# Patient Record
Sex: FEMALE | Race: BLACK OR AFRICAN AMERICAN | Employment: UNEMPLOYED | URBAN - METROPOLITAN AREA
[De-identification: names, ages, dates, MRNs, and addresses within clinical notes are randomized per-mention and may not be internally consistent; named-entity substitution may affect disease eponyms.]

---

## 2022-07-20 NOTE — LETTER
OUTSIDE TESTING RESULT REQUEST     IMPORTANT: FOR YOUR IMMEDIATE ATTENTION  Please FAX all test results listed below to: [phone]     Testing already done on or about: 24 @ 1300     * * * * If testing is NOT complete, arrange with patient A.S.A.P. * * * *      Patient Name: Ogunyemi,Oluwaseun Oluwadamilola  Surgery Date: 12/3/2024  Medical Record: [id number]  CSN: [id number]  : 8/3/1985 - A: 39 y     Sex: female  Surgeon(s):  Mahin Denis DO  Procedure: LAPAROSCOPIC CHOLECYSTECTOMY POSSIBLE OPEN WITH CHOLANGIOGRAM  Anesthesia Type: General     Surgeon: Mahin Denis DO     The following Testing and Time Line are REQUIRED PER ANESTHESIA     EKG READ AND SIGNED WITHIN   90 days      Thank You,   Sent by:July Bernard RN     Repair Type: Flap and Graft

## 2022-09-05 ENCOUNTER — HOSPITAL ENCOUNTER (INPATIENT)
Facility: HOSPITAL | Age: 37
LOS: 2 days | Discharge: HOME | DRG: 812 | End: 2022-09-07
Attending: EMERGENCY MEDICINE | Admitting: STUDENT IN AN ORGANIZED HEALTH CARE EDUCATION/TRAINING PROGRAM

## 2022-09-05 ENCOUNTER — APPOINTMENT (OUTPATIENT)
Dept: GENERAL RADIOLOGY | Facility: HOSPITAL | Age: 37
DRG: 812 | End: 2022-09-05
Attending: EMERGENCY MEDICINE

## 2022-09-05 DIAGNOSIS — J18.9 MULTIFOCAL PNEUMONIA: ICD-10-CM

## 2022-09-05 DIAGNOSIS — D57.00 SICKLE CELL CRISIS (HCC): Primary | ICD-10-CM

## 2022-09-05 DIAGNOSIS — R77.8 ELEVATED TROPONIN: ICD-10-CM

## 2022-09-05 DIAGNOSIS — D57.00 SICKLE CELL DISEASE WITH CRISIS (HCC): ICD-10-CM

## 2022-09-05 PROBLEM — R79.89 ELEVATED TROPONIN: Status: ACTIVE | Noted: 2022-09-05

## 2022-09-05 LAB
ALBUMIN SERPL-MCNC: 4 G/DL (ref 3.4–5)
ALBUMIN/GLOB SERPL: 0.7 {RATIO} (ref 1–2)
ALP LIVER SERPL-CCNC: 51 U/L
ALT SERPL-CCNC: 20 U/L
ANION GAP SERPL CALC-SCNC: 5 MMOL/L (ref 0–18)
AST SERPL-CCNC: 23 U/L (ref 15–37)
B-HCG UR QL: NEGATIVE
BASOPHILS # BLD AUTO: 0 X10(3) UL (ref 0–0.2)
BASOPHILS NFR BLD AUTO: 0 %
BILIRUB SERPL-MCNC: 0.6 MG/DL (ref 0.1–2)
BILIRUB UR QL STRIP.AUTO: NEGATIVE
BUN BLD-MCNC: 11 MG/DL (ref 7–18)
CALCIUM BLD-MCNC: 8.8 MG/DL (ref 8.5–10.1)
CHLORIDE SERPL-SCNC: 106 MMOL/L (ref 98–112)
CHOLEST SERPL-MCNC: 141 MG/DL (ref ?–200)
CLARITY UR REFRACT.AUTO: CLEAR
CO2 SERPL-SCNC: 26 MMOL/L (ref 21–32)
COLOR UR AUTO: YELLOW
CREAT BLD-MCNC: 0.6 MG/DL
DEPRECATED HBV CORE AB SER IA-ACNC: 395.5 NG/ML
EOSINOPHIL # BLD AUTO: 0.01 X10(3) UL (ref 0–0.7)
EOSINOPHIL NFR BLD AUTO: 0.2 %
ERYTHROCYTE [DISTWIDTH] IN BLOOD BY AUTOMATED COUNT: 14.6 %
GFR SERPLBLD BASED ON 1.73 SQ M-ARVRAT: 118 ML/MIN/1.73M2 (ref 60–?)
GLOBULIN PLAS-MCNC: 5.6 G/DL (ref 2.8–4.4)
GLUCOSE BLD-MCNC: 90 MG/DL (ref 70–99)
GLUCOSE UR STRIP.AUTO-MCNC: NEGATIVE MG/DL
HAPTOGLOB SERPL-MCNC: 60.5 MG/DL (ref 30–200)
HCT VFR BLD AUTO: 24.2 %
HDLC SERPL-MCNC: 34 MG/DL (ref 40–59)
HEMOGLOBIN S SICKLE CRISIS: 48.2 %
HGB BLD-MCNC: 8.7 G/DL
HGB RETIC QN AUTO: 42.3 PG (ref 28.2–36.6)
IMM GRANULOCYTES # BLD AUTO: 0.01 X10(3) UL (ref 0–1)
IMM GRANULOCYTES NFR BLD: 0.2 %
IMM RETICS NFR: 0.26 RATIO (ref 0.1–0.3)
IRON SATN MFR SERPL: 36 %
IRON SERPL-MCNC: 112 UG/DL
KETONES UR STRIP.AUTO-MCNC: NEGATIVE MG/DL
LDH SERPL L TO P-CCNC: 158 U/L
LDLC SERPL CALC-MCNC: 88 MG/DL (ref ?–100)
LEUKOCYTE ESTERASE UR QL STRIP.AUTO: NEGATIVE
LYMPHOCYTES # BLD AUTO: 2.04 X10(3) UL (ref 1–4)
LYMPHOCYTES NFR BLD AUTO: 39.3 %
MCH RBC QN AUTO: 42.2 PG (ref 26–34)
MCHC RBC AUTO-ENTMCNC: 36 G/DL (ref 31–37)
MCV RBC AUTO: 117.5 FL
MONOCYTES # BLD AUTO: 0.71 X10(3) UL (ref 0.1–1)
MONOCYTES NFR BLD AUTO: 13.7 %
NEUTROPHILS # BLD AUTO: 2.42 X10 (3) UL (ref 1.5–7.7)
NEUTROPHILS # BLD AUTO: 2.42 X10(3) UL (ref 1.5–7.7)
NEUTROPHILS NFR BLD AUTO: 46.6 %
NITRITE UR QL STRIP.AUTO: NEGATIVE
NONHDLC SERPL-MCNC: 107 MG/DL (ref ?–130)
OSMOLALITY SERPL CALC.SUM OF ELEC: 283 MOSM/KG (ref 275–295)
PH UR STRIP.AUTO: 7.5 [PH] (ref 5–8)
PLATELET # BLD AUTO: 350 10(3)UL (ref 150–450)
POTASSIUM SERPL-SCNC: 3.6 MMOL/L (ref 3.5–5.1)
PROT SERPL-MCNC: 9.6 G/DL (ref 6.4–8.2)
PROT UR STRIP.AUTO-MCNC: NEGATIVE MG/DL
RBC # BLD AUTO: 2.06 X10(6)UL
RBC UR QL AUTO: NEGATIVE
RETICS # AUTO: 56.9 X10(3) UL (ref 22.5–147.5)
RETICS/RBC NFR AUTO: 2.8 %
SARS-COV-2 RNA RESP QL NAA+PROBE: NOT DETECTED
SODIUM SERPL-SCNC: 137 MMOL/L (ref 136–145)
SP GR UR STRIP.AUTO: 1.01 (ref 1–1.03)
TIBC SERPL-MCNC: 310 UG/DL (ref 240–450)
TRANSFERRIN SERPL-MCNC: 208 MG/DL (ref 200–360)
TRIGL SERPL-MCNC: 99 MG/DL (ref 30–149)
TROPONIN I HIGH SENSITIVITY: 80 NG/L
TROPONIN I HIGH SENSITIVITY: 83 NG/L
UROBILINOGEN UR STRIP.AUTO-MCNC: 1 MG/DL
VLDLC SERPL CALC-MCNC: 16 MG/DL (ref 0–30)
WBC # BLD AUTO: 5.2 X10(3) UL (ref 4–11)

## 2022-09-05 PROCEDURE — 99222 1ST HOSP IP/OBS MODERATE 55: CPT | Performed by: STUDENT IN AN ORGANIZED HEALTH CARE EDUCATION/TRAINING PROGRAM

## 2022-09-05 PROCEDURE — 99223 1ST HOSP IP/OBS HIGH 75: CPT | Performed by: INTERNAL MEDICINE

## 2022-09-05 PROCEDURE — 71045 X-RAY EXAM CHEST 1 VIEW: CPT | Performed by: EMERGENCY MEDICINE

## 2022-09-05 RX ORDER — HYDROXYUREA 500 MG/1
2000 CAPSULE ORAL DAILY
Status: ON HOLD | COMMUNITY
End: 2022-09-06

## 2022-09-05 RX ORDER — HYDROMORPHONE HYDROCHLORIDE 1 MG/ML
1 INJECTION, SOLUTION INTRAMUSCULAR; INTRAVENOUS; SUBCUTANEOUS EVERY 30 MIN PRN
Status: DISCONTINUED | OUTPATIENT
Start: 2022-09-05 | End: 2022-09-05

## 2022-09-05 RX ORDER — DIPHENHYDRAMINE HYDROCHLORIDE 50 MG/ML
25 INJECTION INTRAMUSCULAR; INTRAVENOUS ONCE
Status: COMPLETED | OUTPATIENT
Start: 2022-09-05 | End: 2022-09-05

## 2022-09-05 RX ORDER — SODIUM PHOSPHATE, DIBASIC AND SODIUM PHOSPHATE, MONOBASIC 7; 19 G/133ML; G/133ML
1 ENEMA RECTAL ONCE AS NEEDED
Status: DISCONTINUED | OUTPATIENT
Start: 2022-09-05 | End: 2022-09-07

## 2022-09-05 RX ORDER — MORPHINE SULFATE 2 MG/ML
2 INJECTION, SOLUTION INTRAMUSCULAR; INTRAVENOUS EVERY 2 HOUR PRN
Status: DISCONTINUED | OUTPATIENT
Start: 2022-09-05 | End: 2022-09-06

## 2022-09-05 RX ORDER — MORPHINE SULFATE 2 MG/ML
1 INJECTION, SOLUTION INTRAMUSCULAR; INTRAVENOUS EVERY 2 HOUR PRN
Status: DISCONTINUED | OUTPATIENT
Start: 2022-09-05 | End: 2022-09-06

## 2022-09-05 RX ORDER — MORPHINE SULFATE 2 MG/ML
1 INJECTION, SOLUTION INTRAMUSCULAR; INTRAVENOUS EVERY 2 HOUR PRN
Status: DISCONTINUED | OUTPATIENT
Start: 2022-09-05 | End: 2022-09-05

## 2022-09-05 RX ORDER — FOLIC ACID 1 MG/1
150 TABLET ORAL DAILY
COMMUNITY
End: 2022-09-07

## 2022-09-05 RX ORDER — HYDROMORPHONE HYDROCHLORIDE 1 MG/ML
0.8 INJECTION, SOLUTION INTRAMUSCULAR; INTRAVENOUS; SUBCUTANEOUS EVERY 2 HOUR PRN
Status: DISCONTINUED | OUTPATIENT
Start: 2022-09-05 | End: 2022-09-05

## 2022-09-05 RX ORDER — OXYCODONE HYDROCHLORIDE 30 MG/1
10 TABLET ORAL EVERY 4 HOURS PRN
COMMUNITY
End: 2022-09-07

## 2022-09-05 RX ORDER — FOLIC ACID 1 MG/1
1 TABLET ORAL DAILY
Status: DISCONTINUED | OUTPATIENT
Start: 2022-09-05 | End: 2022-09-07

## 2022-09-05 RX ORDER — MORPHINE SULFATE 30 MG/1
30 TABLET ORAL EVERY 4 HOURS PRN
Status: ON HOLD | COMMUNITY
End: 2022-09-05 | Stop reason: CLARIF

## 2022-09-05 RX ORDER — SENNOSIDES 8.6 MG
17.2 TABLET ORAL NIGHTLY PRN
Status: DISCONTINUED | OUTPATIENT
Start: 2022-09-05 | End: 2022-09-07

## 2022-09-05 RX ORDER — MORPHINE SULFATE 2 MG/ML
0.5 INJECTION, SOLUTION INTRAMUSCULAR; INTRAVENOUS EVERY 2 HOUR PRN
Status: DISCONTINUED | OUTPATIENT
Start: 2022-09-05 | End: 2022-09-05

## 2022-09-05 RX ORDER — ECHINACEA PURPUREA EXTRACT 125 MG
1 TABLET ORAL
Status: DISCONTINUED | OUTPATIENT
Start: 2022-09-05 | End: 2022-09-07

## 2022-09-05 RX ORDER — MORPHINE SULFATE 2 MG/ML
2 INJECTION, SOLUTION INTRAMUSCULAR; INTRAVENOUS EVERY 2 HOUR PRN
Status: DISCONTINUED | OUTPATIENT
Start: 2022-09-05 | End: 2022-09-05

## 2022-09-05 RX ORDER — ACETAMINOPHEN 500 MG
500 TABLET ORAL EVERY 4 HOURS PRN
Status: DISCONTINUED | OUTPATIENT
Start: 2022-09-05 | End: 2022-09-07

## 2022-09-05 RX ORDER — DULOXETIN HYDROCHLORIDE 20 MG/1
40 CAPSULE, DELAYED RELEASE ORAL DAILY
Status: DISCONTINUED | OUTPATIENT
Start: 2022-09-05 | End: 2022-09-07

## 2022-09-05 RX ORDER — OXYCODONE HYDROCHLORIDE 5 MG/1
10 TABLET ORAL EVERY 4 HOURS PRN
Status: DISCONTINUED | OUTPATIENT
Start: 2022-09-05 | End: 2022-09-06

## 2022-09-05 RX ORDER — POLYETHYLENE GLYCOL 3350 17 G/17G
17 POWDER, FOR SOLUTION ORAL DAILY PRN
Status: DISCONTINUED | OUTPATIENT
Start: 2022-09-05 | End: 2022-09-07

## 2022-09-05 RX ORDER — DULOXETIN HYDROCHLORIDE 20 MG/1
20 CAPSULE, DELAYED RELEASE ORAL DAILY
COMMUNITY

## 2022-09-05 RX ORDER — MORPHINE SULFATE 4 MG/ML
4 INJECTION, SOLUTION INTRAMUSCULAR; INTRAVENOUS EVERY 2 HOUR PRN
Status: DISCONTINUED | OUTPATIENT
Start: 2022-09-05 | End: 2022-09-06

## 2022-09-05 RX ORDER — METOPROLOL SUCCINATE 25 MG/1
25 TABLET, EXTENDED RELEASE ORAL
Status: DISCONTINUED | OUTPATIENT
Start: 2022-09-05 | End: 2022-09-07

## 2022-09-05 RX ORDER — PROCHLORPERAZINE EDISYLATE 5 MG/ML
5 INJECTION INTRAMUSCULAR; INTRAVENOUS EVERY 8 HOURS PRN
Status: DISCONTINUED | OUTPATIENT
Start: 2022-09-05 | End: 2022-09-07

## 2022-09-05 RX ORDER — BISACODYL 10 MG
10 SUPPOSITORY, RECTAL RECTAL
Status: DISCONTINUED | OUTPATIENT
Start: 2022-09-05 | End: 2022-09-07

## 2022-09-05 RX ORDER — HYDROMORPHONE HYDROCHLORIDE 1 MG/ML
0.4 INJECTION, SOLUTION INTRAMUSCULAR; INTRAVENOUS; SUBCUTANEOUS EVERY 2 HOUR PRN
Status: DISCONTINUED | OUTPATIENT
Start: 2022-09-05 | End: 2022-09-05

## 2022-09-05 RX ORDER — METOPROLOL SUCCINATE 25 MG/1
25 TABLET, EXTENDED RELEASE ORAL DAILY
COMMUNITY

## 2022-09-05 RX ORDER — ENOXAPARIN SODIUM 100 MG/ML
40 INJECTION SUBCUTANEOUS DAILY
Status: DISCONTINUED | OUTPATIENT
Start: 2022-09-05 | End: 2022-09-07

## 2022-09-05 RX ORDER — LISINOPRIL 2.5 MG/1
2.5 TABLET ORAL DAILY
Status: DISCONTINUED | OUTPATIENT
Start: 2022-09-05 | End: 2022-09-07

## 2022-09-05 RX ORDER — ONDANSETRON 2 MG/ML
4 INJECTION INTRAMUSCULAR; INTRAVENOUS EVERY 6 HOURS PRN
Status: DISCONTINUED | OUTPATIENT
Start: 2022-09-05 | End: 2022-09-07

## 2022-09-05 RX ORDER — MORPHINE SULFATE 30 MG/1
30 TABLET, FILM COATED, EXTENDED RELEASE ORAL EVERY 12 HOURS SCHEDULED
Status: ON HOLD | COMMUNITY
End: 2022-09-06

## 2022-09-05 RX ORDER — GLUTAMINE 5 G/1
15 POWDER, FOR SOLUTION ORAL 2 TIMES DAILY
Status: ON HOLD | COMMUNITY
End: 2022-09-06

## 2022-09-05 RX ORDER — LISINOPRIL 2.5 MG/1
2.5 TABLET ORAL DAILY
COMMUNITY

## 2022-09-05 RX ORDER — MORPHINE SULFATE 15 MG/1
30 TABLET, FILM COATED, EXTENDED RELEASE ORAL EVERY 12 HOURS SCHEDULED
Status: DISCONTINUED | OUTPATIENT
Start: 2022-09-05 | End: 2022-09-07

## 2022-09-05 RX ORDER — UBIDECARENONE 75 MG
200 CAPSULE ORAL DAILY
Status: DISCONTINUED | OUTPATIENT
Start: 2022-09-05 | End: 2022-09-07

## 2022-09-05 RX ORDER — HYDROXYUREA 500 MG/1
2000 CAPSULE ORAL DAILY
Status: DISCONTINUED | OUTPATIENT
Start: 2022-09-05 | End: 2022-09-07

## 2022-09-05 RX ORDER — HYDROMORPHONE HYDROCHLORIDE 1 MG/ML
0.2 INJECTION, SOLUTION INTRAMUSCULAR; INTRAVENOUS; SUBCUTANEOUS EVERY 2 HOUR PRN
Status: DISCONTINUED | OUTPATIENT
Start: 2022-09-05 | End: 2022-09-05

## 2022-09-05 RX ORDER — SODIUM CHLORIDE 9 MG/ML
INJECTION, SOLUTION INTRAVENOUS CONTINUOUS
Status: DISCONTINUED | OUTPATIENT
Start: 2022-09-05 | End: 2022-09-07

## 2022-09-05 NOTE — PROGRESS NOTES
09/05/22 0340 09/05/22 0342 09/05/22 0344   Vital Signs   Pulse 91 88 103   Heart Rate Source Monitor  --   --    Resp 18  --   --    /64 113/75 105/71   MAP (mmHg) 74 82 78   BP Location Right arm Right arm Right arm   BP Method Automatic Automatic Automatic   Patient Position Lying Sitting Standing

## 2022-09-05 NOTE — ED QUICK NOTES
Orders for admission, patient is aware of plan and ready to go upstairs. Any questions, please call ED RN Jason  at extension [phone]. Vaccinated?   Type of COVID test sent:rapid  COVID Suspicion level: Low      Titratable drug(s) infusing:none  Rate:    LOC at time of transport:a&ox4    Other pertinent information:    CIWA score=  NIH score=

## 2022-09-05 NOTE — PLAN OF CARE
A&Ox4  O2 sat WNL on RA. . Pt states she has shortness of breath with ambulation   SR/ST on tele. Denies chest pain at this time  Continent of bladder and bowel   IVF infusing per order  Up with SBA. Fall precautions explained to pt.  Bed alarm on and call light in reach   Pt updated on POC     Problem: Patient/Family Goals  Goal: Patient/Family Long Term Goal  Description: Patient's Long Term Goal: to stay out of the hospital     Interventions:  - Follow up appointments  -Take medications as prescribed by MD     - See additional Care Plan goals for specific interventions  Outcome: Progressing  Goal: Patient/Family Short Term Goal  Description: Patient's Short Term Goal: To be pain free     Interventions:   - Pain control   - IV fluids   - Mds to see     - See additional Care Plan goals for specific interventions  Outcome: Progressing

## 2022-09-05 NOTE — ED INITIAL ASSESSMENT (HPI)
Sickle cell pain. Pt has has increased stress due to recent move. Pain in back, waist, bilateral legs and chest and left chest wall.

## 2022-09-06 ENCOUNTER — APPOINTMENT (OUTPATIENT)
Dept: CV DIAGNOSTICS | Facility: HOSPITAL | Age: 37
DRG: 812 | End: 2022-09-06
Attending: INTERNAL MEDICINE

## 2022-09-06 LAB
ALBUMIN SERPL-MCNC: 3.1 G/DL (ref 3.4–5)
ALBUMIN/GLOB SERPL: 0.7 {RATIO} (ref 1–2)
ALP LIVER SERPL-CCNC: 47 U/L
ALT SERPL-CCNC: 28 U/L
ANION GAP SERPL CALC-SCNC: 4 MMOL/L (ref 0–18)
AST SERPL-CCNC: 27 U/L (ref 15–37)
ATRIAL RATE: 118 BPM
ATRIAL RATE: 72 BPM
BASOPHILS # BLD AUTO: 0.01 X10(3) UL (ref 0–0.2)
BASOPHILS NFR BLD AUTO: 0.2 %
BILIRUB SERPL-MCNC: 0.4 MG/DL (ref 0.1–2)
BUN BLD-MCNC: 9 MG/DL (ref 7–18)
CALCIUM BLD-MCNC: 8.2 MG/DL (ref 8.5–10.1)
CHLORIDE SERPL-SCNC: 113 MMOL/L (ref 98–112)
CO2 SERPL-SCNC: 25 MMOL/L (ref 21–32)
CREAT BLD-MCNC: 0.61 MG/DL
EOSINOPHIL # BLD AUTO: 0.03 X10(3) UL (ref 0–0.7)
EOSINOPHIL NFR BLD AUTO: 0.7 %
ERYTHROCYTE [DISTWIDTH] IN BLOOD BY AUTOMATED COUNT: 14.7 %
GFR SERPLBLD BASED ON 1.73 SQ M-ARVRAT: 117 ML/MIN/1.73M2 (ref 60–?)
GLOBULIN PLAS-MCNC: 4.3 G/DL (ref 2.8–4.4)
GLUCOSE BLD-MCNC: 100 MG/DL (ref 70–99)
HCT VFR BLD AUTO: 21 %
HGB BLD-MCNC: 7.2 G/DL
HGB RETIC QN AUTO: 36.1 PG (ref 28.2–36.6)
IMM GRANULOCYTES # BLD AUTO: 0.02 X10(3) UL (ref 0–1)
IMM GRANULOCYTES NFR BLD: 0.5 %
IMM RETICS NFR: 0.32 RATIO (ref 0.1–0.3)
LYMPHOCYTES # BLD AUTO: 1.81 X10(3) UL (ref 1–4)
LYMPHOCYTES NFR BLD AUTO: 41.7 %
MCH RBC QN AUTO: 42.1 PG (ref 26–34)
MCHC RBC AUTO-ENTMCNC: 34.3 G/DL (ref 31–37)
MCV RBC AUTO: 122.8 FL
MONOCYTES # BLD AUTO: 0.35 X10(3) UL (ref 0.1–1)
MONOCYTES NFR BLD AUTO: 8.1 %
NEUTROPHILS # BLD AUTO: 2.12 X10 (3) UL (ref 1.5–7.7)
NEUTROPHILS # BLD AUTO: 2.12 X10(3) UL (ref 1.5–7.7)
NEUTROPHILS NFR BLD AUTO: 48.8 %
OSMOLALITY SERPL CALC.SUM OF ELEC: 293 MOSM/KG (ref 275–295)
P AXIS: 26 DEGREES
P AXIS: 56 DEGREES
P-R INTERVAL: 164 MS
P-R INTERVAL: 190 MS
PLATELET # BLD AUTO: 306 10(3)UL (ref 150–450)
POTASSIUM SERPL-SCNC: 3.7 MMOL/L (ref 3.5–5.1)
PROCALCITONIN SERPL-MCNC: <0.05 NG/ML (ref ?–0.16)
PROT SERPL-MCNC: 7.4 G/DL (ref 6.4–8.2)
Q-T INTERVAL: 306 MS
Q-T INTERVAL: 424 MS
QRS DURATION: 74 MS
QRS DURATION: 98 MS
QTC CALCULATION (BEZET): 428 MS
QTC CALCULATION (BEZET): 464 MS
R AXIS: 34 DEGREES
R AXIS: 36 DEGREES
RBC # BLD AUTO: 1.71 X10(6)UL
RETICS # AUTO: 40.6 X10(3) UL (ref 22.5–147.5)
RETICS/RBC NFR AUTO: 2.4 %
SODIUM SERPL-SCNC: 142 MMOL/L (ref 136–145)
T AXIS: -36 DEGREES
T AXIS: 10 DEGREES
TROPONIN I HIGH SENSITIVITY: 94 NG/L
VENTRICULAR RATE: 118 BPM
VENTRICULAR RATE: 72 BPM
WBC # BLD AUTO: 4.3 X10(3) UL (ref 4–11)

## 2022-09-06 PROCEDURE — 99232 SBSQ HOSP IP/OBS MODERATE 35: CPT | Performed by: INTERNAL MEDICINE

## 2022-09-06 PROCEDURE — 93306 TTE W/DOPPLER COMPLETE: CPT | Performed by: INTERNAL MEDICINE

## 2022-09-06 RX ORDER — MORPHINE SULFATE 4 MG/ML
6 INJECTION, SOLUTION INTRAMUSCULAR; INTRAVENOUS EVERY 2 HOUR PRN
Status: DISCONTINUED | OUTPATIENT
Start: 2022-09-06 | End: 2022-09-06

## 2022-09-06 RX ORDER — GLUTAMINE 5 G/1
15 POWDER, FOR SOLUTION ORAL 2 TIMES DAILY
Qty: 180 EACH | Refills: 0 | Status: SHIPPED | OUTPATIENT
Start: 2022-09-06 | End: 2022-09-26

## 2022-09-06 RX ORDER — OXYCODONE HYDROCHLORIDE 5 MG/1
10 TABLET ORAL
Status: DISCONTINUED | OUTPATIENT
Start: 2022-09-06 | End: 2022-09-07

## 2022-09-06 RX ORDER — OXYCODONE HYDROCHLORIDE 10 MG/1
10 TABLET ORAL
Qty: 90 TABLET | Refills: 0 | Status: SHIPPED | OUTPATIENT
Start: 2022-09-06 | End: 2022-09-07

## 2022-09-06 RX ORDER — MORPHINE SULFATE 30 MG/1
30 TABLET, FILM COATED, EXTENDED RELEASE ORAL EVERY 12 HOURS SCHEDULED
Qty: 60 TABLET | Refills: 0 | Status: SHIPPED | OUTPATIENT
Start: 2022-09-06 | End: 2022-09-21

## 2022-09-06 RX ORDER — MORPHINE SULFATE 4 MG/ML
4 INJECTION, SOLUTION INTRAMUSCULAR; INTRAVENOUS EVERY 2 HOUR PRN
Status: DISCONTINUED | OUTPATIENT
Start: 2022-09-06 | End: 2022-09-06

## 2022-09-06 RX ORDER — MORPHINE SULFATE 2 MG/ML
2 INJECTION, SOLUTION INTRAMUSCULAR; INTRAVENOUS EVERY 2 HOUR PRN
Status: DISCONTINUED | OUTPATIENT
Start: 2022-09-06 | End: 2022-09-06

## 2022-09-06 RX ORDER — HYDROXYUREA 500 MG/1
2000 CAPSULE ORAL DAILY
Qty: 120 CAPSULE | Refills: 2 | Status: SHIPPED | OUTPATIENT
Start: 2022-09-06

## 2022-09-06 RX ORDER — DIPHENHYDRAMINE HYDROCHLORIDE 50 MG/ML
12.5 INJECTION INTRAMUSCULAR; INTRAVENOUS EVERY 4 HOURS PRN
Status: DISCONTINUED | OUTPATIENT
Start: 2022-09-06 | End: 2022-09-07

## 2022-09-06 RX ORDER — ONDANSETRON 2 MG/ML
4 INJECTION INTRAMUSCULAR; INTRAVENOUS EVERY 6 HOURS PRN
Status: DISCONTINUED | OUTPATIENT
Start: 2022-09-06 | End: 2022-09-07

## 2022-09-06 RX ORDER — SODIUM CHLORIDE 9 MG/ML
INJECTION, SOLUTION INTRAVENOUS CONTINUOUS
Status: DISCONTINUED | OUTPATIENT
Start: 2022-09-06 | End: 2022-09-07

## 2022-09-06 RX ORDER — NALOXONE HYDROCHLORIDE 0.4 MG/ML
0.08 INJECTION, SOLUTION INTRAMUSCULAR; INTRAVENOUS; SUBCUTANEOUS
Status: DISCONTINUED | OUTPATIENT
Start: 2022-09-06 | End: 2022-09-07

## 2022-09-06 RX ORDER — FOLIC ACID 1 MG/1
1 TABLET ORAL DAILY
Qty: 90 TABLET | Refills: 3 | Status: SHIPPED | OUTPATIENT
Start: 2022-09-06

## 2022-09-06 RX ORDER — NALOXONE HYDROCHLORIDE 4 MG/.1ML
4 SPRAY NASAL AS NEEDED
Qty: 1 KIT | Refills: 0 | Status: SHIPPED | OUTPATIENT
Start: 2022-09-06

## 2022-09-06 NOTE — PLAN OF CARE
Pt alert x 4- tearful d/t severe pain 9/10  pt treated for sickle cell- vital signs stable- tele NSR-  Pt on oxycodone at home- med not ordered at this time- MD notified per pt requesting  Plan of care explained to pt  Pain control- morphine/ MS contin PRN  IVF infusing  Will con't to monitor  Problem: CARDIOVASCULAR - ADULT  Goal: Absence of cardiac arrhythmias or at baseline  Description: INTERVENTIONS:  - Continuous cardiac monitoring, monitor vital signs, obtain 12 lead EKG if indicated  - Evaluate effectiveness of antiarrhythmic and heart rate control medications as ordered  - Initiate emergency measures for life threatening arrhythmias  - Monitor electrolytes and administer replacement therapy as ordered  Outcome: Progressing     Problem: PAIN - ADULT  Goal: Verbalizes/displays adequate comfort level or patient's stated pain goal  Description: INTERVENTIONS:  - Encourage pt to monitor pain and request assistance  - Assess pain using appropriate pain scale  - Administer analgesics based on type and severity of pain and evaluate response  - Implement non-pharmacological measures as appropriate and evaluate response  - Consider cultural and social influences on pain and pain management  - Manage/alleviate anxiety  - Utilize distraction and/or relaxation techniques  - Monitor for opioid side effects  - Notify MD/LIP if interventions unsuccessful or patient reports new pain  - Anticipate increased pain with activity and pre-medicate as appropriate  Outcome: Progressing

## 2022-09-06 NOTE — PLAN OF CARE
Assumed pt care at 0730. A&Ox4. 2L for comfort/ sickle cell crisis, denies pain/SOB, lung sounds clear, VSS, NSR/ST on tele. Voids. Up with SBA. Pain mgmt (pt to be initiated on PCA pump, IV fluids, POC updated with pt, questions answered, verbalized understanding. Will continue to monitor. Problem: Patient/Family Goals  Goal: Patient/Family Long Term Goal  Description: Patient's Long Term Goal: to stay out of the hospital     Interventions:  - Follow up appointments  -Take medications as prescribed by MD     - See additional Care Plan goals for specific interventions  Outcome: Progressing  Goal: Patient/Family Short Term Goal  Description: Patient's Short Term Goal: To be pain free     Interventions:   - Pain control   - IV fluids   - Mds to see     - See additional Care Plan goals for specific interventions  Outcome: Progressing     Problem: CARDIOVASCULAR - ADULT  Goal: Maintains optimal cardiac output and hemodynamic stability  Description: INTERVENTIONS:  - Monitor vital signs, rhythm, and trends  - Monitor for bleeding, hypotension and signs of decreased cardiac output  - Evaluate effectiveness of vasoactive medications to optimize hemodynamic stability  - Monitor arterial and/or venous puncture sites for bleeding and/or hematoma  - Assess quality of pulses, skin color and temperature  - Assess for signs of decreased coronary artery perfusion - ex.  Angina  - Evaluate fluid balance, assess for edema, trend weights  Outcome: Progressing  Goal: Absence of cardiac arrhythmias or at baseline  Description: INTERVENTIONS:  - Continuous cardiac monitoring, monitor vital signs, obtain 12 lead EKG if indicated  - Evaluate effectiveness of antiarrhythmic and heart rate control medications as ordered  - Initiate emergency measures for life threatening arrhythmias  - Monitor electrolytes and administer replacement therapy as ordered  Outcome: Progressing     Problem: PAIN - ADULT  Goal: Verbalizes/displays adequate comfort level or patient's stated pain goal  Description: INTERVENTIONS:  - Encourage pt to monitor pain and request assistance  - Assess pain using appropriate pain scale  - Administer analgesics based on type and severity of pain and evaluate response  - Implement non-pharmacological measures as appropriate and evaluate response  - Consider cultural and social influences on pain and pain management  - Manage/alleviate anxiety  - Utilize distraction and/or relaxation techniques  - Monitor for opioid side effects  - Notify MD/LIP if interventions unsuccessful or patient reports new pain  - Anticipate increased pain with activity and pre-medicate as appropriate  Outcome: Progressing     Problem: RISK FOR INFECTION - ADULT  Goal: Absence of fever/infection during anticipated neutropenic period  Description: INTERVENTIONS  - Monitor WBC  - Administer growth factors as ordered  - Implement neutropenic guidelines  Outcome: Progressing     Problem: SAFETY ADULT - FALL  Goal: Free from fall injury  Description: INTERVENTIONS:  - Assess pt frequently for physical needs  - Identify cognitive and physical deficits and behaviors that affect risk of falls.   - [place] fall precautions as indicated by assessment.  - Educate pt/family on patient safety including physical limitations  - Instruct pt to call for assistance with activity based on assessment  - Modify environment to reduce risk of injury  - Provide assistive devices as appropriate  - Consider OT/PT consult to assist with strengthening/mobility  - Encourage toileting schedule  Outcome: Progressing     Problem: DISCHARGE PLANNING  Goal: Discharge to home or other facility with appropriate resources  Description: INTERVENTIONS:  - Identify barriers to discharge w/pt and caregiver  - Include patient/family/discharge partner in discharge planning  - Arrange for needed discharge resources and transportation as appropriate  - Identify discharge learning needs (meds, wound care, etc)  - Arrange for interpreters to assist at discharge as needed  - Consider post-discharge preferences of patient/family/discharge partner  - Complete POLST form as appropriate  - Assess patient's ability to be responsible for managing their own health  - Refer to Case Management Department for coordinating discharge planning if the patient needs post-hospital services based on physician/LIP order or complex needs related to functional status, cognitive ability or social support system  Outcome: Progressing

## 2022-09-06 NOTE — PROGRESS NOTES
[facility] Pharmacy Note:  Pain Consult    Eva Gonzalez is a 44year old patient started on Morphine PCA by Dr. Cole Landers. Pharmacy was consulted to review medication profile and to discontinue previously ordered narcotics and sedatives. Medication profile was reviewed and scheduled MScontin was held per Dr Cole Landers  and oxycodone 10 mg every 3 hours PRN was continued per Dr Cole Landers    Thank you for the consult.     Bayron Mcclain PharmD  9/6/20222:12 PM

## 2022-09-07 VITALS
WEIGHT: 162.94 LBS | DIASTOLIC BLOOD PRESSURE: 73 MMHG | SYSTOLIC BLOOD PRESSURE: 99 MMHG | TEMPERATURE: 97 F | OXYGEN SATURATION: 100 % | RESPIRATION RATE: 18 BRPM | HEART RATE: 82 BPM

## 2022-09-07 LAB
BASOPHILS # BLD AUTO: 0.01 X10(3) UL (ref 0–0.2)
BASOPHILS NFR BLD AUTO: 0.3 %
EOSINOPHIL # BLD AUTO: 0.03 X10(3) UL (ref 0–0.7)
EOSINOPHIL NFR BLD AUTO: 0.8 %
ERYTHROCYTE [DISTWIDTH] IN BLOOD BY AUTOMATED COUNT: 14.8 %
HCT VFR BLD AUTO: 21.2 %
HGB BLD-MCNC: 7.2 G/DL
IMM GRANULOCYTES # BLD AUTO: 0.01 X10(3) UL (ref 0–1)
IMM GRANULOCYTES NFR BLD: 0.3 %
LYMPHOCYTES # BLD AUTO: 1.48 X10(3) UL (ref 1–4)
LYMPHOCYTES NFR BLD AUTO: 41.6 %
MCH RBC QN AUTO: 41.6 PG (ref 26–34)
MCHC RBC AUTO-ENTMCNC: 34 G/DL (ref 31–37)
MCV RBC AUTO: 122.5 FL
MONOCYTES # BLD AUTO: 0.36 X10(3) UL (ref 0.1–1)
MONOCYTES NFR BLD AUTO: 10.1 %
NEUTROPHILS # BLD AUTO: 1.67 X10 (3) UL (ref 1.5–7.7)
NEUTROPHILS # BLD AUTO: 1.67 X10(3) UL (ref 1.5–7.7)
NEUTROPHILS NFR BLD AUTO: 46.9 %
PLATELET # BLD AUTO: 316 10(3)UL (ref 150–450)
RBC # BLD AUTO: 1.73 X10(6)UL
WBC # BLD AUTO: 3.6 X10(3) UL (ref 4–11)

## 2022-09-07 PROCEDURE — 99239 HOSP IP/OBS DSCHRG MGMT >30: CPT | Performed by: INTERNAL MEDICINE

## 2022-09-07 PROCEDURE — 99232 SBSQ HOSP IP/OBS MODERATE 35: CPT | Performed by: INTERNAL MEDICINE

## 2022-09-07 RX ORDER — MORPHINE SULFATE 15 MG/1
30 TABLET, FILM COATED, EXTENDED RELEASE ORAL EVERY 12 HOURS SCHEDULED
Status: DISCONTINUED | OUTPATIENT
Start: 2022-09-07 | End: 2022-09-07

## 2022-09-07 RX ORDER — OXYCODONE HYDROCHLORIDE 10 MG/1
10 TABLET ORAL
Qty: 90 TABLET | Refills: 0 | Status: SHIPPED | OUTPATIENT
Start: 2022-09-07

## 2022-09-07 NOTE — PLAN OF CARE
Explained discharge instructions including medications and follow-ups to the patient, verbalized understanding, IV removed, tele monitor discontinued, will be transported via wheelchair. Problem: Patient/Family Goals  Goal: Patient/Family Long Term Goal  Description: Patient's Long Term Goal: to stay out of the hospital     Interventions:  - Follow up appointments  -Take medications as prescribed by MD     - See additional Care Plan goals for specific interventions  9/7/2022 1449 by Valente Bean RN  Outcome: Completed  9/7/2022 1448 by Valente Bean RN  Outcome: Progressing  9/7/2022 1028 by Valente Bean RN  Outcome: Progressing  Goal: Patient/Family Short Term Goal  Description: Patient's Short Term Goal: To be pain free     Interventions:   - Pain control   - IV fluids   - Mds to see     - See additional Care Plan goals for specific interventions  9/7/2022 1449 by Valente Bean RN  Outcome: Completed  9/7/2022 1448 by Valente Bean RN  Outcome: Progressing  9/7/2022 1028 by Valente Bean RN  Outcome: Progressing     Problem: CARDIOVASCULAR - ADULT  Goal: Maintains optimal cardiac output and hemodynamic stability  Description: INTERVENTIONS:  - Monitor vital signs, rhythm, and trends  - Monitor for bleeding, hypotension and signs of decreased cardiac output  - Evaluate effectiveness of vasoactive medications to optimize hemodynamic stability  - Monitor arterial and/or venous puncture sites for bleeding and/or hematoma  - Assess quality of pulses, skin color and temperature  - Assess for signs of decreased coronary artery perfusion - ex.  Angina  - Evaluate fluid balance, assess for edema, trend weights  9/7/2022 1449 by Valente Bean RN  Outcome: Completed  9/7/2022 1448 by Valente Bean RN  Outcome: Progressing  9/7/2022 1028 by Valente Bean RN  Outcome: Progressing  Goal: Absence of cardiac arrhythmias or at baseline  Description: INTERVENTIONS:  - Continuous cardiac monitoring, monitor vital signs, obtain 12 lead EKG if indicated  - Evaluate effectiveness of antiarrhythmic and heart rate control medications as ordered  - Initiate emergency measures for life threatening arrhythmias  - Monitor electrolytes and administer replacement therapy as ordered  9/7/2022 1449 by Enrike Muller RN  Outcome: Completed  9/7/2022 1448 by Enrike Muller RN  Outcome: Progressing  9/7/2022 1028 by Enrike Muller RN  Outcome: Progressing     Problem: PAIN - ADULT  Goal: Verbalizes/displays adequate comfort level or patient's stated pain goal  Description: INTERVENTIONS:  - Encourage pt to monitor pain and request assistance  - Assess pain using appropriate pain scale  - Administer analgesics based on type and severity of pain and evaluate response  - Implement non-pharmacological measures as appropriate and evaluate response  - Consider cultural and social influences on pain and pain management  - Manage/alleviate anxiety  - Utilize distraction and/or relaxation techniques  - Monitor for opioid side effects  - Notify MD/LIP if interventions unsuccessful or patient reports new pain  - Anticipate increased pain with activity and pre-medicate as appropriate  9/7/2022 1449 by Enrike Muller RN  Outcome: Completed  9/7/2022 1448 by Enrike Muller RN  Outcome: Progressing  9/7/2022 1028 by Enrike Muller RN  Outcome: Progressing     Problem: RISK FOR INFECTION - ADULT  Goal: Absence of fever/infection during anticipated neutropenic period  Description: INTERVENTIONS  - Monitor WBC  - Administer growth factors as ordered  - Implement neutropenic guidelines  9/7/2022 1449 by Enrike Muller RN  Outcome: Completed  9/7/2022 1448 by Enrike Muller RN  Outcome: Progressing  9/7/2022 1028 by Enrike Muller RN  Outcome: Progressing     Problem: SAFETY ADULT - FALL  Goal: Free from fall injury  Description: INTERVENTIONS:  - Assess pt frequently for physical needs  - Identify cognitive and physical deficits and behaviors that affect risk of falls.   - [place] fall precautions as indicated by assessment.  - Educate pt/family on patient safety including physical limitations  - Instruct pt to call for assistance with activity based on assessment  - Modify environment to reduce risk of injury  - Provide assistive devices as appropriate  - Consider OT/PT consult to assist with strengthening/mobility  - Encourage toileting schedule  9/7/2022 1449 by Evelia Hayes RN  Outcome: Completed  9/7/2022 1448 by Evelia Hayes RN  Outcome: Progressing  9/7/2022 1028 by Evelia Hayes RN  Outcome: Progressing     Problem: DISCHARGE PLANNING  Goal: Discharge to home or other facility with appropriate resources  Description: INTERVENTIONS:  - Identify barriers to discharge w/pt and caregiver  - Include patient/family/discharge partner in discharge planning  - Arrange for needed discharge resources and transportation as appropriate  - Identify discharge learning needs (meds, wound care, etc)  - Arrange for interpreters to assist at discharge as needed  - Consider post-discharge preferences of patient/family/discharge partner  - Complete POLST form as appropriate  - Assess patient's ability to be responsible for managing their own health  - Refer to Case Management Department for coordinating discharge planning if the patient needs post-hospital services based on physician/LIP order or complex needs related to functional status, cognitive ability or social support system  9/7/2022 1449 by Evelia Hayes RN  Outcome: Completed  9/7/2022 1448 by Evelia Hayes RN  Outcome: Progressing  9/7/2022 1028 by Evelia Hayes RN  Outcome: Progressing

## 2022-09-07 NOTE — CM/SW NOTE
Received call from Ayesha Coughlin ([phone]) Laquita LIRA who is able to offer discharge planning assistance. SW will remain available.      JAMEEL Childress  Discharge Planner  [phone]

## 2022-09-07 NOTE — PLAN OF CARE
Assumed pt care at 0730. A&Ox4. 2L for comfort/ sickle cell crisis, denies pain/SOB, lung sounds clear, VSS, NSR/ST on tele. Voids. Up with SBA. Pain mgmt (PCA pump PO morphine, PRN oxy), IV fluids, potential dc. POC updated with pt, questions answered, verbalized understanding. Will continue to monitor. Problem: Patient/Family Goals  Goal: Patient/Family Long Term Goal  Description: Patient's Long Term Goal: to stay out of the hospital     Interventions:  - Follow up appointments  -Take medications as prescribed by MD     - See additional Care Plan goals for specific interventions  Outcome: Progressing  Goal: Patient/Family Short Term Goal  Description: Patient's Short Term Goal: To be pain free     Interventions:   - Pain control   - IV fluids   - Mds to see     - See additional Care Plan goals for specific interventions  Outcome: Progressing     Problem: CARDIOVASCULAR - ADULT  Goal: Maintains optimal cardiac output and hemodynamic stability  Description: INTERVENTIONS:  - Monitor vital signs, rhythm, and trends  - Monitor for bleeding, hypotension and signs of decreased cardiac output  - Evaluate effectiveness of vasoactive medications to optimize hemodynamic stability  - Monitor arterial and/or venous puncture sites for bleeding and/or hematoma  - Assess quality of pulses, skin color and temperature  - Assess for signs of decreased coronary artery perfusion - ex.  Angina  - Evaluate fluid balance, assess for edema, trend weights  Outcome: Progressing  Goal: Absence of cardiac arrhythmias or at baseline  Description: INTERVENTIONS:  - Continuous cardiac monitoring, monitor vital signs, obtain 12 lead EKG if indicated  - Evaluate effectiveness of antiarrhythmic and heart rate control medications as ordered  - Initiate emergency measures for life threatening arrhythmias  - Monitor electrolytes and administer replacement therapy as ordered  Outcome: Progressing     Problem: PAIN - ADULT  Goal: Verbalizes/displays adequate comfort level or patient's stated pain goal  Description: INTERVENTIONS:  - Encourage pt to monitor pain and request assistance  - Assess pain using appropriate pain scale  - Administer analgesics based on type and severity of pain and evaluate response  - Implement non-pharmacological measures as appropriate and evaluate response  - Consider cultural and social influences on pain and pain management  - Manage/alleviate anxiety  - Utilize distraction and/or relaxation techniques  - Monitor for opioid side effects  - Notify MD/LIP if interventions unsuccessful or patient reports new pain  - Anticipate increased pain with activity and pre-medicate as appropriate  Outcome: Progressing     Problem: RISK FOR INFECTION - ADULT  Goal: Absence of fever/infection during anticipated neutropenic period  Description: INTERVENTIONS  - Monitor WBC  - Administer growth factors as ordered  - Implement neutropenic guidelines  Outcome: Progressing     Problem: SAFETY ADULT - FALL  Goal: Free from fall injury  Description: INTERVENTIONS:  - Assess pt frequently for physical needs  - Identify cognitive and physical deficits and behaviors that affect risk of falls.   - [place] fall precautions as indicated by assessment.  - Educate pt/family on patient safety including physical limitations  - Instruct pt to call for assistance with activity based on assessment  - Modify environment to reduce risk of injury  - Provide assistive devices as appropriate  - Consider OT/PT consult to assist with strengthening/mobility  - Encourage toileting schedule  Outcome: Progressing     Problem: DISCHARGE PLANNING  Goal: Discharge to home or other facility with appropriate resources  Description: INTERVENTIONS:  - Identify barriers to discharge w/pt and caregiver  - Include patient/family/discharge partner in discharge planning  - Arrange for needed discharge resources and transportation as appropriate  - Identify discharge learning needs (meds, wound care, etc)  - Arrange for interpreters to assist at discharge as needed  - Consider post-discharge preferences of patient/family/discharge partner  - Complete POLST form as appropriate  - Assess patient's ability to be responsible for managing their own health  - Refer to Case Management Department for coordinating discharge planning if the patient needs post-hospital services based on physician/LIP order or complex needs related to functional status, cognitive ability or social support system  Outcome: Progressing

## 2022-09-08 LAB
HGB A2 MFR BLD: 1.3 % (ref 1.5–3.5)
HGB F MFR BLD: 45 % (ref 0–2)
HGB PNL BLD ELPH: <1 % (ref 95.5–100)
HGB S MFR BLD: 53.7 %

## 2022-09-19 ENCOUNTER — APPOINTMENT (OUTPATIENT)
Dept: CT IMAGING | Facility: HOSPITAL | Age: 37
End: 2022-09-19
Attending: EMERGENCY MEDICINE

## 2022-09-19 ENCOUNTER — APPOINTMENT (OUTPATIENT)
Dept: CV DIAGNOSTICS | Facility: HOSPITAL | Age: 37
End: 2022-09-19
Attending: EMERGENCY MEDICINE

## 2022-09-19 ENCOUNTER — APPOINTMENT (OUTPATIENT)
Dept: GENERAL RADIOLOGY | Facility: HOSPITAL | Age: 37
End: 2022-09-19
Attending: EMERGENCY MEDICINE

## 2022-09-19 ENCOUNTER — HOSPITAL ENCOUNTER (EMERGENCY)
Facility: HOSPITAL | Age: 37
Discharge: HOME | End: 2022-09-19
Attending: EMERGENCY MEDICINE

## 2022-09-19 VITALS
SYSTOLIC BLOOD PRESSURE: 93 MMHG | HEART RATE: 76 BPM | HEIGHT: 66 IN | BODY MASS INDEX: 25.55 KG/M2 | DIASTOLIC BLOOD PRESSURE: 67 MMHG | WEIGHT: 159 LBS | RESPIRATION RATE: 16 BRPM | TEMPERATURE: 98 F | OXYGEN SATURATION: 100 %

## 2022-09-19 DIAGNOSIS — D57.00 SICKLE CELL CRISIS (HCC): ICD-10-CM

## 2022-09-19 DIAGNOSIS — R07.9 CHEST PAIN OF UNCERTAIN ETIOLOGY: Primary | ICD-10-CM

## 2022-09-19 PROBLEM — R73.9 HYPERGLYCEMIA: Status: ACTIVE | Noted: 2022-09-19

## 2022-09-19 PROBLEM — R79.89 AZOTEMIA: Status: ACTIVE | Noted: 2022-09-19

## 2022-09-19 PROBLEM — E87.6 HYPOKALEMIA: Status: ACTIVE | Noted: 2022-09-19

## 2022-09-19 LAB
ALBUMIN SERPL-MCNC: 3.9 G/DL (ref 3.4–5)
ALBUMIN/GLOB SERPL: 0.8 {RATIO} (ref 1–2)
ALP LIVER SERPL-CCNC: 47 U/L
ALT SERPL-CCNC: 19 U/L
ANION GAP SERPL CALC-SCNC: 7 MMOL/L (ref 0–18)
AST SERPL-CCNC: 24 U/L (ref 15–37)
ATRIAL RATE: 85 BPM
B-HCG UR QL: NEGATIVE
BASOPHILS # BLD AUTO: 0.01 X10(3) UL (ref 0–0.2)
BASOPHILS NFR BLD AUTO: 0.2 %
BILIRUB SERPL-MCNC: 0.5 MG/DL (ref 0.1–2)
BILIRUB UR QL STRIP.AUTO: NEGATIVE
BUN BLD-MCNC: 13 MG/DL (ref 7–18)
CALCIUM BLD-MCNC: 9.2 MG/DL (ref 8.5–10.1)
CHLORIDE SERPL-SCNC: 106 MMOL/L (ref 98–112)
CHOLEST SERPL-MCNC: 146 MG/DL (ref ?–200)
CLARITY UR REFRACT.AUTO: CLEAR
CO2 SERPL-SCNC: 26 MMOL/L (ref 21–32)
COLOR UR AUTO: YELLOW
CREAT BLD-MCNC: 0.62 MG/DL
D DIMER PPP FEU-MCNC: 1.04 UG/ML FEU (ref ?–0.5)
EOSINOPHIL # BLD AUTO: 0.01 X10(3) UL (ref 0–0.7)
EOSINOPHIL NFR BLD AUTO: 0.2 %
ERYTHROCYTE [DISTWIDTH] IN BLOOD BY AUTOMATED COUNT: 15.1 %
GFR SERPLBLD BASED ON 1.73 SQ M-ARVRAT: 117 ML/MIN/1.73M2 (ref 60–?)
GLOBULIN PLAS-MCNC: 5.2 G/DL (ref 2.8–4.4)
GLUCOSE BLD-MCNC: 103 MG/DL (ref 70–99)
GLUCOSE UR STRIP.AUTO-MCNC: NEGATIVE MG/DL
HCT VFR BLD AUTO: 24.3 %
HDLC SERPL-MCNC: 34 MG/DL (ref 40–59)
HGB BLD-MCNC: 8.5 G/DL
HGB RETIC QN AUTO: 39.2 PG (ref 28.2–36.6)
IMM GRANULOCYTES # BLD AUTO: 0.01 X10(3) UL (ref 0–1)
IMM GRANULOCYTES NFR BLD: 0.2 %
IMM RETICS NFR: 0.4 RATIO (ref 0.1–0.3)
KETONES UR STRIP.AUTO-MCNC: NEGATIVE MG/DL
LDLC SERPL CALC-MCNC: 91 MG/DL (ref ?–100)
LEUKOCYTE ESTERASE UR QL STRIP.AUTO: NEGATIVE
LYMPHOCYTES # BLD AUTO: 1.69 X10(3) UL (ref 1–4)
LYMPHOCYTES NFR BLD AUTO: 37.4 %
MCH RBC QN AUTO: 42.1 PG (ref 26–34)
MCHC RBC AUTO-ENTMCNC: 35 G/DL (ref 31–37)
MCV RBC AUTO: 120.3 FL
MONOCYTES # BLD AUTO: 0.5 X10(3) UL (ref 0.1–1)
MONOCYTES NFR BLD AUTO: 11.1 %
NEUTROPHILS # BLD AUTO: 2.3 X10 (3) UL (ref 1.5–7.7)
NEUTROPHILS # BLD AUTO: 2.3 X10(3) UL (ref 1.5–7.7)
NEUTROPHILS NFR BLD AUTO: 50.9 %
NITRITE UR QL STRIP.AUTO: NEGATIVE
NONHDLC SERPL-MCNC: 112 MG/DL (ref ?–130)
OSMOLALITY SERPL CALC.SUM OF ELEC: 288 MOSM/KG (ref 275–295)
P AXIS: 41 DEGREES
P-R INTERVAL: 174 MS
PH UR STRIP.AUTO: 7 [PH] (ref 5–8)
PLATELET # BLD AUTO: 405 10(3)UL (ref 150–450)
POTASSIUM SERPL-SCNC: 3.4 MMOL/L (ref 3.5–5.1)
PROT SERPL-MCNC: 9.1 G/DL (ref 6.4–8.2)
PROT UR STRIP.AUTO-MCNC: NEGATIVE MG/DL
Q-T INTERVAL: 364 MS
QRS DURATION: 90 MS
QTC CALCULATION (BEZET): 433 MS
R AXIS: 18 DEGREES
RBC # BLD AUTO: 2.02 X10(6)UL
RBC UR QL AUTO: NEGATIVE
RETICS # AUTO: 81.6 X10(3) UL (ref 22.5–147.5)
RETICS/RBC NFR AUTO: 4 %
SARS-COV-2 RNA RESP QL NAA+PROBE: NOT DETECTED
SODIUM SERPL-SCNC: 139 MMOL/L (ref 136–145)
SP GR UR STRIP.AUTO: 1.01 (ref 1–1.03)
T AXIS: 10 DEGREES
TRIGL SERPL-MCNC: 114 MG/DL (ref 30–149)
TROPONIN I HIGH SENSITIVITY: 101 NG/L
TROPONIN I HIGH SENSITIVITY: 99 NG/L
UROBILINOGEN UR STRIP.AUTO-MCNC: 1 MG/DL
VENTRICULAR RATE: 85 BPM
VLDLC SERPL CALC-MCNC: 18 MG/DL (ref 0–30)
WBC # BLD AUTO: 4.5 X10(3) UL (ref 4–11)

## 2022-09-19 PROCEDURE — 96361 HYDRATE IV INFUSION ADD-ON: CPT

## 2022-09-19 PROCEDURE — 80061 LIPID PANEL: CPT | Performed by: EMERGENCY MEDICINE

## 2022-09-19 PROCEDURE — 80053 COMPREHEN METABOLIC PANEL: CPT | Performed by: EMERGENCY MEDICINE

## 2022-09-19 PROCEDURE — 93350 STRESS TTE ONLY: CPT | Performed by: EMERGENCY MEDICINE

## 2022-09-19 PROCEDURE — 71275 CT ANGIOGRAPHY CHEST: CPT | Performed by: EMERGENCY MEDICINE

## 2022-09-19 PROCEDURE — 99285 EMERGENCY DEPT VISIT HI MDM: CPT

## 2022-09-19 PROCEDURE — 85025 COMPLETE CBC W/AUTO DIFF WBC: CPT | Performed by: EMERGENCY MEDICINE

## 2022-09-19 PROCEDURE — 85379 FIBRIN DEGRADATION QUANT: CPT | Performed by: EMERGENCY MEDICINE

## 2022-09-19 PROCEDURE — 93018 CV STRESS TEST I&R ONLY: CPT | Performed by: EMERGENCY MEDICINE

## 2022-09-19 PROCEDURE — 85045 AUTOMATED RETICULOCYTE COUNT: CPT | Performed by: EMERGENCY MEDICINE

## 2022-09-19 PROCEDURE — 96375 TX/PRO/DX INJ NEW DRUG ADDON: CPT

## 2022-09-19 PROCEDURE — 81003 URINALYSIS AUTO W/O SCOPE: CPT | Performed by: EMERGENCY MEDICINE

## 2022-09-19 PROCEDURE — 71045 X-RAY EXAM CHEST 1 VIEW: CPT | Performed by: EMERGENCY MEDICINE

## 2022-09-19 PROCEDURE — 96376 TX/PRO/DX INJ SAME DRUG ADON: CPT

## 2022-09-19 PROCEDURE — 93017 CV STRESS TEST TRACING ONLY: CPT | Performed by: EMERGENCY MEDICINE

## 2022-09-19 PROCEDURE — 84484 ASSAY OF TROPONIN QUANT: CPT | Performed by: EMERGENCY MEDICINE

## 2022-09-19 PROCEDURE — 93005 ELECTROCARDIOGRAM TRACING: CPT

## 2022-09-19 PROCEDURE — 81025 URINE PREGNANCY TEST: CPT

## 2022-09-19 PROCEDURE — 93010 ELECTROCARDIOGRAM REPORT: CPT

## 2022-09-19 PROCEDURE — 96374 THER/PROPH/DIAG INJ IV PUSH: CPT

## 2022-09-19 RX ORDER — SODIUM CHLORIDE 9 MG/ML
125 INJECTION, SOLUTION INTRAVENOUS CONTINUOUS
Status: DISCONTINUED | OUTPATIENT
Start: 2022-09-19 | End: 2022-09-19

## 2022-09-19 RX ORDER — ASPIRIN 81 MG/1
324 TABLET, CHEWABLE ORAL ONCE
Status: COMPLETED | OUTPATIENT
Start: 2022-09-19 | End: 2022-09-19

## 2022-09-19 RX ORDER — MORPHINE SULFATE 4 MG/ML
4 INJECTION, SOLUTION INTRAMUSCULAR; INTRAVENOUS EVERY 30 MIN PRN
Status: COMPLETED | OUTPATIENT
Start: 2022-09-19 | End: 2022-09-19

## 2022-09-19 RX ORDER — SODIUM CHLORIDE 9 MG/ML
INJECTION, SOLUTION INTRAVENOUS CONTINUOUS
Status: CANCELLED | OUTPATIENT
Start: 2022-09-19 | End: 2022-09-19

## 2022-09-19 RX ORDER — OXYCODONE AND ACETAMINOPHEN 10; 325 MG/1; MG/1
1-2 TABLET ORAL EVERY 6 HOURS PRN
Qty: 10 TABLET | Refills: 0 | Status: SHIPPED | OUTPATIENT
Start: 2022-09-19 | End: 2022-09-21

## 2022-09-19 RX ORDER — POTASSIUM CHLORIDE 20 MEQ/1
40 TABLET, EXTENDED RELEASE ORAL ONCE
Status: COMPLETED | OUTPATIENT
Start: 2022-09-19 | End: 2022-09-19

## 2022-09-19 RX ORDER — ONDANSETRON 2 MG/ML
4 INJECTION INTRAMUSCULAR; INTRAVENOUS ONCE
Status: COMPLETED | OUTPATIENT
Start: 2022-09-19 | End: 2022-09-19

## 2022-09-19 RX ORDER — IOHEXOL 350 MG/ML
100 INJECTION, SOLUTION INTRAVENOUS
Status: COMPLETED | OUTPATIENT
Start: 2022-09-19 | End: 2022-09-19

## 2022-09-19 RX ORDER — DIPHENHYDRAMINE HYDROCHLORIDE 50 MG/ML
INJECTION INTRAMUSCULAR; INTRAVENOUS
Status: COMPLETED
Start: 2022-09-19 | End: 2022-09-19

## 2022-09-19 RX ORDER — ONDANSETRON 2 MG/ML
INJECTION INTRAMUSCULAR; INTRAVENOUS
Status: COMPLETED
Start: 2022-09-19 | End: 2022-09-19

## 2022-09-19 RX ORDER — MORPHINE SULFATE 4 MG/ML
4 INJECTION, SOLUTION INTRAMUSCULAR; INTRAVENOUS EVERY 30 MIN PRN
Status: CANCELLED | OUTPATIENT
Start: 2022-09-19 | End: 2022-09-19

## 2022-09-19 RX ORDER — DIPHENHYDRAMINE HYDROCHLORIDE 50 MG/ML
25 INJECTION INTRAMUSCULAR; INTRAVENOUS ONCE
Status: COMPLETED | OUTPATIENT
Start: 2022-09-19 | End: 2022-09-19

## 2022-09-19 RX ORDER — ONDANSETRON 2 MG/ML
4 INJECTION INTRAMUSCULAR; INTRAVENOUS EVERY 4 HOURS PRN
Status: CANCELLED | OUTPATIENT
Start: 2022-09-19 | End: 2022-09-19

## 2022-09-19 NOTE — ED INITIAL ASSESSMENT (HPI)
Patient here with c/o chest pain and sickle cell pain. Patient reports that she has had the sickle cell pain x 1 week. Chest pain started a couple hours ago, midsternal and radiating down right arm. Patient also nauseated.

## 2022-09-19 NOTE — PLAN OF CARE
Stress echo reviewed by Dr Elba Barrientos. Normal study. No further cardiac work up required at this time. Cardiology will sign off. Please call if further assistance needed.      RAQUEL Carrington  9/19/2022  1:31 PM

## 2022-09-20 ENCOUNTER — HOSPITAL ENCOUNTER (INPATIENT)
Facility: HOSPITAL | Age: 37
LOS: 1 days | Discharge: HOME | End: 2022-09-21
Attending: EMERGENCY MEDICINE | Admitting: HOSPITALIST
Payer: COMMERCIAL

## 2022-09-20 DIAGNOSIS — R11.2 NAUSEA VOMITING AND DIARRHEA: ICD-10-CM

## 2022-09-20 DIAGNOSIS — D57.00 SICKLE CELL CRISIS (HCC): ICD-10-CM

## 2022-09-20 DIAGNOSIS — R19.7 NAUSEA VOMITING AND DIARRHEA: ICD-10-CM

## 2022-09-20 DIAGNOSIS — D57.00 SICKLE CELL PAIN CRISIS (HCC): Primary | ICD-10-CM

## 2022-09-20 LAB
ALBUMIN SERPL-MCNC: 4.2 G/DL (ref 3.4–5)
ALBUMIN/GLOB SERPL: 0.8 {RATIO} (ref 1–2)
ALP LIVER SERPL-CCNC: 50 U/L
ALT SERPL-CCNC: 23 U/L
ANION GAP SERPL CALC-SCNC: 6 MMOL/L (ref 0–18)
AST SERPL-CCNC: 24 U/L (ref 15–37)
ATRIAL RATE: 82 BPM
B-HCG UR QL: NEGATIVE
BASOPHILS # BLD AUTO: 0 X10(3) UL (ref 0–0.2)
BASOPHILS NFR BLD AUTO: 0 %
BILIRUB SERPL-MCNC: 0.7 MG/DL (ref 0.1–2)
BILIRUB UR QL STRIP.AUTO: NEGATIVE
BUN BLD-MCNC: 9 MG/DL (ref 7–18)
CALCIUM BLD-MCNC: 9.9 MG/DL (ref 8.5–10.1)
CHLORIDE SERPL-SCNC: 108 MMOL/L (ref 98–112)
CLARITY UR REFRACT.AUTO: CLEAR
CO2 SERPL-SCNC: 25 MMOL/L (ref 21–32)
COLOR UR AUTO: YELLOW
CREAT BLD-MCNC: 0.56 MG/DL
EOSINOPHIL # BLD AUTO: 0 X10(3) UL (ref 0–0.7)
EOSINOPHIL NFR BLD AUTO: 0 %
ERYTHROCYTE [DISTWIDTH] IN BLOOD BY AUTOMATED COUNT: 14.8 %
GFR SERPLBLD BASED ON 1.73 SQ M-ARVRAT: 120 ML/MIN/1.73M2 (ref 60–?)
GLOBULIN PLAS-MCNC: 5.2 G/DL (ref 2.8–4.4)
GLUCOSE BLD-MCNC: 111 MG/DL (ref 70–99)
GLUCOSE UR STRIP.AUTO-MCNC: NEGATIVE MG/DL
HCT VFR BLD AUTO: 24.3 %
HEMOGLOBIN S SICKLE CRISIS: 49.2 %
HGB BLD-MCNC: 8.6 G/DL
HGB RETIC QN AUTO: 47.1 PG (ref 28.2–36.6)
IMM GRANULOCYTES # BLD AUTO: 0.05 X10(3) UL (ref 0–1)
IMM GRANULOCYTES NFR BLD: 0.7 %
IMM RETICS NFR: 0.38 RATIO (ref 0.1–0.3)
KETONES UR STRIP.AUTO-MCNC: NEGATIVE MG/DL
LEUKOCYTE ESTERASE UR QL STRIP.AUTO: NEGATIVE
LIPASE SERPL-CCNC: 135 U/L (ref 73–393)
LYMPHOCYTES # BLD AUTO: 0.58 X10(3) UL (ref 1–4)
LYMPHOCYTES NFR BLD AUTO: 8.1 %
MCH RBC QN AUTO: 42.4 PG (ref 26–34)
MCHC RBC AUTO-ENTMCNC: 35.4 G/DL (ref 31–37)
MCV RBC AUTO: 119.7 FL
MONOCYTES # BLD AUTO: 0.45 X10(3) UL (ref 0.1–1)
MONOCYTES NFR BLD AUTO: 6.3 %
NEUTROPHILS # BLD AUTO: 6.07 X10 (3) UL (ref 1.5–7.7)
NEUTROPHILS # BLD AUTO: 6.07 X10(3) UL (ref 1.5–7.7)
NEUTROPHILS NFR BLD AUTO: 84.9 %
NITRITE UR QL STRIP.AUTO: NEGATIVE
OSMOLALITY SERPL CALC.SUM OF ELEC: 287 MOSM/KG (ref 275–295)
P AXIS: 20 DEGREES
P-R INTERVAL: 180 MS
PH UR STRIP.AUTO: 6.5 [PH] (ref 5–8)
PLATELET # BLD AUTO: 424 10(3)UL (ref 150–450)
POTASSIUM SERPL-SCNC: 3.9 MMOL/L (ref 3.5–5.1)
PROT SERPL-MCNC: 9.4 G/DL (ref 6.4–8.2)
Q-T INTERVAL: 404 MS
QRS DURATION: 74 MS
QTC CALCULATION (BEZET): 472 MS
R AXIS: 26 DEGREES
RBC # BLD AUTO: 2.03 X10(6)UL
RBC UR QL AUTO: NEGATIVE
RETICS # AUTO: 78.4 X10(3) UL (ref 22.5–147.5)
RETICS/RBC NFR AUTO: 3.9 %
SODIUM SERPL-SCNC: 139 MMOL/L (ref 136–145)
SP GR UR STRIP.AUTO: 1.01 (ref 1–1.03)
T AXIS: -32 DEGREES
UROBILINOGEN UR STRIP.AUTO-MCNC: 1 MG/DL
VENTRICULAR RATE: 82 BPM
WBC # BLD AUTO: 7.2 X10(3) UL (ref 4–11)

## 2022-09-20 PROCEDURE — 99254 IP/OBS CNSLTJ NEW/EST MOD 60: CPT | Performed by: NURSE PRACTITIONER

## 2022-09-20 PROCEDURE — 99223 1ST HOSP IP/OBS HIGH 75: CPT | Performed by: INTERNAL MEDICINE

## 2022-09-20 RX ORDER — METOPROLOL SUCCINATE 25 MG/1
25 TABLET, EXTENDED RELEASE ORAL
Status: DISCONTINUED | OUTPATIENT
Start: 2022-09-20 | End: 2022-09-21

## 2022-09-20 RX ORDER — MORPHINE SULFATE 30 MG/1
30 TABLET, FILM COATED, EXTENDED RELEASE ORAL EVERY 12 HOURS SCHEDULED
Status: DISCONTINUED | OUTPATIENT
Start: 2022-09-20 | End: 2022-09-21

## 2022-09-20 RX ORDER — ONDANSETRON 2 MG/ML
4 INJECTION INTRAMUSCULAR; INTRAVENOUS EVERY 6 HOURS PRN
Status: DISCONTINUED | OUTPATIENT
Start: 2022-09-20 | End: 2022-09-20

## 2022-09-20 RX ORDER — PROCHLORPERAZINE EDISYLATE 5 MG/ML
5 INJECTION INTRAMUSCULAR; INTRAVENOUS EVERY 8 HOURS PRN
Status: DISCONTINUED | OUTPATIENT
Start: 2022-09-20 | End: 2022-09-21

## 2022-09-20 RX ORDER — HYDROXYUREA 500 MG/1
2000 CAPSULE ORAL DAILY
Status: DISCONTINUED | OUTPATIENT
Start: 2022-09-20 | End: 2022-09-21

## 2022-09-20 RX ORDER — NALOXONE HYDROCHLORIDE 0.4 MG/ML
0.08 INJECTION, SOLUTION INTRAMUSCULAR; INTRAVENOUS; SUBCUTANEOUS
Status: DISCONTINUED | OUTPATIENT
Start: 2022-09-20 | End: 2022-09-21

## 2022-09-20 RX ORDER — ONDANSETRON 2 MG/ML
4 INJECTION INTRAMUSCULAR; INTRAVENOUS ONCE
Status: COMPLETED | OUTPATIENT
Start: 2022-09-20 | End: 2022-09-20

## 2022-09-20 RX ORDER — DULOXETIN HYDROCHLORIDE 20 MG/1
20 CAPSULE, DELAYED RELEASE ORAL DAILY
Status: DISCONTINUED | OUTPATIENT
Start: 2022-09-20 | End: 2022-09-21

## 2022-09-20 RX ORDER — ACETAMINOPHEN 500 MG
500 TABLET ORAL EVERY 4 HOURS PRN
Status: DISCONTINUED | OUTPATIENT
Start: 2022-09-20 | End: 2022-09-21

## 2022-09-20 RX ORDER — SODIUM CHLORIDE 9 MG/ML
INJECTION, SOLUTION INTRAVENOUS CONTINUOUS
Status: DISCONTINUED | OUTPATIENT
Start: 2022-09-20 | End: 2022-09-21

## 2022-09-20 RX ORDER — FOLIC ACID 1 MG/1
1 TABLET ORAL DAILY
Status: DISCONTINUED | OUTPATIENT
Start: 2022-09-20 | End: 2022-09-21

## 2022-09-20 RX ORDER — ASPIRIN 81 MG/1
81 TABLET ORAL DAILY
Status: DISCONTINUED | OUTPATIENT
Start: 2022-09-20 | End: 2022-09-21

## 2022-09-20 RX ORDER — MORPHINE SULFATE 4 MG/ML
4 INJECTION, SOLUTION INTRAMUSCULAR; INTRAVENOUS ONCE
Status: COMPLETED | OUTPATIENT
Start: 2022-09-20 | End: 2022-09-20

## 2022-09-20 RX ORDER — ONDANSETRON 2 MG/ML
4 INJECTION INTRAMUSCULAR; INTRAVENOUS EVERY 6 HOURS PRN
Status: DISCONTINUED | OUTPATIENT
Start: 2022-09-20 | End: 2022-09-21

## 2022-09-20 RX ORDER — MORPHINE SULFATE 4 MG/ML
4 INJECTION, SOLUTION INTRAMUSCULAR; INTRAVENOUS EVERY 30 MIN PRN
Status: ACTIVE | OUTPATIENT
Start: 2022-09-20 | End: 2022-09-20

## 2022-09-20 RX ORDER — SODIUM CHLORIDE 9 MG/ML
INJECTION, SOLUTION INTRAVENOUS CONTINUOUS
Status: ACTIVE | OUTPATIENT
Start: 2022-09-20 | End: 2022-09-20

## 2022-09-20 RX ORDER — ONDANSETRON 2 MG/ML
4 INJECTION INTRAMUSCULAR; INTRAVENOUS EVERY 4 HOURS PRN
Status: ACTIVE | OUTPATIENT
Start: 2022-09-20 | End: 2022-09-20

## 2022-09-20 RX ORDER — LISINOPRIL 2.5 MG/1
2.5 TABLET ORAL DAILY
Status: DISCONTINUED | OUTPATIENT
Start: 2022-09-20 | End: 2022-09-21

## 2022-09-20 RX ORDER — ASPIRIN 81 MG/1
81 TABLET ORAL DAILY
Status: ON HOLD | COMMUNITY

## 2022-09-20 RX ORDER — MORPHINE SULFATE 4 MG/ML
4 INJECTION, SOLUTION INTRAMUSCULAR; INTRAVENOUS EVERY 30 MIN PRN
Status: COMPLETED | OUTPATIENT
Start: 2022-09-20 | End: 2022-09-20

## 2022-09-20 RX ORDER — ENOXAPARIN SODIUM 100 MG/ML
40 INJECTION SUBCUTANEOUS DAILY
Status: DISCONTINUED | OUTPATIENT
Start: 2022-09-20 | End: 2022-09-21

## 2022-09-20 RX ORDER — DIPHENHYDRAMINE HYDROCHLORIDE 50 MG/ML
12.5 INJECTION INTRAMUSCULAR; INTRAVENOUS EVERY 4 HOURS PRN
Status: DISCONTINUED | OUTPATIENT
Start: 2022-09-20 | End: 2022-09-21

## 2022-09-20 NOTE — ED QUICK NOTES
Orders for admission, patient is aware of plan and ready to go upstairs. Any questions, please call ED RN Eusebio Duncan  at extension [phone]. Vaccinated? yes  Type of COVID test sent:rapid  COVID Suspicion level: Low      Titratable drug(s) infusing:none  Rate:    LOC at time of transport:alert    Other pertinent information:    CIWA score=  NIH score=

## 2022-09-20 NOTE — ED INITIAL ASSESSMENT (HPI)
Pt presents to ED with c/o nausea/vomiting/diarrhea since 0300. Pt also reports having sickle cell crisis.

## 2022-09-21 VITALS
SYSTOLIC BLOOD PRESSURE: 104 MMHG | TEMPERATURE: 98 F | OXYGEN SATURATION: 99 % | BODY MASS INDEX: 25.54 KG/M2 | WEIGHT: 158.94 LBS | RESPIRATION RATE: 20 BRPM | HEART RATE: 82 BPM | DIASTOLIC BLOOD PRESSURE: 62 MMHG | HEIGHT: 66 IN

## 2022-09-21 LAB
ALBUMIN SERPL-MCNC: 3.1 G/DL (ref 3.4–5)
ALBUMIN/GLOB SERPL: 0.7 {RATIO} (ref 1–2)
ALP LIVER SERPL-CCNC: 40 U/L
ALT SERPL-CCNC: 16 U/L
ANION GAP SERPL CALC-SCNC: 4 MMOL/L (ref 0–18)
AST SERPL-CCNC: 11 U/L (ref 15–37)
BASOPHILS # BLD AUTO: 0 X10(3) UL (ref 0–0.2)
BASOPHILS NFR BLD AUTO: 0 %
BILIRUB SERPL-MCNC: 0.4 MG/DL (ref 0.1–2)
BUN BLD-MCNC: 4 MG/DL (ref 7–18)
CALCIUM BLD-MCNC: 8.2 MG/DL (ref 8.5–10.1)
CHLORIDE SERPL-SCNC: 112 MMOL/L (ref 98–112)
CO2 SERPL-SCNC: 27 MMOL/L (ref 21–32)
CREAT BLD-MCNC: 0.54 MG/DL
EOSINOPHIL # BLD AUTO: 0.01 X10(3) UL (ref 0–0.7)
EOSINOPHIL NFR BLD AUTO: 0.2 %
ERYTHROCYTE [DISTWIDTH] IN BLOOD BY AUTOMATED COUNT: 14.7 %
GFR SERPLBLD BASED ON 1.73 SQ M-ARVRAT: 121 ML/MIN/1.73M2 (ref 60–?)
GLOBULIN PLAS-MCNC: 4.3 G/DL (ref 2.8–4.4)
GLUCOSE BLD-MCNC: 90 MG/DL (ref 70–99)
HCT VFR BLD AUTO: 21.8 %
HGB BLD-MCNC: 7.6 G/DL
IMM GRANULOCYTES # BLD AUTO: 0.01 X10(3) UL (ref 0–1)
IMM GRANULOCYTES NFR BLD: 0.2 %
LYMPHOCYTES # BLD AUTO: 2.25 X10(3) UL (ref 1–4)
LYMPHOCYTES NFR BLD AUTO: 43.2 %
MCH RBC QN AUTO: 42 PG (ref 26–34)
MCHC RBC AUTO-ENTMCNC: 34.9 G/DL (ref 31–37)
MCV RBC AUTO: 120.4 FL
MONOCYTES # BLD AUTO: 0.25 X10(3) UL (ref 0.1–1)
MONOCYTES NFR BLD AUTO: 4.8 %
NEUTROPHILS # BLD AUTO: 2.69 X10 (3) UL (ref 1.5–7.7)
NEUTROPHILS # BLD AUTO: 2.69 X10(3) UL (ref 1.5–7.7)
NEUTROPHILS NFR BLD AUTO: 51.6 %
OSMOLALITY SERPL CALC.SUM OF ELEC: 292 MOSM/KG (ref 275–295)
PLATELET # BLD AUTO: 351 10(3)UL (ref 150–450)
POTASSIUM SERPL-SCNC: 3.5 MMOL/L (ref 3.5–5.1)
PROT SERPL-MCNC: 7.4 G/DL (ref 6.4–8.2)
RBC # BLD AUTO: 1.81 X10(6)UL
SODIUM SERPL-SCNC: 143 MMOL/L (ref 136–145)
WBC # BLD AUTO: 5.2 X10(3) UL (ref 4–11)

## 2022-09-21 PROCEDURE — 99232 SBSQ HOSP IP/OBS MODERATE 35: CPT | Performed by: INTERNAL MEDICINE

## 2022-09-21 RX ORDER — OXYCODONE HYDROCHLORIDE 5 MG/1
20 TABLET ORAL
Status: DISCONTINUED | OUTPATIENT
Start: 2022-09-21 | End: 2022-09-21

## 2022-09-21 RX ORDER — OXYCODONE HYDROCHLORIDE 5 MG/1
10 TABLET ORAL
Status: DISCONTINUED | OUTPATIENT
Start: 2022-09-21 | End: 2022-09-21

## 2022-09-21 RX ORDER — BUTALBITAL, ACETAMINOPHEN AND CAFFEINE 50; 325; 40 MG/1; MG/1; MG/1
1 TABLET ORAL EVERY 4 HOURS PRN
Status: DISCONTINUED | OUTPATIENT
Start: 2022-09-21 | End: 2022-09-21

## 2022-09-21 NOTE — PLAN OF CARE
A/o x4. Room air. Still with nausea, no emesis. Prn zofran/ compazine given with relief. Trying to eat and drink. No stool overnight. Pain control with PCA. Ivf continues. Fall precaution maintained.   Problem: PAIN - ADULT  Goal: Verbalizes/displays adequate comfort level or patient's stated pain goal  Description: INTERVENTIONS:  - Encourage pt to monitor pain and request assistance  - Assess pain using appropriate pain scale  - Administer analgesics based on type and severity of pain and evaluate response  - Implement non-pharmacological measures as appropriate and evaluate response  - Consider cultural and social influences on pain and pain management  - Manage/alleviate anxiety  - Utilize distraction and/or relaxation techniques  - Monitor for opioid side effects  - Notify MD/LIP if interventions unsuccessful or patient reports new pain  - Anticipate increased pain with activity and pre-medicate as appropriate  Outcome: Progressing

## 2022-09-21 NOTE — PLAN OF CARE
Pt reports relief w use of PCA and bolus. Fal and safety precautions in place. Patient tolerating some PO intake. Records release request form filled out, signed by patient and sent to [facility] at approx 1400. Patient declines lovenox, educated on DVT risks and complications, patient performs ankle exercises and ambulates in room. PCA safety information reviewed w patient, patient verbalizes and demonstrates understanding. Patient updated progressing and in agreement with POC.

## 2022-09-21 NOTE — PROGRESS NOTES
Patient is alert and oriented x4. Pain rated 6-7 throughout the shift on a numerical pain scale of 1-10. Pt given oxycodone 10-20 mg q3 and encourage to use PO vs pushing PCA. Pt nauseous in beginning of day, given zofran and compazine PRN. Pt ate breakfast and lunch and tolerated well. Discharge order in place, patient stated pain was zero and was ready for discharge. All medications given per [facility]. Discharge instructions printed and explained. Pt denied questions. IV removed without complications. Patient will use call light when private vehicle arrives for ride home.

## 2022-09-21 NOTE — PROGRESS NOTES
09/21/22 1701   Clinical Encounter Type   Visited With Patient   Continue Visiting No   Patient Spiritual Encounters   Spiritual Assessment Completed Yes   Taxonomy   Intended Effects Demonstrate caring and concern   Methods Encourage sharing of feelings;Encourage self reflection;Encourage self care; Offer spiritual/Orthodoxy support   Interventions Active listening; Ask guided questions;[place]; Share words of hope and inspiration     Initial Visit:     visited with patient in room. Patient alert, oriented, and aware.  helped patient explore feelings concerning grief, spirituality, and family matters.  educated patient on grief group at [facility].  provided spiritual/emotional support through prayer. Spiritual Care support can be requested via an Epic consult or, for immediate needs, at pager [phone]. Maxine Villalobos Balloon

## 2022-09-22 NOTE — PROGRESS NOTES
NURSING DISCHARGE NOTE    Discharged Home via Wheelchair. Accompanied by Support staff  Belongings Taken by patient/family. Remains a/o x4. Pain control with oral pain meds. Nausea better. No more diarrhea. Discharge instruction given. Iv removed per a day nurse. Home stable.

## 2022-09-26 ENCOUNTER — TELEPHONE (OUTPATIENT)
Dept: HEMATOLOGY/ONCOLOGY | Facility: HOSPITAL | Age: 37
End: 2022-09-26

## 2022-09-26 ENCOUNTER — OFFICE VISIT (OUTPATIENT)
Dept: HEMATOLOGY/ONCOLOGY | Facility: HOSPITAL | Age: 37
End: 2022-09-26
Attending: INTERNAL MEDICINE

## 2022-09-26 VITALS
DIASTOLIC BLOOD PRESSURE: 71 MMHG | OXYGEN SATURATION: 98 % | HEART RATE: 93 BPM | SYSTOLIC BLOOD PRESSURE: 107 MMHG | BODY MASS INDEX: 26 KG/M2 | RESPIRATION RATE: 18 BRPM | WEIGHT: 159 LBS | TEMPERATURE: 97 F

## 2022-09-26 DIAGNOSIS — D57.1 SICKLE CELL DISEASE WITHOUT CRISIS (HCC): Primary | ICD-10-CM

## 2022-09-26 LAB
ALBUMIN SERPL-MCNC: 4.1 G/DL (ref 3.4–5)
ALBUMIN/GLOB SERPL: 0.7 {RATIO} (ref 1–2)
ALP LIVER SERPL-CCNC: 48 U/L
ALT SERPL-CCNC: 19 U/L
ANION GAP SERPL CALC-SCNC: 7 MMOL/L (ref 0–18)
AST SERPL-CCNC: 13 U/L (ref 15–37)
BASOPHILS # BLD AUTO: 0.01 X10(3) UL (ref 0–0.2)
BASOPHILS NFR BLD AUTO: 0.2 %
BILIRUB SERPL-MCNC: 0.8 MG/DL (ref 0.1–2)
BUN BLD-MCNC: 8 MG/DL (ref 7–18)
CALCIUM BLD-MCNC: 9.4 MG/DL (ref 8.5–10.1)
CHLORIDE SERPL-SCNC: 108 MMOL/L (ref 98–112)
CO2 SERPL-SCNC: 23 MMOL/L (ref 21–32)
CREAT BLD-MCNC: 0.61 MG/DL
EOSINOPHIL # BLD AUTO: 0.01 X10(3) UL (ref 0–0.7)
EOSINOPHIL NFR BLD AUTO: 0.2 %
ERYTHROCYTE [DISTWIDTH] IN BLOOD BY AUTOMATED COUNT: 14.7 %
GFR SERPLBLD BASED ON 1.73 SQ M-ARVRAT: 117 ML/MIN/1.73M2 (ref 60–?)
GLOBULIN PLAS-MCNC: 5.5 G/DL (ref 2.8–4.4)
GLUCOSE BLD-MCNC: 97 MG/DL (ref 70–99)
HCT VFR BLD AUTO: 24.2 %
HGB BLD-MCNC: 8.6 G/DL
HGB RETIC QN AUTO: 41.3 PG (ref 28.2–36.6)
IMM GRANULOCYTES # BLD AUTO: 0.02 X10(3) UL (ref 0–1)
IMM GRANULOCYTES NFR BLD: 0.5 %
IMM RETICS NFR: 0.33 RATIO (ref 0.1–0.3)
LYMPHOCYTES # BLD AUTO: 1.68 X10(3) UL (ref 1–4)
LYMPHOCYTES NFR BLD AUTO: 41.2 %
MCH RBC QN AUTO: 41.5 PG (ref 26–34)
MCHC RBC AUTO-ENTMCNC: 35.5 G/DL (ref 31–37)
MCV RBC AUTO: 116.9 FL
MONOCYTES # BLD AUTO: 0.44 X10(3) UL (ref 0.1–1)
MONOCYTES NFR BLD AUTO: 10.8 %
NEUTROPHILS # BLD AUTO: 1.92 X10 (3) UL (ref 1.5–7.7)
NEUTROPHILS # BLD AUTO: 1.92 X10(3) UL (ref 1.5–7.7)
NEUTROPHILS NFR BLD AUTO: 47.1 %
OSMOLALITY SERPL CALC.SUM OF ELEC: 284 MOSM/KG (ref 275–295)
PLATELET # BLD AUTO: 341 10(3)UL (ref 150–450)
POTASSIUM SERPL-SCNC: 3.6 MMOL/L (ref 3.5–5.1)
PROT SERPL-MCNC: 9.6 G/DL (ref 6.4–8.2)
RBC # BLD AUTO: 2.07 X10(6)UL
RETICS # AUTO: 54.4 X10(3) UL (ref 22.5–147.5)
RETICS/RBC NFR AUTO: 2.6 %
SODIUM SERPL-SCNC: 138 MMOL/L (ref 136–145)
WBC # BLD AUTO: 4.1 X10(3) UL (ref 4–11)

## 2022-09-26 PROCEDURE — 99214 OFFICE O/P EST MOD 30 MIN: CPT | Performed by: INTERNAL MEDICINE

## 2022-09-26 RX ORDER — GLUTAMINE 5 G/1
15 POWDER, FOR SOLUTION ORAL 2 TIMES DAILY
Qty: 180 EACH | Refills: 11 | Status: SHIPPED | OUTPATIENT
Start: 2022-09-26

## 2022-09-26 NOTE — PROGRESS NOTES
Education Record    Learner:  Patient    Disease / Diagnosis: sickle cell anemia    Barriers / Limitations:  None   Comments:    Method:  Discussion   Comments:    General Topics:  Plan of care reviewed   Comments:    Outcome:  Shows understanding   Comments: PHFU pt states she is feeling better since hospitalization but still having pain in her thighs, legs and waist. States pain medications control her pain. Taking endari, folic acid and hydrea as directed.

## 2022-09-26 NOTE — TELEPHONE ENCOUNTER
Tim Silva calling with [facility].  [employer]     Asking and confirming if Medication ENDARI  Has been sent to [address]  call [phone]

## 2022-09-28 ENCOUNTER — TELEPHONE (OUTPATIENT)
Dept: HEMATOLOGY/ONCOLOGY | Facility: HOSPITAL | Age: 37
End: 2022-09-28

## 2022-09-28 NOTE — TELEPHONE ENCOUNTER
Dinesh Stinson from [place] called to make sure the patient has her contact because she cannot get ahold of her.

## 2022-09-29 DIAGNOSIS — D57.00 SICKLE CELL DISEASE WITH CRISIS (HCC): ICD-10-CM

## 2022-09-29 RX ORDER — OXYCODONE HYDROCHLORIDE 10 MG/1
10 TABLET ORAL
Qty: 90 TABLET | Refills: 0 | Status: CANCELLED | OUTPATIENT
Start: 2022-09-29

## 2022-09-29 RX ORDER — OXYCODONE HYDROCHLORIDE 10 MG/1
10 TABLET ORAL
Qty: 180 TABLET | Refills: 0 | Status: SHIPPED | OUTPATIENT
Start: 2022-09-29

## 2022-09-30 DIAGNOSIS — D57.00 SICKLE CELL DISEASE WITH CRISIS (HCC): ICD-10-CM

## 2022-09-30 RX ORDER — OXYCODONE HYDROCHLORIDE 10 MG/1
10 TABLET ORAL
Qty: 180 TABLET | Refills: 0 | Status: CANCELLED | OUTPATIENT
Start: 2022-09-30

## 2022-10-06 ENCOUNTER — TELEPHONE (OUTPATIENT)
Dept: HEMATOLOGY/ONCOLOGY | Facility: HOSPITAL | Age: 37
End: 2022-10-06

## 2022-10-06 NOTE — TELEPHONE ENCOUNTER
Manjit Acron calling with [employer]   Regarding medication     Glutamine, Sickle Cell, (ENDARI) 5 g Oral Powd Pack.   E-script was sent 9/24/2022    Patient is refusing to have counseling on this medication-  Because she has taken it before    [facility] requesting the patients medication list.    Please call [phone]

## 2022-10-08 ENCOUNTER — HOSPITAL ENCOUNTER (INPATIENT)
Facility: HOSPITAL | Age: 37
LOS: 4 days | Discharge: HOME | End: 2022-10-13
Attending: EMERGENCY MEDICINE | Admitting: INTERNAL MEDICINE
Payer: COMMERCIAL

## 2022-10-08 ENCOUNTER — APPOINTMENT (OUTPATIENT)
Dept: GENERAL RADIOLOGY | Facility: HOSPITAL | Age: 37
End: 2022-10-08
Attending: EMERGENCY MEDICINE
Payer: COMMERCIAL

## 2022-10-08 DIAGNOSIS — R06.02 SOB (SHORTNESS OF BREATH): ICD-10-CM

## 2022-10-08 DIAGNOSIS — D57.00 SICKLE CELL PAIN CRISIS (HCC): ICD-10-CM

## 2022-10-08 DIAGNOSIS — D64.9 ANEMIA, UNSPECIFIED TYPE: ICD-10-CM

## 2022-10-08 DIAGNOSIS — R07.9 ACUTE CHEST PAIN: ICD-10-CM

## 2022-10-08 DIAGNOSIS — D57.00 SICKLE CELL CRISIS (HCC): Primary | ICD-10-CM

## 2022-10-08 LAB
BASOPHILS # BLD AUTO: 0.01 X10(3) UL (ref 0–0.2)
BASOPHILS NFR BLD AUTO: 0.2 %
EOSINOPHIL # BLD AUTO: 0.04 X10(3) UL (ref 0–0.7)
EOSINOPHIL NFR BLD AUTO: 0.9 %
ERYTHROCYTE [DISTWIDTH] IN BLOOD BY AUTOMATED COUNT: 14.9 %
HCT VFR BLD AUTO: 20.6 %
HGB BLD-MCNC: 7.1 G/DL
HGB RETIC QN AUTO: 32.2 PG (ref 28.2–36.6)
IMM GRANULOCYTES # BLD AUTO: 0.02 X10(3) UL (ref 0–1)
IMM GRANULOCYTES NFR BLD: 0.4 %
IMM RETICS NFR: 0.34 RATIO (ref 0.1–0.3)
LYMPHOCYTES # BLD AUTO: 1.89 X10(3) UL (ref 1–4)
LYMPHOCYTES NFR BLD AUTO: 41.1 %
MCH RBC QN AUTO: 41.5 PG (ref 26–34)
MCHC RBC AUTO-ENTMCNC: 34.5 G/DL (ref 31–37)
MCV RBC AUTO: 120.5 FL
MONOCYTES # BLD AUTO: 0.49 X10(3) UL (ref 0.1–1)
MONOCYTES NFR BLD AUTO: 10.7 %
NEUTROPHILS # BLD AUTO: 2.15 X10 (3) UL (ref 1.5–7.7)
NEUTROPHILS # BLD AUTO: 2.15 X10(3) UL (ref 1.5–7.7)
NEUTROPHILS NFR BLD AUTO: 46.7 %
PLATELET # BLD AUTO: 270 10(3)UL (ref 150–450)
RBC # BLD AUTO: 1.71 X10(6)UL
RETICS # AUTO: 60 X10(3) UL (ref 22.5–147.5)
RETICS/RBC NFR AUTO: 3.5 %
WBC # BLD AUTO: 4.6 X10(3) UL (ref 4–11)

## 2022-10-08 PROCEDURE — 71045 X-RAY EXAM CHEST 1 VIEW: CPT | Performed by: EMERGENCY MEDICINE

## 2022-10-08 RX ORDER — MORPHINE SULFATE 4 MG/ML
4 INJECTION, SOLUTION INTRAMUSCULAR; INTRAVENOUS EVERY 30 MIN PRN
Status: DISCONTINUED | OUTPATIENT
Start: 2022-10-08 | End: 2022-10-13

## 2022-10-08 RX ORDER — DIPHENHYDRAMINE HYDROCHLORIDE 50 MG/ML
25 INJECTION INTRAMUSCULAR; INTRAVENOUS ONCE
Status: COMPLETED | OUTPATIENT
Start: 2022-10-08 | End: 2022-10-08

## 2022-10-08 RX ORDER — ONDANSETRON 2 MG/ML
4 INJECTION INTRAMUSCULAR; INTRAVENOUS ONCE
Status: COMPLETED | OUTPATIENT
Start: 2022-10-08 | End: 2022-10-08

## 2022-10-08 RX ORDER — KETOROLAC TROMETHAMINE 15 MG/ML
15 INJECTION, SOLUTION INTRAMUSCULAR; INTRAVENOUS ONCE
Status: COMPLETED | OUTPATIENT
Start: 2022-10-08 | End: 2022-10-08

## 2022-10-09 ENCOUNTER — APPOINTMENT (OUTPATIENT)
Dept: CT IMAGING | Facility: HOSPITAL | Age: 37
End: 2022-10-09
Attending: EMERGENCY MEDICINE
Payer: COMMERCIAL

## 2022-10-09 PROBLEM — D64.9 ANEMIA, UNSPECIFIED TYPE: Status: ACTIVE | Noted: 2022-10-09

## 2022-10-09 PROBLEM — R06.02 SOB (SHORTNESS OF BREATH): Status: ACTIVE | Noted: 2022-10-09

## 2022-10-09 PROBLEM — R07.9 ACUTE CHEST PAIN: Status: ACTIVE | Noted: 2022-10-09

## 2022-10-09 LAB
ADENOVIRUS PCR:: NOT DETECTED
ALBUMIN SERPL-MCNC: 3.3 G/DL (ref 3.4–5)
ALBUMIN/GLOB SERPL: 0.7 {RATIO} (ref 1–2)
ALP LIVER SERPL-CCNC: 58 U/L
ALT SERPL-CCNC: 35 U/L
ANION GAP SERPL CALC-SCNC: 2 MMOL/L (ref 0–18)
ANTIBODY SCREEN: POSITIVE
AST SERPL-CCNC: 29 U/L (ref 15–37)
B PARAPERT DNA SPEC QL NAA+PROBE: NOT DETECTED
B PERT DNA SPEC QL NAA+PROBE: NOT DETECTED
BASOPHILS # BLD AUTO: 0.01 X10(3) UL (ref 0–0.2)
BASOPHILS NFR BLD AUTO: 0.2 %
BILIRUB SERPL-MCNC: 0.4 MG/DL (ref 0.1–2)
BUN BLD-MCNC: 8 MG/DL (ref 7–18)
C ANTIGEN: NEGATIVE
C PNEUM DNA SPEC QL NAA+PROBE: NOT DETECTED
CALCIUM BLD-MCNC: 8.7 MG/DL (ref 8.5–10.1)
CHLORIDE SERPL-SCNC: 108 MMOL/L (ref 98–112)
CHOLEST SERPL-MCNC: 139 MG/DL (ref ?–200)
CO2 SERPL-SCNC: 31 MMOL/L (ref 21–32)
CORONAVIRUS 229E PCR:: NOT DETECTED
CORONAVIRUS HKU1 PCR:: NOT DETECTED
CORONAVIRUS NL63 PCR:: NOT DETECTED
CORONAVIRUS OC43 PCR:: NOT DETECTED
CREAT BLD-MCNC: 0.5 MG/DL
D DIMER PPP FEU-MCNC: 1.08 UG/ML FEU (ref ?–0.5)
DIRECT COOMBS POLY: NEGATIVE
E ANTIGEN: NEGATIVE
ELUATE RESULT: NEGATIVE
EOSINOPHIL # BLD AUTO: 0.04 X10(3) UL (ref 0–0.7)
EOSINOPHIL NFR BLD AUTO: 1 %
ERYTHROCYTE [DISTWIDTH] IN BLOOD BY AUTOMATED COUNT: 14.9 %
FLUAV RNA SPEC QL NAA+PROBE: NOT DETECTED
FLUBV RNA SPEC QL NAA+PROBE: NOT DETECTED
FYA ANTIGEN: NEGATIVE
GFR SERPLBLD BASED ON 1.73 SQ M-ARVRAT: 123 ML/MIN/1.73M2 (ref 60–?)
GLOBULIN PLAS-MCNC: 5 G/DL (ref 2.8–4.4)
GLUCOSE BLD-MCNC: 112 MG/DL (ref 70–99)
HCT VFR BLD AUTO: 18.8 %
HDLC SERPL-MCNC: 27 MG/DL (ref 40–59)
HGB BLD-MCNC: 6.4 G/DL
IMM GRANULOCYTES # BLD AUTO: 0.03 X10(3) UL (ref 0–1)
IMM GRANULOCYTES NFR BLD: 0.7 %
JKB ANTIGEN: POSITIVE
K ANTIGEN: NEGATIVE
LDLC SERPL CALC-MCNC: 87 MG/DL (ref ?–100)
LYMPHOCYTES # BLD AUTO: 1.76 X10(3) UL (ref 1–4)
LYMPHOCYTES NFR BLD AUTO: 43.3 %
MCH RBC QN AUTO: 41.3 PG (ref 26–34)
MCHC RBC AUTO-ENTMCNC: 34 G/DL (ref 31–37)
MCV RBC AUTO: 121.3 FL
METAPNEUMOVIRUS PCR:: NOT DETECTED
MONOCYTES # BLD AUTO: 0.56 X10(3) UL (ref 0.1–1)
MONOCYTES NFR BLD AUTO: 13.8 %
MYCOPLASMA PNEUMONIA PCR:: NOT DETECTED
NEUTROPHILS # BLD AUTO: 1.66 X10 (3) UL (ref 1.5–7.7)
NEUTROPHILS # BLD AUTO: 1.66 X10(3) UL (ref 1.5–7.7)
NEUTROPHILS NFR BLD AUTO: 41 %
NONHDLC SERPL-MCNC: 112 MG/DL (ref ?–130)
NT-PROBNP SERPL-MCNC: 297 PG/ML (ref ?–125)
OSMOLALITY SERPL CALC.SUM OF ELEC: 291 MOSM/KG (ref 275–295)
PARAINFLUENZA 1 PCR:: NOT DETECTED
PARAINFLUENZA 2 PCR:: NOT DETECTED
PARAINFLUENZA 3 PCR:: NOT DETECTED
PARAINFLUENZA 4 PCR:: NOT DETECTED
PLATELET # BLD AUTO: 260 10(3)UL (ref 150–450)
POTASSIUM SERPL-SCNC: 3.2 MMOL/L (ref 3.5–5.1)
PROT SERPL-MCNC: 8.3 G/DL (ref 6.4–8.2)
RBC # BLD AUTO: 1.55 X10(6)UL
RGTSCRN: 5
RH BLOOD TYPE: POSITIVE
RHINOVIRUS/ENTERO PCR:: DETECTED
RSV RNA SPEC QL NAA+PROBE: NOT DETECTED
SARS-COV-2 RNA NPH QL NAA+NON-PROBE: NOT DETECTED
SARS-COV-2 RNA RESP QL NAA+PROBE: NOT DETECTED
SODIUM SERPL-SCNC: 141 MMOL/L (ref 136–145)
TRIGL SERPL-MCNC: 138 MG/DL (ref 30–149)
TROPONIN I HIGH SENSITIVITY: 101 NG/L
TROPONIN I HIGH SENSITIVITY: 112 NG/L
VLDLC SERPL CALC-MCNC: 22 MG/DL (ref 0–30)
WBC # BLD AUTO: 4.1 X10(3) UL (ref 4–11)

## 2022-10-09 PROCEDURE — 99223 1ST HOSP IP/OBS HIGH 75: CPT | Performed by: INTERNAL MEDICINE

## 2022-10-09 PROCEDURE — 71275 CT ANGIOGRAPHY CHEST: CPT | Performed by: EMERGENCY MEDICINE

## 2022-10-09 RX ORDER — DIPHENHYDRAMINE HCL 25 MG
25 CAPSULE ORAL EVERY 4 HOURS PRN
Status: DISCONTINUED | OUTPATIENT
Start: 2022-10-09 | End: 2022-10-13

## 2022-10-09 RX ORDER — OXYCODONE HYDROCHLORIDE 5 MG/1
10 TABLET ORAL EVERY 4 HOURS PRN
Status: DISCONTINUED | OUTPATIENT
Start: 2022-10-09 | End: 2022-10-10

## 2022-10-09 RX ORDER — HYDROXYUREA 500 MG/1
2000 CAPSULE ORAL DAILY
Status: DISCONTINUED | OUTPATIENT
Start: 2022-10-09 | End: 2022-10-13

## 2022-10-09 RX ORDER — MORPHINE SULFATE 4 MG/ML
4 INJECTION, SOLUTION INTRAMUSCULAR; INTRAVENOUS EVERY 2 HOUR PRN
Status: DISCONTINUED | OUTPATIENT
Start: 2022-10-09 | End: 2022-10-10

## 2022-10-09 RX ORDER — SODIUM PHOSPHATE, DIBASIC AND SODIUM PHOSPHATE, MONOBASIC 7; 19 G/133ML; G/133ML
1 ENEMA RECTAL ONCE AS NEEDED
Status: DISCONTINUED | OUTPATIENT
Start: 2022-10-09 | End: 2022-10-13

## 2022-10-09 RX ORDER — MELATONIN
3 NIGHTLY PRN
Status: DISCONTINUED | OUTPATIENT
Start: 2022-10-09 | End: 2022-10-13

## 2022-10-09 RX ORDER — ENOXAPARIN SODIUM 100 MG/ML
40 INJECTION SUBCUTANEOUS DAILY
Status: DISCONTINUED | OUTPATIENT
Start: 2022-10-09 | End: 2022-10-13

## 2022-10-09 RX ORDER — ACETAMINOPHEN 500 MG
1000 TABLET ORAL EVERY 8 HOURS PRN
Status: DISCONTINUED | OUTPATIENT
Start: 2022-10-09 | End: 2022-10-13

## 2022-10-09 RX ORDER — ONDANSETRON 2 MG/ML
4 INJECTION INTRAMUSCULAR; INTRAVENOUS EVERY 6 HOURS PRN
Status: DISCONTINUED | OUTPATIENT
Start: 2022-10-09 | End: 2022-10-13

## 2022-10-09 RX ORDER — METOPROLOL SUCCINATE 25 MG/1
25 TABLET, EXTENDED RELEASE ORAL
Status: DISCONTINUED | OUTPATIENT
Start: 2022-10-09 | End: 2022-10-13

## 2022-10-09 RX ORDER — SODIUM CHLORIDE 9 MG/ML
INJECTION, SOLUTION INTRAVENOUS CONTINUOUS
Status: DISCONTINUED | OUTPATIENT
Start: 2022-10-09 | End: 2022-10-09

## 2022-10-09 RX ORDER — SENNOSIDES 8.6 MG
17.2 TABLET ORAL NIGHTLY PRN
Status: DISCONTINUED | OUTPATIENT
Start: 2022-10-09 | End: 2022-10-13

## 2022-10-09 RX ORDER — POLYETHYLENE GLYCOL 3350 17 G/17G
17 POWDER, FOR SOLUTION ORAL DAILY PRN
Status: DISCONTINUED | OUTPATIENT
Start: 2022-10-09 | End: 2022-10-13

## 2022-10-09 RX ORDER — MORPHINE SULFATE 2 MG/ML
2 INJECTION, SOLUTION INTRAMUSCULAR; INTRAVENOUS EVERY 2 HOUR PRN
Status: DISCONTINUED | OUTPATIENT
Start: 2022-10-09 | End: 2022-10-10

## 2022-10-09 RX ORDER — IOHEXOL 350 MG/ML
100 INJECTION, SOLUTION INTRAVENOUS
Status: COMPLETED | OUTPATIENT
Start: 2022-10-09 | End: 2022-10-09

## 2022-10-09 RX ORDER — PROCHLORPERAZINE EDISYLATE 5 MG/ML
5 INJECTION INTRAMUSCULAR; INTRAVENOUS EVERY 8 HOURS PRN
Status: DISCONTINUED | OUTPATIENT
Start: 2022-10-09 | End: 2022-10-13

## 2022-10-09 RX ORDER — DIPHENHYDRAMINE HYDROCHLORIDE 50 MG/ML
12.5 INJECTION INTRAMUSCULAR; INTRAVENOUS EVERY 4 HOURS PRN
Status: DISCONTINUED | OUTPATIENT
Start: 2022-10-09 | End: 2022-10-11

## 2022-10-09 RX ORDER — BISACODYL 10 MG
10 SUPPOSITORY, RECTAL RECTAL
Status: DISCONTINUED | OUTPATIENT
Start: 2022-10-09 | End: 2022-10-13

## 2022-10-09 RX ORDER — FOLIC ACID 1 MG/1
1 TABLET ORAL DAILY
Status: DISCONTINUED | OUTPATIENT
Start: 2022-10-09 | End: 2022-10-13

## 2022-10-09 RX ORDER — MORPHINE SULFATE 30 MG/1
30 TABLET, FILM COATED, EXTENDED RELEASE ORAL EVERY 12 HOURS SCHEDULED
Status: DISCONTINUED | OUTPATIENT
Start: 2022-10-09 | End: 2022-10-13

## 2022-10-09 RX ORDER — DEXTROSE AND SODIUM CHLORIDE 5; .45 G/100ML; G/100ML
INJECTION, SOLUTION INTRAVENOUS CONTINUOUS
Status: DISCONTINUED | OUTPATIENT
Start: 2022-10-09 | End: 2022-10-13

## 2022-10-09 RX ORDER — MORPHINE SULFATE 2 MG/ML
1 INJECTION, SOLUTION INTRAMUSCULAR; INTRAVENOUS EVERY 2 HOUR PRN
Status: DISCONTINUED | OUTPATIENT
Start: 2022-10-09 | End: 2022-10-10

## 2022-10-09 RX ORDER — MORPHINE SULFATE 30 MG/1
30 TABLET, FILM COATED, EXTENDED RELEASE ORAL 2 TIMES DAILY PRN
Status: DISCONTINUED | OUTPATIENT
Start: 2022-10-09 | End: 2022-10-10

## 2022-10-09 NOTE — ED INITIAL ASSESSMENT (HPI)
Pt presents to ED c/o sickle cell crisis. Pt reports nausea, no vomiting. SOB with exertion, shoulder pain, rib pain, and knee pain. Pt reports low grade fever 99.8 but relieved with tylenol.

## 2022-10-09 NOTE — ED QUICK NOTES
Orders for admission, patient is aware of plan and ready to go upstairs. Any questions, please call ED RN Bonnie Cushing  at extension [phone]. Vaccinated?  yes  Type of COVID test sent:rapid  COVID Suspicion level: Low/High  low    Titratable drug(s) infusing:  Rate:    LOC at time of transport:  aox4  Other pertinent information:    CIWA score=  NIH score=

## 2022-10-09 NOTE — PLAN OF CARE
Pt received A&Ox4. VSS. Afebrile. Pt c/o generalized pain and headache, morphine 4 mg given Q2H and Acetaminophen. Medications per MAR. Hgb 6.4 - MD aware, blood transfusion held. IVF @ 125 ml/hr. Call light within reach.

## 2022-10-09 NOTE — PLAN OF CARE
NURSING ADMISSION NOTE      Patient admitted via cart. Oriented to room. Safety precautions initiated. Bed in low position. Call light in reach. Admitted patient from ED due to Sickle Cell crisis with Acute Chest Syndrome. CTA Chest, Troponin, BNP  and D-Dimer tests done prior transfer to floor due to complaints of Chest pain and SOB. Rapid Covid test came back negative. Patient remains on Droplet Isolation pending results of Respiratory Flu Panel +Covid test. Patient alert and oriented x4. Still in pain due to Sickle cell, managed by PRN IV pain meds with some relief. Afebrile. Room air, sats 93-95%. IVF continuous as ordered. Ambulatory. Fall precautions in place. Needs attended. Received admitting orders from Dr. Randell Harris. Helme/Onco Dr. Karey Amado on consult.

## 2022-10-10 LAB
ALBUMIN SERPL-MCNC: 3.2 G/DL (ref 3.4–5)
ALBUMIN/GLOB SERPL: 0.7 {RATIO} (ref 1–2)
ALP LIVER SERPL-CCNC: 55 U/L
ALT SERPL-CCNC: 24 U/L
ANION GAP SERPL CALC-SCNC: 6 MMOL/L (ref 0–18)
AST SERPL-CCNC: 13 U/L (ref 15–37)
BASOPHILS # BLD AUTO: 0.01 X10(3) UL (ref 0–0.2)
BASOPHILS NFR BLD AUTO: 0.2 %
BILIRUB SERPL-MCNC: 0.3 MG/DL (ref 0.1–2)
BUN BLD-MCNC: 5 MG/DL (ref 7–18)
CALCIUM BLD-MCNC: 8.3 MG/DL (ref 8.5–10.1)
CHLORIDE SERPL-SCNC: 111 MMOL/L (ref 98–112)
CO2 SERPL-SCNC: 26 MMOL/L (ref 21–32)
CREAT BLD-MCNC: 0.44 MG/DL
EOSINOPHIL # BLD AUTO: 0.04 X10(3) UL (ref 0–0.7)
EOSINOPHIL NFR BLD AUTO: 1 %
ERYTHROCYTE [DISTWIDTH] IN BLOOD BY AUTOMATED COUNT: 14.8 %
GFR SERPLBLD BASED ON 1.73 SQ M-ARVRAT: 127 ML/MIN/1.73M2 (ref 60–?)
GLOBULIN PLAS-MCNC: 4.3 G/DL (ref 2.8–4.4)
GLUCOSE BLD-MCNC: 111 MG/DL (ref 70–99)
HCT VFR BLD AUTO: 19.3 %
HGB BLD-MCNC: 6.7 G/DL
IMM GRANULOCYTES # BLD AUTO: 0.01 X10(3) UL (ref 0–1)
IMM GRANULOCYTES NFR BLD: 0.2 %
LYMPHOCYTES # BLD AUTO: 2.1 X10(3) UL (ref 1–4)
LYMPHOCYTES NFR BLD AUTO: 50.2 %
MCH RBC QN AUTO: 41.9 PG (ref 26–34)
MCHC RBC AUTO-ENTMCNC: 34.7 G/DL (ref 31–37)
MCV RBC AUTO: 120.6 FL
MONOCYTES # BLD AUTO: 0.54 X10(3) UL (ref 0.1–1)
MONOCYTES NFR BLD AUTO: 12.9 %
NEUTROPHILS # BLD AUTO: 1.48 X10 (3) UL (ref 1.5–7.7)
NEUTROPHILS # BLD AUTO: 1.48 X10(3) UL (ref 1.5–7.7)
NEUTROPHILS NFR BLD AUTO: 35.5 %
OSMOLALITY SERPL CALC.SUM OF ELEC: 294 MOSM/KG (ref 275–295)
PLATELET # BLD AUTO: 261 10(3)UL (ref 150–450)
PLATELET MORPHOLOGY: NORMAL
POTASSIUM SERPL-SCNC: 3.5 MMOL/L (ref 3.5–5.1)
POTASSIUM SERPL-SCNC: 4 MMOL/L (ref 3.5–5.1)
PROT SERPL-MCNC: 7.5 G/DL (ref 6.4–8.2)
RBC # BLD AUTO: 1.6 X10(6)UL
SODIUM SERPL-SCNC: 143 MMOL/L (ref 136–145)
WBC # BLD AUTO: 4.2 X10(3) UL (ref 4–11)

## 2022-10-10 PROCEDURE — 99232 SBSQ HOSP IP/OBS MODERATE 35: CPT | Performed by: HOSPITALIST

## 2022-10-10 PROCEDURE — 99232 SBSQ HOSP IP/OBS MODERATE 35: CPT | Performed by: INTERNAL MEDICINE

## 2022-10-10 PROCEDURE — 30233N1 TRANSFUSION OF NONAUTOLOGOUS RED BLOOD CELLS INTO PERIPHERAL VEIN, PERCUTANEOUS APPROACH: ICD-10-PCS | Performed by: HOSPITALIST

## 2022-10-10 RX ORDER — SODIUM CHLORIDE 9 MG/ML
INJECTION, SOLUTION INTRAVENOUS ONCE
Status: COMPLETED | OUTPATIENT
Start: 2022-10-10 | End: 2022-10-10

## 2022-10-10 RX ORDER — MORPHINE SULFATE 30 MG/1
30 TABLET, FILM COATED, EXTENDED RELEASE ORAL EVERY 12 HOURS SCHEDULED
Status: DISCONTINUED | OUTPATIENT
Start: 2022-10-10 | End: 2022-10-10

## 2022-10-10 RX ORDER — POTASSIUM CHLORIDE 20 MEQ/1
40 TABLET, EXTENDED RELEASE ORAL EVERY 4 HOURS
Status: COMPLETED | OUTPATIENT
Start: 2022-10-10 | End: 2022-10-10

## 2022-10-10 RX ORDER — MORPHINE SULFATE 4 MG/ML
4 INJECTION, SOLUTION INTRAMUSCULAR; INTRAVENOUS EVERY 2 HOUR PRN
Status: DISCONTINUED | OUTPATIENT
Start: 2022-10-10 | End: 2022-10-13

## 2022-10-10 RX ORDER — MORPHINE SULFATE 2 MG/ML
2 INJECTION, SOLUTION INTRAMUSCULAR; INTRAVENOUS EVERY 2 HOUR PRN
Status: DISCONTINUED | OUTPATIENT
Start: 2022-10-10 | End: 2022-10-13

## 2022-10-10 RX ORDER — OXYCODONE HYDROCHLORIDE 5 MG/1
10 TABLET ORAL
Status: DISCONTINUED | OUTPATIENT
Start: 2022-10-10 | End: 2022-10-13

## 2022-10-10 RX ORDER — BUTALBITAL, ACETAMINOPHEN AND CAFFEINE 50; 325; 40 MG/1; MG/1; MG/1
1 TABLET ORAL EVERY 4 HOURS PRN
Status: DISCONTINUED | OUTPATIENT
Start: 2022-10-10 | End: 2022-10-13

## 2022-10-10 RX ORDER — MORPHINE SULFATE 4 MG/ML
6 INJECTION, SOLUTION INTRAMUSCULAR; INTRAVENOUS EVERY 2 HOUR PRN
Status: DISCONTINUED | OUTPATIENT
Start: 2022-10-10 | End: 2022-10-13

## 2022-10-10 NOTE — PLAN OF CARE
Patient is alert and oriented, vitals stable, no fever. Pain controlled with IV Morphine as needed. IVF infusing at 125 ml/hr. No other complaints overnight, will continue to monitor.      Problem: PAIN - ADULT  Goal: Verbalizes/displays adequate comfort level or patient's stated pain goal  Description: INTERVENTIONS:  - Encourage pt to monitor pain and request assistance  - Assess pain using appropriate pain scale  - Administer analgesics based on type and severity of pain and evaluate response  - Implement non-pharmacological measures as appropriate and evaluate response  - Consider cultural and social influences on pain and pain management  - Manage/alleviate anxiety  - Utilize distraction and/or relaxation techniques  - Monitor for opioid side effects  - Notify MD/LIP if interventions unsuccessful or patient reports new pain  - Anticipate increased pain with activity and pre-medicate as appropriate  Outcome: Progressing

## 2022-10-11 LAB
BASOPHILS # BLD AUTO: 0.01 X10(3) UL (ref 0–0.2)
BASOPHILS NFR BLD AUTO: 0.2 %
BLOOD TYPE BARCODE: 5100
BLOOD TYPE BARCODE: 5100
EOSINOPHIL # BLD AUTO: 0.05 X10(3) UL (ref 0–0.7)
EOSINOPHIL NFR BLD AUTO: 1 %
ERYTHROCYTE [DISTWIDTH] IN BLOOD BY AUTOMATED COUNT: 18 %
HCT VFR BLD AUTO: 24.8 %
HGB BLD-MCNC: 8.6 G/DL
IMM GRANULOCYTES # BLD AUTO: 0.02 X10(3) UL (ref 0–1)
IMM GRANULOCYTES NFR BLD: 0.4 %
LYMPHOCYTES # BLD AUTO: 1.74 X10(3) UL (ref 1–4)
LYMPHOCYTES NFR BLD AUTO: 36.5 %
MCH RBC QN AUTO: 39.8 PG (ref 26–34)
MCHC RBC AUTO-ENTMCNC: 34.7 G/DL (ref 31–37)
MCV RBC AUTO: 114.8 FL
MONOCYTES # BLD AUTO: 0.53 X10(3) UL (ref 0.1–1)
MONOCYTES NFR BLD AUTO: 11.1 %
NEUTROPHILS # BLD AUTO: 2.42 X10 (3) UL (ref 1.5–7.7)
NEUTROPHILS # BLD AUTO: 2.42 X10(3) UL (ref 1.5–7.7)
NEUTROPHILS NFR BLD AUTO: 50.8 %
PLATELET # BLD AUTO: 279 10(3)UL (ref 150–450)
RBC # BLD AUTO: 2.16 X10(6)UL
WBC # BLD AUTO: 4.8 X10(3) UL (ref 4–11)

## 2022-10-11 PROCEDURE — 99232 SBSQ HOSP IP/OBS MODERATE 35: CPT | Performed by: HOSPITALIST

## 2022-10-11 PROCEDURE — 99232 SBSQ HOSP IP/OBS MODERATE 35: CPT | Performed by: INTERNAL MEDICINE

## 2022-10-11 RX ORDER — NAPROXEN 500 MG/1
500 TABLET ORAL 2 TIMES DAILY WITH MEALS
Status: DISCONTINUED | OUTPATIENT
Start: 2022-10-11 | End: 2022-10-13

## 2022-10-11 NOTE — PLAN OF CARE
Pt A/O x4. VSS. Pt c/o pain between 8-10 on 0-10 scale. PRN morphine and oxycodone given per MAR. Pt reports relief w/ 6/10 on 0-10 sclae. Pt also c/o of headache pain. PRN fioricet ordered per Dr. Fatmata Cisneros. Pt c/o of nausea, PRN zofran given. All other medications given per MAR. Hgb 6.7, pt received 1 unit of PRBCs per Dr. Zach Lord. Pt tolerated well. VSS. Pt reports sufficient pain control. Fall precautions in place. Call light within reach; will continue to monitor.     Problem: PAIN - ADULT  Goal: Verbalizes/displays adequate comfort level or patient's stated pain goal  Description: INTERVENTIONS:  - Encourage pt to monitor pain and request assistance  - Assess pain using appropriate pain scale  - Administer analgesics based on type and severity of pain and evaluate response  - Implement non-pharmacological measures as appropriate and evaluate response  - Consider cultural and social influences on pain and pain management  - Manage/alleviate anxiety  - Utilize distraction and/or relaxation techniques  - Monitor for opioid side effects  - Notify MD/LIP if interventions unsuccessful or patient reports new pain  - Anticipate increased pain with activity and pre-medicate as appropriate  Outcome: Progressing

## 2022-10-11 NOTE — CM/SW NOTE
CM received call from pt's  at [facility] regarding discharge planning. She is available to assist with discharge needs.      Ольга Scales at [facility]: [phone]    CHAD WyattN, RN-BC    [id number]

## 2022-10-12 LAB
BASOPHILS # BLD AUTO: 0.01 X10(3) UL (ref 0–0.2)
BASOPHILS NFR BLD AUTO: 0.2 %
EOSINOPHIL # BLD AUTO: 0.04 X10(3) UL (ref 0–0.7)
EOSINOPHIL NFR BLD AUTO: 0.9 %
ERYTHROCYTE [DISTWIDTH] IN BLOOD BY AUTOMATED COUNT: 16.9 %
HCT VFR BLD AUTO: 23.9 %
HGB BLD-MCNC: 8.3 G/DL
IMM GRANULOCYTES # BLD AUTO: 0.02 X10(3) UL (ref 0–1)
IMM GRANULOCYTES NFR BLD: 0.4 %
LYMPHOCYTES # BLD AUTO: 1.84 X10(3) UL (ref 1–4)
LYMPHOCYTES NFR BLD AUTO: 41 %
MCH RBC QN AUTO: 40.3 PG (ref 26–34)
MCHC RBC AUTO-ENTMCNC: 34.7 G/DL (ref 31–37)
MCV RBC AUTO: 116 FL
MONOCYTES # BLD AUTO: 0.44 X10(3) UL (ref 0.1–1)
MONOCYTES NFR BLD AUTO: 9.8 %
NEUTROPHILS # BLD AUTO: 2.14 X10 (3) UL (ref 1.5–7.7)
NEUTROPHILS # BLD AUTO: 2.14 X10(3) UL (ref 1.5–7.7)
NEUTROPHILS NFR BLD AUTO: 47.7 %
PLATELET # BLD AUTO: 288 10(3)UL (ref 150–450)
PLATELET MORPHOLOGY: NORMAL
RBC # BLD AUTO: 2.06 X10(6)UL
WBC # BLD AUTO: 4.5 X10(3) UL (ref 4–11)

## 2022-10-12 PROCEDURE — 99232 SBSQ HOSP IP/OBS MODERATE 35: CPT | Performed by: INTERNAL MEDICINE

## 2022-10-12 PROCEDURE — 99232 SBSQ HOSP IP/OBS MODERATE 35: CPT | Performed by: HOSPITALIST

## 2022-10-12 NOTE — PLAN OF CARE
Pt A/O x4. Pt c/o pain at 8 on 0-10 scale. PRN morphine and oxycodone given per [facility] with relief noted by patient. Pt reported sufficient pain control. All other medications given per MAR. Fall precautions in place. Call light within reach; will continue to monitor. Pt unable to bring glutamine in at this time d/t pending shipment to her house.     Problem: PAIN - ADULT  Goal: Verbalizes/displays adequate comfort level or patient's stated pain goal  Description: INTERVENTIONS:  - Encourage pt to monitor pain and request assistance  - Assess pain using appropriate pain scale  - Administer analgesics based on type and severity of pain and evaluate response  - Implement non-pharmacological measures as appropriate and evaluate response  - Consider cultural and social influences on pain and pain management  - Manage/alleviate anxiety  - Utilize distraction and/or relaxation techniques  - Monitor for opioid side effects  - Notify MD/LIP if interventions unsuccessful or patient reports new pain  - Anticipate increased pain with activity and pre-medicate as appropriate  Outcome: Progressing

## 2022-10-12 NOTE — PLAN OF CARE
Patient alert and oriented x4. Afebrile. On 2LO2 with sats 94-96%. Still complaining of pain due to Sickle Call crisis, managed by IV and PO Pain medication with good relief. Hgb stable. No nausea and vomiting. Maintained on Droplet Isolation due to positive Rhinovirus. Up to the bathroom. Fall precautions in place. Call light in reach. Needs attended.        Problem: PAIN - ADULT  Goal: Verbalizes/displays adequate comfort level or patient's stated pain goal  Description: INTERVENTIONS:  - Encourage pt to monitor pain and request assistance  - Assess pain using appropriate pain scale  - Administer analgesics based on type and severity of pain and evaluate response  - Implement non-pharmacological measures as appropriate and evaluate response  - Consider cultural and social influences on pain and pain management  - Manage/alleviate anxiety  - Utilize distraction and/or relaxation techniques  - Monitor for opioid side effects  - Notify MD/LIP if interventions unsuccessful or patient reports new pain  - Anticipate increased pain with activity and pre-medicate as appropriate  Outcome: Progressing

## 2022-10-12 NOTE — PROGRESS NOTES
Pt A&Ox4, VSS. Up independently. IVF maintained, all scheduled meds administered per [facility]. Reports 7/10 pain in R shoulder, PRN morphine and oxy administered. Reports 4mg ineffective and requiring 6mg to achieve relief. Utilizing 2 L NC for comfort. Isolation precautions maintained. Hourly rounded on.

## 2022-10-13 VITALS
OXYGEN SATURATION: 100 % | BODY MASS INDEX: 27.18 KG/M2 | DIASTOLIC BLOOD PRESSURE: 61 MMHG | SYSTOLIC BLOOD PRESSURE: 104 MMHG | HEIGHT: 66 IN | RESPIRATION RATE: 14 BRPM | WEIGHT: 169.13 LBS | HEART RATE: 64 BPM | TEMPERATURE: 98 F

## 2022-10-13 LAB
ALBUMIN SERPL-MCNC: 3.5 G/DL (ref 3.4–5)
ALBUMIN/GLOB SERPL: 0.8 {RATIO} (ref 1–2)
ALP LIVER SERPL-CCNC: 52 U/L
ALT SERPL-CCNC: 28 U/L
ANION GAP SERPL CALC-SCNC: 3 MMOL/L (ref 0–18)
AST SERPL-CCNC: 23 U/L (ref 15–37)
BASOPHILS # BLD AUTO: 0.01 X10(3) UL (ref 0–0.2)
BASOPHILS NFR BLD AUTO: 0.2 %
BILIRUB SERPL-MCNC: 0.5 MG/DL (ref 0.1–2)
BUN BLD-MCNC: 6 MG/DL (ref 7–18)
CALCIUM BLD-MCNC: 8.7 MG/DL (ref 8.5–10.1)
CHLORIDE SERPL-SCNC: 109 MMOL/L (ref 98–112)
CO2 SERPL-SCNC: 29 MMOL/L (ref 21–32)
CREAT BLD-MCNC: 0.62 MG/DL
EOSINOPHIL # BLD AUTO: 0.04 X10(3) UL (ref 0–0.7)
EOSINOPHIL NFR BLD AUTO: 1 %
ERYTHROCYTE [DISTWIDTH] IN BLOOD BY AUTOMATED COUNT: 16 %
GFR SERPLBLD BASED ON 1.73 SQ M-ARVRAT: 117 ML/MIN/1.73M2 (ref 60–?)
GLOBULIN PLAS-MCNC: 4.4 G/DL (ref 2.8–4.4)
GLUCOSE BLD-MCNC: 92 MG/DL (ref 70–99)
HCT VFR BLD AUTO: 25.4 %
HGB BLD-MCNC: 8.8 G/DL
IMM GRANULOCYTES # BLD AUTO: 0.01 X10(3) UL (ref 0–1)
IMM GRANULOCYTES NFR BLD: 0.2 %
LYMPHOCYTES # BLD AUTO: 1.69 X10(3) UL (ref 1–4)
LYMPHOCYTES NFR BLD AUTO: 40.7 %
MCH RBC QN AUTO: 40.6 PG (ref 26–34)
MCHC RBC AUTO-ENTMCNC: 34.6 G/DL (ref 31–37)
MCV RBC AUTO: 117.1 FL
MONOCYTES # BLD AUTO: 0.49 X10(3) UL (ref 0.1–1)
MONOCYTES NFR BLD AUTO: 11.8 %
NEUTROPHILS # BLD AUTO: 1.91 X10 (3) UL (ref 1.5–7.7)
NEUTROPHILS # BLD AUTO: 1.91 X10(3) UL (ref 1.5–7.7)
NEUTROPHILS NFR BLD AUTO: 46.1 %
OSMOLALITY SERPL CALC.SUM OF ELEC: 289 MOSM/KG (ref 275–295)
PLATELET # BLD AUTO: 342 10(3)UL (ref 150–450)
POTASSIUM SERPL-SCNC: 4 MMOL/L (ref 3.5–5.1)
PROT SERPL-MCNC: 7.9 G/DL (ref 6.4–8.2)
RBC # BLD AUTO: 2.17 X10(6)UL
SODIUM SERPL-SCNC: 141 MMOL/L (ref 136–145)
WBC # BLD AUTO: 4.2 X10(3) UL (ref 4–11)

## 2022-10-13 PROCEDURE — 99239 HOSP IP/OBS DSCHRG MGMT >30: CPT | Performed by: HOSPITALIST

## 2022-10-13 PROCEDURE — 99232 SBSQ HOSP IP/OBS MODERATE 35: CPT | Performed by: INTERNAL MEDICINE

## 2022-10-13 NOTE — PROGRESS NOTES
Pt A&Ox4, VSS. Reports 5/10 pain, states she feels comfortable discharging. Up independently, hourly rounded on, call light within reach, gait steady. Discharge paperwork given to pt and educated on POC. PIV removed with catheter intact and fluids stopped. Pt verbalized understanding. WCTM.

## 2022-10-13 NOTE — PLAN OF CARE
Received pt alert and oriented x4. VSS. Afebrile. Pt c/o ongoing pain and given PRN pain meds with moderate relief. IVF infusing. Meds given per MAR orders. No other complaints at this time. Safety and isolation precautions maintained. Call light within reach.

## 2022-10-24 ENCOUNTER — OFFICE VISIT (OUTPATIENT)
Dept: HEMATOLOGY/ONCOLOGY | Facility: HOSPITAL | Age: 37
End: 2022-10-24
Attending: INTERNAL MEDICINE
Payer: COMMERCIAL

## 2022-10-24 VITALS
DIASTOLIC BLOOD PRESSURE: 77 MMHG | WEIGHT: 164.19 LBS | BODY MASS INDEX: 27 KG/M2 | SYSTOLIC BLOOD PRESSURE: 115 MMHG | TEMPERATURE: 98 F | RESPIRATION RATE: 18 BRPM | OXYGEN SATURATION: 98 % | HEART RATE: 96 BPM

## 2022-10-24 DIAGNOSIS — D57.1 SICKLE CELL DISEASE WITHOUT CRISIS (HCC): Primary | ICD-10-CM

## 2022-10-24 LAB
ALBUMIN SERPL-MCNC: 3.9 G/DL (ref 3.4–5)
ALBUMIN/GLOB SERPL: 0.7 {RATIO} (ref 1–2)
ALP LIVER SERPL-CCNC: 53 U/L
ALT SERPL-CCNC: 20 U/L
ANION GAP SERPL CALC-SCNC: 5 MMOL/L (ref 0–18)
AST SERPL-CCNC: 12 U/L (ref 15–37)
BASOPHILS # BLD AUTO: 0.02 X10(3) UL (ref 0–0.2)
BASOPHILS NFR BLD AUTO: 0.5 %
BILIRUB SERPL-MCNC: 0.4 MG/DL (ref 0.1–2)
BUN BLD-MCNC: 12 MG/DL (ref 7–18)
CALCIUM BLD-MCNC: 9.1 MG/DL (ref 8.5–10.1)
CHLORIDE SERPL-SCNC: 107 MMOL/L (ref 98–112)
CO2 SERPL-SCNC: 27 MMOL/L (ref 21–32)
CREAT BLD-MCNC: 0.57 MG/DL
EOSINOPHIL # BLD AUTO: 0.01 X10(3) UL (ref 0–0.7)
EOSINOPHIL NFR BLD AUTO: 0.2 %
ERYTHROCYTE [DISTWIDTH] IN BLOOD BY AUTOMATED COUNT: 15.9 %
FASTING STATUS PATIENT QL REPORTED: NO
GFR SERPLBLD BASED ON 1.73 SQ M-ARVRAT: 119 ML/MIN/1.73M2 (ref 60–?)
GLOBULIN PLAS-MCNC: 5.3 G/DL (ref 2.8–4.4)
GLUCOSE BLD-MCNC: 103 MG/DL (ref 70–99)
HCT VFR BLD AUTO: 26.2 %
HGB BLD-MCNC: 9.4 G/DL
HGB RETIC QN AUTO: 40.8 PG (ref 28.2–36.6)
IMM GRANULOCYTES # BLD AUTO: 0.02 X10(3) UL (ref 0–1)
IMM GRANULOCYTES NFR BLD: 0.5 %
IMM RETICS NFR: 0.4 RATIO (ref 0.1–0.3)
LYMPHOCYTES # BLD AUTO: 2.1 X10(3) UL (ref 1–4)
LYMPHOCYTES NFR BLD AUTO: 51 %
MCH RBC QN AUTO: 41.6 PG (ref 26–34)
MCHC RBC AUTO-ENTMCNC: 35.9 G/DL (ref 31–37)
MCV RBC AUTO: 115.9 FL
MONOCYTES # BLD AUTO: 0.48 X10(3) UL (ref 0.1–1)
MONOCYTES NFR BLD AUTO: 11.7 %
NEUTROPHILS # BLD AUTO: 1.49 X10 (3) UL (ref 1.5–7.7)
NEUTROPHILS # BLD AUTO: 1.49 X10(3) UL (ref 1.5–7.7)
NEUTROPHILS NFR BLD AUTO: 36.1 %
OSMOLALITY SERPL CALC.SUM OF ELEC: 288 MOSM/KG (ref 275–295)
PLATELET # BLD AUTO: 508 10(3)UL (ref 150–450)
POTASSIUM SERPL-SCNC: 3.3 MMOL/L (ref 3.5–5.1)
PROT SERPL-MCNC: 9.2 G/DL (ref 6.4–8.2)
RBC # BLD AUTO: 2.26 X10(6)UL
RETICS # AUTO: 82.5 X10(3) UL (ref 22.5–147.5)
RETICS/RBC NFR AUTO: 3.7 %
SODIUM SERPL-SCNC: 139 MMOL/L (ref 136–145)
WBC # BLD AUTO: 4.1 X10(3) UL (ref 4–11)

## 2022-10-24 PROCEDURE — 99214 OFFICE O/P EST MOD 30 MIN: CPT | Performed by: INTERNAL MEDICINE

## 2022-10-26 ENCOUNTER — TELEPHONE (OUTPATIENT)
Dept: HEMATOLOGY/ONCOLOGY | Facility: HOSPITAL | Age: 37
End: 2022-10-26

## 2022-10-26 DIAGNOSIS — D57.00 SICKLE CELL DISEASE WITH CRISIS (HCC): ICD-10-CM

## 2022-10-26 RX ORDER — OXYCODONE HYDROCHLORIDE 10 MG/1
10 TABLET ORAL
Qty: 180 TABLET | Refills: 0 | Status: SHIPPED | OUTPATIENT
Start: 2022-10-26

## 2022-10-26 RX ORDER — MORPHINE SULFATE 30 MG/1
30 TABLET, FILM COATED, EXTENDED RELEASE ORAL EVERY 12 HOURS
Qty: 60 TABLET | Refills: 0 | Status: SHIPPED | OUTPATIENT
Start: 2022-10-26

## 2022-10-26 NOTE — TELEPHONE ENCOUNTER
Skye needs refill on Oxycodone 10 mg. She has question about Covid treatment that she read about. Could someone call her.  Thanks [id number]

## 2022-10-27 ENCOUNTER — TELEPHONE (OUTPATIENT)
Dept: HEMATOLOGY/ONCOLOGY | Facility: HOSPITAL | Age: 37
End: 2022-10-27

## 2022-10-27 NOTE — TELEPHONE ENCOUNTER
I called Skye and told her Dr Leopoldo Edward will order paxlovid if she tests positive. He suggested she wear a mask and try to isolate from her  and son. She said her son is only 11 yrs old so she can't self isolate. She will wear the mask.

## 2022-10-27 NOTE — TELEPHONE ENCOUNTER
Skye reports that her  tested positive for Covid 3 days ago. Her son tested positive this morning. Other than her sickle cell pain, she is not having symptoms. She is pushing water, eating and taking her pain medication. She denies pain in her chest or dyspnea. She said she did receive her Covid 19 booster and flu shot a month ago. She asked if there is anything she can do now to prevent getting Covid 19? I told her no. She should continue to monitor herself for symptoms. If she develops symptoms, to do a home Covid 19 test. If she is positive, she would be eligible for Paxlovid. Skye said she has not established care with a PCP yet. It she tests positive would Dr Nehemiah Velez order the Paxlovid? I told her I would ask.

## 2022-10-27 NOTE — TELEPHONE ENCOUNTER
Dr Herman Foreman patient - sickle cell disease    I attempted to return Skye's call. I left a voice mail message asking her to please return my call so I may do a telephone assessment.

## 2022-10-27 NOTE — TELEPHONE ENCOUNTER
Patient called. She has had Sickle cell pain all day. She is taking Oxycodone every 2-3 hours. Her son tested positive for Covid. She is negative for covid and she has no symptoms. Please call. Thank you.

## 2022-10-27 NOTE — TELEPHONE ENCOUNTER
[facility]: Tino Nesbitt ([phone]) would like to know if the  Refill: Endari 15 grams had been filled out and Faxed to [phone]

## 2022-10-29 ENCOUNTER — APPOINTMENT (OUTPATIENT)
Dept: GENERAL RADIOLOGY | Facility: HOSPITAL | Age: 37
End: 2022-10-29
Attending: EMERGENCY MEDICINE
Payer: COMMERCIAL

## 2022-10-29 ENCOUNTER — HOSPITAL ENCOUNTER (EMERGENCY)
Facility: HOSPITAL | Age: 37
Discharge: HOME | End: 2022-10-29
Attending: EMERGENCY MEDICINE
Payer: COMMERCIAL

## 2022-10-29 VITALS
TEMPERATURE: 98 F | HEART RATE: 82 BPM | SYSTOLIC BLOOD PRESSURE: 113 MMHG | BODY MASS INDEX: 26 KG/M2 | WEIGHT: 160 LBS | DIASTOLIC BLOOD PRESSURE: 79 MMHG | OXYGEN SATURATION: 98 % | RESPIRATION RATE: 16 BRPM

## 2022-10-29 DIAGNOSIS — D57.00 SICKLE CELL PAIN CRISIS (HCC): ICD-10-CM

## 2022-10-29 DIAGNOSIS — U07.1 COVID-19: Primary | ICD-10-CM

## 2022-10-29 DIAGNOSIS — D64.9 ANEMIA, UNSPECIFIED TYPE: ICD-10-CM

## 2022-10-29 LAB
ALBUMIN SERPL-MCNC: 3.9 G/DL (ref 3.4–5)
ALBUMIN/GLOB SERPL: 0.8 {RATIO} (ref 1–2)
ALP LIVER SERPL-CCNC: 44 U/L
ALT SERPL-CCNC: 19 U/L
ANION GAP SERPL CALC-SCNC: 4 MMOL/L (ref 0–18)
AST SERPL-CCNC: 12 U/L (ref 15–37)
BASOPHILS # BLD AUTO: 0.01 X10(3) UL (ref 0–0.2)
BASOPHILS NFR BLD AUTO: 0.2 %
BILIRUB SERPL-MCNC: 0.5 MG/DL (ref 0.1–2)
BUN BLD-MCNC: 9 MG/DL (ref 7–18)
CALCIUM BLD-MCNC: 8.6 MG/DL (ref 8.5–10.1)
CHLORIDE SERPL-SCNC: 107 MMOL/L (ref 98–112)
CO2 SERPL-SCNC: 27 MMOL/L (ref 21–32)
CREAT BLD-MCNC: 0.49 MG/DL
EOSINOPHIL # BLD AUTO: 0.01 X10(3) UL (ref 0–0.7)
EOSINOPHIL NFR BLD AUTO: 0.2 %
ERYTHROCYTE [DISTWIDTH] IN BLOOD BY AUTOMATED COUNT: 15.6 %
GFR SERPLBLD BASED ON 1.73 SQ M-ARVRAT: 124 ML/MIN/1.73M2 (ref 60–?)
GLOBULIN PLAS-MCNC: 4.9 G/DL (ref 2.8–4.4)
GLUCOSE BLD-MCNC: 99 MG/DL (ref 70–99)
HCT VFR BLD AUTO: 25.4 %
HGB BLD-MCNC: 8.9 G/DL
IMM GRANULOCYTES # BLD AUTO: 0.01 X10(3) UL (ref 0–1)
IMM GRANULOCYTES NFR BLD: 0.2 %
LYMPHOCYTES # BLD AUTO: 1.48 X10(3) UL (ref 1–4)
LYMPHOCYTES NFR BLD AUTO: 31.9 %
MCH RBC QN AUTO: 39.9 PG (ref 26–34)
MCHC RBC AUTO-ENTMCNC: 35 G/DL (ref 31–37)
MCV RBC AUTO: 113.9 FL
MONOCYTES # BLD AUTO: 0.53 X10(3) UL (ref 0.1–1)
MONOCYTES NFR BLD AUTO: 11.4 %
NEUTROPHILS # BLD AUTO: 2.6 X10 (3) UL (ref 1.5–7.7)
NEUTROPHILS # BLD AUTO: 2.6 X10(3) UL (ref 1.5–7.7)
NEUTROPHILS NFR BLD AUTO: 56.1 %
OSMOLALITY SERPL CALC.SUM OF ELEC: 285 MOSM/KG (ref 275–295)
PLATELET # BLD AUTO: 389 10(3)UL (ref 150–450)
POTASSIUM SERPL-SCNC: 3.6 MMOL/L (ref 3.5–5.1)
PROT SERPL-MCNC: 8.8 G/DL (ref 6.4–8.2)
RBC # BLD AUTO: 2.23 X10(6)UL
SARS-COV-2 RNA RESP QL NAA+PROBE: DETECTED
SODIUM SERPL-SCNC: 138 MMOL/L (ref 136–145)
WBC # BLD AUTO: 4.6 X10(3) UL (ref 4–11)

## 2022-10-29 PROCEDURE — 99284 EMERGENCY DEPT VISIT MOD MDM: CPT

## 2022-10-29 PROCEDURE — 96375 TX/PRO/DX INJ NEW DRUG ADDON: CPT

## 2022-10-29 PROCEDURE — 80053 COMPREHEN METABOLIC PANEL: CPT | Performed by: EMERGENCY MEDICINE

## 2022-10-29 PROCEDURE — 96376 TX/PRO/DX INJ SAME DRUG ADON: CPT

## 2022-10-29 PROCEDURE — 71045 X-RAY EXAM CHEST 1 VIEW: CPT | Performed by: EMERGENCY MEDICINE

## 2022-10-29 PROCEDURE — 96374 THER/PROPH/DIAG INJ IV PUSH: CPT

## 2022-10-29 PROCEDURE — 85025 COMPLETE CBC W/AUTO DIFF WBC: CPT | Performed by: EMERGENCY MEDICINE

## 2022-10-29 PROCEDURE — 96361 HYDRATE IV INFUSION ADD-ON: CPT

## 2022-10-29 RX ORDER — KETOROLAC TROMETHAMINE 15 MG/ML
15 INJECTION, SOLUTION INTRAMUSCULAR; INTRAVENOUS ONCE
Status: COMPLETED | OUTPATIENT
Start: 2022-10-29 | End: 2022-10-29

## 2022-10-29 RX ORDER — MORPHINE SULFATE 4 MG/ML
4 INJECTION, SOLUTION INTRAMUSCULAR; INTRAVENOUS ONCE
Status: COMPLETED | OUTPATIENT
Start: 2022-10-29 | End: 2022-10-29

## 2022-10-29 RX ORDER — DIPHENHYDRAMINE HYDROCHLORIDE 50 MG/ML
50 INJECTION INTRAMUSCULAR; INTRAVENOUS ONCE
Status: COMPLETED | OUTPATIENT
Start: 2022-10-29 | End: 2022-10-29

## 2022-10-31 ENCOUNTER — TELEPHONE (OUTPATIENT)
Dept: HEMATOLOGY/ONCOLOGY | Facility: HOSPITAL | Age: 37
End: 2022-10-31

## 2022-10-31 RX ORDER — GLUTAMINE 5 G/1
15 POWDER, FOR SOLUTION ORAL 2 TIMES DAILY
Qty: 180 EACH | Refills: 11 | Status: SHIPPED | OUTPATIENT
Start: 2022-10-31

## 2022-10-31 NOTE — TELEPHONE ENCOUNTER
[address] calling  [facility] [place] )  needs a refill for  [facility]  Fax to   [phone].  Thanks [employer]

## 2022-11-03 ENCOUNTER — TELEPHONE (OUTPATIENT)
Dept: HEMATOLOGY/ONCOLOGY | Facility: HOSPITAL | Age: 37
End: 2022-11-03

## 2022-11-03 NOTE — TELEPHONE ENCOUNTER
Fariba Roque calling with [facility] [phone]. regarding medication Glutamine, Sickle Cell, (ENDARI) 5 g Oral Powd Pack    Calling to advise patient is taking medication incorrectly  Stated she has taken all 6 packets at one  Without food. Stated they have tried to advise patient.    She does no seem to understand the directions provided    Please call patient

## 2022-11-06 ENCOUNTER — HOSPITAL ENCOUNTER (EMERGENCY)
Facility: HOSPITAL | Age: 37
Discharge: HOME | End: 2022-11-06
Attending: EMERGENCY MEDICINE
Payer: COMMERCIAL

## 2022-11-06 ENCOUNTER — APPOINTMENT (OUTPATIENT)
Dept: GENERAL RADIOLOGY | Facility: HOSPITAL | Age: 37
End: 2022-11-06
Attending: EMERGENCY MEDICINE
Payer: COMMERCIAL

## 2022-11-06 VITALS
HEART RATE: 75 BPM | HEIGHT: 66 IN | DIASTOLIC BLOOD PRESSURE: 72 MMHG | TEMPERATURE: 97 F | RESPIRATION RATE: 24 BRPM | SYSTOLIC BLOOD PRESSURE: 108 MMHG | WEIGHT: 165 LBS | BODY MASS INDEX: 26.52 KG/M2 | OXYGEN SATURATION: 100 %

## 2022-11-06 DIAGNOSIS — D57.00 SICKLE CELL PAIN CRISIS (HCC): Primary | ICD-10-CM

## 2022-11-06 DIAGNOSIS — U07.1 COVID-19 VIRUS INFECTION: ICD-10-CM

## 2022-11-06 LAB
ALBUMIN SERPL-MCNC: 3.6 G/DL (ref 3.4–5)
ALBUMIN/GLOB SERPL: 0.6 {RATIO} (ref 1–2)
ALP LIVER SERPL-CCNC: 53 U/L
ALT SERPL-CCNC: 21 U/L
ANION GAP SERPL CALC-SCNC: 4 MMOL/L (ref 0–18)
AST SERPL-CCNC: 46 U/L (ref 15–37)
BASOPHILS # BLD AUTO: 0.01 X10(3) UL (ref 0–0.2)
BASOPHILS NFR BLD AUTO: 0.1 %
BILIRUB SERPL-MCNC: 0.5 MG/DL (ref 0.1–2)
BUN BLD-MCNC: 9 MG/DL (ref 7–18)
CALCIUM BLD-MCNC: 8.9 MG/DL (ref 8.5–10.1)
CHLORIDE SERPL-SCNC: 106 MMOL/L (ref 98–112)
CO2 SERPL-SCNC: 27 MMOL/L (ref 21–32)
CREAT BLD-MCNC: 0.42 MG/DL
EOSINOPHIL # BLD AUTO: 0 X10(3) UL (ref 0–0.7)
EOSINOPHIL NFR BLD AUTO: 0 %
ERYTHROCYTE [DISTWIDTH] IN BLOOD BY AUTOMATED COUNT: 15.6 %
GFR SERPLBLD BASED ON 1.73 SQ M-ARVRAT: 128 ML/MIN/1.73M2 (ref 60–?)
GLOBULIN PLAS-MCNC: 5.8 G/DL (ref 2.8–4.4)
GLUCOSE BLD-MCNC: 108 MG/DL (ref 70–99)
HCT VFR BLD AUTO: 24.6 %
HGB BLD-MCNC: 8.7 G/DL
HGB RETIC QN AUTO: 36.7 PG (ref 28.2–36.6)
IMM GRANULOCYTES # BLD AUTO: 0.14 X10(3) UL (ref 0–1)
IMM GRANULOCYTES NFR BLD: 1.3 %
IMM RETICS NFR: 0.32 RATIO (ref 0.1–0.3)
LYMPHOCYTES # BLD AUTO: 0.72 X10(3) UL (ref 1–4)
LYMPHOCYTES NFR BLD AUTO: 6.5 %
MCH RBC QN AUTO: 40.7 PG (ref 26–34)
MCHC RBC AUTO-ENTMCNC: 35.4 G/DL (ref 31–37)
MCV RBC AUTO: 115 FL
MONOCYTES # BLD AUTO: 0.77 X10(3) UL (ref 0.1–1)
MONOCYTES NFR BLD AUTO: 6.9 %
NEUTROPHILS # BLD AUTO: 9.48 X10 (3) UL (ref 1.5–7.7)
NEUTROPHILS # BLD AUTO: 9.48 X10(3) UL (ref 1.5–7.7)
NEUTROPHILS NFR BLD AUTO: 85.2 %
OSMOLALITY SERPL CALC.SUM OF ELEC: 283 MOSM/KG (ref 275–295)
PLATELET # BLD AUTO: 222 10(3)UL (ref 150–450)
POTASSIUM SERPL-SCNC: 4.3 MMOL/L (ref 3.5–5.1)
PROT SERPL-MCNC: 9.4 G/DL (ref 6.4–8.2)
RBC # BLD AUTO: 2.14 X10(6)UL
RETICS # AUTO: 47.5 X10(3) UL (ref 22.5–147.5)
RETICS/RBC NFR AUTO: 2.2 %
SODIUM SERPL-SCNC: 137 MMOL/L (ref 136–145)
WBC # BLD AUTO: 11.1 X10(3) UL (ref 4–11)

## 2022-11-06 PROCEDURE — 85045 AUTOMATED RETICULOCYTE COUNT: CPT | Performed by: EMERGENCY MEDICINE

## 2022-11-06 PROCEDURE — 85025 COMPLETE CBC W/AUTO DIFF WBC: CPT | Performed by: EMERGENCY MEDICINE

## 2022-11-06 PROCEDURE — 71045 X-RAY EXAM CHEST 1 VIEW: CPT | Performed by: EMERGENCY MEDICINE

## 2022-11-06 PROCEDURE — 99284 EMERGENCY DEPT VISIT MOD MDM: CPT

## 2022-11-06 PROCEDURE — 96361 HYDRATE IV INFUSION ADD-ON: CPT

## 2022-11-06 PROCEDURE — 80053 COMPREHEN METABOLIC PANEL: CPT | Performed by: EMERGENCY MEDICINE

## 2022-11-06 PROCEDURE — 96374 THER/PROPH/DIAG INJ IV PUSH: CPT

## 2022-11-06 PROCEDURE — 96375 TX/PRO/DX INJ NEW DRUG ADDON: CPT

## 2022-11-06 RX ORDER — DIPHENHYDRAMINE HYDROCHLORIDE 50 MG/ML
25 INJECTION INTRAMUSCULAR; INTRAVENOUS ONCE
Status: COMPLETED | OUTPATIENT
Start: 2022-11-06 | End: 2022-11-06

## 2022-11-06 RX ORDER — HYDROMORPHONE HYDROCHLORIDE 1 MG/ML
2 INJECTION, SOLUTION INTRAMUSCULAR; INTRAVENOUS; SUBCUTANEOUS ONCE
Status: COMPLETED | OUTPATIENT
Start: 2022-11-06 | End: 2022-11-06

## 2022-11-06 NOTE — ED INITIAL ASSESSMENT (HPI)
Pt presents to ed c/o sickle cell crisis and fevers that started yesterday evening. Pt states she recently tested positive for covid. States last dose of tylenol was at 0600 this morning.

## 2022-11-21 ENCOUNTER — TELEPHONE (OUTPATIENT)
Dept: HEMATOLOGY/ONCOLOGY | Facility: HOSPITAL | Age: 37
End: 2022-11-21

## 2022-11-21 ENCOUNTER — APPOINTMENT (OUTPATIENT)
Dept: GENERAL RADIOLOGY | Facility: HOSPITAL | Age: 37
End: 2022-11-21
Attending: EMERGENCY MEDICINE
Payer: COMMERCIAL

## 2022-11-21 ENCOUNTER — HOSPITAL ENCOUNTER (EMERGENCY)
Facility: HOSPITAL | Age: 37
Discharge: HOME | End: 2022-11-21
Attending: EMERGENCY MEDICINE
Payer: COMMERCIAL

## 2022-11-21 ENCOUNTER — APPOINTMENT (OUTPATIENT)
Dept: CT IMAGING | Facility: HOSPITAL | Age: 37
End: 2022-11-21
Attending: EMERGENCY MEDICINE
Payer: COMMERCIAL

## 2022-11-21 VITALS
SYSTOLIC BLOOD PRESSURE: 112 MMHG | WEIGHT: 165 LBS | HEIGHT: 66 IN | HEART RATE: 86 BPM | RESPIRATION RATE: 17 BRPM | TEMPERATURE: 99 F | OXYGEN SATURATION: 98 % | DIASTOLIC BLOOD PRESSURE: 74 MMHG | BODY MASS INDEX: 26.52 KG/M2

## 2022-11-21 DIAGNOSIS — R07.89 CHEST WALL PAIN: Primary | ICD-10-CM

## 2022-11-21 DIAGNOSIS — D57.00 SICKLE CELL CRISIS (HCC): ICD-10-CM

## 2022-11-21 DIAGNOSIS — D57.00 SICKLE CELL DISEASE WITH CRISIS (HCC): ICD-10-CM

## 2022-11-21 LAB
ALBUMIN SERPL-MCNC: 4.2 G/DL (ref 3.4–5)
ALBUMIN/GLOB SERPL: 0.8 {RATIO} (ref 1–2)
ALP LIVER SERPL-CCNC: 50 U/L
ALT SERPL-CCNC: 22 U/L
ANION GAP SERPL CALC-SCNC: 3 MMOL/L (ref 0–18)
AST SERPL-CCNC: 52 U/L (ref 15–37)
ATRIAL RATE: 83 BPM
BASOPHILS # BLD AUTO: 0.01 X10(3) UL (ref 0–0.2)
BASOPHILS NFR BLD AUTO: 0.3 %
BILIRUB SERPL-MCNC: 0.6 MG/DL (ref 0.1–2)
BUN BLD-MCNC: 9 MG/DL (ref 7–18)
CALCIUM BLD-MCNC: 9.2 MG/DL (ref 8.5–10.1)
CHLORIDE SERPL-SCNC: 107 MMOL/L (ref 98–112)
CHOLEST SERPL-MCNC: 143 MG/DL (ref ?–200)
CO2 SERPL-SCNC: 27 MMOL/L (ref 21–32)
CREAT BLD-MCNC: 0.31 MG/DL
D DIMER PPP FEU-MCNC: 1.05 UG/ML FEU (ref ?–0.5)
EOSINOPHIL # BLD AUTO: 0.02 X10(3) UL (ref 0–0.7)
EOSINOPHIL NFR BLD AUTO: 0.6 %
ERYTHROCYTE [DISTWIDTH] IN BLOOD BY AUTOMATED COUNT: 16.9 %
GFR SERPLBLD BASED ON 1.73 SQ M-ARVRAT: 138 ML/MIN/1.73M2 (ref 60–?)
GLOBULIN PLAS-MCNC: 5.1 G/DL (ref 2.8–4.4)
GLUCOSE BLD-MCNC: 97 MG/DL (ref 70–99)
HCT VFR BLD AUTO: 24.7 %
HDLC SERPL-MCNC: 31 MG/DL (ref 40–59)
HGB BLD-MCNC: 8.7 G/DL
HGB RETIC QN AUTO: 37 PG (ref 28.2–36.6)
IMM GRANULOCYTES # BLD AUTO: 0.02 X10(3) UL (ref 0–1)
IMM GRANULOCYTES NFR BLD: 0.6 %
IMM RETICS NFR: 0.32 RATIO (ref 0.1–0.3)
LDLC SERPL CALC-MCNC: 91 MG/DL (ref ?–100)
LYMPHOCYTES # BLD AUTO: 1.25 X10(3) UL (ref 1–4)
LYMPHOCYTES NFR BLD AUTO: 35.6 %
MCH RBC QN AUTO: 39.9 PG (ref 26–34)
MCHC RBC AUTO-ENTMCNC: 35.2 G/DL (ref 31–37)
MCV RBC AUTO: 113.3 FL
MONOCYTES # BLD AUTO: 0.24 X10(3) UL (ref 0.1–1)
MONOCYTES NFR BLD AUTO: 6.8 %
NEUTROPHILS # BLD AUTO: 1.97 X10 (3) UL (ref 1.5–7.7)
NEUTROPHILS # BLD AUTO: 1.97 X10(3) UL (ref 1.5–7.7)
NEUTROPHILS NFR BLD AUTO: 56.1 %
NONHDLC SERPL-MCNC: 112 MG/DL (ref ?–130)
OSMOLALITY SERPL CALC.SUM OF ELEC: 283 MOSM/KG (ref 275–295)
P AXIS: 39 DEGREES
P-R INTERVAL: 186 MS
PLATELET # BLD AUTO: 468 10(3)UL (ref 150–450)
POTASSIUM SERPL-SCNC: 4.8 MMOL/L (ref 3.5–5.1)
PROT SERPL-MCNC: 9.3 G/DL (ref 6.4–8.2)
Q-T INTERVAL: 374 MS
QRS DURATION: 94 MS
QTC CALCULATION (BEZET): 439 MS
R AXIS: 22 DEGREES
RBC # BLD AUTO: 2.18 X10(6)UL
RETICS # AUTO: 59.1 X10(3) UL (ref 22.5–147.5)
RETICS/RBC NFR AUTO: 2.7 %
SODIUM SERPL-SCNC: 137 MMOL/L (ref 136–145)
T AXIS: 12 DEGREES
TRIGL SERPL-MCNC: 115 MG/DL (ref 30–149)
TROPONIN I HIGH SENSITIVITY: 92 NG/L
TROPONIN I HIGH SENSITIVITY: 97 NG/L
VENTRICULAR RATE: 83 BPM
VLDLC SERPL CALC-MCNC: 19 MG/DL (ref 0–30)
WBC # BLD AUTO: 3.5 X10(3) UL (ref 4–11)

## 2022-11-21 PROCEDURE — 71045 X-RAY EXAM CHEST 1 VIEW: CPT | Performed by: EMERGENCY MEDICINE

## 2022-11-21 PROCEDURE — 99285 EMERGENCY DEPT VISIT HI MDM: CPT

## 2022-11-21 PROCEDURE — 93005 ELECTROCARDIOGRAM TRACING: CPT

## 2022-11-21 PROCEDURE — 85379 FIBRIN DEGRADATION QUANT: CPT | Performed by: EMERGENCY MEDICINE

## 2022-11-21 PROCEDURE — 71260 CT THORAX DX C+: CPT | Performed by: EMERGENCY MEDICINE

## 2022-11-21 PROCEDURE — 85025 COMPLETE CBC W/AUTO DIFF WBC: CPT | Performed by: EMERGENCY MEDICINE

## 2022-11-21 PROCEDURE — 93010 ELECTROCARDIOGRAM REPORT: CPT

## 2022-11-21 PROCEDURE — 96375 TX/PRO/DX INJ NEW DRUG ADDON: CPT

## 2022-11-21 PROCEDURE — 96361 HYDRATE IV INFUSION ADD-ON: CPT

## 2022-11-21 PROCEDURE — 96374 THER/PROPH/DIAG INJ IV PUSH: CPT

## 2022-11-21 PROCEDURE — 96376 TX/PRO/DX INJ SAME DRUG ADON: CPT

## 2022-11-21 PROCEDURE — 80061 LIPID PANEL: CPT | Performed by: EMERGENCY MEDICINE

## 2022-11-21 PROCEDURE — 80053 COMPREHEN METABOLIC PANEL: CPT | Performed by: EMERGENCY MEDICINE

## 2022-11-21 PROCEDURE — 85045 AUTOMATED RETICULOCYTE COUNT: CPT | Performed by: EMERGENCY MEDICINE

## 2022-11-21 PROCEDURE — 84484 ASSAY OF TROPONIN QUANT: CPT | Performed by: EMERGENCY MEDICINE

## 2022-11-21 RX ORDER — OXYCODONE HYDROCHLORIDE 10 MG/1
10 TABLET ORAL
Qty: 180 TABLET | Refills: 0 | Status: SHIPPED | OUTPATIENT
Start: 2022-11-21 | End: 2022-12-16

## 2022-11-21 RX ORDER — KETOROLAC TROMETHAMINE 15 MG/ML
15 INJECTION, SOLUTION INTRAMUSCULAR; INTRAVENOUS ONCE
Status: COMPLETED | OUTPATIENT
Start: 2022-11-21 | End: 2022-11-21

## 2022-11-21 RX ORDER — IOHEXOL 350 MG/ML
100 INJECTION, SOLUTION INTRAVENOUS
Status: COMPLETED | OUTPATIENT
Start: 2022-11-21 | End: 2022-11-21

## 2022-11-21 RX ORDER — HYDROMORPHONE HYDROCHLORIDE 1 MG/ML
1 INJECTION, SOLUTION INTRAMUSCULAR; INTRAVENOUS; SUBCUTANEOUS EVERY 30 MIN PRN
Status: DISCONTINUED | OUTPATIENT
Start: 2022-11-21 | End: 2022-11-21

## 2022-11-21 RX ORDER — DIPHENHYDRAMINE HYDROCHLORIDE 50 MG/ML
25 INJECTION INTRAMUSCULAR; INTRAVENOUS ONCE
Status: COMPLETED | OUTPATIENT
Start: 2022-11-21 | End: 2022-11-21

## 2022-11-21 NOTE — TELEPHONE ENCOUNTER
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 Dyspnea/Chest Pain/Chest Pressure/Pain Between Shoulder Blades/Bilateral Thigh Pain & Lower Back Pain: (Patient reports that she tested positive for Covid 19 on 10/29/2022. On 11/6/2022 she presented to the [address] [place] [address] ER with fever, dyspnea and chest pain & sickle cell pain. She was evaluated and later discharged home. Since last Thursday she has had dyspnea when walking up and down stairs. She also has chest pain and chest pressure. She has pain between her shoulders blades, pain in both thighs and her lower back. The chest pain was worse this morning before she took her last oxycodone 10 mg tab. The chest pain is now more dull. She still has chest pressure and pain between her shoulder blades. She also reports feeling light headed after walking up and down stairs. She also has pain in both thighs and lower back.)    I updated Juan Pablo Hughes. She asked me to have the patient get evaluated in the ER ER for possible acute chest. I updated the patient. She agreed with the plan.  I called report to the [address] [place] [address] ER.

## 2022-11-21 NOTE — TELEPHONE ENCOUNTER
Patient called. She has aches in her legs, chest, back, rib cage and shoulder blades for the 3 days, She has been managing the pain with medication. Please call. Thank you.

## 2022-11-21 NOTE — ED INITIAL ASSESSMENT (HPI)
Pt c/o left sided chest pain x 4 days, states no relief with her pain medications, also c/o CARLIN with exertion.

## 2022-12-05 ENCOUNTER — OFFICE VISIT (OUTPATIENT)
Dept: HEMATOLOGY/ONCOLOGY | Facility: HOSPITAL | Age: 37
End: 2022-12-05
Attending: INTERNAL MEDICINE
Payer: COMMERCIAL

## 2022-12-05 ENCOUNTER — TELEPHONE (OUTPATIENT)
Dept: HEMATOLOGY/ONCOLOGY | Facility: HOSPITAL | Age: 37
End: 2022-12-05

## 2022-12-05 VITALS
OXYGEN SATURATION: 100 % | SYSTOLIC BLOOD PRESSURE: 132 MMHG | RESPIRATION RATE: 18 BRPM | HEART RATE: 95 BPM | BODY MASS INDEX: 25.71 KG/M2 | TEMPERATURE: 97 F | DIASTOLIC BLOOD PRESSURE: 87 MMHG | WEIGHT: 160 LBS | HEIGHT: 65.98 IN

## 2022-12-05 DIAGNOSIS — D57.00 SICKLE CELL PAIN CRISIS (HCC): Primary | ICD-10-CM

## 2022-12-05 DIAGNOSIS — D57.00 SICKLE CELL DISEASE WITH CRISIS (HCC): Primary | ICD-10-CM

## 2022-12-05 DIAGNOSIS — D57.00 SICKLE CELL DISEASE WITH CRISIS (HCC): ICD-10-CM

## 2022-12-05 LAB
ALBUMIN SERPL-MCNC: 4.1 G/DL (ref 3.4–5)
ALBUMIN/GLOB SERPL: 0.8 {RATIO} (ref 1–2)
ALP LIVER SERPL-CCNC: 51 U/L
ALT SERPL-CCNC: 26 U/L
ANION GAP SERPL CALC-SCNC: 4 MMOL/L (ref 0–18)
AST SERPL-CCNC: 15 U/L (ref 15–37)
BASOPHILS # BLD AUTO: 0 X10(3) UL (ref 0–0.2)
BASOPHILS NFR BLD AUTO: 0 %
BILIRUB SERPL-MCNC: 0.6 MG/DL (ref 0.1–2)
BUN BLD-MCNC: 10 MG/DL (ref 7–18)
CALCIUM BLD-MCNC: 9.5 MG/DL (ref 8.5–10.1)
CHLORIDE SERPL-SCNC: 109 MMOL/L (ref 98–112)
CO2 SERPL-SCNC: 27 MMOL/L (ref 21–32)
CREAT BLD-MCNC: 0.66 MG/DL
EOSINOPHIL # BLD AUTO: 0.02 X10(3) UL (ref 0–0.7)
EOSINOPHIL NFR BLD AUTO: 0.7 %
ERYTHROCYTE [DISTWIDTH] IN BLOOD BY AUTOMATED COUNT: 18.1 %
FASTING STATUS PATIENT QL REPORTED: NO
GFR SERPLBLD BASED ON 1.73 SQ M-ARVRAT: 115 ML/MIN/1.73M2 (ref 60–?)
GLOBULIN PLAS-MCNC: 4.9 G/DL (ref 2.8–4.4)
GLUCOSE BLD-MCNC: 94 MG/DL (ref 70–99)
HCT VFR BLD AUTO: 24.4 %
HELMET CELLS BLD QL SMEAR: PRESENT
HGB BLD-MCNC: 8.6 G/DL
HGB RETIC QN AUTO: 41.6 PG (ref 28.2–36.6)
IMM GRANULOCYTES # BLD AUTO: 0.01 X10(3) UL (ref 0–1)
IMM GRANULOCYTES NFR BLD: 0.3 %
IMM RETICS NFR: 0.4 RATIO (ref 0.1–0.3)
LYMPHOCYTES # BLD AUTO: 1.08 X10(3) UL (ref 1–4)
LYMPHOCYTES NFR BLD AUTO: 35.3 %
MCH RBC QN AUTO: 41.3 PG (ref 26–34)
MCHC RBC AUTO-ENTMCNC: 35.2 G/DL (ref 31–37)
MCV RBC AUTO: 117.3 FL
MONOCYTES # BLD AUTO: 0.29 X10(3) UL (ref 0.1–1)
MONOCYTES NFR BLD AUTO: 9.5 %
NEUTROPHILS # BLD AUTO: 1.66 X10 (3) UL (ref 1.5–7.7)
NEUTROPHILS # BLD AUTO: 1.66 X10(3) UL (ref 1.5–7.7)
NEUTROPHILS NFR BLD AUTO: 54.2 %
OSMOLALITY SERPL CALC.SUM OF ELEC: 289 MOSM/KG (ref 275–295)
PLATELET # BLD AUTO: 282 10(3)UL (ref 150–450)
PLATELET MORPHOLOGY: NORMAL
POTASSIUM SERPL-SCNC: 3.4 MMOL/L (ref 3.5–5.1)
PROT SERPL-MCNC: 9 G/DL (ref 6.4–8.2)
RBC # BLD AUTO: 2.08 X10(6)UL
RETICS # AUTO: 69.9 X10(3) UL (ref 22.5–147.5)
RETICS/RBC NFR AUTO: 3.4 %
SODIUM SERPL-SCNC: 140 MMOL/L (ref 136–145)
WBC # BLD AUTO: 3.1 X10(3) UL (ref 4–11)

## 2022-12-05 PROCEDURE — 96374 THER/PROPH/DIAG INJ IV PUSH: CPT

## 2022-12-05 PROCEDURE — 96376 TX/PRO/DX INJ SAME DRUG ADON: CPT

## 2022-12-05 PROCEDURE — 99214 OFFICE O/P EST MOD 30 MIN: CPT | Performed by: NURSE PRACTITIONER

## 2022-12-05 PROCEDURE — 96361 HYDRATE IV INFUSION ADD-ON: CPT

## 2022-12-05 RX ORDER — MORPHINE SULFATE 4 MG/ML
4 INJECTION, SOLUTION INTRAMUSCULAR; INTRAVENOUS ONCE
Status: CANCELLED
Start: 2022-12-05 | End: 2022-12-05

## 2022-12-05 RX ORDER — MORPHINE SULFATE 30 MG/1
30 TABLET, FILM COATED, EXTENDED RELEASE ORAL EVERY 12 HOURS SCHEDULED
Qty: 60 TABLET | Refills: 0 | Status: SHIPPED | OUTPATIENT
Start: 2022-12-05

## 2022-12-05 RX ORDER — MORPHINE SULFATE 4 MG/ML
4 INJECTION, SOLUTION INTRAMUSCULAR; INTRAVENOUS ONCE
Status: COMPLETED | OUTPATIENT
Start: 2022-12-05 | End: 2022-12-05

## 2022-12-05 RX ORDER — MORPHINE SULFATE 4 MG/ML
3 INJECTION, SOLUTION INTRAMUSCULAR; INTRAVENOUS ONCE
Status: CANCELLED
Start: 2022-12-05 | End: 2022-12-05

## 2022-12-05 RX ADMIN — MORPHINE SULFATE 4 MG: 4 INJECTION, SOLUTION INTRAMUSCULAR; INTRAVENOUS at 15:42:00

## 2022-12-05 RX ADMIN — MORPHINE SULFATE 4 MG: 4 INJECTION, SOLUTION INTRAMUSCULAR; INTRAVENOUS at 16:22:00

## 2022-12-05 RX ADMIN — MORPHINE SULFATE 4 MG: 4 INJECTION, SOLUTION INTRAMUSCULAR; INTRAVENOUS at 17:12:00

## 2022-12-05 NOTE — PROGRESS NOTES
Patient presents with:  Pain: Apn assessment      Patient here for assessment of acute sickle cell pain. Reports having pain 3-4 days in shoulders legs and waist- taking oral pain medication with water and alternating with no relief denies any chest pain- pain is 9/10 on pain scale. Patient denies any fever or chills- N/V or any signs of infection she reports being at the tail end of her menstrual cycle. Labs drawn peripherally and sent to lab for processing.       Learner:  Patient    Disease / Diagnosis: sickle cell     Barriers / Limitations:  None   Comments:    Method:  Brief focused   Comments:    General Topics:  Plan of care reviewed   Comments:    Outcome:     Comments:

## 2022-12-05 NOTE — TELEPHONE ENCOUNTER
Dr Freddy Bryan patient - Sickle Cell Disease    Acute Pain: (Patient reports she is having pain in both shoulders, legs and by her waist. She has taken her pain medication and has drank 64oz of water so far today. She is still having \"a lot of pain. \" She denies chest pain or dyspnea. She is requesting to come for an ACV, IV hydration and pain management.)    I updated JUAN Beard. She said if Infusion has room to book her for an ACV with JUAN Aleman. I spoke with Wilevr Greene RN [facility]. She said she could add her on. I updated Skye. She agreed to a 2:30 pm ACV.

## 2022-12-05 NOTE — PROGRESS NOTES
Education Record    Learner:  Patient    Disease / Diagnosis: Sickle Cell Crisis    Barriers / Limitations:  None   Comments:    Method:  Brief focused and Reinforcement   Comments:    General Topics:  Pain, Plan of care reviewed and Fall risk and prevention   Comments:    Outcome:  Shows understanding   Comments:

## 2022-12-06 RX ORDER — MORPHINE SULFATE 30 MG/1
30 TABLET, FILM COATED, EXTENDED RELEASE ORAL EVERY 12 HOURS SCHEDULED
Qty: 60 TABLET | Refills: 0 | OUTPATIENT
Start: 2022-12-06

## 2022-12-08 ENCOUNTER — TELEPHONE (OUTPATIENT)
Dept: HEMATOLOGY/ONCOLOGY | Facility: HOSPITAL | Age: 37
End: 2022-12-08

## 2022-12-08 RX ORDER — MORPHINE SULFATE 30 MG/1
30 TABLET, FILM COATED, EXTENDED RELEASE ORAL EVERY 12 HOURS SCHEDULED
Qty: 60 TABLET | Refills: 0 | Status: CANCELLED | OUTPATIENT
Start: 2022-12-08

## 2022-12-08 NOTE — TELEPHONE ENCOUNTER
Patient need refill on Morphine Er 30 mg po, patient is completely out of medication.  Please send to  [facility]/pharmacy #[id number] [place], [place]

## 2022-12-16 DIAGNOSIS — D57.00 SICKLE CELL DISEASE WITH CRISIS (HCC): ICD-10-CM

## 2022-12-16 RX ORDER — OXYCODONE HYDROCHLORIDE 10 MG/1
10 TABLET ORAL
Qty: 180 TABLET | Refills: 0 | Status: SHIPPED | OUTPATIENT
Start: 2022-12-16 | End: 2023-01-15

## 2022-12-22 ENCOUNTER — OFFICE VISIT (OUTPATIENT)
Dept: HEMATOLOGY/ONCOLOGY | Facility: HOSPITAL | Age: 37
End: 2022-12-22
Attending: INTERNAL MEDICINE
Payer: COMMERCIAL

## 2022-12-22 VITALS
HEIGHT: 65.98 IN | WEIGHT: 159.19 LBS | DIASTOLIC BLOOD PRESSURE: 58 MMHG | RESPIRATION RATE: 18 BRPM | SYSTOLIC BLOOD PRESSURE: 109 MMHG | TEMPERATURE: 97 F | HEART RATE: 78 BPM | BODY MASS INDEX: 25.58 KG/M2 | OXYGEN SATURATION: 98 %

## 2022-12-22 DIAGNOSIS — D57.00 SICKLE CELL PAIN CRISIS (HCC): Primary | ICD-10-CM

## 2022-12-22 DIAGNOSIS — D57.00 SICKLE CELL CRISIS (HCC): ICD-10-CM

## 2022-12-22 DIAGNOSIS — D57.00 SICKLE CELL DISEASE WITH CRISIS (HCC): ICD-10-CM

## 2022-12-22 DIAGNOSIS — D57.1 SICKLE CELL DISEASE WITHOUT CRISIS (HCC): ICD-10-CM

## 2022-12-22 LAB
ALBUMIN SERPL-MCNC: 4 G/DL (ref 3.4–5)
ALBUMIN/GLOB SERPL: 0.8 {RATIO} (ref 1–2)
ALP LIVER SERPL-CCNC: 49 U/L
ALT SERPL-CCNC: 16 U/L
ANION GAP SERPL CALC-SCNC: 2 MMOL/L (ref 0–18)
AST SERPL-CCNC: 14 U/L (ref 15–37)
BASOPHILS # BLD AUTO: 0.01 X10(3) UL (ref 0–0.2)
BASOPHILS NFR BLD AUTO: 0.2 %
BILIRUB SERPL-MCNC: 0.6 MG/DL (ref 0.1–2)
BUN BLD-MCNC: 14 MG/DL (ref 7–18)
CALCIUM BLD-MCNC: 8.9 MG/DL (ref 8.5–10.1)
CHLORIDE SERPL-SCNC: 108 MMOL/L (ref 98–112)
CO2 SERPL-SCNC: 25 MMOL/L (ref 21–32)
CREAT BLD-MCNC: 0.58 MG/DL
EOSINOPHIL # BLD AUTO: 0.02 X10(3) UL (ref 0–0.7)
EOSINOPHIL NFR BLD AUTO: 0.5 %
ERYTHROCYTE [DISTWIDTH] IN BLOOD BY AUTOMATED COUNT: 17 %
GFR SERPLBLD BASED ON 1.73 SQ M-ARVRAT: 119 ML/MIN/1.73M2 (ref 60–?)
GLOBULIN PLAS-MCNC: 4.9 G/DL (ref 2.8–4.4)
GLUCOSE BLD-MCNC: 98 MG/DL (ref 70–99)
HCT VFR BLD AUTO: 22.5 %
HGB BLD-MCNC: 7.9 G/DL
HGB RETIC QN AUTO: 35.8 PG (ref 28.2–36.6)
IMM GRANULOCYTES # BLD AUTO: 0.01 X10(3) UL (ref 0–1)
IMM GRANULOCYTES NFR BLD: 0.2 %
IMM RETICS NFR: 0.23 RATIO (ref 0.1–0.3)
LDH SERPL L TO P-CCNC: 186 U/L
LYMPHOCYTES # BLD AUTO: 2.1 X10(3) UL (ref 1–4)
LYMPHOCYTES NFR BLD AUTO: 48.7 %
MCH RBC QN AUTO: 41.1 PG (ref 26–34)
MCHC RBC AUTO-ENTMCNC: 35.1 G/DL (ref 31–37)
MCV RBC AUTO: 117.2 FL
MONOCYTES # BLD AUTO: 0.49 X10(3) UL (ref 0.1–1)
MONOCYTES NFR BLD AUTO: 11.4 %
NEUTROPHILS # BLD AUTO: 1.68 X10 (3) UL (ref 1.5–7.7)
NEUTROPHILS # BLD AUTO: 1.68 X10(3) UL (ref 1.5–7.7)
NEUTROPHILS NFR BLD AUTO: 39 %
OSMOLALITY SERPL CALC.SUM OF ELEC: 280 MOSM/KG (ref 275–295)
PLATELET # BLD AUTO: 373 10(3)UL (ref 150–450)
POTASSIUM SERPL-SCNC: 3.6 MMOL/L (ref 3.5–5.1)
PROT SERPL-MCNC: 8.9 G/DL (ref 6.4–8.2)
RBC # BLD AUTO: 1.92 X10(6)UL
RETICS # AUTO: 59.7 X10(3) UL (ref 22.5–147.5)
RETICS/RBC NFR AUTO: 3.1 %
SODIUM SERPL-SCNC: 135 MMOL/L (ref 136–145)
WBC # BLD AUTO: 4.3 X10(3) UL (ref 4–11)

## 2022-12-22 PROCEDURE — 96361 HYDRATE IV INFUSION ADD-ON: CPT

## 2022-12-22 PROCEDURE — 96374 THER/PROPH/DIAG INJ IV PUSH: CPT

## 2022-12-22 PROCEDURE — 96376 TX/PRO/DX INJ SAME DRUG ADON: CPT

## 2022-12-22 PROCEDURE — 99215 OFFICE O/P EST HI 40 MIN: CPT | Performed by: INTERNAL MEDICINE

## 2022-12-22 RX ORDER — DIPHENHYDRAMINE HCL 25 MG
CAPSULE ORAL ONCE
Status: CANCELLED
Start: 2022-12-22 | End: 2022-12-22

## 2022-12-22 RX ORDER — MORPHINE SULFATE 4 MG/ML
4 INJECTION, SOLUTION INTRAMUSCULAR; INTRAVENOUS ONCE
Status: COMPLETED | OUTPATIENT
Start: 2022-12-22 | End: 2022-12-22

## 2022-12-22 RX ORDER — DIPHENHYDRAMINE HCL 25 MG
25 CAPSULE ORAL ONCE
Status: COMPLETED | OUTPATIENT
Start: 2022-12-22 | End: 2022-12-22

## 2022-12-22 RX ORDER — DIPHENHYDRAMINE HCL 25 MG
CAPSULE ORAL ONCE
Start: 2022-12-22 | End: 2022-12-22

## 2022-12-22 RX ORDER — MORPHINE SULFATE 4 MG/ML
4 INJECTION, SOLUTION INTRAMUSCULAR; INTRAVENOUS ONCE
Start: 2022-12-22 | End: 2022-12-22

## 2022-12-22 RX ORDER — MORPHINE SULFATE 4 MG/ML
4 INJECTION, SOLUTION INTRAMUSCULAR; INTRAVENOUS ONCE
Status: CANCELLED
Start: 2022-12-22 | End: 2022-12-22

## 2022-12-22 RX ADMIN — MORPHINE SULFATE 4 MG: 4 INJECTION, SOLUTION INTRAMUSCULAR; INTRAVENOUS at 11:23:00

## 2022-12-22 RX ADMIN — DIPHENHYDRAMINE HCL 25 MG: 25 MG CAPSULE ORAL at 11:23:00

## 2022-12-22 RX ADMIN — MORPHINE SULFATE 4 MG: 4 INJECTION, SOLUTION INTRAMUSCULAR; INTRAVENOUS at 12:25:00

## 2022-12-22 NOTE — PROGRESS NOTES
Education Record    Learner:  Patient    Disease / Diagnosis: sickle cell    Barriers / Limitations:  None    Method:  Brief focused, printed material and  reinforcement    General Topics:  Plan of care reviewed    Outcome:  Shows understanding    Here for IVF, morphine and benadryl. Pain 7/10. Has f/u scheduled in two months.

## 2022-12-28 ENCOUNTER — APPOINTMENT (OUTPATIENT)
Dept: HEMATOLOGY/ONCOLOGY | Facility: HOSPITAL | Age: 37
End: 2022-12-28
Attending: INTERNAL MEDICINE
Payer: COMMERCIAL

## 2023-01-05 ENCOUNTER — OFFICE VISIT (OUTPATIENT)
Dept: HEMATOLOGY/ONCOLOGY | Facility: HOSPITAL | Age: 38
End: 2023-01-05
Attending: INTERNAL MEDICINE
Payer: COMMERCIAL

## 2023-01-05 ENCOUNTER — TELEPHONE (OUTPATIENT)
Dept: HEMATOLOGY/ONCOLOGY | Facility: HOSPITAL | Age: 38
End: 2023-01-05

## 2023-01-05 VITALS
HEART RATE: 97 BPM | TEMPERATURE: 98 F | SYSTOLIC BLOOD PRESSURE: 131 MMHG | RESPIRATION RATE: 18 BRPM | DIASTOLIC BLOOD PRESSURE: 85 MMHG | OXYGEN SATURATION: 98 %

## 2023-01-05 DIAGNOSIS — D57.00 SICKLE CELL DISEASE WITH CRISIS (HCC): Primary | ICD-10-CM

## 2023-01-05 DIAGNOSIS — D57.00 SICKLE CELL DISEASE WITH CRISIS (HCC): ICD-10-CM

## 2023-01-05 DIAGNOSIS — D57.00 SICKLE CELL PAIN CRISIS (HCC): Primary | ICD-10-CM

## 2023-01-05 PROCEDURE — 96361 HYDRATE IV INFUSION ADD-ON: CPT

## 2023-01-05 PROCEDURE — 96376 TX/PRO/DX INJ SAME DRUG ADON: CPT

## 2023-01-05 PROCEDURE — 99214 OFFICE O/P EST MOD 30 MIN: CPT | Performed by: NURSE PRACTITIONER

## 2023-01-05 PROCEDURE — 96374 THER/PROPH/DIAG INJ IV PUSH: CPT

## 2023-01-05 RX ORDER — DIPHENHYDRAMINE HCL 25 MG
CAPSULE ORAL ONCE
Start: 2023-01-05 | End: 2023-01-05

## 2023-01-05 RX ORDER — MORPHINE SULFATE 4 MG/ML
4 INJECTION, SOLUTION INTRAMUSCULAR; INTRAVENOUS ONCE
Status: COMPLETED | OUTPATIENT
Start: 2023-01-05 | End: 2023-01-05

## 2023-01-05 RX ORDER — MORPHINE SULFATE 4 MG/ML
4 INJECTION, SOLUTION INTRAMUSCULAR; INTRAVENOUS ONCE
Start: 2023-01-05 | End: 2023-01-05

## 2023-01-05 RX ORDER — DIPHENHYDRAMINE HCL 25 MG
25 CAPSULE ORAL ONCE
Status: COMPLETED | OUTPATIENT
Start: 2023-01-05 | End: 2023-01-05

## 2023-01-05 RX ADMIN — MORPHINE SULFATE 4 MG: 4 INJECTION, SOLUTION INTRAMUSCULAR; INTRAVENOUS at 16:43:00

## 2023-01-05 RX ADMIN — MORPHINE SULFATE 4 MG: 4 INJECTION, SOLUTION INTRAMUSCULAR; INTRAVENOUS at 14:27:00

## 2023-01-05 RX ADMIN — DIPHENHYDRAMINE HCL 25 MG: 25 MG CAPSULE ORAL at 14:27:00

## 2023-01-05 RX ADMIN — MORPHINE SULFATE 4 MG: 4 INJECTION, SOLUTION INTRAMUSCULAR; INTRAVENOUS at 15:40:00

## 2023-01-05 NOTE — TELEPHONE ENCOUNTER
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 I spoke with Michaela Sands. She reviewed Dr Bijan Allison most recent note. She asked me to have Skye come in for IV hydration and pain meds. Ester Wise can see her in Infusion. I spoke with María Motron RN [employer]. She said she could accommodate Skye at 2pm today. I told her Ester Wise will see her in infusion at 2 pm. attempted to reach [facility]. I left a voice mail message that I can get her an appointment for IV hydration, pain medication and a visit with Ester Wise at 2pm in Infusion today. I asked her to please call back ASAP so I can get her appointments booked.

## 2023-01-05 NOTE — PROGRESS NOTES
Education Record    Learner:  Patient    Disease / Diagnosis: Sickle Cell. Here for IV fluids and pain medications. Barriers / Limitations:  None   Comments:    Method:  Discussion   Comments:    General Topics:  Medication and Plan of care reviewed   Comments:    Outcome:  Shows understanding   Comments:    Vital signs stable. PO Benadryl given. IV morphine given for 8/10 pain in her legs, hips, back, abdomen, and chest. 1L 0.9NS infusing. Patient reported some minor relief in her pain after 1 dose of IV Morphine (8/10 to 7/10). Per order, patient can get IV morphine every 1 hour for a total of 3 doses. Another dose of IV Morphine given per order. Patient reports pain decreased from 7/10 to 6/10 30 minutes after 2nd dose of IV Morphine given. Patient given 3rd dose of IV Morphine 1 hour after 2nd dose for pain 6/10. Patient reports that pain is relieved in her chest, abdomen, and back, but is still in her legs and hips. 1L 0.9NS infusion completed. 15 minutes post 3rd dose of IV Morphine patient reports pain is decreased to 5/10 in her legs and hips. Patient verbalized understanding that if pain gets worse or does not improve by tomorrow to go to the ER. Patient discharged in stable condition.

## 2023-01-07 ENCOUNTER — HOSPITAL ENCOUNTER (INPATIENT)
Facility: HOSPITAL | Age: 38
LOS: 8 days | Discharge: HOME | End: 2023-01-15
Attending: EMERGENCY MEDICINE | Admitting: HOSPITALIST
Payer: COMMERCIAL

## 2023-01-07 ENCOUNTER — APPOINTMENT (OUTPATIENT)
Dept: CT IMAGING | Facility: HOSPITAL | Age: 38
End: 2023-01-07
Attending: EMERGENCY MEDICINE
Payer: COMMERCIAL

## 2023-01-07 ENCOUNTER — APPOINTMENT (OUTPATIENT)
Dept: GENERAL RADIOLOGY | Facility: HOSPITAL | Age: 38
End: 2023-01-07
Attending: EMERGENCY MEDICINE
Payer: COMMERCIAL

## 2023-01-07 DIAGNOSIS — R77.8 ELEVATED TROPONIN: ICD-10-CM

## 2023-01-07 DIAGNOSIS — D57.00 SICKLE CELL PAIN CRISIS (HCC): Primary | ICD-10-CM

## 2023-01-07 DIAGNOSIS — T40.2X5A CONSTIPATION DUE TO OPIOID THERAPY: ICD-10-CM

## 2023-01-07 DIAGNOSIS — Z72.820 POOR SLEEP: ICD-10-CM

## 2023-01-07 DIAGNOSIS — R07.9 ACUTE CHEST PAIN: ICD-10-CM

## 2023-01-07 DIAGNOSIS — D57.00 SICKLE CELL DISEASE WITH CRISIS (HCC): ICD-10-CM

## 2023-01-07 DIAGNOSIS — K59.03 CONSTIPATION DUE TO OPIOID THERAPY: ICD-10-CM

## 2023-01-07 PROBLEM — R11.2 NAUSEA AND VOMITING: Status: ACTIVE | Noted: 2022-09-20

## 2023-01-07 LAB
ALBUMIN SERPL-MCNC: 4 G/DL (ref 3.4–5)
ALBUMIN/GLOB SERPL: 0.7 {RATIO} (ref 1–2)
ALP LIVER SERPL-CCNC: 49 U/L
ALT SERPL-CCNC: 19 U/L
ANION GAP SERPL CALC-SCNC: 5 MMOL/L (ref 0–18)
AST SERPL-CCNC: 22 U/L (ref 15–37)
ATRIAL RATE: 70 BPM
B-HCG UR QL: NEGATIVE
BASOPHILS # BLD AUTO: 0.01 X10(3) UL (ref 0–0.2)
BASOPHILS NFR BLD AUTO: 0.2 %
BILIRUB SERPL-MCNC: 0.6 MG/DL (ref 0.1–2)
BILIRUB UR QL STRIP.AUTO: NEGATIVE
BUN BLD-MCNC: 6 MG/DL (ref 7–18)
CALCIUM BLD-MCNC: 9.3 MG/DL (ref 8.5–10.1)
CHLORIDE SERPL-SCNC: 109 MMOL/L (ref 98–112)
CHOLEST SERPL-MCNC: 144 MG/DL (ref ?–200)
CLARITY UR REFRACT.AUTO: CLEAR
CO2 SERPL-SCNC: 26 MMOL/L (ref 21–32)
COLOR UR AUTO: YELLOW
CREAT BLD-MCNC: 0.56 MG/DL
EOSINOPHIL # BLD AUTO: 0 X10(3) UL (ref 0–0.7)
EOSINOPHIL NFR BLD AUTO: 0 %
ERYTHROCYTE [DISTWIDTH] IN BLOOD BY AUTOMATED COUNT: 15.5 %
GFR SERPLBLD BASED ON 1.73 SQ M-ARVRAT: 120 ML/MIN/1.73M2 (ref 60–?)
GLOBULIN PLAS-MCNC: 5.5 G/DL (ref 2.8–4.4)
GLUCOSE BLD-MCNC: 114 MG/DL (ref 70–99)
GLUCOSE UR STRIP.AUTO-MCNC: NEGATIVE MG/DL
HCT VFR BLD AUTO: 24.3 %
HDLC SERPL-MCNC: 32 MG/DL (ref 40–59)
HGB BLD-MCNC: 8.7 G/DL
HGB RETIC QN AUTO: 40.5 PG (ref 28.2–36.6)
IMM GRANULOCYTES # BLD AUTO: 0.01 X10(3) UL (ref 0–1)
IMM GRANULOCYTES NFR BLD: 0.2 %
IMM RETICS NFR: 0.29 RATIO (ref 0.1–0.3)
KETONES UR STRIP.AUTO-MCNC: NEGATIVE MG/DL
LDLC SERPL CALC-MCNC: 96 MG/DL (ref ?–100)
LEUKOCYTE ESTERASE UR QL STRIP.AUTO: NEGATIVE
LYMPHOCYTES # BLD AUTO: 0.93 X10(3) UL (ref 1–4)
LYMPHOCYTES NFR BLD AUTO: 21.6 %
MCH RBC QN AUTO: 41.2 PG (ref 26–34)
MCHC RBC AUTO-ENTMCNC: 35.8 G/DL (ref 31–37)
MCV RBC AUTO: 115.2 FL
MONOCYTES # BLD AUTO: 0.27 X10(3) UL (ref 0.1–1)
MONOCYTES NFR BLD AUTO: 6.3 %
NEUTROPHILS # BLD AUTO: 3.08 X10 (3) UL (ref 1.5–7.7)
NEUTROPHILS # BLD AUTO: 3.08 X10(3) UL (ref 1.5–7.7)
NEUTROPHILS NFR BLD AUTO: 71.7 %
NITRITE UR QL STRIP.AUTO: NEGATIVE
NONHDLC SERPL-MCNC: 112 MG/DL (ref ?–130)
NT-PROBNP SERPL-MCNC: 158 PG/ML (ref ?–125)
OSMOLALITY SERPL CALC.SUM OF ELEC: 288 MOSM/KG (ref 275–295)
P AXIS: 50 DEGREES
P-R INTERVAL: 186 MS
PH UR STRIP.AUTO: 8 [PH] (ref 5–8)
PLATELET # BLD AUTO: 367 10(3)UL (ref 150–450)
POTASSIUM SERPL-SCNC: 3.4 MMOL/L (ref 3.5–5.1)
PROT SERPL-MCNC: 9.5 G/DL (ref 6.4–8.2)
PROT UR STRIP.AUTO-MCNC: NEGATIVE MG/DL
Q-T INTERVAL: 382 MS
QRS DURATION: 78 MS
QTC CALCULATION (BEZET): 412 MS
R AXIS: 21 DEGREES
RBC # BLD AUTO: 2.11 X10(6)UL
RBC UR QL AUTO: NEGATIVE
RETICS # AUTO: 58.8 X10(3) UL (ref 22.5–147.5)
RETICS/RBC NFR AUTO: 2.8 %
SARS-COV-2 RNA RESP QL NAA+PROBE: NOT DETECTED
SODIUM SERPL-SCNC: 140 MMOL/L (ref 136–145)
SP GR UR STRIP.AUTO: 1.01 (ref 1–1.03)
T AXIS: -6 DEGREES
TRIGL SERPL-MCNC: 82 MG/DL (ref 30–149)
TROPONIN I HIGH SENSITIVITY: 170 NG/L
TROPONIN I HIGH SENSITIVITY: 172 NG/L
UROBILINOGEN UR STRIP.AUTO-MCNC: 2 MG/DL
VENTRICULAR RATE: 70 BPM
VLDLC SERPL CALC-MCNC: 13 MG/DL (ref 0–30)
WBC # BLD AUTO: 4.3 X10(3) UL (ref 4–11)

## 2023-01-07 PROCEDURE — 99223 1ST HOSP IP/OBS HIGH 75: CPT | Performed by: HOSPITALIST

## 2023-01-07 PROCEDURE — 71275 CT ANGIOGRAPHY CHEST: CPT | Performed by: EMERGENCY MEDICINE

## 2023-01-07 PROCEDURE — 71045 X-RAY EXAM CHEST 1 VIEW: CPT | Performed by: EMERGENCY MEDICINE

## 2023-01-07 RX ORDER — DIPHENHYDRAMINE HYDROCHLORIDE 50 MG/ML
12.5 INJECTION INTRAMUSCULAR; INTRAVENOUS EVERY 4 HOURS PRN
Status: DISCONTINUED | OUTPATIENT
Start: 2023-01-07 | End: 2023-01-15

## 2023-01-07 RX ORDER — POLYETHYLENE GLYCOL 3350 17 G/17G
17 POWDER, FOR SOLUTION ORAL DAILY PRN
Status: DISCONTINUED | OUTPATIENT
Start: 2023-01-07 | End: 2023-01-15

## 2023-01-07 RX ORDER — HYDROXYUREA 500 MG/1
2000 CAPSULE ORAL DAILY
Status: DISCONTINUED | OUTPATIENT
Start: 2023-01-07 | End: 2023-01-15

## 2023-01-07 RX ORDER — MORPHINE SULFATE 15 MG/1
30 TABLET, FILM COATED, EXTENDED RELEASE ORAL EVERY 12 HOURS SCHEDULED
Status: DISCONTINUED | OUTPATIENT
Start: 2023-01-07 | End: 2023-01-13

## 2023-01-07 RX ORDER — KETOROLAC TROMETHAMINE 30 MG/ML
30 INJECTION, SOLUTION INTRAMUSCULAR; INTRAVENOUS ONCE
Status: COMPLETED | OUTPATIENT
Start: 2023-01-07 | End: 2023-01-07

## 2023-01-07 RX ORDER — FOLIC ACID 1 MG/1
1 TABLET ORAL DAILY
Status: DISCONTINUED | OUTPATIENT
Start: 2023-01-07 | End: 2023-01-15

## 2023-01-07 RX ORDER — ASPIRIN 81 MG/1
324 TABLET, CHEWABLE ORAL ONCE
Status: COMPLETED | OUTPATIENT
Start: 2023-01-07 | End: 2023-01-07

## 2023-01-07 RX ORDER — ONDANSETRON 2 MG/ML
4 INJECTION INTRAMUSCULAR; INTRAVENOUS ONCE
Status: COMPLETED | OUTPATIENT
Start: 2023-01-07 | End: 2023-01-07

## 2023-01-07 RX ORDER — SENNOSIDES 8.6 MG
17.2 TABLET ORAL NIGHTLY PRN
Status: DISCONTINUED | OUTPATIENT
Start: 2023-01-07 | End: 2023-01-15

## 2023-01-07 RX ORDER — ONDANSETRON 2 MG/ML
4 INJECTION INTRAMUSCULAR; INTRAVENOUS EVERY 6 HOURS PRN
Status: DISCONTINUED | OUTPATIENT
Start: 2023-01-07 | End: 2023-01-15

## 2023-01-07 RX ORDER — MELATONIN
3 NIGHTLY PRN
Status: DISCONTINUED | OUTPATIENT
Start: 2023-01-07 | End: 2023-01-15

## 2023-01-07 RX ORDER — MORPHINE SULFATE 4 MG/ML
4 INJECTION, SOLUTION INTRAMUSCULAR; INTRAVENOUS EVERY 2 HOUR PRN
Status: DISCONTINUED | OUTPATIENT
Start: 2023-01-07 | End: 2023-01-08

## 2023-01-07 RX ORDER — ASPIRIN 81 MG/1
81 TABLET ORAL DAILY
Status: DISCONTINUED | OUTPATIENT
Start: 2023-01-07 | End: 2023-01-15

## 2023-01-07 RX ORDER — MORPHINE SULFATE 4 MG/ML
4 INJECTION, SOLUTION INTRAMUSCULAR; INTRAVENOUS EVERY 30 MIN PRN
Status: ACTIVE | OUTPATIENT
Start: 2023-01-07 | End: 2023-01-07

## 2023-01-07 RX ORDER — SODIUM CHLORIDE 9 MG/ML
INJECTION, SOLUTION INTRAVENOUS CONTINUOUS
Status: DISCONTINUED | OUTPATIENT
Start: 2023-01-07 | End: 2023-01-09

## 2023-01-07 RX ORDER — ONDANSETRON 2 MG/ML
4 INJECTION INTRAMUSCULAR; INTRAVENOUS EVERY 4 HOURS PRN
Status: ACTIVE | OUTPATIENT
Start: 2023-01-07 | End: 2023-01-07

## 2023-01-07 RX ORDER — BISACODYL 10 MG
10 SUPPOSITORY, RECTAL RECTAL
Status: DISCONTINUED | OUTPATIENT
Start: 2023-01-07 | End: 2023-01-12

## 2023-01-07 RX ORDER — MORPHINE SULFATE 4 MG/ML
4 INJECTION, SOLUTION INTRAMUSCULAR; INTRAVENOUS EVERY 30 MIN PRN
Status: DISCONTINUED | OUTPATIENT
Start: 2023-01-07 | End: 2023-01-07

## 2023-01-07 RX ORDER — HYDROMORPHONE HYDROCHLORIDE 1 MG/ML
0.4 INJECTION, SOLUTION INTRAMUSCULAR; INTRAVENOUS; SUBCUTANEOUS EVERY 2 HOUR PRN
Status: DISCONTINUED | OUTPATIENT
Start: 2023-01-07 | End: 2023-01-07

## 2023-01-07 RX ORDER — MORPHINE SULFATE 2 MG/ML
2 INJECTION, SOLUTION INTRAMUSCULAR; INTRAVENOUS EVERY 2 HOUR PRN
Status: DISCONTINUED | OUTPATIENT
Start: 2023-01-07 | End: 2023-01-08

## 2023-01-07 RX ORDER — PROCHLORPERAZINE EDISYLATE 5 MG/ML
5 INJECTION INTRAMUSCULAR; INTRAVENOUS EVERY 8 HOURS PRN
Status: DISCONTINUED | OUTPATIENT
Start: 2023-01-07 | End: 2023-01-15

## 2023-01-07 RX ORDER — OXYCODONE HYDROCHLORIDE 5 MG/1
10 TABLET ORAL
Status: DISCONTINUED | OUTPATIENT
Start: 2023-01-07 | End: 2023-01-08

## 2023-01-07 RX ORDER — HYDROMORPHONE HYDROCHLORIDE 1 MG/ML
0.8 INJECTION, SOLUTION INTRAMUSCULAR; INTRAVENOUS; SUBCUTANEOUS EVERY 2 HOUR PRN
Status: DISCONTINUED | OUTPATIENT
Start: 2023-01-07 | End: 2023-01-07

## 2023-01-07 RX ORDER — SODIUM CHLORIDE 9 MG/ML
INJECTION, SOLUTION INTRAVENOUS CONTINUOUS
Status: ACTIVE | OUTPATIENT
Start: 2023-01-07 | End: 2023-01-07

## 2023-01-07 RX ORDER — SODIUM PHOSPHATE, DIBASIC AND SODIUM PHOSPHATE, MONOBASIC 7; 19 G/133ML; G/133ML
1 ENEMA RECTAL ONCE AS NEEDED
Status: DISCONTINUED | OUTPATIENT
Start: 2023-01-07 | End: 2023-01-15

## 2023-01-07 RX ORDER — DIPHENHYDRAMINE HCL 25 MG
25 CAPSULE ORAL EVERY 4 HOURS PRN
Status: DISCONTINUED | OUTPATIENT
Start: 2023-01-07 | End: 2023-01-15

## 2023-01-07 RX ORDER — MORPHINE SULFATE 4 MG/ML
6 INJECTION, SOLUTION INTRAMUSCULAR; INTRAVENOUS EVERY 2 HOUR PRN
Status: DISCONTINUED | OUTPATIENT
Start: 2023-01-07 | End: 2023-01-08

## 2023-01-07 RX ORDER — METOPROLOL SUCCINATE 25 MG/1
25 TABLET, EXTENDED RELEASE ORAL
Status: DISCONTINUED | OUTPATIENT
Start: 2023-01-07 | End: 2023-01-15

## 2023-01-07 RX ORDER — ENOXAPARIN SODIUM 100 MG/ML
40 INJECTION SUBCUTANEOUS DAILY
Status: DISCONTINUED | OUTPATIENT
Start: 2023-01-07 | End: 2023-01-15

## 2023-01-07 RX ORDER — HYDROMORPHONE HYDROCHLORIDE 1 MG/ML
1.2 INJECTION, SOLUTION INTRAMUSCULAR; INTRAVENOUS; SUBCUTANEOUS EVERY 2 HOUR PRN
Status: DISCONTINUED | OUTPATIENT
Start: 2023-01-07 | End: 2023-01-07

## 2023-01-07 RX ORDER — UBIDECARENONE 75 MG
200 CAPSULE ORAL DAILY
Status: DISCONTINUED | OUTPATIENT
Start: 2023-01-07 | End: 2023-01-15

## 2023-01-07 NOTE — PROGRESS NOTES
Shift Note:  Assumed care of patient @1730 from ER. Patient alert and oriented x4. Patient on room air, denies difficulty breathing, lung sounds clear. Denies any cardiac symptoms, NSR on tele. Patient reports severe pain. Continent of bowel and bladder. Ambulates independently, call light within reach, tolerating care well. 1740:   Pt refusing to do orthostatic blood pressure and standing weight, pt stated she was in too much pain to move around excessively.      POC:  - Hydrate  - Pain management   - EKG and Trop in AM

## 2023-01-07 NOTE — ED QUICK NOTES
Orders for admission, patient is aware of plan and ready to go upstairs. Any questions, please call ED RN Carli Gold at extension [phone].      Patient Covid vaccination status: Fully vaccinated     COVID Test Ordered in ED: Rapid SARS-CoV-2 by PCR    COVID Suspicion at Admission: Low clinical suspicion for COVID    Running Infusions:      Mental Status/LOC at time of transport: AO x 4    Other pertinent information:   CIWA score: N/A   NIH score:  N/A

## 2023-01-07 NOTE — ED INITIAL ASSESSMENT (HPI)
Pt c/o sickle cell pain to sternum, low back, R leg and R arm starting a few days ago, saw rheumatologist on Thursday, pt now has had cramping with several bms, n/v x1

## 2023-01-08 ENCOUNTER — APPOINTMENT (OUTPATIENT)
Dept: CV DIAGNOSTICS | Facility: HOSPITAL | Age: 38
End: 2023-01-08
Attending: NURSE PRACTITIONER
Payer: COMMERCIAL

## 2023-01-08 LAB
ATRIAL RATE: 250 BPM
BASOPHILS # BLD AUTO: 0.01 X10(3) UL (ref 0–0.2)
BASOPHILS NFR BLD AUTO: 0.2 %
EOSINOPHIL # BLD AUTO: 0.02 X10(3) UL (ref 0–0.7)
EOSINOPHIL NFR BLD AUTO: 0.5 %
ERYTHROCYTE [DISTWIDTH] IN BLOOD BY AUTOMATED COUNT: 15.6 %
HCT VFR BLD AUTO: 21.2 %
HGB BLD-MCNC: 7.3 G/DL
IMM GRANULOCYTES # BLD AUTO: 0.01 X10(3) UL (ref 0–1)
IMM GRANULOCYTES NFR BLD: 0.2 %
LYMPHOCYTES # BLD AUTO: 2.47 X10(3) UL (ref 1–4)
LYMPHOCYTES NFR BLD AUTO: 56.1 %
MCH RBC QN AUTO: 41.5 PG (ref 26–34)
MCHC RBC AUTO-ENTMCNC: 34.4 G/DL (ref 31–37)
MCV RBC AUTO: 120.5 FL
MONOCYTES # BLD AUTO: 0.37 X10(3) UL (ref 0.1–1)
MONOCYTES NFR BLD AUTO: 8.4 %
NEUTROPHILS # BLD AUTO: 1.52 X10 (3) UL (ref 1.5–7.7)
NEUTROPHILS # BLD AUTO: 1.52 X10(3) UL (ref 1.5–7.7)
NEUTROPHILS NFR BLD AUTO: 34.6 %
P AXIS: 39 DEGREES
PLATELET # BLD AUTO: 300 10(3)UL (ref 150–450)
Q-T INTERVAL: 484 MS
QRS DURATION: 100 MS
QTC CALCULATION (BEZET): 487 MS
R AXIS: 21 DEGREES
RBC # BLD AUTO: 1.76 X10(6)UL
T AXIS: 20 DEGREES
TROPONIN I HIGH SENSITIVITY: 161 NG/L
TROPONIN I HIGH SENSITIVITY: 176 NG/L
VENTRICULAR RATE: 61 BPM
WBC # BLD AUTO: 4.4 X10(3) UL (ref 4–11)

## 2023-01-08 PROCEDURE — 93306 TTE W/DOPPLER COMPLETE: CPT | Performed by: NURSE PRACTITIONER

## 2023-01-08 PROCEDURE — 99254 IP/OBS CNSLTJ NEW/EST MOD 60: CPT | Performed by: INTERNAL MEDICINE

## 2023-01-08 PROCEDURE — 99233 SBSQ HOSP IP/OBS HIGH 50: CPT | Performed by: HOSPITALIST

## 2023-01-08 RX ORDER — ONDANSETRON 2 MG/ML
4 INJECTION INTRAMUSCULAR; INTRAVENOUS EVERY 6 HOURS PRN
Status: DISCONTINUED | OUTPATIENT
Start: 2023-01-08 | End: 2023-01-15

## 2023-01-08 RX ORDER — NALOXONE HYDROCHLORIDE 0.4 MG/ML
0.08 INJECTION, SOLUTION INTRAMUSCULAR; INTRAVENOUS; SUBCUTANEOUS
Status: DISCONTINUED | OUTPATIENT
Start: 2023-01-08 | End: 2023-01-15

## 2023-01-08 RX ORDER — OXYCODONE HYDROCHLORIDE 10 MG/1
20 TABLET ORAL
Status: DISCONTINUED | OUTPATIENT
Start: 2023-01-08 | End: 2023-01-15

## 2023-01-08 RX ORDER — POTASSIUM CHLORIDE 20 MEQ/1
40 TABLET, EXTENDED RELEASE ORAL ONCE
Status: COMPLETED | OUTPATIENT
Start: 2023-01-08 | End: 2023-01-08

## 2023-01-08 RX ORDER — SODIUM CHLORIDE 9 MG/ML
INJECTION, SOLUTION INTRAVENOUS CONTINUOUS
Status: DISCONTINUED | OUTPATIENT
Start: 2023-01-08 | End: 2023-01-15

## 2023-01-08 RX ORDER — DIPHENHYDRAMINE HYDROCHLORIDE 50 MG/ML
12.5 INJECTION INTRAMUSCULAR; INTRAVENOUS EVERY 4 HOURS PRN
Status: DISCONTINUED | OUTPATIENT
Start: 2023-01-08 | End: 2023-01-15

## 2023-01-08 NOTE — PLAN OF CARE
Assumed care around 1930. A/Ox4. On RA w/ sats >90. Monitor shows NSR. Voids in BR, last BM 1/6. Reports generalized pain, treated w/ medication. Up SBA. Plan to cont IVF, pain control, repeat EKG in AM. Safety precautions in place, call light within reach.     Problem: PAIN - ADULT  Goal: Verbalizes/displays adequate comfort level or patient's stated pain goal  Description: INTERVENTIONS:  - Encourage pt to monitor pain and request assistance  - Assess pain using appropriate pain scale  - Administer analgesics based on type and severity of pain and evaluate response  - Implement non-pharmacological measures as appropriate and evaluate response  - Consider cultural and social influences on pain and pain management  - Manage/alleviate anxiety  - Utilize distraction and/or relaxation techniques  - Monitor for opioid side effects  - Notify MD/LIP if interventions unsuccessful or patient reports new pain  - Anticipate increased pain with activity and pre-medicate as appropriate  Outcome: Progressing

## 2023-01-08 NOTE — PLAN OF CARE
Patient alert and oriented x 4. On RA. Up with SBA. NSR on tele. Continent of bowel/bladder. No complaints of shortness of breath. POC: IV fluids, pain control. Call light within reach. Fall precautions in place.       Problem: PAIN - ADULT  Goal: Verbalizes/displays adequate comfort level or patient's stated pain goal  Description: INTERVENTIONS:  - Encourage pt to monitor pain and request assistance  - Assess pain using appropriate pain scale  - Administer analgesics based on type and severity of pain and evaluate response  - Implement non-pharmacological measures as appropriate and evaluate response  - Consider cultural and social influences on pain and pain management  - Manage/alleviate anxiety  - Utilize distraction and/or relaxation techniques  - Monitor for opioid side effects  - Notify MD/LIP if interventions unsuccessful or patient reports new pain  - Anticipate increased pain with activity and pre-medicate as appropriate  Outcome: Progressing

## 2023-01-09 LAB
ANION GAP SERPL CALC-SCNC: 7 MMOL/L (ref 0–18)
ATRIAL RATE: 64 BPM
BASOPHILS # BLD AUTO: 0.01 X10(3) UL (ref 0–0.2)
BASOPHILS NFR BLD AUTO: 0.3 %
BUN BLD-MCNC: 6 MG/DL (ref 7–18)
CALCIUM BLD-MCNC: 8.4 MG/DL (ref 8.5–10.1)
CHLORIDE SERPL-SCNC: 112 MMOL/L (ref 98–112)
CO2 SERPL-SCNC: 25 MMOL/L (ref 21–32)
CREAT BLD-MCNC: 0.53 MG/DL
EOSINOPHIL # BLD AUTO: 0.02 X10(3) UL (ref 0–0.7)
EOSINOPHIL NFR BLD AUTO: 0.5 %
ERYTHROCYTE [DISTWIDTH] IN BLOOD BY AUTOMATED COUNT: 15.3 %
GFR SERPLBLD BASED ON 1.73 SQ M-ARVRAT: 121 ML/MIN/1.73M2 (ref 60–?)
GLUCOSE BLD-MCNC: 93 MG/DL (ref 70–99)
HCT VFR BLD AUTO: 21.2 %
HGB BLD-MCNC: 7.4 G/DL
IMM GRANULOCYTES # BLD AUTO: 0.01 X10(3) UL (ref 0–1)
IMM GRANULOCYTES NFR BLD: 0.3 %
LYMPHOCYTES # BLD AUTO: 1.94 X10(3) UL (ref 1–4)
LYMPHOCYTES NFR BLD AUTO: 52.4 %
MCH RBC QN AUTO: 42 PG (ref 26–34)
MCHC RBC AUTO-ENTMCNC: 34.9 G/DL (ref 31–37)
MCV RBC AUTO: 120.5 FL
MONOCYTES # BLD AUTO: 0.3 X10(3) UL (ref 0.1–1)
MONOCYTES NFR BLD AUTO: 8.1 %
NEUTROPHILS # BLD AUTO: 1.42 X10 (3) UL (ref 1.5–7.7)
NEUTROPHILS # BLD AUTO: 1.42 X10(3) UL (ref 1.5–7.7)
NEUTROPHILS NFR BLD AUTO: 38.4 %
OSMOLALITY SERPL CALC.SUM OF ELEC: 295 MOSM/KG (ref 275–295)
P AXIS: 44 DEGREES
P-R INTERVAL: 212 MS
PLATELET # BLD AUTO: 303 10(3)UL (ref 150–450)
POTASSIUM SERPL-SCNC: 3.6 MMOL/L (ref 3.5–5.1)
POTASSIUM SERPL-SCNC: 3.7 MMOL/L (ref 3.5–5.1)
Q-T INTERVAL: 434 MS
QRS DURATION: 98 MS
QTC CALCULATION (BEZET): 447 MS
R AXIS: 33 DEGREES
RBC # BLD AUTO: 1.76 X10(6)UL
SODIUM SERPL-SCNC: 144 MMOL/L (ref 136–145)
T AXIS: 26 DEGREES
VENTRICULAR RATE: 64 BPM
WBC # BLD AUTO: 3.7 X10(3) UL (ref 4–11)

## 2023-01-09 PROCEDURE — 99233 SBSQ HOSP IP/OBS HIGH 50: CPT | Performed by: HOSPITALIST

## 2023-01-09 PROCEDURE — 99232 SBSQ HOSP IP/OBS MODERATE 35: CPT | Performed by: INTERNAL MEDICINE

## 2023-01-09 RX ORDER — SODIUM CHLORIDE 450 MG/100ML
INJECTION, SOLUTION INTRAVENOUS CONTINUOUS
Status: DISCONTINUED | OUTPATIENT
Start: 2023-01-09 | End: 2023-01-15

## 2023-01-09 NOTE — PLAN OF CARE
Assumed care around 1930. A/Ox4. On RA w/ sats >90. Monitor shows NSR. Voids in BR, last BM 1/6. Reports generalized pain, treated w/ PCA pump. Up SBA. Plan to cont pain control, IVF, EKG to be done in AM. Safety precautions in place, call light within reach.      Problem: PAIN - ADULT  Goal: Verbalizes/displays adequate comfort level or patient's stated pain goal  Description: INTERVENTIONS:  - Encourage pt to monitor pain and request assistance  - Assess pain using appropriate pain scale  - Administer analgesics based on type and severity of pain and evaluate response  - Implement non-pharmacological measures as appropriate and evaluate response  - Consider cultural and social influences on pain and pain management  - Manage/alleviate anxiety  - Utilize distraction and/or relaxation techniques  - Monitor for opioid side effects  - Notify MD/LIP if interventions unsuccessful or patient reports new pain  - Anticipate increased pain with activity and pre-medicate as appropriate  Outcome: Progressing

## 2023-01-10 LAB
ALBUMIN SERPL-MCNC: 3.3 G/DL (ref 3.4–5)
ALBUMIN/GLOB SERPL: 0.7 {RATIO} (ref 1–2)
ALP LIVER SERPL-CCNC: 47 U/L
ALT SERPL-CCNC: 15 U/L
ANION GAP SERPL CALC-SCNC: 7 MMOL/L (ref 0–18)
AST SERPL-CCNC: 12 U/L (ref 15–37)
BASOPHILS # BLD AUTO: 0.01 X10(3) UL (ref 0–0.2)
BASOPHILS NFR BLD AUTO: 0.2 %
BILIRUB SERPL-MCNC: 0.4 MG/DL (ref 0.1–2)
BUN BLD-MCNC: 7 MG/DL (ref 7–18)
CALCIUM BLD-MCNC: 8.7 MG/DL (ref 8.5–10.1)
CHLORIDE SERPL-SCNC: 107 MMOL/L (ref 98–112)
CO2 SERPL-SCNC: 28 MMOL/L (ref 21–32)
CREAT BLD-MCNC: 0.53 MG/DL
EOSINOPHIL # BLD AUTO: 0.03 X10(3) UL (ref 0–0.7)
EOSINOPHIL NFR BLD AUTO: 0.7 %
ERYTHROCYTE [DISTWIDTH] IN BLOOD BY AUTOMATED COUNT: 15.5 %
GFR SERPLBLD BASED ON 1.73 SQ M-ARVRAT: 121 ML/MIN/1.73M2 (ref 60–?)
GLOBULIN PLAS-MCNC: 4.5 G/DL (ref 2.8–4.4)
GLUCOSE BLD-MCNC: 117 MG/DL (ref 70–99)
HCT VFR BLD AUTO: 22.6 %
HGB BLD-MCNC: 8.1 G/DL
IMM GRANULOCYTES # BLD AUTO: 0.02 X10(3) UL (ref 0–1)
IMM GRANULOCYTES NFR BLD: 0.5 %
LYMPHOCYTES # BLD AUTO: 1.75 X10(3) UL (ref 1–4)
LYMPHOCYTES NFR BLD AUTO: 42.1 %
MCH RBC QN AUTO: 41.8 PG (ref 26–34)
MCHC RBC AUTO-ENTMCNC: 35.8 G/DL (ref 31–37)
MCV RBC AUTO: 116.5 FL
MONOCYTES # BLD AUTO: 0.31 X10(3) UL (ref 0.1–1)
MONOCYTES NFR BLD AUTO: 7.5 %
NEUTROPHILS # BLD AUTO: 2.04 X10 (3) UL (ref 1.5–7.7)
NEUTROPHILS # BLD AUTO: 2.04 X10(3) UL (ref 1.5–7.7)
NEUTROPHILS NFR BLD AUTO: 49 %
OSMOLALITY SERPL CALC.SUM OF ELEC: 293 MOSM/KG (ref 275–295)
PLATELET # BLD AUTO: 308 10(3)UL (ref 150–450)
POTASSIUM SERPL-SCNC: 3.5 MMOL/L (ref 3.5–5.1)
PROT SERPL-MCNC: 7.8 G/DL (ref 6.4–8.2)
RBC # BLD AUTO: 1.94 X10(6)UL
SODIUM SERPL-SCNC: 142 MMOL/L (ref 136–145)
WBC # BLD AUTO: 4.2 X10(3) UL (ref 4–11)

## 2023-01-10 PROCEDURE — 99232 SBSQ HOSP IP/OBS MODERATE 35: CPT | Performed by: INTERNAL MEDICINE

## 2023-01-10 NOTE — PLAN OF CARE
Patient alert, oriented X4. O2 sat > 90% on room air. NSR on tele. VSS. Complains of severe generalized pain secondary to sickle cell crisis, reports relief with Morphine PCA pump and scheduled MS contin. IV fluids infusing per orders. Daily hydroxyurea as ordered. Fall precautions in place. Patient updated on plan of care. Problem: PAIN - ADULT  Goal: Verbalizes/displays adequate comfort level or patient's stated pain goal  Description: INTERVENTIONS:  - Encourage pt to monitor pain and request assistance  - Assess pain using appropriate pain scale  - Administer analgesics based on type and severity of pain and evaluate response  - Implement non-pharmacological measures as appropriate and evaluate response  - Consider cultural and social influences on pain and pain management  - Manage/alleviate anxiety  - Utilize distraction and/or relaxation techniques  - Monitor for opioid side effects  - Notify MD/LIP if interventions unsuccessful or patient reports new pain  - Anticipate increased pain with activity and pre-medicate as appropriate  1/9/2023 2357 by Antoni Wilkins RN  Outcome: Progressing  1/9/2023 2356 by Antoni Wilkins RN  Outcome: Progressing     Problem: SAFETY ADULT - FALL  Goal: Free from fall injury  Description: INTERVENTIONS:  - Assess pt frequently for physical needs  - Identify cognitive and physical deficits and behaviors that affect risk of falls.   - [place] fall precautions as indicated by assessment.  - Educate pt/family on patient safety including physical limitations  - Instruct pt to call for assistance with activity based on assessment  - Modify environment to reduce risk of injury  - Provide assistive devices as appropriate  - Consider OT/PT consult to assist with strengthening/mobility  - Encourage toileting schedule  Outcome: Progressing

## 2023-01-10 NOTE — PLAN OF CARE
Pt alert and oriented x 4. Continuous tele monitoring in place. NSR on the monitor. Report generalized pain. PCA Morphine per MD order. Intermittent nausea. No vomiting. Zofran given x2. Room air. Gait steady. IV fluids infusing continuously. Safety and comfort maintained. Will continue to monitor.        Problem: PAIN - ADULT  Goal: Verbalizes/displays adequate comfort level or patient's stated pain goal  Description: INTERVENTIONS:  - Encourage pt to monitor pain and request assistance  - Assess pain using appropriate pain scale  - Administer analgesics based on type and severity of pain and evaluate response  - Implement non-pharmacological measures as appropriate and evaluate response  - Consider cultural and social influences on pain and pain management  - Manage/alleviate anxiety  - Utilize distraction and/or relaxation techniques  - Monitor for opioid side effects  - Notify MD/LIP if interventions unsuccessful or patient reports new pain  - Anticipate increased pain with activity and pre-medicate as appropriate  Outcome: Progressing

## 2023-01-11 LAB
BASOPHILS # BLD AUTO: 0.01 X10(3) UL (ref 0–0.2)
BASOPHILS NFR BLD AUTO: 0.3 %
EOSINOPHIL # BLD AUTO: 0.02 X10(3) UL (ref 0–0.7)
EOSINOPHIL NFR BLD AUTO: 0.6 %
ERYTHROCYTE [DISTWIDTH] IN BLOOD BY AUTOMATED COUNT: 15.2 %
HCT VFR BLD AUTO: 23.4 %
HGB BLD-MCNC: 8.3 G/DL
IMM GRANULOCYTES # BLD AUTO: 0.01 X10(3) UL (ref 0–1)
IMM GRANULOCYTES NFR BLD: 0.3 %
LYMPHOCYTES # BLD AUTO: 1.66 X10(3) UL (ref 1–4)
LYMPHOCYTES NFR BLD AUTO: 47.2 %
MCH RBC QN AUTO: 42.1 PG (ref 26–34)
MCHC RBC AUTO-ENTMCNC: 35.5 G/DL (ref 31–37)
MCV RBC AUTO: 118.8 FL
MONOCYTES # BLD AUTO: 0.29 X10(3) UL (ref 0.1–1)
MONOCYTES NFR BLD AUTO: 8.2 %
NEUTROPHILS # BLD AUTO: 1.53 X10 (3) UL (ref 1.5–7.7)
NEUTROPHILS # BLD AUTO: 1.53 X10(3) UL (ref 1.5–7.7)
NEUTROPHILS NFR BLD AUTO: 43.4 %
PLATELET # BLD AUTO: 299 10(3)UL (ref 150–450)
RBC # BLD AUTO: 1.97 X10(6)UL
WBC # BLD AUTO: 3.5 X10(3) UL (ref 4–11)

## 2023-01-11 PROCEDURE — 99232 SBSQ HOSP IP/OBS MODERATE 35: CPT | Performed by: INTERNAL MEDICINE

## 2023-01-11 NOTE — PLAN OF CARE
Pt denies c/o malaise or cardiac symptoms. A&Ox4. Lungs clear bilaterally with equal expansion, on 2L O2 NC. Pt NSR/ST on monitor with regular rate. Abdomen soft and non-tender with active bowel sounds in all four quadrants. Continent of B&B. PCA pump. Pt c/o pain, see MAR. Pt updated with plan of care. Problem: PAIN - ADULT  Goal: Verbalizes/displays adequate comfort level or patient's stated pain goal  Description: INTERVENTIONS:  - Encourage pt to monitor pain and request assistance  - Assess pain using appropriate pain scale  - Administer analgesics based on type and severity of pain and evaluate response  - Implement non-pharmacological measures as appropriate and evaluate response  - Consider cultural and social influences on pain and pain management  - Manage/alleviate anxiety  - Utilize distraction and/or relaxation techniques  - Monitor for opioid side effects  - Notify MD/LIP if interventions unsuccessful or patient reports new pain  - Anticipate increased pain with activity and pre-medicate as appropriate  Outcome: Progressing     Problem: SAFETY ADULT - FALL  Goal: Free from fall injury  Description: INTERVENTIONS:  - Assess pt frequently for physical needs  - Identify cognitive and physical deficits and behaviors that affect risk of falls.   - [place] fall precautions as indicated by assessment.  - Educate pt/family on patient safety including physical limitations  - Instruct pt to call for assistance with activity based on assessment  - Modify environment to reduce risk of injury  - Provide assistive devices as appropriate  - Consider OT/PT consult to assist with strengthening/mobility  - Encourage toileting schedule  Outcome: Progressing

## 2023-01-11 NOTE — PLAN OF CARE
Pt alert and oriented x 4. Continuous tele monitoring in place. NSR on the monitor. Reports continued pain to legs. PCA in place. Pt working to use PO medication for pain control when possible. No dyspnea. Oxygen at 2L NC for support. Intermittent nausea. Zofran x1 with good result. Safety and comfort maintained. Will continue to monitor. Problem: PAIN - ADULT  Goal: Verbalizes/displays adequate comfort level or patient's stated pain goal  Description: INTERVENTIONS:  - Encourage pt to monitor pain and request assistance  - Assess pain using appropriate pain scale  - Administer analgesics based on type and severity of pain and evaluate response  - Implement non-pharmacological measures as appropriate and evaluate response  - Consider cultural and social influences on pain and pain management  - Manage/alleviate anxiety  - Utilize distraction and/or relaxation techniques  - Monitor for opioid side effects  - Notify MD/LIP if interventions unsuccessful or patient reports new pain  - Anticipate increased pain with activity and pre-medicate as appropriate  Outcome: Progressing     Problem: SAFETY ADULT - FALL  Goal: Free from fall injury  Description: INTERVENTIONS:  - Assess pt frequently for physical needs  - Identify cognitive and physical deficits and behaviors that affect risk of falls.   - [place] fall precautions as indicated by assessment.  - Educate pt/family on patient safety including physical limitations  - Instruct pt to call for assistance with activity based on assessment  - Modify environment to reduce risk of injury  - Provide assistive devices as appropriate  - Consider OT/PT consult to assist with strengthening/mobility  - Encourage toileting schedule  Outcome: Progressing

## 2023-01-11 NOTE — PLAN OF CARE
Pt alert and oriented x 4. Continuous tele monitoring in place. Afib on the monitor. Denies pain, dyspnea. Room air. Positive orthostatics. Per cards, pt to stay for adjustment of medication. Pt disappointed but agreeable. Safety and comfort maintained. Will continue to monitor. Problem: PAIN - ADULT  Goal: Verbalizes/displays adequate comfort level or patient's stated pain goal  Description: INTERVENTIONS:  - Encourage pt to monitor pain and request assistance  - Assess pain using appropriate pain scale  - Administer analgesics based on type and severity of pain and evaluate response  - Implement non-pharmacological measures as appropriate and evaluate response  - Consider cultural and social influences on pain and pain management  - Manage/alleviate anxiety  - Utilize distraction and/or relaxation techniques  - Monitor for opioid side effects  - Notify MD/LIP if interventions unsuccessful or patient reports new pain  - Anticipate increased pain with activity and pre-medicate as appropriate  1/10/2023 1851 by Francisco Lucio RN  Outcome: Progressing  1/10/2023 1848 by Francisco Lucio RN  Outcome: Progressing     Problem: SAFETY ADULT - FALL  Goal: Free from fall injury  Description: INTERVENTIONS:  - Assess pt frequently for physical needs  - Identify cognitive and physical deficits and behaviors that affect risk of falls.   - [place] fall precautions as indicated by assessment.  - Educate pt/family on patient safety including physical limitations  - Instruct pt to call for assistance with activity based on assessment  - Modify environment to reduce risk of injury  - Provide assistive devices as appropriate  - Consider OT/PT consult to assist with strengthening/mobility  - Encourage toileting schedule  1/10/2023 1851 by Francisco Lucio RN  Outcome: Progressing  1/10/2023 1848 by Francisco Lucio RN  Outcome: Progressing

## 2023-01-11 NOTE — PLAN OF CARE
Patient AOX4. O2 sat > 90% on 2L NC. NSR on tele. VSS. 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 Continuing IV fluids, hydroxyurea as ordered. PRN antiemetics administered for intermittent nausea. Patient updated on plan of care. Problem: PAIN - ADULT  Goal: Verbalizes/displays adequate comfort level or patient's stated pain goal  Description: INTERVENTIONS:  - Encourage pt to monitor pain and request assistance  - Assess pain using appropriate pain scale  - Administer analgesics based on type and severity of pain and evaluate response  - Implement non-pharmacological measures as appropriate and evaluate response  - Consider cultural and social influences on pain and pain management  - Manage/alleviate anxiety  - Utilize distraction and/or relaxation techniques  - Monitor for opioid side effects  - Notify MD/LIP if interventions unsuccessful or patient reports new pain  - Anticipate increased pain with activity and pre-medicate as appropriate  1/11/2023 0049 by Sarika Painter RN  Outcome: Progressing  1/11/2023 0048 by Sarika Painter RN  Outcome: Progressing     Problem: SAFETY ADULT - FALL  Goal: Free from fall injury  Description: INTERVENTIONS:  - Assess pt frequently for physical needs  - Identify cognitive and physical deficits and behaviors that affect risk of falls.   - [place] fall precautions as indicated by assessment.  - Educate pt/family on patient safety including physical limitations  - Instruct pt to call for assistance with activity based on assessment  - Modify environment to reduce risk of injury  - Provide assistive devices as appropriate  - Consider OT/PT consult to assist with strengthening/mobility  - Encourage toileting schedule  1/11/2023 0049 by Sarika Painter RN  Outcome: Progressing  1/11/2023 0048 by Sarika Painter RN  Outcome: Progressing

## 2023-01-12 LAB
BASOPHILS # BLD AUTO: 0.01 X10(3) UL (ref 0–0.2)
BASOPHILS NFR BLD AUTO: 0.3 %
EOSINOPHIL # BLD AUTO: 0.02 X10(3) UL (ref 0–0.7)
EOSINOPHIL NFR BLD AUTO: 0.6 %
ERYTHROCYTE [DISTWIDTH] IN BLOOD BY AUTOMATED COUNT: 15.3 %
HCT VFR BLD AUTO: 23.1 %
HGB BLD-MCNC: 8.3 G/DL
IMM GRANULOCYTES # BLD AUTO: 0.05 X10(3) UL (ref 0–1)
IMM GRANULOCYTES NFR BLD: 1.5 %
LYMPHOCYTES # BLD AUTO: 1.45 X10(3) UL (ref 1–4)
LYMPHOCYTES NFR BLD AUTO: 42.3 %
MCH RBC QN AUTO: 41.7 PG (ref 26–34)
MCHC RBC AUTO-ENTMCNC: 35.9 G/DL (ref 31–37)
MCV RBC AUTO: 116.1 FL
MONOCYTES # BLD AUTO: 0.44 X10(3) UL (ref 0.1–1)
MONOCYTES NFR BLD AUTO: 12.8 %
NEUTROPHILS # BLD AUTO: 1.46 X10 (3) UL (ref 1.5–7.7)
NEUTROPHILS # BLD AUTO: 1.46 X10(3) UL (ref 1.5–7.7)
NEUTROPHILS NFR BLD AUTO: 42.5 %
PLATELET # BLD AUTO: 308 10(3)UL (ref 150–450)
RBC # BLD AUTO: 1.99 X10(6)UL
WBC # BLD AUTO: 3.4 X10(3) UL (ref 4–11)

## 2023-01-12 PROCEDURE — 99232 SBSQ HOSP IP/OBS MODERATE 35: CPT | Performed by: INTERNAL MEDICINE

## 2023-01-12 RX ORDER — BISACODYL 10 MG
10 SUPPOSITORY, RECTAL RECTAL
Status: DISCONTINUED | OUTPATIENT
Start: 2023-01-12 | End: 2023-01-15

## 2023-01-12 RX ORDER — SENNOSIDES 8.6 MG
8.6 TABLET ORAL ONCE
Status: COMPLETED | OUTPATIENT
Start: 2023-01-12 | End: 2023-01-12

## 2023-01-12 RX ORDER — POLYETHYLENE GLYCOL 3350 17 G/17G
17 POWDER, FOR SOLUTION ORAL DAILY
Status: DISCONTINUED | OUTPATIENT
Start: 2023-01-12 | End: 2023-01-15

## 2023-01-12 NOTE — PLAN OF CARE
Patient AOX4. O2 sat > 90% on 2L nasal cannula. NSR/sinus tachycardic on telemetry. Vital signs stable. Patient complains of severe generalized pain secondary to sickle cell crisis, pain managed with Morphine PCA, PO MS Contin, PRN PO Oxycodone. Last BM 1/6, patient passing gas, encouraged to take Miralax. Continuing hydroxyurea. IV fluids infusing as ordered. Hematology following. Patient updated on plan of care. Problem: PAIN - ADULT  Goal: Verbalizes/displays adequate comfort level or patient's stated pain goal  Description: INTERVENTIONS:  - Encourage pt to monitor pain and request assistance  - Assess pain using appropriate pain scale  - Administer analgesics based on type and severity of pain and evaluate response  - Implement non-pharmacological measures as appropriate and evaluate response  - Consider cultural and social influences on pain and pain management  - Manage/alleviate anxiety  - Utilize distraction and/or relaxation techniques  - Monitor for opioid side effects  - Notify MD/LIP if interventions unsuccessful or patient reports new pain  - Anticipate increased pain with activity and pre-medicate as appropriate  Outcome: Progressing     Problem: SAFETY ADULT - FALL  Goal: Free from fall injury  Description: INTERVENTIONS:  - Assess pt frequently for physical needs  - Identify cognitive and physical deficits and behaviors that affect risk of falls.   - [place] fall precautions as indicated by assessment.  - Educate pt/family on patient safety including physical limitations  - Instruct pt to call for assistance with activity based on assessment  - Modify environment to reduce risk of injury  - Provide assistive devices as appropriate  - Consider OT/PT consult to assist with strengthening/mobility  - Encourage toileting schedule  Outcome: Progressing

## 2023-01-13 LAB
BASOPHILS # BLD AUTO: 0.01 X10(3) UL (ref 0–0.2)
BASOPHILS NFR BLD AUTO: 0.3 %
EOSINOPHIL # BLD AUTO: 0.02 X10(3) UL (ref 0–0.7)
EOSINOPHIL NFR BLD AUTO: 0.6 %
ERYTHROCYTE [DISTWIDTH] IN BLOOD BY AUTOMATED COUNT: 16.6 %
HCT VFR BLD AUTO: 23 %
HGB BLD-MCNC: 8.4 G/DL
IMM GRANULOCYTES # BLD AUTO: 0.01 X10(3) UL (ref 0–1)
IMM GRANULOCYTES NFR BLD: 0.3 %
LYMPHOCYTES # BLD AUTO: 1.39 X10(3) UL (ref 1–4)
LYMPHOCYTES NFR BLD AUTO: 39.7 %
MCH RBC QN AUTO: 43.8 PG (ref 26–34)
MCHC RBC AUTO-ENTMCNC: 36.5 G/DL (ref 31–37)
MCV RBC AUTO: 119.8 FL
MONOCYTES # BLD AUTO: 0.36 X10(3) UL (ref 0.1–1)
MONOCYTES NFR BLD AUTO: 10.3 %
NEUTROPHILS # BLD AUTO: 1.71 X10 (3) UL (ref 1.5–7.7)
NEUTROPHILS # BLD AUTO: 1.71 X10(3) UL (ref 1.5–7.7)
NEUTROPHILS NFR BLD AUTO: 48.8 %
PLATELET # BLD AUTO: 360 10(3)UL (ref 150–450)
RBC # BLD AUTO: 1.92 X10(6)UL
WBC # BLD AUTO: 3.5 X10(3) UL (ref 4–11)

## 2023-01-13 PROCEDURE — 99232 SBSQ HOSP IP/OBS MODERATE 35: CPT | Performed by: INTERNAL MEDICINE

## 2023-01-13 RX ORDER — MORPHINE SULFATE 15 MG/1
60 TABLET, FILM COATED, EXTENDED RELEASE ORAL EVERY 8 HOURS SCHEDULED
Status: DISCONTINUED | OUTPATIENT
Start: 2023-01-13 | End: 2023-01-15

## 2023-01-13 NOTE — PLAN OF CARE
Pt denies c/o malaise or cardiac symptoms. A&Ox4. Lungs clear bilaterally with equal expansion, on 2L O2 NC. Pt NSR/ST on monitor with regular rate. Abdomen soft and non-tender with active bowel sounds in all four quadrants. Continent of B&B. PCA for pain. Pt updated with plan of care. Problem: PAIN - ADULT  Goal: Verbalizes/displays adequate comfort level or patient's stated pain goal  Description: INTERVENTIONS:  - Encourage pt to monitor pain and request assistance  - Assess pain using appropriate pain scale  - Administer analgesics based on type and severity of pain and evaluate response  - Implement non-pharmacological measures as appropriate and evaluate response  - Consider cultural and social influences on pain and pain management  - Manage/alleviate anxiety  - Utilize distraction and/or relaxation techniques  - Monitor for opioid side effects  - Notify MD/LIP if interventions unsuccessful or patient reports new pain  - Anticipate increased pain with activity and pre-medicate as appropriate  Outcome: Progressing     Problem: SAFETY ADULT - FALL  Goal: Free from fall injury  Description: INTERVENTIONS:  - Assess pt frequently for physical needs  - Identify cognitive and physical deficits and behaviors that affect risk of falls.   - [place] fall precautions as indicated by assessment.  - Educate pt/family on patient safety including physical limitations  - Instruct pt to call for assistance with activity based on assessment  - Modify environment to reduce risk of injury  - Provide assistive devices as appropriate  - Consider OT/PT consult to assist with strengthening/mobility  - Encourage toileting schedule  Outcome: Progressing

## 2023-01-13 NOTE — PLAN OF CARE
Pt is A+Ox4. VSS on 2L per NC( wearing for comfort). NSR/ST on tele. Voids in bathroom. BM yesterday. Pt still with pain- states its getting better but still requiring PCA pump. PRN and scheduled pain medications given per MAR. Up with SBA. Pt updated on current POC. All needs met at this time. Problem: PAIN - ADULT  Goal: Verbalizes/displays adequate comfort level or patient's stated pain goal  Description: INTERVENTIONS:  - Encourage pt to monitor pain and request assistance  - Assess pain using appropriate pain scale  - Administer analgesics based on type and severity of pain and evaluate response  - Implement non-pharmacological measures as appropriate and evaluate response  - Consider cultural and social influences on pain and pain management  - Manage/alleviate anxiety  - Utilize distraction and/or relaxation techniques  - Monitor for opioid side effects  - Notify MD/LIP if interventions unsuccessful or patient reports new pain  - Anticipate increased pain with activity and pre-medicate as appropriate  Outcome: Progressing     Problem: SAFETY ADULT - FALL  Goal: Free from fall injury  Description: INTERVENTIONS:  - Assess pt frequently for physical needs  - Identify cognitive and physical deficits and behaviors that affect risk of falls.   - [place] fall precautions as indicated by assessment.  - Educate pt/family on patient safety including physical limitations  - Instruct pt to call for assistance with activity based on assessment  - Modify environment to reduce risk of injury  - Provide assistive devices as appropriate  - Consider OT/PT consult to assist with strengthening/mobility  - Encourage toileting schedule  Outcome: Progressing     Problem: Patient/Family Goals  Goal: Patient/Family Long Term Goal  Description: Patient's Long Term Goal: go home soon- pain free    Interventions:    - See additional Care Plan goals for specific interventions  Outcome: Progressing  Goal: Patient/Family Short Term Goal  Description: Patient's Short Term Goal: Decrease pain to a  more manageable level      Interventions:   - Scheduled and PRN pain medications  -PCA pump  -IVF  -heme consult  - See additional Care Plan goals for specific interventions  Outcome: Progressing     Problem: CARDIOVASCULAR - ADULT  Goal: Maintains optimal cardiac output and hemodynamic stability  Description: INTERVENTIONS:  - Monitor vital signs, rhythm, and trends  - Monitor for bleeding, hypotension and signs of decreased cardiac output  - Evaluate effectiveness of vasoactive medications to optimize hemodynamic stability  - Monitor arterial and/or venous puncture sites for bleeding and/or hematoma  - Assess quality of pulses, skin color and temperature  - Assess for signs of decreased coronary artery perfusion - ex.  Angina  - Evaluate fluid balance, assess for edema, trend weights  Outcome: Progressing  Goal: Absence of cardiac arrhythmias or at baseline  Description: INTERVENTIONS:  - Continuous cardiac monitoring, monitor vital signs, obtain 12 lead EKG if indicated  - Evaluate effectiveness of antiarrhythmic and heart rate control medications as ordered  - Initiate emergency measures for life threatening arrhythmias  - Monitor electrolytes and administer replacement therapy as ordered  Outcome: Progressing     Problem: RESPIRATORY - ADULT  Goal: Achieves optimal ventilation and oxygenation  Description: INTERVENTIONS:  - Assess for changes in respiratory status  - Assess for changes in mentation and behavior  - Position to facilitate oxygenation and minimize respiratory effort  - Oxygen supplementation based on oxygen saturation or ABGs  - Provide Smoking Cessation handout, if applicable  - Encourage broncho-pulmonary hygiene including cough, deep breathe, Incentive Spirometry  - Assess the need for suctioning and perform as needed  - Assess and instruct to report SOB or any respiratory difficulty  - Respiratory Therapy support as indicated  - Manage/alleviate anxiety  - Monitor for signs/symptoms of CO2 retention  Outcome: Progressing

## 2023-01-13 NOTE — PLAN OF CARE
Assumed care around 1930. A/Ox4. On 2L w/ sats >90. Monitor shows NSR. Voids in BR, last BM 1/6, c/o constipation, managing w/ fluids and medication. Reports generalized pain, treated w/ PCA pump and PRN/scheduled pain medication. Up SBA. Plan to cont IVF, cont PCA pump to manage pain. Safety precautions in place, call light within reach. Problem: PAIN - ADULT  Goal: Verbalizes/displays adequate comfort level or patient's stated pain goal  Description: INTERVENTIONS:  - Encourage pt to monitor pain and request assistance  - Assess pain using appropriate pain scale  - Administer analgesics based on type and severity of pain and evaluate response  - Implement non-pharmacological measures as appropriate and evaluate response  - Consider cultural and social influences on pain and pain management  - Manage/alleviate anxiety  - Utilize distraction and/or relaxation techniques  - Monitor for opioid side effects  - Notify MD/LIP if interventions unsuccessful or patient reports new pain  - Anticipate increased pain with activity and pre-medicate as appropriate  Outcome: Progressing     Problem: SAFETY ADULT - FALL  Goal: Free from fall injury  Description: INTERVENTIONS:  - Assess pt frequently for physical needs  - Identify cognitive and physical deficits and behaviors that affect risk of falls.   - [place] fall precautions as indicated by assessment.  - Educate pt/family on patient safety including physical limitations  - Instruct pt to call for assistance with activity based on assessment  - Modify environment to reduce risk of injury  - Provide assistive devices as appropriate  - Consider OT/PT consult to assist with strengthening/mobility  - Encourage toileting schedule  Outcome: Progressing

## 2023-01-14 LAB
BASOPHILS # BLD AUTO: 0.01 X10(3) UL (ref 0–0.2)
BASOPHILS NFR BLD AUTO: 0.3 %
EOSINOPHIL # BLD AUTO: 0.02 X10(3) UL (ref 0–0.7)
EOSINOPHIL NFR BLD AUTO: 0.6 %
ERYTHROCYTE [DISTWIDTH] IN BLOOD BY AUTOMATED COUNT: 15 %
HCT VFR BLD AUTO: 23.1 %
HGB BLD-MCNC: 8.1 G/DL
IMM GRANULOCYTES # BLD AUTO: 0.01 X10(3) UL (ref 0–1)
IMM GRANULOCYTES NFR BLD: 0.3 %
LYMPHOCYTES # BLD AUTO: 1.37 X10(3) UL (ref 1–4)
LYMPHOCYTES NFR BLD AUTO: 44.1 %
MCH RBC QN AUTO: 41.3 PG (ref 26–34)
MCHC RBC AUTO-ENTMCNC: 35.1 G/DL (ref 31–37)
MCV RBC AUTO: 117.9 FL
MONOCYTES # BLD AUTO: 0.34 X10(3) UL (ref 0.1–1)
MONOCYTES NFR BLD AUTO: 10.9 %
NEUTROPHILS # BLD AUTO: 1.36 X10 (3) UL (ref 1.5–7.7)
NEUTROPHILS # BLD AUTO: 1.36 X10(3) UL (ref 1.5–7.7)
NEUTROPHILS NFR BLD AUTO: 43.8 %
PLATELET # BLD AUTO: 288 10(3)UL (ref 150–450)
RBC # BLD AUTO: 1.96 X10(6)UL
WBC # BLD AUTO: 3.1 X10(3) UL (ref 4–11)

## 2023-01-14 PROCEDURE — 99232 SBSQ HOSP IP/OBS MODERATE 35: CPT | Performed by: INTERNAL MEDICINE

## 2023-01-14 NOTE — PLAN OF CARE
Received patient awake and oriented with spouse at bedside. On O2 at 2L, breath sounds clear. On tele, SR, ST when up to the bathroom. Continuing to use PCA morophine, MS Contin and Oxycodone for pain relief.  Up to the bathroom with standby assist.

## 2023-01-14 NOTE — PLAN OF CARE
Pt is A+Ox4. Occasionally drowsy- but awakens to voice. VSS on RA, but pt to stay on 2L per NC per MD orders. NSR on tele. Voids in bathroom. Pt c.o pain still- PRNs and scheduled pain medications given per MAR. Pt also remains on a PCA pump. IVF infusing. Up with SBA. Pt updated on current POC. All needs met at this time. Problem: PAIN - ADULT  Goal: Verbalizes/displays adequate comfort level or patient's stated pain goal  Description: INTERVENTIONS:  - Encourage pt to monitor pain and request assistance  - Assess pain using appropriate pain scale  - Administer analgesics based on type and severity of pain and evaluate response  - Implement non-pharmacological measures as appropriate and evaluate response  - Consider cultural and social influences on pain and pain management  - Manage/alleviate anxiety  - Utilize distraction and/or relaxation techniques  - Monitor for opioid side effects  - Notify MD/LIP if interventions unsuccessful or patient reports new pain  - Anticipate increased pain with activity and pre-medicate as appropriate  Outcome: Progressing     Problem: SAFETY ADULT - FALL  Goal: Free from fall injury  Description: INTERVENTIONS:  - Assess pt frequently for physical needs  - Identify cognitive and physical deficits and behaviors that affect risk of falls.   - [place] fall precautions as indicated by assessment.  - Educate pt/family on patient safety including physical limitations  - Instruct pt to call for assistance with activity based on assessment  - Modify environment to reduce risk of injury  - Provide assistive devices as appropriate  - Consider OT/PT consult to assist with strengthening/mobility  - Encourage toileting schedule  Outcome: Progressing     Problem: Patient/Family Goals  Goal: Patient/Family Long Term Goal  Description: Patient's Long Term Goal: go home soon- pain free    Interventions:    - See additional Care Plan goals for specific interventions  Outcome: Progressing  Goal: Patient/Family Short Term Goal  Description: Patient's Short Term Goal: Decrease pain to a  more manageable level      Interventions:   - Scheduled and PRN pain medications  -PCA pump  -IVF  -heme consult  - See additional Care Plan goals for specific interventions  Outcome: Progressing     Problem: CARDIOVASCULAR - ADULT  Goal: Maintains optimal cardiac output and hemodynamic stability  Description: INTERVENTIONS:  - Monitor vital signs, rhythm, and trends  - Monitor for bleeding, hypotension and signs of decreased cardiac output  - Evaluate effectiveness of vasoactive medications to optimize hemodynamic stability  - Monitor arterial and/or venous puncture sites for bleeding and/or hematoma  - Assess quality of pulses, skin color and temperature  - Assess for signs of decreased coronary artery perfusion - ex.  Angina  - Evaluate fluid balance, assess for edema, trend weights  Outcome: Progressing  Goal: Absence of cardiac arrhythmias or at baseline  Description: INTERVENTIONS:  - Continuous cardiac monitoring, monitor vital signs, obtain 12 lead EKG if indicated  - Evaluate effectiveness of antiarrhythmic and heart rate control medications as ordered  - Initiate emergency measures for life threatening arrhythmias  - Monitor electrolytes and administer replacement therapy as ordered  Outcome: Progressing     Problem: RESPIRATORY - ADULT  Goal: Achieves optimal ventilation and oxygenation  Description: INTERVENTIONS:  - Assess for changes in respiratory status  - Assess for changes in mentation and behavior  - Position to facilitate oxygenation and minimize respiratory effort  - Oxygen supplementation based on oxygen saturation or ABGs  - Provide Smoking Cessation handout, if applicable  - Encourage broncho-pulmonary hygiene including cough, deep breathe, Incentive Spirometry  - Assess the need for suctioning and perform as needed  - Assess and instruct to report SOB or any respiratory difficulty  - Respiratory Therapy support as indicated  - Manage/alleviate anxiety  - Monitor for signs/symptoms of CO2 retention  Outcome: Progressing

## 2023-01-15 VITALS
WEIGHT: 156 LBS | HEART RATE: 73 BPM | DIASTOLIC BLOOD PRESSURE: 54 MMHG | BODY MASS INDEX: 25 KG/M2 | OXYGEN SATURATION: 100 % | RESPIRATION RATE: 17 BRPM | TEMPERATURE: 98 F | SYSTOLIC BLOOD PRESSURE: 92 MMHG

## 2023-01-15 PROCEDURE — 99232 SBSQ HOSP IP/OBS MODERATE 35: CPT | Performed by: INTERNAL MEDICINE

## 2023-01-15 PROCEDURE — 99239 HOSP IP/OBS DSCHRG MGMT >30: CPT | Performed by: INTERNAL MEDICINE

## 2023-01-15 RX ORDER — MORPHINE SULFATE 4 MG/ML
4 INJECTION, SOLUTION INTRAMUSCULAR; INTRAVENOUS EVERY 2 HOUR PRN
Status: DISCONTINUED | OUTPATIENT
Start: 2023-01-15 | End: 2023-01-15

## 2023-01-15 RX ORDER — OXYCODONE HYDROCHLORIDE 20 MG/1
20 TABLET ORAL
Qty: 90 TABLET | Refills: 0 | Status: SHIPPED | OUTPATIENT
Start: 2023-01-15 | End: 2023-01-16

## 2023-01-15 RX ORDER — MORPHINE SULFATE 60 MG/1
60 TABLET, FILM COATED, EXTENDED RELEASE ORAL EVERY 8 HOURS SCHEDULED
Qty: 60 TABLET | Refills: 0 | Status: SHIPPED | OUTPATIENT
Start: 2023-01-15 | End: 2023-01-16

## 2023-01-15 RX ORDER — MORPHINE SULFATE 2 MG/ML
2 INJECTION, SOLUTION INTRAMUSCULAR; INTRAVENOUS EVERY 2 HOUR PRN
Status: DISCONTINUED | OUTPATIENT
Start: 2023-01-15 | End: 2023-01-15

## 2023-01-15 RX ORDER — MORPHINE SULFATE 2 MG/ML
1 INJECTION, SOLUTION INTRAMUSCULAR; INTRAVENOUS EVERY 2 HOUR PRN
Status: DISCONTINUED | OUTPATIENT
Start: 2023-01-15 | End: 2023-01-15

## 2023-01-15 NOTE — PLAN OF CARE
Patient alert and oriented x 4. Up  with standby assist. On 2L via NC per MD order. NSR on tele. Continent of bowel and bladder. Complaints of pain, on PCA pump, scheduled and prn pain medication given. No complaints  of shortness of breath or chest pain/discomfort. POC : Pain control. Fall precautions in place. Call light within reach. Problem: PAIN - ADULT  Goal: Verbalizes/displays adequate comfort level or patient's stated pain goal  Description: INTERVENTIONS:  - Encourage pt to monitor pain and request assistance  - Assess pain using appropriate pain scale  - Administer analgesics based on type and severity of pain and evaluate response  - Implement non-pharmacological measures as appropriate and evaluate response  - Consider cultural and social influences on pain and pain management  - Manage/alleviate anxiety  - Utilize distraction and/or relaxation techniques  - Monitor for opioid side effects  - Notify MD/LIP if interventions unsuccessful or patient reports new pain  - Anticipate increased pain with activity and pre-medicate as appropriate  Outcome: Progressing     Problem: SAFETY ADULT - FALL  Goal: Free from fall injury  Description: INTERVENTIONS:  - Assess pt frequently for physical needs  - Identify cognitive and physical deficits and behaviors that affect risk of falls.   - [place] fall precautions as indicated by assessment.  - Educate pt/family on patient safety including physical limitations  - Instruct pt to call for assistance with activity based on assessment  - Modify environment to reduce risk of injury  - Provide assistive devices as appropriate  - Consider OT/PT consult to assist with strengthening/mobility  - Encourage toileting schedule  Outcome: Progressing     Problem: Patient/Family Goals  Goal: Patient/Family Long Term Goal  Description: Patient's Long Term Goal: go home soon- pain free    Interventions:    - See additional Care Plan goals for specific interventions  Outcome: Progressing  Goal: Patient/Family Short Term Goal  Description: Patient's Short Term Goal: Decrease pain to a  more manageable level      Interventions:   - Scheduled and PRN pain medications  -PCA pump  -IVF  -heme consult  - See additional Care Plan goals for specific interventions  Outcome: Progressing     Problem: CARDIOVASCULAR - ADULT  Goal: Maintains optimal cardiac output and hemodynamic stability  Description: INTERVENTIONS:  - Monitor vital signs, rhythm, and trends  - Monitor for bleeding, hypotension and signs of decreased cardiac output  - Evaluate effectiveness of vasoactive medications to optimize hemodynamic stability  - Monitor arterial and/or venous puncture sites for bleeding and/or hematoma  - Assess quality of pulses, skin color and temperature  - Assess for signs of decreased coronary artery perfusion - ex.  Angina  - Evaluate fluid balance, assess for edema, trend weights  Outcome: Progressing  Goal: Absence of cardiac arrhythmias or at baseline  Description: INTERVENTIONS:  - Continuous cardiac monitoring, monitor vital signs, obtain 12 lead EKG if indicated  - Evaluate effectiveness of antiarrhythmic and heart rate control medications as ordered  - Initiate emergency measures for life threatening arrhythmias  - Monitor electrolytes and administer replacement therapy as ordered  Outcome: Progressing     Problem: RESPIRATORY - ADULT  Goal: Achieves optimal ventilation and oxygenation  Description: INTERVENTIONS:  - Assess for changes in respiratory status  - Assess for changes in mentation and behavior  - Position to facilitate oxygenation and minimize respiratory effort  - Oxygen supplementation based on oxygen saturation or ABGs  - Provide Smoking Cessation handout, if applicable  - Encourage broncho-pulmonary hygiene including cough, deep breathe, Incentive Spirometry  - Assess the need for suctioning and perform as needed  - Assess and instruct to report SOB or any respiratory difficulty  - Respiratory Therapy support as indicated  - Manage/alleviate anxiety  - Monitor for signs/symptoms of CO2 retention  Outcome: Progressing

## 2023-01-15 NOTE — PLAN OF CARE
Pt is assessed and ready for discharge. VSS on RA. Instructions and follow ups gone over with patient. All questions answered at this time. IV DC'd  CDI. TO lobby via WC to drive self home. Problem: PAIN - ADULT  Goal: Verbalizes/displays adequate comfort level or patient's stated pain goal  Description: INTERVENTIONS:  - Encourage pt to monitor pain and request assistance  - Assess pain using appropriate pain scale  - Administer analgesics based on type and severity of pain and evaluate response  - Implement non-pharmacological measures as appropriate and evaluate response  - Consider cultural and social influences on pain and pain management  - Manage/alleviate anxiety  - Utilize distraction and/or relaxation techniques  - Monitor for opioid side effects  - Notify MD/LIP if interventions unsuccessful or patient reports new pain  - Anticipate increased pain with activity and pre-medicate as appropriate  Outcome: Adequate for Discharge     Problem: SAFETY ADULT - FALL  Goal: Free from fall injury  Description: INTERVENTIONS:  - Assess pt frequently for physical needs  - Identify cognitive and physical deficits and behaviors that affect risk of falls.   - [place] fall precautions as indicated by assessment.  - Educate pt/family on patient safety including physical limitations  - Instruct pt to call for assistance with activity based on assessment  - Modify environment to reduce risk of injury  - Provide assistive devices as appropriate  - Consider OT/PT consult to assist with strengthening/mobility  - Encourage toileting schedule  Outcome: Adequate for Discharge     Problem: Patient/Family Goals  Goal: Patient/Family Long Term Goal  Description: Patient's Long Term Goal: go home soon- pain free    Interventions:    - See additional Care Plan goals for specific interventions  Outcome: Adequate for Discharge  Goal: Patient/Family Short Term Goal  Description: Patient's Short Term Goal: Decrease pain to a  more manageable level      Interventions:   - Scheduled and PRN pain medications  -PCA pump  -IVF  -heme consult  - See additional Care Plan goals for specific interventions  Outcome: Adequate for Discharge     Problem: CARDIOVASCULAR - ADULT  Goal: Maintains optimal cardiac output and hemodynamic stability  Description: INTERVENTIONS:  - Monitor vital signs, rhythm, and trends  - Monitor for bleeding, hypotension and signs of decreased cardiac output  - Evaluate effectiveness of vasoactive medications to optimize hemodynamic stability  - Monitor arterial and/or venous puncture sites for bleeding and/or hematoma  - Assess quality of pulses, skin color and temperature  - Assess for signs of decreased coronary artery perfusion - ex.  Angina  - Evaluate fluid balance, assess for edema, trend weights  Outcome: Adequate for Discharge  Goal: Absence of cardiac arrhythmias or at baseline  Description: INTERVENTIONS:  - Continuous cardiac monitoring, monitor vital signs, obtain 12 lead EKG if indicated  - Evaluate effectiveness of antiarrhythmic and heart rate control medications as ordered  - Initiate emergency measures for life threatening arrhythmias  - Monitor electrolytes and administer replacement therapy as ordered  Outcome: Adequate for Discharge     Problem: RESPIRATORY - ADULT  Goal: Achieves optimal ventilation and oxygenation  Description: INTERVENTIONS:  - Assess for changes in respiratory status  - Assess for changes in mentation and behavior  - Position to facilitate oxygenation and minimize respiratory effort  - Oxygen supplementation based on oxygen saturation or ABGs  - Provide Smoking Cessation handout, if applicable  - Encourage broncho-pulmonary hygiene including cough, deep breathe, Incentive Spirometry  - Assess the need for suctioning and perform as needed  - Assess and instruct to report SOB or any respiratory difficulty  - Respiratory Therapy support as indicated  - Manage/alleviate anxiety  - Monitor for signs/symptoms of CO2 retention  Outcome: Adequate for Discharge

## 2023-01-15 NOTE — DISCHARGE INSTRUCTIONS
Wean the MS Contin from 60mg every 8 hours back to 30 mg every 12 hours as able at home over the next several days. Also wean the breakthrough oxycodone dosing down from 20 mg to 10mg.

## 2023-01-16 ENCOUNTER — TELEPHONE (OUTPATIENT)
Dept: HEMATOLOGY/ONCOLOGY | Facility: HOSPITAL | Age: 38
End: 2023-01-16

## 2023-01-16 ENCOUNTER — PATIENT OUTREACH (OUTPATIENT)
Dept: CASE MANAGEMENT | Age: 38
End: 2023-01-16

## 2023-01-16 DIAGNOSIS — D57.00 SICKLE CELL PAIN CRISIS (HCC): ICD-10-CM

## 2023-01-16 RX ORDER — OXYCODONE HYDROCHLORIDE 20 MG/1
20 TABLET ORAL
Qty: 90 TABLET | Refills: 0 | Status: CANCELLED | OUTPATIENT
Start: 2023-01-16

## 2023-01-16 RX ORDER — MORPHINE SULFATE 60 MG/1
60 TABLET, FILM COATED, EXTENDED RELEASE ORAL EVERY 8 HOURS SCHEDULED
Qty: 60 TABLET | Refills: 0 | Status: SHIPPED | OUTPATIENT
Start: 2023-01-16

## 2023-01-16 RX ORDER — OXYCODONE HYDROCHLORIDE 20 MG/1
20 TABLET ORAL
Qty: 90 TABLET | Refills: 0 | Status: SHIPPED | OUTPATIENT
Start: 2023-01-16

## 2023-01-16 RX ORDER — MORPHINE SULFATE 60 MG/1
60 TABLET, FILM COATED, EXTENDED RELEASE ORAL EVERY 8 HOURS SCHEDULED
Qty: 60 TABLET | Refills: 0 | Status: CANCELLED | OUTPATIENT
Start: 2023-01-16

## 2023-01-16 NOTE — PAYOR COMM NOTE
Discharge Notification    Patient Name: Carina Smith POS  Subscriber #: [id number]  Authorization Number: [id number]  Admit Date/Time: 1/7/2023 1:44 PM  Discharge Date/Time: 1/15/2023 6:04 PM

## 2023-01-16 NOTE — TELEPHONE ENCOUNTER
Spoke with patient. MD sent in new RX. Spoke with pharmacy and the morphine is ready for pick-up. The pharmacy does not have oxycodone in stock and anticipate getting this at the end of the week. Pt states she has enough oxycodone right now she can wait until end of the week. Pt v/u morphine is ready for .

## 2023-01-16 NOTE — TELEPHONE ENCOUNTER
Skye needs a refill on the following medication she was discharged on [place] 1/15/23. MORPHINE 60MG ORAL TAB CR.  OXYCODONE 20MG ORAL TAB. Phramacy they didn't get order for one and the other refill is to early. Joey Mao says that she it out.   [id number] [facility]   Thanks Mila Pierre

## 2023-01-31 ENCOUNTER — OFFICE VISIT (OUTPATIENT)
Dept: FAMILY MEDICINE CLINIC | Facility: CLINIC | Age: 38
End: 2023-01-31
Payer: COMMERCIAL

## 2023-01-31 VITALS
TEMPERATURE: 97 F | HEIGHT: 64.5 IN | OXYGEN SATURATION: 98 % | RESPIRATION RATE: 16 BRPM | DIASTOLIC BLOOD PRESSURE: 60 MMHG | WEIGHT: 161 LBS | BODY MASS INDEX: 27.15 KG/M2 | HEART RATE: 80 BPM | SYSTOLIC BLOOD PRESSURE: 94 MMHG

## 2023-01-31 DIAGNOSIS — Z00.00 ANNUAL PHYSICAL EXAM: Primary | ICD-10-CM

## 2023-01-31 DIAGNOSIS — D57.1 SICKLE CELL DISEASE WITHOUT CRISIS (HCC): ICD-10-CM

## 2023-01-31 PROBLEM — D64.9 ANEMIA, UNSPECIFIED TYPE: Status: RESOLVED | Noted: 2022-10-09 | Resolved: 2023-01-31

## 2023-01-31 PROBLEM — R79.89 AZOTEMIA: Status: RESOLVED | Noted: 2022-09-19 | Resolved: 2023-01-31

## 2023-01-31 PROBLEM — R07.9 ACUTE CHEST PAIN: Status: RESOLVED | Noted: 2022-10-09 | Resolved: 2023-01-31

## 2023-01-31 PROBLEM — R07.9 CHEST PAIN OF UNCERTAIN ETIOLOGY: Status: RESOLVED | Noted: 2022-09-19 | Resolved: 2023-01-31

## 2023-01-31 PROBLEM — R06.02 SOB (SHORTNESS OF BREATH): Status: RESOLVED | Noted: 2022-10-09 | Resolved: 2023-01-31

## 2023-01-31 PROBLEM — J18.9 MULTIFOCAL PNEUMONIA: Status: RESOLVED | Noted: 2022-09-05 | Resolved: 2023-01-31

## 2023-01-31 PROBLEM — R11.2 NAUSEA AND VOMITING: Status: RESOLVED | Noted: 2022-09-20 | Resolved: 2023-01-31

## 2023-01-31 PROCEDURE — 3008F BODY MASS INDEX DOCD: CPT | Performed by: FAMILY MEDICINE

## 2023-01-31 PROCEDURE — 3078F DIAST BP <80 MM HG: CPT | Performed by: FAMILY MEDICINE

## 2023-01-31 PROCEDURE — 99385 PREV VISIT NEW AGE 18-39: CPT | Performed by: FAMILY MEDICINE

## 2023-01-31 PROCEDURE — 90471 IMMUNIZATION ADMIN: CPT | Performed by: FAMILY MEDICINE

## 2023-01-31 PROCEDURE — 90677 PCV20 VACCINE IM: CPT | Performed by: FAMILY MEDICINE

## 2023-01-31 PROCEDURE — 3074F SYST BP LT 130 MM HG: CPT | Performed by: FAMILY MEDICINE

## 2023-02-16 ENCOUNTER — OFFICE VISIT (OUTPATIENT)
Dept: HEMATOLOGY/ONCOLOGY | Facility: HOSPITAL | Age: 38
End: 2023-02-16
Attending: INTERNAL MEDICINE
Payer: COMMERCIAL

## 2023-02-16 VITALS
WEIGHT: 162 LBS | HEART RATE: 88 BPM | OXYGEN SATURATION: 100 % | SYSTOLIC BLOOD PRESSURE: 114 MMHG | HEIGHT: 65.98 IN | BODY MASS INDEX: 26.03 KG/M2 | RESPIRATION RATE: 18 BRPM | TEMPERATURE: 98 F | DIASTOLIC BLOOD PRESSURE: 74 MMHG

## 2023-02-16 DIAGNOSIS — D57.1 SICKLE CELL DISEASE WITHOUT CRISIS (HCC): ICD-10-CM

## 2023-02-16 DIAGNOSIS — D57.00 SICKLE CELL CRISIS (HCC): ICD-10-CM

## 2023-02-16 DIAGNOSIS — D57.00 SICKLE CELL PAIN CRISIS (HCC): ICD-10-CM

## 2023-02-16 LAB
ALBUMIN SERPL-MCNC: 3.9 G/DL (ref 3.4–5)
ALBUMIN/GLOB SERPL: 0.8 {RATIO} (ref 1–2)
ALP LIVER SERPL-CCNC: 44 U/L
ALT SERPL-CCNC: 17 U/L
ANION GAP SERPL CALC-SCNC: 4 MMOL/L (ref 0–18)
AST SERPL-CCNC: 17 U/L (ref 15–37)
BASOPHILS # BLD AUTO: 0.01 X10(3) UL (ref 0–0.2)
BASOPHILS NFR BLD AUTO: 0.2 %
BILIRUB SERPL-MCNC: 0.7 MG/DL (ref 0.1–2)
BUN BLD-MCNC: 5 MG/DL (ref 7–18)
CALCIUM BLD-MCNC: 9.1 MG/DL (ref 8.5–10.1)
CHLORIDE SERPL-SCNC: 108 MMOL/L (ref 98–112)
CO2 SERPL-SCNC: 27 MMOL/L (ref 21–32)
CREAT BLD-MCNC: 0.51 MG/DL
DEPRECATED HBV CORE AB SER IA-ACNC: 279.5 NG/ML
EOSINOPHIL # BLD AUTO: 0.01 X10(3) UL (ref 0–0.7)
EOSINOPHIL NFR BLD AUTO: 0.2 %
ERYTHROCYTE [DISTWIDTH] IN BLOOD BY AUTOMATED COUNT: 14.7 %
FASTING STATUS PATIENT QL REPORTED: NO
GFR SERPLBLD BASED ON 1.73 SQ M-ARVRAT: 122 ML/MIN/1.73M2 (ref 60–?)
GLOBULIN PLAS-MCNC: 4.7 G/DL (ref 2.8–4.4)
GLUCOSE BLD-MCNC: 106 MG/DL (ref 70–99)
HCT VFR BLD AUTO: 23.6 %
HGB BLD-MCNC: 8.6 G/DL
HGB RETIC QN AUTO: 41.4 PG (ref 28.2–36.6)
IMM GRANULOCYTES # BLD AUTO: 0.02 X10(3) UL (ref 0–1)
IMM GRANULOCYTES NFR BLD: 0.4 %
IMM RETICS NFR: 0.36 RATIO (ref 0.1–0.3)
IRON SATN MFR SERPL: 28 %
IRON SERPL-MCNC: 75 UG/DL
LDH SERPL L TO P-CCNC: 194 U/L
LYMPHOCYTES # BLD AUTO: 1.94 X10(3) UL (ref 1–4)
LYMPHOCYTES NFR BLD AUTO: 43.4 %
MCH RBC QN AUTO: 42 PG (ref 26–34)
MCHC RBC AUTO-ENTMCNC: 36.4 G/DL (ref 31–37)
MCV RBC AUTO: 115.1 FL
MONOCYTES # BLD AUTO: 0.47 X10(3) UL (ref 0.1–1)
MONOCYTES NFR BLD AUTO: 10.5 %
NEUTROPHILS # BLD AUTO: 2.02 X10 (3) UL (ref 1.5–7.7)
NEUTROPHILS # BLD AUTO: 2.02 X10(3) UL (ref 1.5–7.7)
NEUTROPHILS NFR BLD AUTO: 45.3 %
OSMOLALITY SERPL CALC.SUM OF ELEC: 286 MOSM/KG (ref 275–295)
PLATELET # BLD AUTO: 389 10(3)UL (ref 150–450)
POTASSIUM SERPL-SCNC: 3.3 MMOL/L (ref 3.5–5.1)
PROT SERPL-MCNC: 8.6 G/DL (ref 6.4–8.2)
RBC # BLD AUTO: 2.05 X10(6)UL
RETICS # AUTO: 58.8 X10(3) UL (ref 22.5–147.5)
RETICS/RBC NFR AUTO: 2.9 %
SODIUM SERPL-SCNC: 139 MMOL/L (ref 136–145)
TIBC SERPL-MCNC: 271 UG/DL (ref 240–450)
TRANSFERRIN SERPL-MCNC: 182 MG/DL (ref 200–360)
WBC # BLD AUTO: 4.5 X10(3) UL (ref 4–11)

## 2023-02-16 PROCEDURE — 99214 OFFICE O/P EST MOD 30 MIN: CPT | Performed by: INTERNAL MEDICINE

## 2023-02-16 RX ORDER — FOLIC ACID 1 MG/1
1 TABLET ORAL DAILY
Qty: 90 TABLET | Refills: 3 | Status: SHIPPED | OUTPATIENT
Start: 2023-02-16

## 2023-02-16 RX ORDER — OXYCODONE HYDROCHLORIDE 20 MG/1
20 TABLET ORAL
Qty: 90 TABLET | Refills: 0 | Status: SHIPPED | OUTPATIENT
Start: 2023-02-16

## 2023-02-16 RX ORDER — MORPHINE SULFATE 60 MG/1
60 TABLET, FILM COATED, EXTENDED RELEASE ORAL EVERY 8 HOURS SCHEDULED
Qty: 90 TABLET | Refills: 0 | Status: SHIPPED | OUTPATIENT
Start: 2023-02-16

## 2023-02-16 RX ORDER — HYDROXYUREA 500 MG/1
2000 CAPSULE ORAL DAILY
Qty: 360 CAPSULE | Refills: 3 | Status: SHIPPED | OUTPATIENT
Start: 2023-02-16

## 2023-02-17 PROBLEM — R79.89 ELEVATED TROPONIN: Status: RESOLVED | Noted: 2022-09-05 | Resolved: 2023-02-17

## 2023-02-17 PROBLEM — R73.9 HYPERGLYCEMIA: Status: RESOLVED | Noted: 2022-09-19 | Resolved: 2023-02-17

## 2023-02-17 PROBLEM — R77.8 ELEVATED TROPONIN: Status: RESOLVED | Noted: 2022-09-05 | Resolved: 2023-02-17

## 2023-02-22 ENCOUNTER — TELEPHONE (OUTPATIENT)
Dept: HEMATOLOGY/ONCOLOGY | Facility: HOSPITAL | Age: 38
End: 2023-02-22

## 2023-02-22 NOTE — TELEPHONE ENCOUNTER
Mulu Khan with [facility] is the case manger of patient and is available to coordinate care if needed. ph# [phone]

## 2023-03-13 ENCOUNTER — TELEPHONE (OUTPATIENT)
Dept: HEMATOLOGY/ONCOLOGY | Facility: HOSPITAL | Age: 38
End: 2023-03-13

## 2023-03-13 ENCOUNTER — OFFICE VISIT (OUTPATIENT)
Dept: HEMATOLOGY/ONCOLOGY | Facility: HOSPITAL | Age: 38
End: 2023-03-13
Attending: INTERNAL MEDICINE
Payer: COMMERCIAL

## 2023-03-13 ENCOUNTER — HOSPITAL ENCOUNTER (OUTPATIENT)
Dept: GENERAL RADIOLOGY | Facility: HOSPITAL | Age: 38
Discharge: HOME | End: 2023-03-13
Attending: CLINICAL NURSE SPECIALIST
Payer: COMMERCIAL

## 2023-03-13 VITALS
BODY MASS INDEX: 25 KG/M2 | HEART RATE: 91 BPM | WEIGHT: 157.81 LBS | SYSTOLIC BLOOD PRESSURE: 120 MMHG | OXYGEN SATURATION: 98 % | DIASTOLIC BLOOD PRESSURE: 78 MMHG | RESPIRATION RATE: 20 BRPM

## 2023-03-13 DIAGNOSIS — R07.89 OTHER CHEST PAIN: ICD-10-CM

## 2023-03-13 DIAGNOSIS — D57.00 SICKLE CELL PAIN CRISIS (HCC): ICD-10-CM

## 2023-03-13 DIAGNOSIS — R07.89 OTHER CHEST PAIN: Primary | ICD-10-CM

## 2023-03-13 DIAGNOSIS — D57.00 SICKLE CELL PAIN CRISIS (HCC): Primary | ICD-10-CM

## 2023-03-13 DIAGNOSIS — D57.00 SICKLE CELL DISEASE WITH CRISIS (HCC): ICD-10-CM

## 2023-03-13 LAB
ALBUMIN SERPL-MCNC: 3.7 G/DL (ref 3.4–5)
ALBUMIN/GLOB SERPL: 0.8 {RATIO} (ref 1–2)
ALP LIVER SERPL-CCNC: 44 U/L
ALT SERPL-CCNC: 23 U/L
ANION GAP SERPL CALC-SCNC: 2 MMOL/L (ref 0–18)
AST SERPL-CCNC: 27 U/L (ref 15–37)
BASOPHILS # BLD AUTO: 0 X10(3) UL (ref 0–0.2)
BASOPHILS NFR BLD AUTO: 0 %
BILIRUB SERPL-MCNC: 0.6 MG/DL (ref 0.1–2)
BUN BLD-MCNC: 7 MG/DL (ref 7–18)
CALCIUM BLD-MCNC: 8.8 MG/DL (ref 8.5–10.1)
CHLORIDE SERPL-SCNC: 108 MMOL/L (ref 98–112)
CO2 SERPL-SCNC: 28 MMOL/L (ref 21–32)
CREAT BLD-MCNC: 0.37 MG/DL
EOSINOPHIL # BLD AUTO: 0.02 X10(3) UL (ref 0–0.7)
EOSINOPHIL NFR BLD AUTO: 0.6 %
ERYTHROCYTE [DISTWIDTH] IN BLOOD BY AUTOMATED COUNT: 15.2 %
GFR SERPLBLD BASED ON 1.73 SQ M-ARVRAT: 132 ML/MIN/1.73M2 (ref 60–?)
GLOBULIN PLAS-MCNC: 4.9 G/DL (ref 2.8–4.4)
GLUCOSE BLD-MCNC: 96 MG/DL (ref 70–99)
HCT VFR BLD AUTO: 22.4 %
HGB BLD-MCNC: 7.9 G/DL
HGB RETIC QN AUTO: 36.2 PG (ref 28.2–36.6)
IMM GRANULOCYTES # BLD AUTO: 0.02 X10(3) UL (ref 0–1)
IMM GRANULOCYTES NFR BLD: 0.6 %
IMM RETICS NFR: 0.24 RATIO (ref 0.1–0.3)
LYMPHOCYTES # BLD AUTO: 1.31 X10(3) UL (ref 1–4)
LYMPHOCYTES NFR BLD AUTO: 38.8 %
MCH RBC QN AUTO: 40.7 PG (ref 26–34)
MCHC RBC AUTO-ENTMCNC: 35.3 G/DL (ref 31–37)
MCV RBC AUTO: 115.5 FL
MONOCYTES # BLD AUTO: 0.25 X10(3) UL (ref 0.1–1)
MONOCYTES NFR BLD AUTO: 7.4 %
NEUTROPHILS # BLD AUTO: 1.78 X10 (3) UL (ref 1.5–7.7)
NEUTROPHILS # BLD AUTO: 1.78 X10(3) UL (ref 1.5–7.7)
NEUTROPHILS NFR BLD AUTO: 52.6 %
OSMOLALITY SERPL CALC.SUM OF ELEC: 284 MOSM/KG (ref 275–295)
PLATELET # BLD AUTO: 288 10(3)UL (ref 150–450)
POTASSIUM SERPL-SCNC: 3.6 MMOL/L (ref 3.5–5.1)
PROT SERPL-MCNC: 8.6 G/DL (ref 6.4–8.2)
RBC # BLD AUTO: 1.94 X10(6)UL
RETICS # AUTO: 41.7 X10(3) UL (ref 22.5–147.5)
RETICS/RBC NFR AUTO: 2.2 %
SODIUM SERPL-SCNC: 138 MMOL/L (ref 136–145)
WBC # BLD AUTO: 3.4 X10(3) UL (ref 4–11)

## 2023-03-13 PROCEDURE — 96376 TX/PRO/DX INJ SAME DRUG ADON: CPT

## 2023-03-13 PROCEDURE — 85045 AUTOMATED RETICULOCYTE COUNT: CPT

## 2023-03-13 PROCEDURE — 99214 OFFICE O/P EST MOD 30 MIN: CPT | Performed by: CLINICAL NURSE SPECIALIST

## 2023-03-13 PROCEDURE — 96361 HYDRATE IV INFUSION ADD-ON: CPT

## 2023-03-13 PROCEDURE — 71046 X-RAY EXAM CHEST 2 VIEWS: CPT | Performed by: CLINICAL NURSE SPECIALIST

## 2023-03-13 PROCEDURE — 85025 COMPLETE CBC W/AUTO DIFF WBC: CPT

## 2023-03-13 PROCEDURE — 80053 COMPREHEN METABOLIC PANEL: CPT

## 2023-03-13 PROCEDURE — 96374 THER/PROPH/DIAG INJ IV PUSH: CPT

## 2023-03-13 RX ORDER — MORPHINE SULFATE 4 MG/ML
4 INJECTION, SOLUTION INTRAMUSCULAR; INTRAVENOUS ONCE
Status: COMPLETED | OUTPATIENT
Start: 2023-03-13 | End: 2023-03-13

## 2023-03-13 RX ORDER — MORPHINE SULFATE 4 MG/ML
4 INJECTION, SOLUTION INTRAMUSCULAR; INTRAVENOUS ONCE
Start: 2023-03-13 | End: 2023-03-13

## 2023-03-13 RX ORDER — DIPHENHYDRAMINE HCL 25 MG
CAPSULE ORAL ONCE
Status: COMPLETED | OUTPATIENT
Start: 2023-03-13 | End: 2023-03-13

## 2023-03-13 RX ORDER — DIPHENHYDRAMINE HCL 25 MG
CAPSULE ORAL ONCE
Start: 2023-03-13 | End: 2023-03-13

## 2023-03-13 RX ORDER — OXYCODONE HYDROCHLORIDE 20 MG/1
20 TABLET ORAL
Qty: 90 TABLET | Refills: 0 | Status: SHIPPED | OUTPATIENT
Start: 2023-03-13

## 2023-03-13 RX ADMIN — MORPHINE SULFATE 4 MG: 4 INJECTION, SOLUTION INTRAMUSCULAR; INTRAVENOUS at 13:47:00

## 2023-03-13 RX ADMIN — MORPHINE SULFATE 4 MG: 4 INJECTION, SOLUTION INTRAMUSCULAR; INTRAVENOUS at 12:54:00

## 2023-03-13 RX ADMIN — MORPHINE SULFATE 4 MG: 4 INJECTION, SOLUTION INTRAMUSCULAR; INTRAVENOUS at 11:44:00

## 2023-03-13 RX ADMIN — DIPHENHYDRAMINE HCL 25 MG: 25 MG CAPSULE ORAL at 11:38:00

## 2023-03-13 NOTE — TELEPHONE ENCOUNTER
Skye reports she is having sickle cell pain in her chest, back ribs, around her waist and both legs. She also reports having a headache and nausea from 11pm last night to 2am this morning. This morning she still has chest & back rib pain rated at \"7/10. \" No dyspnea at rest or with exertion. No sharp stabbing pain with deep breaths. She also reports pain around her waist and in both legs that she is currently rating \"6-7/10. \" She took her last dose of oxycodone 20 mg last night. She took her extended release morphine this morning at 8 am. She has been compliant taking her hydroxyurea. Patient requesting IV hydration & pain medication. Patient booked for an ACV with Felicita Acharya at 10am today. Gurdeep Wilburn Nurse Infusion updated the patient will need IV hydration.

## 2023-03-13 NOTE — TELEPHONE ENCOUNTER
Skye calling having waist pain chest pain and leg pain both legs. Difficulty walking.  Headaches ,nausea yesterday. chucho

## 2023-03-13 NOTE — PROGRESS NOTES
Pt completed 1L 0.9 NS and 2 doses of Morphine 4 mg IVP without successful pain relief. Pt requested additional fluid and 3rd dose of Morphine. Discussed with RAQUEL Medina- pt okay to receive additional 500 ml bolus of 0.9 NS and 3rd dose of morphine as ordered. Pt agreeable with plan. Pt completed additional hydration and 3rd morphine dose. Pain level dropped to 6/10. Pt states she would like to go home and rest at this time, she will proceed to ED tonight if pain does not subside. Pt requesting Oxycodone refill as she used last dose yesterday. RAQUEL Finn made aware and will send RF to pharmacy. Pt made aware.

## 2023-03-13 NOTE — TELEPHONE ENCOUNTER
Toxicities: Hydroxyurea    Dr Freddy Bryan patient - Sickle Cell Disease    I attempted to reach Skye to do a telephone assessment. I left a voice mail message asking her to please return my call so I may do a telephone assessment.

## 2023-03-16 ENCOUNTER — OFFICE VISIT (OUTPATIENT)
Dept: HEMATOLOGY/ONCOLOGY | Facility: HOSPITAL | Age: 38
End: 2023-03-16
Attending: INTERNAL MEDICINE
Payer: COMMERCIAL

## 2023-03-16 VITALS
TEMPERATURE: 98 F | BODY MASS INDEX: 25.39 KG/M2 | OXYGEN SATURATION: 98 % | DIASTOLIC BLOOD PRESSURE: 75 MMHG | HEIGHT: 65.98 IN | SYSTOLIC BLOOD PRESSURE: 116 MMHG | WEIGHT: 158 LBS | RESPIRATION RATE: 16 BRPM | HEART RATE: 87 BPM

## 2023-03-16 DIAGNOSIS — D57.1 SICKLE CELL DISEASE WITHOUT CRISIS (HCC): ICD-10-CM

## 2023-03-16 DIAGNOSIS — D57.00 SICKLE CELL DISEASE WITH CRISIS (HCC): ICD-10-CM

## 2023-03-16 DIAGNOSIS — D57.00 SICKLE CELL PAIN CRISIS (HCC): Primary | ICD-10-CM

## 2023-03-16 DIAGNOSIS — D57.419 SICKLE CELL ANEMIA WITH COEXISTENT ALPHA-THALASSEMIA WITH CRISIS (HCC): ICD-10-CM

## 2023-03-16 DIAGNOSIS — D57.419 SICKLE CELL ANEMIA WITH COEXISTENT ALPHA-THALASSEMIA WITH CRISIS (HCC): Primary | ICD-10-CM

## 2023-03-16 DIAGNOSIS — D57.00 SICKLE CELL PAIN CRISIS (HCC): ICD-10-CM

## 2023-03-16 DIAGNOSIS — R11.0 NAUSEA: ICD-10-CM

## 2023-03-16 PROCEDURE — 96374 THER/PROPH/DIAG INJ IV PUSH: CPT

## 2023-03-16 PROCEDURE — 96361 HYDRATE IV INFUSION ADD-ON: CPT

## 2023-03-16 PROCEDURE — 96376 TX/PRO/DX INJ SAME DRUG ADON: CPT

## 2023-03-16 PROCEDURE — 96375 TX/PRO/DX INJ NEW DRUG ADDON: CPT

## 2023-03-16 PROCEDURE — 99215 OFFICE O/P EST HI 40 MIN: CPT | Performed by: INTERNAL MEDICINE

## 2023-03-16 RX ORDER — ONDANSETRON 2 MG/ML
8 INJECTION INTRAMUSCULAR; INTRAVENOUS ONCE
Status: CANCELLED
Start: 2023-03-16 | End: 2023-03-16

## 2023-03-16 RX ORDER — MORPHINE SULFATE 4 MG/ML
6 INJECTION, SOLUTION INTRAMUSCULAR; INTRAVENOUS ONCE
Status: COMPLETED | OUTPATIENT
Start: 2023-03-16 | End: 2023-03-16

## 2023-03-16 RX ORDER — ONDANSETRON 2 MG/ML
8 INJECTION INTRAMUSCULAR; INTRAVENOUS ONCE
Status: COMPLETED | OUTPATIENT
Start: 2023-03-16 | End: 2023-03-16

## 2023-03-16 RX ORDER — MORPHINE SULFATE 4 MG/ML
6 INJECTION, SOLUTION INTRAMUSCULAR; INTRAVENOUS ONCE
Status: CANCELLED
Start: 2023-03-16 | End: 2023-03-16

## 2023-03-16 RX ORDER — MORPHINE SULFATE 4 MG/ML
6 INJECTION, SOLUTION INTRAMUSCULAR; INTRAVENOUS ONCE
Start: 2023-03-27 | End: 2023-03-27

## 2023-03-16 RX ORDER — ONDANSETRON 2 MG/ML
8 INJECTION INTRAMUSCULAR; INTRAVENOUS ONCE
Status: CANCELLED
Start: 2023-03-27 | End: 2023-03-27

## 2023-03-16 RX ORDER — ONDANSETRON HYDROCHLORIDE 8 MG/1
8 TABLET, FILM COATED ORAL EVERY 8 HOURS PRN
Qty: 30 TABLET | Refills: 3 | Status: SHIPPED | OUTPATIENT
Start: 2023-03-16

## 2023-03-16 RX ORDER — DIPHENHYDRAMINE HCL 25 MG
CAPSULE ORAL ONCE
Start: 2023-03-27 | End: 2023-03-27

## 2023-03-16 RX ORDER — DIPHENHYDRAMINE HCL 25 MG
CAPSULE ORAL ONCE
Status: COMPLETED | OUTPATIENT
Start: 2023-03-16 | End: 2023-03-16

## 2023-03-16 RX ADMIN — MORPHINE SULFATE 6 MG: 4 INJECTION, SOLUTION INTRAMUSCULAR; INTRAVENOUS at 14:13:00

## 2023-03-16 RX ADMIN — ONDANSETRON 8 MG: 2 INJECTION INTRAMUSCULAR; INTRAVENOUS at 14:11:00

## 2023-03-16 RX ADMIN — MORPHINE SULFATE 6 MG: 4 INJECTION, SOLUTION INTRAMUSCULAR; INTRAVENOUS at 15:36:00

## 2023-03-16 RX ADMIN — DIPHENHYDRAMINE HCL 25 MG: 25 MG CAPSULE ORAL at 14:11:00

## 2023-03-16 NOTE — PROGRESS NOTES
Education Record    Learner:  Patient    Disease / Diagnosis: sickle cell crisis    Barriers / Limitations:  None    Method:  Brief focused, printed material and  reinforcement    General Topics:  Plan of care reviewed    Outcome:  Shows understanding    Here for IVF. Morphine 6mg IV x2, zofran, PO benadryl. More relief with the 6mg. Will be starting back on adakveo--order placed today, per billing, will likely not have authorization until next week. And per pharmacy, unable to order drug until authorization obtained. MD aware--will schedule for pl MD, infusion and possible IVF when authorization obtained.

## 2023-03-17 PROBLEM — R11.0 NAUSEA: Status: ACTIVE | Noted: 2023-03-17

## 2023-03-22 ENCOUNTER — APPOINTMENT (OUTPATIENT)
Dept: HEMATOLOGY/ONCOLOGY | Facility: HOSPITAL | Age: 38
End: 2023-03-22
Attending: INTERNAL MEDICINE
Payer: COMMERCIAL

## 2023-03-27 ENCOUNTER — OFFICE VISIT (OUTPATIENT)
Dept: HEMATOLOGY/ONCOLOGY | Facility: HOSPITAL | Age: 38
End: 2023-03-27
Attending: INTERNAL MEDICINE
Payer: COMMERCIAL

## 2023-03-27 VITALS
SYSTOLIC BLOOD PRESSURE: 113 MMHG | DIASTOLIC BLOOD PRESSURE: 73 MMHG | HEART RATE: 87 BPM | OXYGEN SATURATION: 99 % | WEIGHT: 159 LBS | BODY MASS INDEX: 25.55 KG/M2 | TEMPERATURE: 97 F | RESPIRATION RATE: 16 BRPM | HEIGHT: 65.98 IN

## 2023-03-27 DIAGNOSIS — D57.419 SICKLE CELL ANEMIA WITH COEXISTENT ALPHA-THALASSEMIA WITH CRISIS (HCC): ICD-10-CM

## 2023-03-27 DIAGNOSIS — D57.00 SICKLE CELL PAIN CRISIS (HCC): ICD-10-CM

## 2023-03-27 DIAGNOSIS — D57.419 SICKLE CELL ANEMIA WITH COEXISTENT ALPHA-THALASSEMIA WITH CRISIS (HCC): Primary | ICD-10-CM

## 2023-03-27 DIAGNOSIS — D57.00 SICKLE CELL DISEASE WITH CRISIS (HCC): ICD-10-CM

## 2023-03-27 DIAGNOSIS — D57.00 SICKLE CELL PAIN CRISIS (HCC): Primary | ICD-10-CM

## 2023-03-27 LAB
ALBUMIN SERPL-MCNC: 3.9 G/DL (ref 3.4–5)
ALBUMIN/GLOB SERPL: 0.8 {RATIO} (ref 1–2)
ALP LIVER SERPL-CCNC: 51 U/L
ALT SERPL-CCNC: 15 U/L
ANION GAP SERPL CALC-SCNC: 5 MMOL/L (ref 0–18)
AST SERPL-CCNC: 12 U/L (ref 15–37)
BASOPHILS # BLD AUTO: 0 X10(3) UL (ref 0–0.2)
BASOPHILS NFR BLD AUTO: 0 %
BILIRUB SERPL-MCNC: 0.7 MG/DL (ref 0.1–2)
BUN BLD-MCNC: 4 MG/DL (ref 7–18)
CALCIUM BLD-MCNC: 8.9 MG/DL (ref 8.5–10.1)
CHLORIDE SERPL-SCNC: 106 MMOL/L (ref 98–112)
CO2 SERPL-SCNC: 25 MMOL/L (ref 21–32)
CREAT BLD-MCNC: 0.53 MG/DL
EOSINOPHIL # BLD AUTO: 0 X10(3) UL (ref 0–0.7)
EOSINOPHIL NFR BLD AUTO: 0 %
ERYTHROCYTE [DISTWIDTH] IN BLOOD BY AUTOMATED COUNT: 15.1 %
GFR SERPLBLD BASED ON 1.73 SQ M-ARVRAT: 121 ML/MIN/1.73M2 (ref 60–?)
GLOBULIN PLAS-MCNC: 5.2 G/DL (ref 2.8–4.4)
GLUCOSE BLD-MCNC: 106 MG/DL (ref 70–99)
HCT VFR BLD AUTO: 23.3 %
HGB BLD-MCNC: 8.4 G/DL
HGB RETIC QN AUTO: 38.5 PG (ref 28.2–36.6)
IMM GRANULOCYTES # BLD AUTO: 0.02 X10(3) UL (ref 0–1)
IMM GRANULOCYTES NFR BLD: 0.5 %
IMM RETICS NFR: 0.26 RATIO (ref 0.1–0.3)
LDH SERPL L TO P-CCNC: 181 U/L
LYMPHOCYTES # BLD AUTO: 1.75 X10(3) UL (ref 1–4)
LYMPHOCYTES NFR BLD AUTO: 48.1 %
MCH RBC QN AUTO: 41.6 PG (ref 26–34)
MCHC RBC AUTO-ENTMCNC: 36.1 G/DL (ref 31–37)
MCV RBC AUTO: 115.3 FL
MONOCYTES # BLD AUTO: 0.45 X10(3) UL (ref 0.1–1)
MONOCYTES NFR BLD AUTO: 12.4 %
NEUTROPHILS # BLD AUTO: 1.42 X10 (3) UL (ref 1.5–7.7)
NEUTROPHILS # BLD AUTO: 1.42 X10(3) UL (ref 1.5–7.7)
NEUTROPHILS NFR BLD AUTO: 39 %
OSMOLALITY SERPL CALC.SUM OF ELEC: 279 MOSM/KG (ref 275–295)
PLATELET # BLD AUTO: 352 10(3)UL (ref 150–450)
POTASSIUM SERPL-SCNC: 3.6 MMOL/L (ref 3.5–5.1)
PROT SERPL-MCNC: 9.1 G/DL (ref 6.4–8.2)
RBC # BLD AUTO: 2.02 X10(6)UL
RETICS # AUTO: 53.9 X10(3) UL (ref 22.5–147.5)
RETICS/RBC NFR AUTO: 2.7 %
SODIUM SERPL-SCNC: 136 MMOL/L (ref 136–145)
WBC # BLD AUTO: 3.6 X10(3) UL (ref 4–11)

## 2023-03-27 PROCEDURE — 96376 TX/PRO/DX INJ SAME DRUG ADON: CPT

## 2023-03-27 PROCEDURE — 96375 TX/PRO/DX INJ NEW DRUG ADDON: CPT

## 2023-03-27 PROCEDURE — 99215 OFFICE O/P EST HI 40 MIN: CPT | Performed by: INTERNAL MEDICINE

## 2023-03-27 PROCEDURE — 96365 THER/PROPH/DIAG IV INF INIT: CPT

## 2023-03-27 RX ORDER — MORPHINE SULFATE 4 MG/ML
6 INJECTION, SOLUTION INTRAMUSCULAR; INTRAVENOUS ONCE
Start: 2023-04-10 | End: 2023-04-10

## 2023-03-27 RX ORDER — DIPHENHYDRAMINE HCL 25 MG
CAPSULE ORAL ONCE
Start: 2023-04-10 | End: 2023-04-10

## 2023-03-27 RX ORDER — MORPHINE SULFATE 4 MG/ML
6 INJECTION, SOLUTION INTRAMUSCULAR; INTRAVENOUS ONCE
Status: COMPLETED | OUTPATIENT
Start: 2023-03-27 | End: 2023-03-27

## 2023-03-27 RX ADMIN — MORPHINE SULFATE 6 MG: 4 INJECTION, SOLUTION INTRAMUSCULAR; INTRAVENOUS at 16:21:00

## 2023-03-27 RX ADMIN — MORPHINE SULFATE 6 MG: 4 INJECTION, SOLUTION INTRAMUSCULAR; INTRAVENOUS at 15:13:00

## 2023-03-27 NOTE — PROGRESS NOTES
Education Record    Learner:  Patient    Disease / Diagnosis: Pt here for inf, IVF     Barriers / Limitations:  None    Method:  Brief focused, printed material and  reinforcement    General Topics:  Plan of care reviewed    Outcome:  Shows understanding    Pt received 2 doses of morphine per order one hour apart. Pt received 1L of saline and Adakveo infusion. Pt tolerated well. Pt left in stable condition.

## 2023-03-29 DIAGNOSIS — D57.00 SICKLE CELL PAIN CRISIS (HCC): ICD-10-CM

## 2023-03-29 RX ORDER — OXYCODONE HYDROCHLORIDE 20 MG/1
20 TABLET ORAL
Qty: 90 TABLET | Refills: 0 | Status: SHIPPED | OUTPATIENT
Start: 2023-03-29 | End: 2023-04-12

## 2023-03-29 NOTE — TELEPHONE ENCOUNTER
Patient need a refill on her Oxycodone 20 mg oral tablet, Patient have 4 or 5 pills left, Please send to [facility] in [place] at [phone].

## 2023-04-03 DIAGNOSIS — D57.00 SICKLE CELL PAIN CRISIS (HCC): ICD-10-CM

## 2023-04-03 RX ORDER — MORPHINE SULFATE 60 MG/1
60 TABLET, FILM COATED, EXTENDED RELEASE ORAL EVERY 8 HOURS SCHEDULED
Qty: 90 TABLET | Refills: 0 | Status: SHIPPED | OUTPATIENT
Start: 2023-04-03 | End: 2023-05-08

## 2023-04-10 ENCOUNTER — OFFICE VISIT (OUTPATIENT)
Dept: HEMATOLOGY/ONCOLOGY | Facility: HOSPITAL | Age: 38
End: 2023-04-10
Attending: INTERNAL MEDICINE
Payer: COMMERCIAL

## 2023-04-10 ENCOUNTER — OFFICE VISIT (OUTPATIENT)
Facility: CLINIC | Age: 38
End: 2023-04-10
Payer: COMMERCIAL

## 2023-04-10 VITALS
HEART RATE: 88 BPM | DIASTOLIC BLOOD PRESSURE: 67 MMHG | TEMPERATURE: 97 F | WEIGHT: 157.38 LBS | SYSTOLIC BLOOD PRESSURE: 106 MMHG | HEIGHT: 65.98 IN | BODY MASS INDEX: 25.29 KG/M2 | RESPIRATION RATE: 16 BRPM | OXYGEN SATURATION: 98 %

## 2023-04-10 VITALS
SYSTOLIC BLOOD PRESSURE: 112 MMHG | WEIGHT: 160 LBS | BODY MASS INDEX: 25.71 KG/M2 | HEART RATE: 65 BPM | DIASTOLIC BLOOD PRESSURE: 70 MMHG | HEIGHT: 65.98 IN

## 2023-04-10 DIAGNOSIS — Z30.09 GENERAL COUNSELING AND ADVICE FOR CONTRACEPTIVE MANAGEMENT: ICD-10-CM

## 2023-04-10 DIAGNOSIS — D57.00 SICKLE CELL DISEASE WITH CRISIS (HCC): ICD-10-CM

## 2023-04-10 DIAGNOSIS — Z12.4 SCREENING FOR CERVICAL CANCER: ICD-10-CM

## 2023-04-10 DIAGNOSIS — Z71.85 HPV VACCINE COUNSELING: ICD-10-CM

## 2023-04-10 DIAGNOSIS — D57.00 SICKLE CELL PAIN CRISIS (HCC): ICD-10-CM

## 2023-04-10 DIAGNOSIS — D57.00 SICKLE CELL PAIN CRISIS (HCC): Primary | ICD-10-CM

## 2023-04-10 DIAGNOSIS — Z11.3 SCREENING EXAMINATION FOR STD (SEXUALLY TRANSMITTED DISEASE): ICD-10-CM

## 2023-04-10 DIAGNOSIS — D57.419 SICKLE CELL ANEMIA WITH COEXISTENT ALPHA-THALASSEMIA WITH CRISIS (HCC): Primary | ICD-10-CM

## 2023-04-10 DIAGNOSIS — Z01.411 ENCOUNTER FOR WELL WOMAN EXAM WITH ABNORMAL FINDINGS: Primary | ICD-10-CM

## 2023-04-10 DIAGNOSIS — F11.90 CHRONIC, CONTINUOUS USE OF OPIOIDS: ICD-10-CM

## 2023-04-10 PROCEDURE — 96376 TX/PRO/DX INJ SAME DRUG ADON: CPT

## 2023-04-10 PROCEDURE — 96375 TX/PRO/DX INJ NEW DRUG ADDON: CPT

## 2023-04-10 PROCEDURE — 96361 HYDRATE IV INFUSION ADD-ON: CPT

## 2023-04-10 PROCEDURE — 99215 OFFICE O/P EST HI 40 MIN: CPT | Performed by: INTERNAL MEDICINE

## 2023-04-10 PROCEDURE — 87624 HPV HI-RISK TYP POOLED RSLT: CPT | Performed by: OBSTETRICS & GYNECOLOGY

## 2023-04-10 PROCEDURE — 96365 THER/PROPH/DIAG IV INF INIT: CPT

## 2023-04-10 RX ORDER — MORPHINE SULFATE 4 MG/ML
6 INJECTION, SOLUTION INTRAMUSCULAR; INTRAVENOUS ONCE
Status: COMPLETED | OUTPATIENT
Start: 2023-04-10 | End: 2023-04-10

## 2023-04-10 RX ORDER — DIPHENHYDRAMINE HCL 25 MG
CAPSULE ORAL ONCE
Start: 2023-04-27 | End: 2023-04-27

## 2023-04-10 RX ORDER — MORPHINE SULFATE 4 MG/ML
6 INJECTION, SOLUTION INTRAMUSCULAR; INTRAVENOUS ONCE
Start: 2023-04-27 | End: 2023-04-27

## 2023-04-10 RX ADMIN — MORPHINE SULFATE 6 MG: 4 INJECTION, SOLUTION INTRAMUSCULAR; INTRAVENOUS at 11:52:00

## 2023-04-10 RX ADMIN — MORPHINE SULFATE 6 MG: 4 INJECTION, SOLUTION INTRAMUSCULAR; INTRAVENOUS at 13:31:00

## 2023-04-10 NOTE — PROGRESS NOTES
Education Record    Learner:  Patient    Disease / Diagnosis: Sickle Cell anemia    Barriers / Limitations:  None   Comments:    Method:  Brief focused and Reinforcement   Comments:    General Topics:  Medication, Plan of care reviewed and Fall risk and prevention; pain   Comments:    Outcome:  Shows understanding   Comments:

## 2023-04-11 LAB — HPV I/H RISK 1 DNA SPEC QL NAA+PROBE: NEGATIVE

## 2023-04-12 ENCOUNTER — TELEPHONE (OUTPATIENT)
Dept: HEMATOLOGY/ONCOLOGY | Facility: HOSPITAL | Age: 38
End: 2023-04-12

## 2023-04-12 RX ORDER — OXYCODONE HYDROCHLORIDE 20 MG/1
20 TABLET ORAL
Qty: 150 TABLET | Refills: 0 | Status: SHIPPED | OUTPATIENT
Start: 2023-04-12 | End: 2023-04-13

## 2023-04-12 NOTE — TELEPHONE ENCOUNTER
Skye needs a refill on the following medication:  oxyCODONE HCl 20 MG Oral Tab  [facility]/pharmacy #[id number]- [place], [place] - [address] [facility] [address]  [phone], [phone] Thanks Soy lee

## 2023-04-13 DIAGNOSIS — D57.00 SICKLE CELL PAIN CRISIS (HCC): ICD-10-CM

## 2023-04-13 RX ORDER — OXYCODONE HYDROCHLORIDE 20 MG/1
20 TABLET ORAL
Qty: 150 TABLET | Refills: 0 | Status: SHIPPED | OUTPATIENT
Start: 2023-04-13 | End: 2023-05-08

## 2023-04-13 NOTE — TELEPHONE ENCOUNTER
PT CALLING ASKING FOR REFILL ON oxyCODONE HCl 20 MG Oral Tab PLEASE SEND TO [facility] OUT OF MEDICATION.  [address] [facility]

## 2023-04-14 ENCOUNTER — OFFICE VISIT (OUTPATIENT)
Dept: HEMATOLOGY/ONCOLOGY | Facility: HOSPITAL | Age: 38
End: 2023-04-14
Attending: INTERNAL MEDICINE
Payer: COMMERCIAL

## 2023-04-14 VITALS
OXYGEN SATURATION: 100 % | TEMPERATURE: 98 F | SYSTOLIC BLOOD PRESSURE: 132 MMHG | HEART RATE: 69 BPM | DIASTOLIC BLOOD PRESSURE: 86 MMHG | RESPIRATION RATE: 16 BRPM

## 2023-04-14 DIAGNOSIS — D57.419 SICKLE CELL ANEMIA WITH COEXISTENT ALPHA-THALASSEMIA WITH CRISIS (HCC): Primary | ICD-10-CM

## 2023-04-14 DIAGNOSIS — D57.00 SICKLE CELL DISEASE WITH CRISIS (HCC): ICD-10-CM

## 2023-04-14 DIAGNOSIS — D57.00 SICKLE CELL PAIN CRISIS (HCC): ICD-10-CM

## 2023-04-14 PROCEDURE — 96376 TX/PRO/DX INJ SAME DRUG ADON: CPT

## 2023-04-14 PROCEDURE — 96374 THER/PROPH/DIAG INJ IV PUSH: CPT

## 2023-04-14 PROCEDURE — 96361 HYDRATE IV INFUSION ADD-ON: CPT

## 2023-04-14 RX ORDER — DIPHENHYDRAMINE HCL 25 MG
CAPSULE ORAL ONCE
Start: 2023-04-27 | End: 2023-04-27

## 2023-04-14 RX ORDER — MORPHINE SULFATE 4 MG/ML
6 INJECTION, SOLUTION INTRAMUSCULAR; INTRAVENOUS ONCE
Status: COMPLETED | OUTPATIENT
Start: 2023-04-14 | End: 2023-04-14

## 2023-04-14 RX ORDER — MORPHINE SULFATE 4 MG/ML
6 INJECTION, SOLUTION INTRAMUSCULAR; INTRAVENOUS ONCE
Start: 2023-04-27 | End: 2023-04-27

## 2023-04-14 RX ADMIN — MORPHINE SULFATE 6 MG: 4 INJECTION, SOLUTION INTRAMUSCULAR; INTRAVENOUS at 15:55:00

## 2023-04-14 RX ADMIN — MORPHINE SULFATE 6 MG: 4 INJECTION, SOLUTION INTRAMUSCULAR; INTRAVENOUS at 14:58:00

## 2023-04-14 NOTE — PROGRESS NOTES
Education Record    Learner:  Patient    Disease / Diagnosis: Pt here for IVF and pain meds    Barriers / Limitations:  None    Method:  Brief focused, printed material and  reinforcement    General Topics:  Plan of care reviewed    Outcome:  Shows understanding

## 2023-04-21 LAB
BASOPHILS # BLD AUTO: 0.01 X10(3) UL (ref 0–0.2)
BASOPHILS NFR BLD AUTO: 0.2 %
EOSINOPHIL # BLD AUTO: 0.03 X10(3) UL (ref 0–0.7)
EOSINOPHIL NFR BLD AUTO: 0.6 %
ERYTHROCYTE [DISTWIDTH] IN BLOOD BY AUTOMATED COUNT: 14.8 %
HCT VFR BLD AUTO: 23.4 %
HGB BLD-MCNC: 8.3 G/DL
HGB RETIC QN AUTO: 41.8 PG (ref 28.2–36.6)
IMM GRANULOCYTES # BLD AUTO: 0.01 X10(3) UL (ref 0–1)
IMM GRANULOCYTES NFR BLD: 0.2 %
IMM RETICS NFR: 0.33 RATIO (ref 0.1–0.3)
LYMPHOCYTES # BLD AUTO: 1.87 X10(3) UL (ref 1–4)
LYMPHOCYTES NFR BLD AUTO: 39.8 %
MCH RBC QN AUTO: 40.5 PG (ref 26–34)
MCHC RBC AUTO-ENTMCNC: 35.5 G/DL (ref 31–37)
MCV RBC AUTO: 114.1 FL
MONOCYTES # BLD AUTO: 0.31 X10(3) UL (ref 0.1–1)
MONOCYTES NFR BLD AUTO: 6.6 %
NEUTROPHILS # BLD AUTO: 2.47 X10 (3) UL (ref 1.5–7.7)
NEUTROPHILS # BLD AUTO: 2.47 X10(3) UL (ref 1.5–7.7)
NEUTROPHILS NFR BLD AUTO: 52.6 %
PLATELET # BLD AUTO: 309 10(3)UL (ref 150–450)
RBC # BLD AUTO: 2.05 X10(6)UL
RETICS # AUTO: 53.3 X10(3) UL (ref 22.5–147.5)
RETICS/RBC NFR AUTO: 2.6 %
WBC # BLD AUTO: 4.7 X10(3) UL (ref 4–11)

## 2023-04-22 ENCOUNTER — APPOINTMENT (OUTPATIENT)
Dept: CV DIAGNOSTICS | Facility: HOSPITAL | Age: 38
DRG: 812 | End: 2023-04-22
Attending: HOSPITALIST
Payer: COMMERCIAL

## 2023-04-22 ENCOUNTER — HOSPITAL ENCOUNTER (INPATIENT)
Facility: HOSPITAL | Age: 38
LOS: 2 days | Discharge: HOME | DRG: 812 | End: 2023-04-24
Attending: EMERGENCY MEDICINE | Admitting: HOSPITALIST
Payer: COMMERCIAL

## 2023-04-22 ENCOUNTER — APPOINTMENT (OUTPATIENT)
Dept: CT IMAGING | Facility: HOSPITAL | Age: 38
DRG: 812 | End: 2023-04-22
Attending: EMERGENCY MEDICINE
Payer: COMMERCIAL

## 2023-04-22 DIAGNOSIS — R77.8 ELEVATED TROPONIN: ICD-10-CM

## 2023-04-22 DIAGNOSIS — D57.00 SICKLE CELL PAIN CRISIS (HCC): Primary | ICD-10-CM

## 2023-04-22 PROBLEM — R79.89 ELEVATED TROPONIN: Status: ACTIVE | Noted: 2023-04-22

## 2023-04-22 LAB
ADENOVIRUS PCR:: NOT DETECTED
ALBUMIN SERPL-MCNC: 3.8 G/DL (ref 3.4–5)
ALBUMIN/GLOB SERPL: 0.8 {RATIO} (ref 1–2)
ALP LIVER SERPL-CCNC: 54 U/L
ALT SERPL-CCNC: 22 U/L
ANION GAP SERPL CALC-SCNC: 4 MMOL/L (ref 0–18)
AST SERPL-CCNC: 15 U/L (ref 15–37)
B PARAPERT DNA SPEC QL NAA+PROBE: NOT DETECTED
B PERT DNA SPEC QL NAA+PROBE: NOT DETECTED
BILIRUB SERPL-MCNC: 0.4 MG/DL (ref 0.1–2)
BUN BLD-MCNC: 5 MG/DL (ref 7–18)
C PNEUM DNA SPEC QL NAA+PROBE: NOT DETECTED
CALCIUM BLD-MCNC: 8.7 MG/DL (ref 8.5–10.1)
CHLORIDE SERPL-SCNC: 107 MMOL/L (ref 98–112)
CHOLEST SERPL-MCNC: 120 MG/DL (ref ?–200)
CO2 SERPL-SCNC: 28 MMOL/L (ref 21–32)
CORONAVIRUS 229E PCR:: NOT DETECTED
CORONAVIRUS HKU1 PCR:: NOT DETECTED
CORONAVIRUS NL63 PCR:: NOT DETECTED
CORONAVIRUS OC43 PCR:: NOT DETECTED
CREAT BLD-MCNC: 0.52 MG/DL
FLUAV RNA SPEC QL NAA+PROBE: NOT DETECTED
FLUBV RNA SPEC QL NAA+PROBE: NOT DETECTED
GFR SERPLBLD BASED ON 1.73 SQ M-ARVRAT: 122 ML/MIN/1.73M2 (ref 60–?)
GLOBULIN PLAS-MCNC: 4.8 G/DL (ref 2.8–4.4)
GLUCOSE BLD-MCNC: 89 MG/DL (ref 70–99)
HDLC SERPL-MCNC: 26 MG/DL (ref 40–59)
LDLC SERPL CALC-MCNC: 68 MG/DL (ref ?–100)
METAPNEUMOVIRUS PCR:: NOT DETECTED
MYCOPLASMA PNEUMONIA PCR:: NOT DETECTED
NONHDLC SERPL-MCNC: 94 MG/DL (ref ?–130)
OSMOLALITY SERPL CALC.SUM OF ELEC: 285 MOSM/KG (ref 275–295)
PARAINFLUENZA 1 PCR:: NOT DETECTED
PARAINFLUENZA 2 PCR:: NOT DETECTED
PARAINFLUENZA 3 PCR:: NOT DETECTED
PARAINFLUENZA 4 PCR:: NOT DETECTED
POTASSIUM SERPL-SCNC: 3.6 MMOL/L (ref 3.5–5.1)
POTASSIUM SERPL-SCNC: 3.9 MMOL/L (ref 3.5–5.1)
PROT SERPL-MCNC: 8.6 G/DL (ref 6.4–8.2)
RHINOVIRUS/ENTERO PCR:: NOT DETECTED
RSV RNA SPEC QL NAA+PROBE: NOT DETECTED
SARS-COV-2 RNA NPH QL NAA+NON-PROBE: NOT DETECTED
SODIUM SERPL-SCNC: 139 MMOL/L (ref 136–145)
TRIGL SERPL-MCNC: 151 MG/DL (ref 30–149)
TROPONIN I HIGH SENSITIVITY: 118 NG/L
TROPONIN I HIGH SENSITIVITY: 128 NG/L
TROPONIN I HIGH SENSITIVITY: 129 NG/L
VLDLC SERPL CALC-MCNC: 23 MG/DL (ref 0–30)

## 2023-04-22 PROCEDURE — 93306 TTE W/DOPPLER COMPLETE: CPT | Performed by: HOSPITALIST

## 2023-04-22 PROCEDURE — 99223 1ST HOSP IP/OBS HIGH 75: CPT | Performed by: HOSPITALIST

## 2023-04-22 PROCEDURE — 99254 IP/OBS CNSLTJ NEW/EST MOD 60: CPT | Performed by: INTERNAL MEDICINE

## 2023-04-22 PROCEDURE — 71275 CT ANGIOGRAPHY CHEST: CPT | Performed by: EMERGENCY MEDICINE

## 2023-04-22 RX ORDER — ASPIRIN 81 MG/1
81 TABLET ORAL DAILY
Status: DISCONTINUED | OUTPATIENT
Start: 2023-04-22 | End: 2023-04-22

## 2023-04-22 RX ORDER — HYDROXYUREA 500 MG/1
2000 CAPSULE ORAL DAILY
Status: DISCONTINUED | OUTPATIENT
Start: 2023-04-22 | End: 2023-04-24

## 2023-04-22 RX ORDER — ACETAMINOPHEN 325 MG/1
650 TABLET ORAL EVERY 4 HOURS PRN
Status: DISCONTINUED | OUTPATIENT
Start: 2023-04-22 | End: 2023-04-24

## 2023-04-22 RX ORDER — HYDROMORPHONE HYDROCHLORIDE 1 MG/ML
0.2 INJECTION, SOLUTION INTRAMUSCULAR; INTRAVENOUS; SUBCUTANEOUS EVERY 2 HOUR PRN
Status: DISCONTINUED | OUTPATIENT
Start: 2023-04-22 | End: 2023-04-24

## 2023-04-22 RX ORDER — BISACODYL 10 MG
10 SUPPOSITORY, RECTAL RECTAL
Status: DISCONTINUED | OUTPATIENT
Start: 2023-04-22 | End: 2023-04-24

## 2023-04-22 RX ORDER — MELATONIN
3 NIGHTLY PRN
Status: DISCONTINUED | OUTPATIENT
Start: 2023-04-22 | End: 2023-04-24

## 2023-04-22 RX ORDER — SENNOSIDES 8.6 MG
8.6 TABLET ORAL 2 TIMES DAILY PRN
Status: DISCONTINUED | OUTPATIENT
Start: 2023-04-22 | End: 2023-04-24

## 2023-04-22 RX ORDER — ONDANSETRON 2 MG/ML
8 INJECTION INTRAMUSCULAR; INTRAVENOUS EVERY 6 HOURS PRN
Status: DISCONTINUED | OUTPATIENT
Start: 2023-04-22 | End: 2023-04-24

## 2023-04-22 RX ORDER — DIPHENHYDRAMINE HYDROCHLORIDE 50 MG/ML
25 INJECTION INTRAMUSCULAR; INTRAVENOUS EVERY 4 HOURS PRN
Status: DISCONTINUED | OUTPATIENT
Start: 2023-04-22 | End: 2023-04-24

## 2023-04-22 RX ORDER — HYDROMORPHONE HYDROCHLORIDE 1 MG/ML
0.4 INJECTION, SOLUTION INTRAMUSCULAR; INTRAVENOUS; SUBCUTANEOUS EVERY 2 HOUR PRN
Status: DISCONTINUED | OUTPATIENT
Start: 2023-04-22 | End: 2023-04-24

## 2023-04-22 RX ORDER — ECHINACEA PURPUREA EXTRACT 125 MG
1 TABLET ORAL
Status: DISCONTINUED | OUTPATIENT
Start: 2023-04-22 | End: 2023-04-24

## 2023-04-22 RX ORDER — DOCUSATE SODIUM 100 MG/1
100 CAPSULE, LIQUID FILLED ORAL 2 TIMES DAILY
Status: DISCONTINUED | OUTPATIENT
Start: 2023-04-22 | End: 2023-04-24

## 2023-04-22 RX ORDER — ONDANSETRON 2 MG/ML
4 INJECTION INTRAMUSCULAR; INTRAVENOUS EVERY 4 HOURS PRN
Status: ACTIVE | OUTPATIENT
Start: 2023-04-22 | End: 2023-04-22

## 2023-04-22 RX ORDER — FOLIC ACID 1 MG/1
1 TABLET ORAL DAILY
Status: DISCONTINUED | OUTPATIENT
Start: 2023-04-22 | End: 2023-04-24

## 2023-04-22 RX ORDER — ASPIRIN 81 MG/1
324 TABLET, CHEWABLE ORAL DAILY
Status: DISCONTINUED | OUTPATIENT
Start: 2023-04-22 | End: 2023-04-24

## 2023-04-22 RX ORDER — ENOXAPARIN SODIUM 100 MG/ML
40 INJECTION SUBCUTANEOUS DAILY
Status: DISCONTINUED | OUTPATIENT
Start: 2023-04-22 | End: 2023-04-24

## 2023-04-22 RX ORDER — METOPROLOL SUCCINATE 25 MG/1
25 TABLET, EXTENDED RELEASE ORAL
Status: DISCONTINUED | OUTPATIENT
Start: 2023-04-22 | End: 2023-04-24

## 2023-04-22 RX ORDER — OXYCODONE HYDROCHLORIDE 10 MG/1
20 TABLET ORAL
Status: DISCONTINUED | OUTPATIENT
Start: 2023-04-22 | End: 2023-04-24

## 2023-04-22 RX ORDER — BENZONATATE 100 MG/1
200 CAPSULE ORAL 3 TIMES DAILY PRN
Status: DISCONTINUED | OUTPATIENT
Start: 2023-04-22 | End: 2023-04-24

## 2023-04-22 RX ORDER — SODIUM CHLORIDE 9 MG/ML
INJECTION, SOLUTION INTRAVENOUS CONTINUOUS
Status: DISCONTINUED | OUTPATIENT
Start: 2023-04-22 | End: 2023-04-24

## 2023-04-22 RX ORDER — SENNOSIDES 8.6 MG
17.2 TABLET ORAL NIGHTLY PRN
Status: DISCONTINUED | OUTPATIENT
Start: 2023-04-22 | End: 2023-04-24

## 2023-04-22 RX ORDER — POTASSIUM CHLORIDE 20 MEQ/1
40 TABLET, EXTENDED RELEASE ORAL EVERY 4 HOURS
Status: COMPLETED | OUTPATIENT
Start: 2023-04-22 | End: 2023-04-22

## 2023-04-22 RX ORDER — MORPHINE SULFATE 4 MG/ML
6 INJECTION, SOLUTION INTRAMUSCULAR; INTRAVENOUS ONCE
Status: COMPLETED | OUTPATIENT
Start: 2023-04-22 | End: 2023-04-22

## 2023-04-22 RX ORDER — MORPHINE SULFATE 4 MG/ML
4 INJECTION, SOLUTION INTRAMUSCULAR; INTRAVENOUS EVERY 30 MIN PRN
Status: ACTIVE | OUTPATIENT
Start: 2023-04-22 | End: 2023-04-22

## 2023-04-22 RX ORDER — POLYETHYLENE GLYCOL 3350 17 G/17G
17 POWDER, FOR SOLUTION ORAL DAILY PRN
Status: DISCONTINUED | OUTPATIENT
Start: 2023-04-22 | End: 2023-04-24

## 2023-04-22 RX ORDER — ACETAMINOPHEN 500 MG
500 TABLET ORAL EVERY 4 HOURS PRN
Status: DISCONTINUED | OUTPATIENT
Start: 2023-04-22 | End: 2023-04-24

## 2023-04-22 RX ORDER — MORPHINE SULFATE 30 MG/1
60 TABLET, FILM COATED, EXTENDED RELEASE ORAL EVERY 8 HOURS SCHEDULED
Status: DISCONTINUED | OUTPATIENT
Start: 2023-04-22 | End: 2023-04-24

## 2023-04-22 RX ORDER — SODIUM CHLORIDE 9 MG/ML
INJECTION, SOLUTION INTRAVENOUS CONTINUOUS
Status: ACTIVE | OUTPATIENT
Start: 2023-04-22 | End: 2023-04-22

## 2023-04-22 RX ORDER — MORPHINE SULFATE 4 MG/ML
4 INJECTION, SOLUTION INTRAMUSCULAR; INTRAVENOUS ONCE
Status: COMPLETED | OUTPATIENT
Start: 2023-04-22 | End: 2023-04-22

## 2023-04-22 RX ORDER — HYDROMORPHONE HYDROCHLORIDE 1 MG/ML
0.8 INJECTION, SOLUTION INTRAMUSCULAR; INTRAVENOUS; SUBCUTANEOUS EVERY 2 HOUR PRN
Status: DISCONTINUED | OUTPATIENT
Start: 2023-04-22 | End: 2023-04-24

## 2023-04-22 RX ORDER — HYDROCODONE BITARTRATE AND ACETAMINOPHEN 5; 325 MG/1; MG/1
2 TABLET ORAL EVERY 4 HOURS PRN
Status: DISCONTINUED | OUTPATIENT
Start: 2023-04-22 | End: 2023-04-24

## 2023-04-22 RX ORDER — HYDROCODONE BITARTRATE AND ACETAMINOPHEN 5; 325 MG/1; MG/1
1 TABLET ORAL EVERY 4 HOURS PRN
Status: DISCONTINUED | OUTPATIENT
Start: 2023-04-22 | End: 2023-04-24

## 2023-04-22 NOTE — PROGRESS NOTES
Patient arrived on unit around 0615. EKG done, alert and oriented. Complains of leg, chest, sickle cell pain. Dilaudid given with benadryl. Pt resting in bed. Fall precaution in place. Alert and oriented. admission history with patient. Skin intact. Kept clean and dry. [id number] oncology notified on consult.

## 2023-04-22 NOTE — ED QUICK NOTES
Orders for admission, patient is aware of plan and ready to go upstairs. Any questions, please call ED  [address] at extension [phone]. Patient Covid vaccination status: Fully vaccinated     COVID Test Ordered in ED: None    COVID Suspicion at Admission: N/A    Running Infusions:  None    Mental Status/LOC at time of transport: A/A/O x 4. Other pertinent information: Pt is on O2 2LNC. Pt states not on O2 at home, but her Oncologist states that she needs O2 supplement when she comes to the hospital. Pt's O2 Sat on RA - 98%. Morphine 10 mg IVP total given in the ED.   CIWA score: N/A   NIH score:  N/A

## 2023-04-22 NOTE — ED INITIAL ASSESSMENT (HPI)
Cp, headache, nausea, sickle cell crisis pain since morning.  Took morphine 60mg and oxycodone 30mg and took both at 6pm.

## 2023-04-22 NOTE — ED QUICK NOTES
Report to [facility] received.  New orders received from [address] [place] [address]. Cephalexin Counseling: I counseled the patient regarding use of cephalexin as an antibiotic for prophylactic and/or therapeutic purposes. Cephalexin (commonly prescribed under brand name Keflex) is a cephalosporin antibiotic which is active against numerous classes of bacteria, including most skin bacteria. Side effects may include nausea, diarrhea, gastrointestinal upset, rash, hives, yeast infections, and in rare cases, hepatitis, kidney disease, seizures, fever, confusion, neurologic symptoms, and others. Patients with severe allergies to penicillin medications are cautioned that there is about a 10% incidence of cross-reactivity with cephalosporins. When possible, patients with penicillin allergies should use alternatives to cephalosporins for antibiotic therapy.

## 2023-04-22 NOTE — PLAN OF CARE
RN SHIFT NOTE    Assumed care of pt at 0700. Pt complains of 8/10 pain at this time. Patient was given IV dilaudid to help treat. Patient is A&O x 4. Patient is NSR on tele, S1 and S2 present. No edema noted. Lung sounds clear. Abdomen soft and round. Skin C/D/I. Tolerating medications and care needs have been met at this time. POC: Pain control, Hem/Onc to see.     Problem: Patient/Family Goals  Goal: Patient/Family Long Term Goal  Description: Patient's Long Term Goal: \" To remain out of hospital\"    Interventions:  - Follow up with doctors outpatient  - Take medications as prescribed  - See additional Care Plan goals for specific interventions  4/22/2023 1517 by Orly Gonsales RN  Outcome: Progressing  4/22/2023 1146 by Orly Gonsales RN  Outcome: Progressing  4/22/2023 1142 by Orly Gonsales RN  Outcome: Progressing  Goal: Patient/Family Short Term Goal  Description: Patient's Short Term Goal: \" To discharge home\"    Interventions:   - Pain management  - Echo  - Hem/onc consult   - See additional Care Plan goals for specific interventions  4/22/2023 1517 by Orly Gonsales RN  Outcome: Progressing  4/22/2023 1146 by Orly Gonsales RN  Outcome: Progressing  4/22/2023 1142 by Orly Gonsales RN  Outcome: Progressing     Problem: PAIN - ADULT  Goal: Verbalizes/displays adequate comfort level or patient's stated pain goal  Description: INTERVENTIONS:  - Encourage pt to monitor pain and request assistance  - Assess pain using appropriate pain scale  - Administer analgesics based on type and severity of pain and evaluate response  - Implement non-pharmacological measures as appropriate and evaluate response  - Consider cultural and social influences on pain and pain management  - Manage/alleviate anxiety  - Utilize distraction and/or relaxation techniques  - Monitor for opioid side effects  - Notify MD/LIP if interventions unsuccessful or patient reports new pain  - Anticipate increased pain with activity and pre-medicate as appropriate  4/22/2023 1517 by Mariluz Colindres RN  Outcome: Progressing  4/22/2023 1146 by Mariluz Colindres RN  Outcome: Progressing     Problem: RISK FOR INFECTION - ADULT  Goal: Absence of fever/infection during anticipated neutropenic period  Description: INTERVENTIONS  - Monitor WBC  - Administer growth factors as ordered  - Implement neutropenic guidelines  4/22/2023 1517 by Mariluz Colinders RN  Outcome: Progressing  4/22/2023 1146 by Mariluz Colindres RN  Outcome: Progressing     Problem: SAFETY ADULT - FALL  Goal: Free from fall injury  Description: INTERVENTIONS:  - Assess pt frequently for physical needs  - Identify cognitive and physical deficits and behaviors that affect risk of falls.   - [place] fall precautions as indicated by assessment.  - Educate pt/family on patient safety including physical limitations  - Instruct pt to call for assistance with activity based on assessment  - Modify environment to reduce risk of injury  - Provide assistive devices as appropriate  - Consider OT/PT consult to assist with strengthening/mobility  - Encourage toileting schedule  4/22/2023 1517 by Mariluz Colindres RN  Outcome: Progressing  4/22/2023 1146 by Mariluz Colindres RN  Outcome: Progressing     Problem: DISCHARGE PLANNING  Goal: Discharge to home or other facility with appropriate resources  Description: INTERVENTIONS:  - Identify barriers to discharge w/pt and caregiver  - Include patient/family/discharge partner in discharge planning  - Arrange for needed discharge resources and transportation as appropriate  - Identify discharge learning needs (meds, wound care, etc)  - Arrange for interpreters to assist at discharge as needed  - Consider post-discharge preferences of patient/family/discharge partner  - Complete POLST form as appropriate  - Assess patient's ability to be responsible for managing their own health  - Refer to Case Management Department for coordinating discharge planning if the patient needs post-hospital services based on physician/LIP order or complex needs related to functional status, cognitive ability or social support system  4/22/2023 1517 by Marianela Wood RN  Outcome: Progressing  4/22/2023 1146 by Marianela Wood RN  Outcome: Progressing     Problem: Altered Communication/Language Barrier  Goal: Patient/Family is able to understand and participate in their care  Description: Interventions:  - Assess communication ability and preferred communication style  - Implement communication aides and strategies  - Use visual cues when possible  - Listen attentively, be patient, do not interrupt  - Minimize distractions  - Allow time for understanding and response  - Establish method for patient to ask for assistance (call light)  - Provide an  as needed  - Communicate barriers and strategies to overcome with those who interact with patient  4/22/2023 1517 by Marianela Wood RN  Outcome: Progressing  4/22/2023 1146 by Marianela Wood RN  Outcome: Progressing

## 2023-04-23 LAB
ANION GAP SERPL CALC-SCNC: 3 MMOL/L (ref 0–18)
BUN BLD-MCNC: 6 MG/DL (ref 7–18)
CALCIUM BLD-MCNC: 8.5 MG/DL (ref 8.5–10.1)
CHLORIDE SERPL-SCNC: 111 MMOL/L (ref 98–112)
CO2 SERPL-SCNC: 25 MMOL/L (ref 21–32)
CREAT BLD-MCNC: 0.49 MG/DL
ERYTHROCYTE [DISTWIDTH] IN BLOOD BY AUTOMATED COUNT: 14.9 %
GFR SERPLBLD BASED ON 1.73 SQ M-ARVRAT: 124 ML/MIN/1.73M2 (ref 60–?)
GLUCOSE BLD-MCNC: 98 MG/DL (ref 70–99)
HCT VFR BLD AUTO: 21.9 %
HGB BLD-MCNC: 7.6 G/DL
MCH RBC QN AUTO: 40.2 PG (ref 26–34)
MCHC RBC AUTO-ENTMCNC: 34.7 G/DL (ref 31–37)
MCV RBC AUTO: 115.9 FL
OSMOLALITY SERPL CALC.SUM OF ELEC: 286 MOSM/KG (ref 275–295)
PLATELET # BLD AUTO: 278 10(3)UL (ref 150–450)
POTASSIUM SERPL-SCNC: 3.9 MMOL/L (ref 3.5–5.1)
RBC # BLD AUTO: 1.89 X10(6)UL
SODIUM SERPL-SCNC: 139 MMOL/L (ref 136–145)
WBC # BLD AUTO: 4.1 X10(3) UL (ref 4–11)

## 2023-04-23 PROCEDURE — 99232 SBSQ HOSP IP/OBS MODERATE 35: CPT | Performed by: INTERNAL MEDICINE

## 2023-04-23 PROCEDURE — 99232 SBSQ HOSP IP/OBS MODERATE 35: CPT | Performed by: HOSPITALIST

## 2023-04-23 NOTE — PLAN OF CARE
RN SHIFT NOTE    Assumed care of pt at 0700. Pt denies pain at this time. Patient is A&O x 4. Patient is NSR w/ 1st degree AV block on tele, S1 and S2 present and pt denies cardiac symptoms. Lung sounds clear. Abdomen soft and round. Last BM 4/22. Skin is C/D/I. Tolerating medications and care needs have been met.        POC: Pain control    Problem: Patient/Family Goals  Goal: Patient/Family Long Term Goal  Description: Patient's Long Term Goal: \" To remain out of hospital\"    Interventions:  - Follow up with doctors outpatient  - Take medications as prescribed  - See additional Care Plan goals for specific interventions  Outcome: Progressing  Goal: Patient/Family Short Term Goal  Description: Patient's Short Term Goal: \" To discharge home\"    Interventions:   - Pain management  - Echo  - Hem/onc consult   - See additional Care Plan goals for specific interventions  Outcome: Progressing     Problem: PAIN - ADULT  Goal: Verbalizes/displays adequate comfort level or patient's stated pain goal  Description: INTERVENTIONS:  - Encourage pt to monitor pain and request assistance  - Assess pain using appropriate pain scale  - Administer analgesics based on type and severity of pain and evaluate response  - Implement non-pharmacological measures as appropriate and evaluate response  - Consider cultural and social influences on pain and pain management  - Manage/alleviate anxiety  - Utilize distraction and/or relaxation techniques  - Monitor for opioid side effects  - Notify MD/LIP if interventions unsuccessful or patient reports new pain  - Anticipate increased pain with activity and pre-medicate as appropriate  Outcome: Progressing     Problem: RISK FOR INFECTION - ADULT  Goal: Absence of fever/infection during anticipated neutropenic period  Description: INTERVENTIONS  - Monitor WBC  - Administer growth factors as ordered  - Implement neutropenic guidelines  Outcome: Progressing     Problem: SAFETY ADULT - FALL  Goal: Free from fall injury  Description: INTERVENTIONS:  - Assess pt frequently for physical needs  - Identify cognitive and physical deficits and behaviors that affect risk of falls.   - [place] fall precautions as indicated by assessment.  - Educate pt/family on patient safety including physical limitations  - Instruct pt to call for assistance with activity based on assessment  - Modify environment to reduce risk of injury  - Provide assistive devices as appropriate  - Consider OT/PT consult to assist with strengthening/mobility  - Encourage toileting schedule  Outcome: Progressing     Problem: DISCHARGE PLANNING  Goal: Discharge to home or other facility with appropriate resources  Description: INTERVENTIONS:  - Identify barriers to discharge w/pt and caregiver  - Include patient/family/discharge partner in discharge planning  - Arrange for needed discharge resources and transportation as appropriate  - Identify discharge learning needs (meds, wound care, etc)  - Arrange for interpreters to assist at discharge as needed  - Consider post-discharge preferences of patient/family/discharge partner  - Complete POLST form as appropriate  - Assess patient's ability to be responsible for managing their own health  - Refer to Case Management Department for coordinating discharge planning if the patient needs post-hospital services based on physician/LIP order or complex needs related to functional status, cognitive ability or social support system  Outcome: Progressing     Problem: Altered Communication/Language Barrier  Goal: Patient/Family is able to understand and participate in their care  Description: Interventions:  - Assess communication ability and preferred communication style  - Implement communication aides and strategies  - Use visual cues when possible  - Listen attentively, be patient, do not interrupt  - Minimize distractions  - Allow time for understanding and response  - Establish method for patient to ask for assistance (call light)  - Provide an  as needed  - Communicate barriers and strategies to overcome with those who interact with patient  Outcome: Progressing

## 2023-04-23 NOTE — PLAN OF CARE
Patient alert and oriented. Pain medications every 2 hours as needed. See MAR with med administration. Pain not relieved by medications. Generalized body pains. Will continue pain medications. Vitals stable. Up with assistance. Kept clean and dry. Hydration. Problem: PAIN - ADULT  Goal: Verbalizes/displays adequate comfort level or patient's stated pain goal  Description: INTERVENTIONS:  - Encourage pt to monitor pain and request assistance  - Assess pain using appropriate pain scale  - Administer analgesics based on type and severity of pain and evaluate response  - Implement non-pharmacological measures as appropriate and evaluate response  - Consider cultural and social influences on pain and pain management  - Manage/alleviate anxiety  - Utilize distraction and/or relaxation techniques  - Monitor for opioid side effects  - Notify MD/LIP if interventions unsuccessful or patient reports new pain  - Anticipate increased pain with activity and pre-medicate as appropriate  Outcome: Progressing     Problem: RISK FOR INFECTION - ADULT  Goal: Absence of fever/infection during anticipated neutropenic period  Description: INTERVENTIONS  - Monitor WBC  - Administer growth factors as ordered  - Implement neutropenic guidelines  Outcome: Progressing     Problem: SAFETY ADULT - FALL  Goal: Free from fall injury  Description: INTERVENTIONS:  - Assess pt frequently for physical needs  - Identify cognitive and physical deficits and behaviors that affect risk of falls.   - [place] fall precautions as indicated by assessment.  - Educate pt/family on patient safety including physical limitations  - Instruct pt to call for assistance with activity based on assessment  - Modify environment to reduce risk of injury  - Provide assistive devices as appropriate  - Consider OT/PT consult to assist with strengthening/mobility  - Encourage toileting schedule  Outcome: Progressing     Problem: DISCHARGE PLANNING  Goal: Discharge to home or other facility with appropriate resources  Description: INTERVENTIONS:  - Identify barriers to discharge w/pt and caregiver  - Include patient/family/discharge partner in discharge planning  - Arrange for needed discharge resources and transportation as appropriate  - Identify discharge learning needs (meds, wound care, etc)  - Arrange for interpreters to assist at discharge as needed  - Consider post-discharge preferences of patient/family/discharge partner  - Complete POLST form as appropriate  - Assess patient's ability to be responsible for managing their own health  - Refer to Case Management Department for coordinating discharge planning if the patient needs post-hospital services based on physician/LIP order or complex needs related to functional status, cognitive ability or social support system  Outcome: Progressing

## 2023-04-24 VITALS
BODY MASS INDEX: 27 KG/M2 | WEIGHT: 162.06 LBS | OXYGEN SATURATION: 100 % | SYSTOLIC BLOOD PRESSURE: 101 MMHG | HEART RATE: 75 BPM | HEIGHT: 65 IN | RESPIRATION RATE: 16 BRPM | TEMPERATURE: 98 F | DIASTOLIC BLOOD PRESSURE: 57 MMHG

## 2023-04-24 LAB
ATRIAL RATE: 68 BPM
ATRIAL RATE: 74 BPM
P AXIS: 32 DEGREES
P AXIS: 51 DEGREES
P-R INTERVAL: 208 MS
P-R INTERVAL: 236 MS
Q-T INTERVAL: 400 MS
Q-T INTERVAL: 424 MS
QRS DURATION: 92 MS
QRS DURATION: 94 MS
QTC CALCULATION (BEZET): 444 MS
QTC CALCULATION (BEZET): 450 MS
R AXIS: 18 DEGREES
R AXIS: 21 DEGREES
T AXIS: -3 DEGREES
T AXIS: 0 DEGREES
VENTRICULAR RATE: 68 BPM
VENTRICULAR RATE: 74 BPM

## 2023-04-24 PROCEDURE — 99239 HOSP IP/OBS DSCHRG MGMT >30: CPT | Performed by: HOSPITALIST

## 2023-04-24 RX ORDER — PROCHLORPERAZINE EDISYLATE 5 MG/ML
10 INJECTION INTRAMUSCULAR; INTRAVENOUS EVERY 6 HOURS PRN
Status: DISCONTINUED | OUTPATIENT
Start: 2023-04-24 | End: 2023-04-24

## 2023-04-24 NOTE — PLAN OF CARE
Patient's IV and tele box removed. Patient's questions answered. Patient brought down by staff to [facility] lobby Via wheelchair with expressing pain being controlled. Discharged appointments and medication education completed. Patient verbalized understanding.

## 2023-04-24 NOTE — PLAN OF CARE
Assumed care of pt at 1900  A/Ox4, up with standby, steady gait. Room air, NSR w/ 1st degree block. No complaints of shortness of breath or chest pain. Pt endorses 7/10 generalized pain. PRN pain medication regimen implemented. Pt is continent of bowel and bladder. Last BM today. Skin is warm, dry and intact. Fall precautions in place. Call light in reach. Pt updated on plan of care.                                Problem: Patient/Family Goals  Goal: Patient/Family Long Term Goal  Description: Patient's Long Term Goal: \" To remain out of hospital\"    Interventions:  - Follow up with doctors outpatient  - Take medications as prescribed  - See additional Care Plan goals for specific interventions  Outcome: Progressing  Goal: Patient/Family Short Term Goal  Description: Patient's Short Term Goal: \" To discharge home\"    Interventions:   - Pain management  - Echo  - Hem/onc consult   - See additional Care Plan goals for specific interventions  Outcome: Progressing     Problem: PAIN - ADULT  Goal: Verbalizes/displays adequate comfort level or patient's stated pain goal  Description: INTERVENTIONS:  - Encourage pt to monitor pain and request assistance  - Assess pain using appropriate pain scale  - Administer analgesics based on type and severity of pain and evaluate response  - Implement non-pharmacological measures as appropriate and evaluate response  - Consider cultural and social influences on pain and pain management  - Manage/alleviate anxiety  - Utilize distraction and/or relaxation techniques  - Monitor for opioid side effects  - Notify MD/LIP if interventions unsuccessful or patient reports new pain  - Anticipate increased pain with activity and pre-medicate as appropriate  Outcome: Progressing     Problem: RISK FOR INFECTION - ADULT  Goal: Absence of fever/infection during anticipated neutropenic period  Description: INTERVENTIONS  - Monitor WBC  - Administer growth factors as ordered  - Implement neutropenic guidelines  Outcome: Progressing     Problem: SAFETY ADULT - FALL  Goal: Free from fall injury  Description: INTERVENTIONS:  - Assess pt frequently for physical needs  - Identify cognitive and physical deficits and behaviors that affect risk of falls.   - [place] fall precautions as indicated by assessment.  - Educate pt/family on patient safety including physical limitations  - Instruct pt to call for assistance with activity based on assessment  - Modify environment to reduce risk of injury  - Provide assistive devices as appropriate  - Consider OT/PT consult to assist with strengthening/mobility  - Encourage toileting schedule  Outcome: Progressing     Problem: DISCHARGE PLANNING  Goal: Discharge to home or other facility with appropriate resources  Description: INTERVENTIONS:  - Identify barriers to discharge w/pt and caregiver  - Include patient/family/discharge partner in discharge planning  - Arrange for needed discharge resources and transportation as appropriate  - Identify discharge learning needs (meds, wound care, etc)  - Arrange for interpreters to assist at discharge as needed  - Consider post-discharge preferences of patient/family/discharge partner  - Complete POLST form as appropriate  - Assess patient's ability to be responsible for managing their own health  - Refer to Case Management Department for coordinating discharge planning if the patient needs post-hospital services based on physician/LIP order or complex needs related to functional status, cognitive ability or social support system  Outcome: Progressing     Problem: Altered Communication/Language Barrier  Goal: Patient/Family is able to understand and participate in their care  Description: Interventions:  - Assess communication ability and preferred communication style  - Implement communication aides and strategies  - Use visual cues when possible  - Listen attentively, be patient, do not interrupt  - Minimize distractions  - Allow time for understanding and response  - Establish method for patient to ask for assistance (call light)  - Provide an  as needed  - Communicate barriers and strategies to overcome with those who interact with patient  Outcome: Progressing

## 2023-04-24 NOTE — PLAN OF CARE
Assumed care of patient @0505. Patient is A&O x4, up standby assist. Expresses pain up to 8/10 this AM, Pain meds given per orders. Expresses pain as 5/10 and feeling \"much better, and ready to go home. \"    Patient is normal sinus rhythm with 1st degree avb, on room air, no chest pain, no shortness of breath with or without exertion. Patient continent of bladder and bowel, last bm 4/23, fall precautions in place, bed and chair alarm in use, bed in lowe position. Patient updated on plan of care.     -Discharge order from hospitalist.     RN reached out to Oncology @ [phone]. Patient okay for discharge.                Problem: Patient/Family Goals  Goal: Patient/Family Long Term Goal  Description: Patient's Long Term Goal: \" To remain out of hospital\"    Interventions:  - Follow up with doctors outpatient  - Take medications as prescribed  - See additional Care Plan goals for specific interventions  Outcome: Progressing  Goal: Patient/Family Short Term Goal  Description: Patient's Short Term Goal: \" To discharge home\"    Interventions:   - Pain management  - Echo  - Hem/onc consult   - See additional Care Plan goals for specific interventions  Outcome: Progressing     Problem: PAIN - ADULT  Goal: Verbalizes/displays adequate comfort level or patient's stated pain goal  Description: INTERVENTIONS:  - Encourage pt to monitor pain and request assistance  - Assess pain using appropriate pain scale  - Administer analgesics based on type and severity of pain and evaluate response  - Implement non-pharmacological measures as appropriate and evaluate response  - Consider cultural and social influences on pain and pain management  - Manage/alleviate anxiety  - Utilize distraction and/or relaxation techniques  - Monitor for opioid side effects  - Notify MD/LIP if interventions unsuccessful or patient reports new pain  - Anticipate increased pain with activity and pre-medicate as appropriate  Outcome: Progressing     Problem: RISK FOR INFECTION - ADULT  Goal: Absence of fever/infection during anticipated neutropenic period  Description: INTERVENTIONS  - Monitor WBC  - Administer growth factors as ordered  - Implement neutropenic guidelines  Outcome: Progressing     Problem: SAFETY ADULT - FALL  Goal: Free from fall injury  Description: INTERVENTIONS:  - Assess pt frequently for physical needs  - Identify cognitive and physical deficits and behaviors that affect risk of falls.   - [place] fall precautions as indicated by assessment.  - Educate pt/family on patient safety including physical limitations  - Instruct pt to call for assistance with activity based on assessment  - Modify environment to reduce risk of injury  - Provide assistive devices as appropriate  - Consider OT/PT consult to assist with strengthening/mobility  - Encourage toileting schedule  Outcome: Progressing     Problem: DISCHARGE PLANNING  Goal: Discharge to home or other facility with appropriate resources  Description: INTERVENTIONS:  - Identify barriers to discharge w/pt and caregiver  - Include patient/family/discharge partner in discharge planning  - Arrange for needed discharge resources and transportation as appropriate  - Identify discharge learning needs (meds, wound care, etc)  - Arrange for interpreters to assist at discharge as needed  - Consider post-discharge preferences of patient/family/discharge partner  - Complete POLST form as appropriate  - Assess patient's ability to be responsible for managing their own health  - Refer to Case Management Department for coordinating discharge planning if the patient needs post-hospital services based on physician/LIP order or complex needs related to functional status, cognitive ability or social support system  Outcome: Progressing     Problem: Altered Communication/Language Barrier  Goal: Patient/Family is able to understand and participate in their care  Description: Interventions:  - Assess communication ability and preferred communication style  - Implement communication aides and strategies  - Use visual cues when possible  - Listen attentively, be patient, do not interrupt  - Minimize distractions  - Allow time for understanding and response  - Establish method for patient to ask for assistance (call light)  - Provide an  as needed  - Communicate barriers and strategies to overcome with those who interact with patient  Outcome: Progressing

## 2023-04-25 ENCOUNTER — PATIENT OUTREACH (OUTPATIENT)
Dept: CASE MANAGEMENT | Age: 38
End: 2023-04-25

## 2023-04-25 DIAGNOSIS — Z02.9 ENCOUNTERS FOR UNSPECIFIED ADMINISTRATIVE PURPOSE: ICD-10-CM

## 2023-04-25 DIAGNOSIS — D57.00 SICKLE CELL PAIN CRISIS (HCC): ICD-10-CM

## 2023-04-25 NOTE — PROGRESS NOTES
Left message on mailbox for pt to call NCM back for TCM. NCM contact information included in message. Follow up appointments:     Follow up With Specialties Details Why Contact Info   Lou Peterson MD Family Medicine Follow up in 1 week(s)  [address] [facility] [address] [phone]      Future Appointments   Date Time Provider [facility]   5/8/2023 10:45 AM Nathalie Hutchinson MD [facility]   5/8/2023 11:00 AM Vini Palafox [address] [place] [address]

## 2023-05-02 ENCOUNTER — OFFICE VISIT (OUTPATIENT)
Dept: FAMILY MEDICINE CLINIC | Facility: CLINIC | Age: 38
End: 2023-05-02
Payer: COMMERCIAL

## 2023-05-02 VITALS
HEART RATE: 92 BPM | DIASTOLIC BLOOD PRESSURE: 74 MMHG | RESPIRATION RATE: 16 BRPM | WEIGHT: 157 LBS | OXYGEN SATURATION: 99 % | BODY MASS INDEX: 26.16 KG/M2 | SYSTOLIC BLOOD PRESSURE: 120 MMHG | HEIGHT: 65 IN

## 2023-05-02 DIAGNOSIS — F11.90 CHRONIC, CONTINUOUS USE OF OPIOIDS: ICD-10-CM

## 2023-05-02 DIAGNOSIS — D57.00 SICKLE CELL PAIN CRISIS (HCC): Primary | ICD-10-CM

## 2023-05-02 DIAGNOSIS — R77.8 ELEVATED TROPONIN: ICD-10-CM

## 2023-05-02 PROCEDURE — 3008F BODY MASS INDEX DOCD: CPT | Performed by: FAMILY MEDICINE

## 2023-05-02 PROCEDURE — 3078F DIAST BP <80 MM HG: CPT | Performed by: FAMILY MEDICINE

## 2023-05-02 PROCEDURE — 99495 TRANSJ CARE MGMT MOD F2F 14D: CPT | Performed by: FAMILY MEDICINE

## 2023-05-02 PROCEDURE — 3074F SYST BP LT 130 MM HG: CPT | Performed by: FAMILY MEDICINE

## 2023-05-08 ENCOUNTER — TELEPHONE (OUTPATIENT)
Dept: HEMATOLOGY/ONCOLOGY | Facility: HOSPITAL | Age: 38
End: 2023-05-08

## 2023-05-08 ENCOUNTER — OFFICE VISIT (OUTPATIENT)
Dept: HEMATOLOGY/ONCOLOGY | Facility: HOSPITAL | Age: 38
End: 2023-05-08
Attending: INTERNAL MEDICINE
Payer: COMMERCIAL

## 2023-05-08 VITALS
RESPIRATION RATE: 16 BRPM | TEMPERATURE: 97 F | HEART RATE: 84 BPM | BODY MASS INDEX: 25.39 KG/M2 | HEIGHT: 65.98 IN | WEIGHT: 158 LBS | SYSTOLIC BLOOD PRESSURE: 115 MMHG | OXYGEN SATURATION: 100 % | DIASTOLIC BLOOD PRESSURE: 72 MMHG

## 2023-05-08 DIAGNOSIS — D53.9 MACROCYTIC ANEMIA: ICD-10-CM

## 2023-05-08 DIAGNOSIS — D57.00 SICKLE CELL PAIN CRISIS (HCC): ICD-10-CM

## 2023-05-08 DIAGNOSIS — D57.00 SICKLE CELL DISEASE WITH CRISIS (HCC): ICD-10-CM

## 2023-05-08 DIAGNOSIS — E53.8 VITAMIN B12 DEFICIENCY: Primary | ICD-10-CM

## 2023-05-08 DIAGNOSIS — D57.419 SICKLE CELL ANEMIA WITH COEXISTENT ALPHA-THALASSEMIA WITH CRISIS (HCC): Primary | ICD-10-CM

## 2023-05-08 DIAGNOSIS — D57.1 SICKLE CELL DISEASE WITHOUT CRISIS (HCC): Primary | ICD-10-CM

## 2023-05-08 LAB
ALBUMIN SERPL-MCNC: 3.7 G/DL (ref 3.4–5)
ALBUMIN/GLOB SERPL: 0.7 {RATIO} (ref 1–2)
ALP LIVER SERPL-CCNC: 52 U/L
ALT SERPL-CCNC: 15 U/L
ANION GAP SERPL CALC-SCNC: 1 MMOL/L (ref 0–18)
AST SERPL-CCNC: 13 U/L (ref 15–37)
BASOPHILS # BLD AUTO: 0.01 X10(3) UL (ref 0–0.2)
BASOPHILS NFR BLD AUTO: 0.2 %
BILIRUB SERPL-MCNC: 0.5 MG/DL (ref 0.1–2)
BUN BLD-MCNC: 6 MG/DL (ref 7–18)
CALCIUM BLD-MCNC: 8.6 MG/DL (ref 8.5–10.1)
CHLORIDE SERPL-SCNC: 108 MMOL/L (ref 98–112)
CO2 SERPL-SCNC: 27 MMOL/L (ref 21–32)
CREAT BLD-MCNC: 0.52 MG/DL
EOSINOPHIL # BLD AUTO: 0.02 X10(3) UL (ref 0–0.7)
EOSINOPHIL NFR BLD AUTO: 0.5 %
ERYTHROCYTE [DISTWIDTH] IN BLOOD BY AUTOMATED COUNT: 15 %
GFR SERPLBLD BASED ON 1.73 SQ M-ARVRAT: 122 ML/MIN/1.73M2 (ref 60–?)
GLOBULIN PLAS-MCNC: 5.1 G/DL (ref 2.8–4.4)
GLUCOSE BLD-MCNC: 102 MG/DL (ref 70–99)
HCT VFR BLD AUTO: 20.9 %
HGB BLD-MCNC: 7.4 G/DL
HGB RETIC QN AUTO: 39.7 PG (ref 28.2–36.6)
IMM GRANULOCYTES # BLD AUTO: 0 X10(3) UL (ref 0–1)
IMM GRANULOCYTES NFR BLD: 0 %
IMM RETICS NFR: 0.39 RATIO (ref 0.1–0.3)
LDH SERPL L TO P-CCNC: 168 U/L
LYMPHOCYTES # BLD AUTO: 1.91 X10(3) UL (ref 1–4)
LYMPHOCYTES NFR BLD AUTO: 45.8 %
MCH RBC QN AUTO: 41.3 PG (ref 26–34)
MCHC RBC AUTO-ENTMCNC: 35.4 G/DL (ref 31–37)
MCV RBC AUTO: 116.8 FL
MONOCYTES # BLD AUTO: 0.32 X10(3) UL (ref 0.1–1)
MONOCYTES NFR BLD AUTO: 7.7 %
NEUTROPHILS # BLD AUTO: 1.91 X10 (3) UL (ref 1.5–7.7)
NEUTROPHILS # BLD AUTO: 1.91 X10(3) UL (ref 1.5–7.7)
NEUTROPHILS NFR BLD AUTO: 45.8 %
OSMOLALITY SERPL CALC.SUM OF ELEC: 280 MOSM/KG (ref 275–295)
PLATELET # BLD AUTO: 441 10(3)UL (ref 150–450)
POTASSIUM SERPL-SCNC: 3.3 MMOL/L (ref 3.5–5.1)
PROT SERPL-MCNC: 8.8 G/DL (ref 6.4–8.2)
RBC # BLD AUTO: 1.79 X10(6)UL
RETICS # AUTO: 47.8 X10(3) UL (ref 22.5–147.5)
RETICS/RBC NFR AUTO: 2.7 %
SODIUM SERPL-SCNC: 136 MMOL/L (ref 136–145)
VIT B12 SERPL-MCNC: 182 PG/ML (ref 193–986)
WBC # BLD AUTO: 4.2 X10(3) UL (ref 4–11)

## 2023-05-08 PROCEDURE — 36415 COLL VENOUS BLD VENIPUNCTURE: CPT

## 2023-05-08 PROCEDURE — 99214 OFFICE O/P EST MOD 30 MIN: CPT | Performed by: INTERNAL MEDICINE

## 2023-05-08 PROCEDURE — 96365 THER/PROPH/DIAG IV INF INIT: CPT

## 2023-05-08 RX ORDER — DIPHENHYDRAMINE HCL 25 MG
CAPSULE ORAL ONCE
Start: 2023-05-22 | End: 2023-05-22

## 2023-05-08 RX ORDER — MORPHINE SULFATE 60 MG/1
60 TABLET, FILM COATED, EXTENDED RELEASE ORAL EVERY 8 HOURS SCHEDULED
Qty: 90 TABLET | Refills: 0 | Status: SHIPPED | OUTPATIENT
Start: 2023-05-08

## 2023-05-08 RX ORDER — MORPHINE SULFATE 4 MG/ML
6 INJECTION, SOLUTION INTRAMUSCULAR; INTRAVENOUS ONCE
Start: 2023-05-22 | End: 2023-05-22

## 2023-05-08 RX ORDER — OXYCODONE HYDROCHLORIDE 20 MG/1
20 TABLET ORAL
Qty: 150 TABLET | Refills: 0 | Status: SHIPPED | OUTPATIENT
Start: 2023-05-08

## 2023-05-08 RX ORDER — HYDROXYUREA 500 MG/1
1500 CAPSULE ORAL DAILY
Qty: 270 CAPSULE | Refills: 3 | Status: SHIPPED | OUTPATIENT
Start: 2023-05-08

## 2023-05-08 NOTE — PROGRESS NOTES
Education Record    Learner:  Patient    Disease / Diagnosis: sickle cell    Barriers / Limitations:  None   Comments:    Method:  Discussion and Printed material   Comments:    General Topics:  Plan of care reviewed   Comments:    Outcome:  Shows understanding   Comments:    Pt here for Adakveo infusion. Tolerated well. Next appts scheduled. Pt discharged in stable condition.

## 2023-05-08 NOTE — TELEPHONE ENCOUNTER
Viv Newberry MD  [facility]  Please call and advise patient to start taking OTC vitamin B-12 1000 mcg daily to be continued indefinitely since her vitamin B12 level is low. Spoke with pt. She v/u.

## 2023-05-11 LAB — METHYLMALONIC ACID: 95 NMOL/L

## 2023-06-05 ENCOUNTER — OFFICE VISIT (OUTPATIENT)
Dept: HEMATOLOGY/ONCOLOGY | Facility: HOSPITAL | Age: 38
End: 2023-06-05
Attending: INTERNAL MEDICINE
Payer: COMMERCIAL

## 2023-06-05 VITALS
SYSTOLIC BLOOD PRESSURE: 116 MMHG | WEIGHT: 157.19 LBS | BODY MASS INDEX: 25 KG/M2 | HEART RATE: 98 BPM | OXYGEN SATURATION: 91 % | RESPIRATION RATE: 16 BRPM | DIASTOLIC BLOOD PRESSURE: 73 MMHG | TEMPERATURE: 98 F

## 2023-06-05 DIAGNOSIS — D57.00 SICKLE CELL DISEASE WITH CRISIS (HCC): ICD-10-CM

## 2023-06-05 DIAGNOSIS — D57.1 SICKLE CELL DISEASE WITHOUT CRISIS (HCC): Primary | ICD-10-CM

## 2023-06-05 DIAGNOSIS — D53.9 MACROCYTIC ANEMIA: ICD-10-CM

## 2023-06-05 DIAGNOSIS — D57.419 SICKLE CELL ANEMIA WITH COEXISTENT ALPHA-THALASSEMIA WITH CRISIS (HCC): Primary | ICD-10-CM

## 2023-06-05 DIAGNOSIS — D57.00 SICKLE CELL PAIN CRISIS (HCC): ICD-10-CM

## 2023-06-05 LAB
BASOPHILS # BLD AUTO: 0.01 X10(3) UL (ref 0–0.2)
BASOPHILS NFR BLD AUTO: 0.3 %
EOSINOPHIL # BLD AUTO: 0.02 X10(3) UL (ref 0–0.7)
EOSINOPHIL NFR BLD AUTO: 0.6 %
ERYTHROCYTE [DISTWIDTH] IN BLOOD BY AUTOMATED COUNT: 14.2 %
HCT VFR BLD AUTO: 24.3 %
HGB BLD-MCNC: 8.7 G/DL
HGB RETIC QN AUTO: 36.6 PG (ref 28.2–36.6)
IMM GRANULOCYTES # BLD AUTO: 0.01 X10(3) UL (ref 0–1)
IMM GRANULOCYTES NFR BLD: 0.3 %
IMM RETICS NFR: 0.27 RATIO (ref 0.1–0.3)
LYMPHOCYTES # BLD AUTO: 1.29 X10(3) UL (ref 1–4)
LYMPHOCYTES NFR BLD AUTO: 35.5 %
MCH RBC QN AUTO: 40.3 PG (ref 26–34)
MCHC RBC AUTO-ENTMCNC: 35.8 G/DL (ref 31–37)
MCV RBC AUTO: 112.5 FL
MONOCYTES # BLD AUTO: 0.38 X10(3) UL (ref 0.1–1)
MONOCYTES NFR BLD AUTO: 10.5 %
NEUTROPHILS # BLD AUTO: 1.92 X10 (3) UL (ref 1.5–7.7)
NEUTROPHILS # BLD AUTO: 1.92 X10(3) UL (ref 1.5–7.7)
NEUTROPHILS NFR BLD AUTO: 52.8 %
PLATELET # BLD AUTO: 427 10(3)UL (ref 150–450)
RBC # BLD AUTO: 2.16 X10(6)UL
RETICS # AUTO: 86.6 X10(3) UL (ref 22.5–147.5)
RETICS/RBC NFR AUTO: 4 %
WBC # BLD AUTO: 3.6 X10(3) UL (ref 4–11)

## 2023-06-05 PROCEDURE — 99214 OFFICE O/P EST MOD 30 MIN: CPT | Performed by: NURSE PRACTITIONER

## 2023-06-05 PROCEDURE — 96365 THER/PROPH/DIAG IV INF INIT: CPT

## 2023-06-05 RX ORDER — ONDANSETRON HYDROCHLORIDE 8 MG/1
8 TABLET, FILM COATED ORAL EVERY 8 HOURS PRN
Qty: 30 TABLET | Refills: 3 | Status: SHIPPED | OUTPATIENT
Start: 2023-06-05

## 2023-06-05 RX ORDER — MORPHINE SULFATE 4 MG/ML
6 INJECTION, SOLUTION INTRAMUSCULAR; INTRAVENOUS ONCE
Start: 2023-07-03 | End: 2023-07-03

## 2023-06-05 RX ORDER — MORPHINE SULFATE 60 MG/1
60 TABLET, FILM COATED, EXTENDED RELEASE ORAL EVERY 8 HOURS SCHEDULED
Qty: 90 TABLET | Refills: 0 | Status: SHIPPED | OUTPATIENT
Start: 2023-06-05

## 2023-06-05 RX ORDER — OXYCODONE HYDROCHLORIDE 20 MG/1
20 TABLET ORAL
Qty: 150 TABLET | Refills: 0 | Status: SHIPPED | OUTPATIENT
Start: 2023-06-05

## 2023-06-05 RX ORDER — DIPHENHYDRAMINE HCL 25 MG
CAPSULE ORAL ONCE
Start: 2023-07-03 | End: 2023-07-03

## 2023-06-05 RX ORDER — OXYCODONE HYDROCHLORIDE 20 MG/1
20 TABLET ORAL
Qty: 150 TABLET | Refills: 0 | Status: SHIPPED | OUTPATIENT
Start: 2023-06-05 | End: 2023-06-05

## 2023-06-05 NOTE — PROGRESS NOTES
Patient presents with: Follow - Up: APN assessment prior to treatment  Infusion    Pt is here for treatment - maintenance adakveo is expected. She reports she had a sickle crisis yesterday, managed with her home pain meds. Continued chest pain today with difficulty deep breathing; used her extended relief morphine for control today. Otherwise eating and drinking without issue; denies V,D,C. Persistent nausea with pain.     Education Record    Learner:  Patient    Disease / Diagnosis: sickle cell anemia    Barriers / Limitations:  None   Comments:    Method:  Brief focused   Comments:    General Topics:  Diet, Medication, Pain and Plan of care reviewed   Comments:    Outcome:  Shows understanding   Comments:

## 2023-06-05 NOTE — PROGRESS NOTES
Education Record    Learner:  Patient    Disease / Diagnosis: Sickle Cell    Barriers / Limitations:  None   Comments:    Method:  Brief focused   Comments:    General Topics:  Plan of care reviewed   Comments:    Outcome:  Shows understanding   Comments:

## 2023-06-30 RX ORDER — OXYCODONE HYDROCHLORIDE 20 MG/1
20 TABLET ORAL
Qty: 150 TABLET | Refills: 0 | Status: SHIPPED | OUTPATIENT
Start: 2023-06-30 | End: 2023-07-28

## 2023-07-01 ENCOUNTER — APPOINTMENT (OUTPATIENT)
Dept: CT IMAGING | Facility: HOSPITAL | Age: 38
End: 2023-07-01
Attending: EMERGENCY MEDICINE
Payer: COMMERCIAL

## 2023-07-01 ENCOUNTER — APPOINTMENT (OUTPATIENT)
Dept: GENERAL RADIOLOGY | Facility: HOSPITAL | Age: 38
End: 2023-07-01
Payer: COMMERCIAL

## 2023-07-01 ENCOUNTER — HOSPITAL ENCOUNTER (INPATIENT)
Facility: HOSPITAL | Age: 38
LOS: 1 days | Discharge: HOME | End: 2023-07-03
Attending: EMERGENCY MEDICINE | Admitting: HOSPITALIST
Payer: COMMERCIAL

## 2023-07-01 DIAGNOSIS — R77.8 ELEVATED TROPONIN: ICD-10-CM

## 2023-07-01 DIAGNOSIS — R07.9 CHEST PAIN OF UNCERTAIN ETIOLOGY: ICD-10-CM

## 2023-07-01 DIAGNOSIS — D53.9 MACROCYTIC ANEMIA: ICD-10-CM

## 2023-07-01 DIAGNOSIS — D57.00 SICKLE CELL PAIN CRISIS (HCC): Primary | ICD-10-CM

## 2023-07-01 LAB
ALBUMIN SERPL-MCNC: 4 G/DL (ref 3.4–5)
ALBUMIN/GLOB SERPL: 0.8 {RATIO} (ref 1–2)
ALP LIVER SERPL-CCNC: 49 U/L
ALT SERPL-CCNC: 16 U/L
ANION GAP SERPL CALC-SCNC: 7 MMOL/L (ref 0–18)
AST SERPL-CCNC: 18 U/L (ref 15–37)
BASOPHILS # BLD AUTO: 0 X10(3) UL (ref 0–0.2)
BASOPHILS NFR BLD AUTO: 0 %
BILIRUB SERPL-MCNC: 0.5 MG/DL (ref 0.1–2)
BUN BLD-MCNC: 10 MG/DL (ref 7–18)
CALCIUM BLD-MCNC: 8.9 MG/DL (ref 8.5–10.1)
CHLORIDE SERPL-SCNC: 106 MMOL/L (ref 98–112)
CHOLEST SERPL-MCNC: 136 MG/DL (ref ?–200)
CO2 SERPL-SCNC: 24 MMOL/L (ref 21–32)
CREAT BLD-MCNC: 0.56 MG/DL
D DIMER PPP FEU-MCNC: 0.58 UG/ML FEU (ref ?–0.5)
EOSINOPHIL # BLD AUTO: 0.02 X10(3) UL (ref 0–0.7)
EOSINOPHIL NFR BLD AUTO: 0.4 %
ERYTHROCYTE [DISTWIDTH] IN BLOOD BY AUTOMATED COUNT: 13.9 %
GFR SERPLBLD BASED ON 1.73 SQ M-ARVRAT: 120 ML/MIN/1.73M2 (ref 60–?)
GLOBULIN PLAS-MCNC: 5.1 G/DL (ref 2.8–4.4)
GLUCOSE BLD-MCNC: 89 MG/DL (ref 70–99)
HCG SERPL QL: NEGATIVE
HCT VFR BLD AUTO: 23.1 %
HDLC SERPL-MCNC: 31 MG/DL (ref 40–59)
HGB BLD-MCNC: 8.2 G/DL
HGB RETIC QN AUTO: 40.1 PG (ref 28.2–36.6)
IMM GRANULOCYTES # BLD AUTO: 0.01 X10(3) UL (ref 0–1)
IMM GRANULOCYTES NFR BLD: 0.2 %
IMM RETICS NFR: 0.25 RATIO (ref 0.1–0.3)
LDLC SERPL CALC-MCNC: 75 MG/DL (ref ?–100)
LYMPHOCYTES # BLD AUTO: 2.19 X10(3) UL (ref 1–4)
LYMPHOCYTES NFR BLD AUTO: 41.1 %
MCH RBC QN AUTO: 39.2 PG (ref 26–34)
MCHC RBC AUTO-ENTMCNC: 35.5 G/DL (ref 31–37)
MCV RBC AUTO: 110.5 FL
MONOCYTES # BLD AUTO: 0.53 X10(3) UL (ref 0.1–1)
MONOCYTES NFR BLD AUTO: 9.9 %
NEUTROPHILS # BLD AUTO: 2.58 X10 (3) UL (ref 1.5–7.7)
NEUTROPHILS # BLD AUTO: 2.58 X10(3) UL (ref 1.5–7.7)
NEUTROPHILS NFR BLD AUTO: 48.4 %
NONHDLC SERPL-MCNC: 105 MG/DL (ref ?–130)
OSMOLALITY SERPL CALC.SUM OF ELEC: 283 MOSM/KG (ref 275–295)
PLATELET # BLD AUTO: 422 10(3)UL (ref 150–450)
POTASSIUM SERPL-SCNC: 3.3 MMOL/L (ref 3.5–5.1)
PROT SERPL-MCNC: 9.1 G/DL (ref 6.4–8.2)
RBC # BLD AUTO: 2.09 X10(6)UL
RETICS # AUTO: 59.1 X10(3) UL (ref 22.5–147.5)
RETICS/RBC NFR AUTO: 2.8 %
SODIUM SERPL-SCNC: 137 MMOL/L (ref 136–145)
TRIGL SERPL-MCNC: 173 MG/DL (ref 30–149)
TROPONIN I HIGH SENSITIVITY: 100 NG/L
VLDLC SERPL CALC-MCNC: 27 MG/DL (ref 0–30)
WBC # BLD AUTO: 5.3 X10(3) UL (ref 4–11)

## 2023-07-01 PROCEDURE — 71045 X-RAY EXAM CHEST 1 VIEW: CPT | Performed by: EMERGENCY MEDICINE

## 2023-07-01 PROCEDURE — 71260 CT THORAX DX C+: CPT | Performed by: EMERGENCY MEDICINE

## 2023-07-01 PROCEDURE — 99223 1ST HOSP IP/OBS HIGH 75: CPT | Performed by: HOSPITALIST

## 2023-07-01 RX ORDER — MORPHINE SULFATE 4 MG/ML
4 INJECTION, SOLUTION INTRAMUSCULAR; INTRAVENOUS ONCE
Status: COMPLETED | OUTPATIENT
Start: 2023-07-01 | End: 2023-07-01

## 2023-07-01 RX ORDER — KETOROLAC TROMETHAMINE 15 MG/ML
15 INJECTION, SOLUTION INTRAMUSCULAR; INTRAVENOUS ONCE
Status: COMPLETED | OUTPATIENT
Start: 2023-07-01 | End: 2023-07-01

## 2023-07-02 PROBLEM — R07.9 CHEST PAIN OF UNCERTAIN ETIOLOGY: Status: ACTIVE | Noted: 2023-07-02

## 2023-07-02 LAB
ALBUMIN SERPL-MCNC: 3.2 G/DL (ref 3.4–5)
ALBUMIN/GLOB SERPL: 0.7 {RATIO} (ref 1–2)
ALP LIVER SERPL-CCNC: 38 U/L
ALT SERPL-CCNC: 17 U/L
ANION GAP SERPL CALC-SCNC: 6 MMOL/L (ref 0–18)
AST SERPL-CCNC: 16 U/L (ref 15–37)
ATRIAL RATE: 80 BPM
BASOPHILS # BLD AUTO: 0.01 X10(3) UL (ref 0–0.2)
BASOPHILS NFR BLD AUTO: 0.2 %
BILIRUB SERPL-MCNC: 0.4 MG/DL (ref 0.1–2)
BUN BLD-MCNC: 8 MG/DL (ref 7–18)
CALCIUM BLD-MCNC: 8 MG/DL (ref 8.5–10.1)
CHLORIDE SERPL-SCNC: 113 MMOL/L (ref 98–112)
CO2 SERPL-SCNC: 23 MMOL/L (ref 21–32)
CREAT BLD-MCNC: 0.6 MG/DL
EOSINOPHIL # BLD AUTO: 0.03 X10(3) UL (ref 0–0.7)
EOSINOPHIL NFR BLD AUTO: 0.7 %
ERYTHROCYTE [DISTWIDTH] IN BLOOD BY AUTOMATED COUNT: 14.1 %
GFR SERPLBLD BASED ON 1.73 SQ M-ARVRAT: 118 ML/MIN/1.73M2 (ref 60–?)
GLOBULIN PLAS-MCNC: 4.3 G/DL (ref 2.8–4.4)
GLUCOSE BLD-MCNC: 85 MG/DL (ref 70–99)
HCT VFR BLD AUTO: 20.2 %
HGB BLD-MCNC: 7 G/DL
IMM GRANULOCYTES # BLD AUTO: 0.01 X10(3) UL (ref 0–1)
IMM GRANULOCYTES NFR BLD: 0.2 %
LYMPHOCYTES # BLD AUTO: 2.12 X10(3) UL (ref 1–4)
LYMPHOCYTES NFR BLD AUTO: 49.9 %
MAGNESIUM SERPL-MCNC: 2.4 MG/DL (ref 1.6–2.6)
MCH RBC QN AUTO: 38.9 PG (ref 26–34)
MCHC RBC AUTO-ENTMCNC: 34.7 G/DL (ref 31–37)
MCV RBC AUTO: 112.2 FL
MONOCYTES # BLD AUTO: 0.47 X10(3) UL (ref 0.1–1)
MONOCYTES NFR BLD AUTO: 11.1 %
NEUTROPHILS # BLD AUTO: 1.61 X10 (3) UL (ref 1.5–7.7)
NEUTROPHILS # BLD AUTO: 1.61 X10(3) UL (ref 1.5–7.7)
NEUTROPHILS NFR BLD AUTO: 37.9 %
OSMOLALITY SERPL CALC.SUM OF ELEC: 292 MOSM/KG (ref 275–295)
P AXIS: 43 DEGREES
P-R INTERVAL: 200 MS
PLATELET # BLD AUTO: 374 10(3)UL (ref 150–450)
POTASSIUM SERPL-SCNC: 3.7 MMOL/L (ref 3.5–5.1)
PROT SERPL-MCNC: 7.5 G/DL (ref 6.4–8.2)
Q-T INTERVAL: 378 MS
QRS DURATION: 86 MS
QTC CALCULATION (BEZET): 435 MS
R AXIS: 23 DEGREES
RBC # BLD AUTO: 1.8 X10(6)UL
SODIUM SERPL-SCNC: 142 MMOL/L (ref 136–145)
T AXIS: -4 DEGREES
TROPONIN I HIGH SENSITIVITY: 115 NG/L
TROPONIN I HIGH SENSITIVITY: 125 NG/L
VENTRICULAR RATE: 80 BPM
WBC # BLD AUTO: 4.3 X10(3) UL (ref 4–11)

## 2023-07-02 PROCEDURE — 99254 IP/OBS CNSLTJ NEW/EST MOD 60: CPT | Performed by: INTERNAL MEDICINE

## 2023-07-02 PROCEDURE — 99232 SBSQ HOSP IP/OBS MODERATE 35: CPT | Performed by: INTERNAL MEDICINE

## 2023-07-02 RX ORDER — MORPHINE SULFATE 30 MG/1
60 TABLET, FILM COATED, EXTENDED RELEASE ORAL EVERY 8 HOURS SCHEDULED
Status: DISCONTINUED | OUTPATIENT
Start: 2023-07-02 | End: 2023-07-03

## 2023-07-02 RX ORDER — MORPHINE SULFATE 2 MG/ML
1 INJECTION, SOLUTION INTRAMUSCULAR; INTRAVENOUS EVERY 2 HOUR PRN
Status: DISCONTINUED | OUTPATIENT
Start: 2023-07-02 | End: 2023-07-03

## 2023-07-02 RX ORDER — UBIDECARENONE 75 MG
200 CAPSULE ORAL DAILY
Status: DISCONTINUED | OUTPATIENT
Start: 2023-07-02 | End: 2023-07-03

## 2023-07-02 RX ORDER — METOPROLOL SUCCINATE 25 MG/1
25 TABLET, EXTENDED RELEASE ORAL
Status: DISCONTINUED | OUTPATIENT
Start: 2023-07-02 | End: 2023-07-03

## 2023-07-02 RX ORDER — BISACODYL 10 MG
10 SUPPOSITORY, RECTAL RECTAL
Status: DISCONTINUED | OUTPATIENT
Start: 2023-07-02 | End: 2023-07-03

## 2023-07-02 RX ORDER — ONDANSETRON 2 MG/ML
4 INJECTION INTRAMUSCULAR; INTRAVENOUS EVERY 6 HOURS PRN
Status: DISCONTINUED | OUTPATIENT
Start: 2023-07-02 | End: 2023-07-03

## 2023-07-02 RX ORDER — SODIUM CHLORIDE 9 MG/ML
INJECTION, SOLUTION INTRAVENOUS CONTINUOUS
Status: DISCONTINUED | OUTPATIENT
Start: 2023-07-02 | End: 2023-07-03

## 2023-07-02 RX ORDER — ENEMA 19; 7 G/133ML; G/133ML
1 ENEMA RECTAL ONCE AS NEEDED
Status: DISCONTINUED | OUTPATIENT
Start: 2023-07-02 | End: 2023-07-03

## 2023-07-02 RX ORDER — FOLIC ACID 1 MG/1
1 TABLET ORAL DAILY
Status: DISCONTINUED | OUTPATIENT
Start: 2023-07-02 | End: 2023-07-03

## 2023-07-02 RX ORDER — MORPHINE SULFATE 2 MG/ML
2 INJECTION, SOLUTION INTRAMUSCULAR; INTRAVENOUS EVERY 2 HOUR PRN
Status: DISCONTINUED | OUTPATIENT
Start: 2023-07-02 | End: 2023-07-03

## 2023-07-02 RX ORDER — ONDANSETRON 2 MG/ML
4 INJECTION INTRAMUSCULAR; INTRAVENOUS EVERY 4 HOURS PRN
Status: ACTIVE | OUTPATIENT
Start: 2023-07-02 | End: 2023-07-02

## 2023-07-02 RX ORDER — ENOXAPARIN SODIUM 100 MG/ML
40 INJECTION SUBCUTANEOUS DAILY
Status: DISCONTINUED | OUTPATIENT
Start: 2023-07-02 | End: 2023-07-03

## 2023-07-02 RX ORDER — POTASSIUM CHLORIDE 20 MEQ/1
40 TABLET, EXTENDED RELEASE ORAL ONCE
Status: COMPLETED | OUTPATIENT
Start: 2023-07-02 | End: 2023-07-02

## 2023-07-02 RX ORDER — DOCUSATE SODIUM 100 MG/1
100 CAPSULE, LIQUID FILLED ORAL 2 TIMES DAILY
Status: DISCONTINUED | OUTPATIENT
Start: 2023-07-02 | End: 2023-07-03

## 2023-07-02 RX ORDER — OXYCODONE HYDROCHLORIDE 10 MG/1
20 TABLET ORAL
Status: DISCONTINUED | OUTPATIENT
Start: 2023-07-02 | End: 2023-07-03

## 2023-07-02 RX ORDER — ACETAMINOPHEN 500 MG
1000 TABLET ORAL EVERY 6 HOURS PRN
Status: DISCONTINUED | OUTPATIENT
Start: 2023-07-02 | End: 2023-07-03

## 2023-07-02 RX ORDER — ASPIRIN 81 MG/1
324 TABLET, CHEWABLE ORAL ONCE
Status: COMPLETED | OUTPATIENT
Start: 2023-07-02 | End: 2023-07-02

## 2023-07-02 RX ORDER — ASPIRIN 81 MG/1
81 TABLET ORAL DAILY
Status: DISCONTINUED | OUTPATIENT
Start: 2023-07-02 | End: 2023-07-03

## 2023-07-02 RX ORDER — HYDROXYUREA 500 MG/1
1500 CAPSULE ORAL DAILY
Status: DISCONTINUED | OUTPATIENT
Start: 2023-07-02 | End: 2023-07-03

## 2023-07-02 RX ORDER — MORPHINE SULFATE 4 MG/ML
4 INJECTION, SOLUTION INTRAMUSCULAR; INTRAVENOUS EVERY 2 HOUR PRN
Status: DISCONTINUED | OUTPATIENT
Start: 2023-07-02 | End: 2023-07-03

## 2023-07-02 RX ORDER — POLYETHYLENE GLYCOL 3350 17 G/17G
17 POWDER, FOR SOLUTION ORAL DAILY PRN
Status: DISCONTINUED | OUTPATIENT
Start: 2023-07-02 | End: 2023-07-03

## 2023-07-02 RX ORDER — PROCHLORPERAZINE EDISYLATE 5 MG/ML
5 INJECTION INTRAMUSCULAR; INTRAVENOUS EVERY 8 HOURS PRN
Status: DISCONTINUED | OUTPATIENT
Start: 2023-07-02 | End: 2023-07-03

## 2023-07-02 NOTE — ED QUICK NOTES
Orders for admission, patient is aware of plan and ready to go upstairs. Any questions, please call ED RYLAND Holliday  at extension [phone]. Vaccinated?   Type of COVID test sent:  COVID Suspicion level: Low/High      Titratable drug(s) infusing: NA  Rate:    LOC at time of transport: A/O x 4    Other pertinent information:    CIWA score=  NIH score=

## 2023-07-02 NOTE — PROGRESS NOTES
NURSING ADMISSION NOTE      Patient admitted via Cart  Oriented to room. Safety precautions initiated. Bed in low position. Call light in reach.     Pt A&Ox4 2LNC  Sleeping in bed at this time  NSR on Tele  Electrolyte protocol K 3.3 replaced redraw in AM.   Denies any chest pain at this time  C/O 7/10 pain Medicated per Mar  Voids Freely  PIV infusing [email or web address]  Safety precaution Maintained

## 2023-07-02 NOTE — ED INITIAL ASSESSMENT (HPI)
Pt reports having a sickle cell crisis. Pt c/o of right sided body aches, headache and chest pain. Pt reports taking morphine er 1930HRS, denies relief.

## 2023-07-02 NOTE — PROGRESS NOTES
Assumed care at 7:30am  Still refusing PCA pump, states she doesn't want to stay longer than she has to. PRN medication given. Patient is alert and oriented x4. Able to ambulate independently. All safety precautions in place. Will continue to monitor patient.

## 2023-07-03 VITALS
RESPIRATION RATE: 20 BRPM | WEIGHT: 148.81 LBS | BODY MASS INDEX: 23.92 KG/M2 | OXYGEN SATURATION: 99 % | TEMPERATURE: 98 F | HEIGHT: 66 IN | DIASTOLIC BLOOD PRESSURE: 72 MMHG | HEART RATE: 64 BPM | SYSTOLIC BLOOD PRESSURE: 101 MMHG

## 2023-07-03 LAB
BASOPHILS # BLD AUTO: 0.01 X10(3) UL (ref 0–0.2)
BASOPHILS NFR BLD AUTO: 0.2 %
EOSINOPHIL # BLD AUTO: 0.04 X10(3) UL (ref 0–0.7)
EOSINOPHIL NFR BLD AUTO: 1 %
ERYTHROCYTE [DISTWIDTH] IN BLOOD BY AUTOMATED COUNT: 14.1 %
HCT VFR BLD AUTO: 21.4 %
HGB BLD-MCNC: 7.8 G/DL
IMM GRANULOCYTES # BLD AUTO: 0.01 X10(3) UL (ref 0–1)
IMM GRANULOCYTES NFR BLD: 0.2 %
LYMPHOCYTES # BLD AUTO: 1.84 X10(3) UL (ref 1–4)
LYMPHOCYTES NFR BLD AUTO: 44.4 %
MCH RBC QN AUTO: 40 PG (ref 26–34)
MCHC RBC AUTO-ENTMCNC: 36.4 G/DL (ref 31–37)
MCV RBC AUTO: 109.7 FL
MONOCYTES # BLD AUTO: 0.36 X10(3) UL (ref 0.1–1)
MONOCYTES NFR BLD AUTO: 8.7 %
NEUTROPHILS # BLD AUTO: 1.88 X10 (3) UL (ref 1.5–7.7)
NEUTROPHILS # BLD AUTO: 1.88 X10(3) UL (ref 1.5–7.7)
NEUTROPHILS NFR BLD AUTO: 45.5 %
PLATELET # BLD AUTO: 394 10(3)UL (ref 150–450)
POTASSIUM SERPL-SCNC: 3.7 MMOL/L (ref 3.5–5.1)
RBC # BLD AUTO: 1.95 X10(6)UL
WBC # BLD AUTO: 4.1 X10(3) UL (ref 4–11)

## 2023-07-03 PROCEDURE — 99232 SBSQ HOSP IP/OBS MODERATE 35: CPT | Performed by: INTERNAL MEDICINE

## 2023-07-03 PROCEDURE — 99239 HOSP IP/OBS DSCHRG MGMT >30: CPT | Performed by: INTERNAL MEDICINE

## 2023-07-03 RX ORDER — HYDROXYUREA 500 MG/1
1000 CAPSULE ORAL DAILY
Status: DISCONTINUED | OUTPATIENT
Start: 2023-07-03 | End: 2023-07-03

## 2023-07-03 RX ORDER — HYDROXYUREA 500 MG/1
1000 CAPSULE ORAL DAILY
Qty: 180 CAPSULE | Refills: 3 | Status: SHIPPED | OUTPATIENT
Start: 2023-07-03

## 2023-07-03 RX ORDER — MELATONIN
1000 DAILY
Status: DISCONTINUED | OUTPATIENT
Start: 2023-07-03 | End: 2023-07-03

## 2023-07-03 NOTE — PLAN OF CARE
45 y/o F. A&Ox4. VSS. RA. NSR,on tele. Patient denies n/v. Pt rt reporting pain, PRN given per [facility]  Patient up ad alejandra  Voiding in bathroom  LAC infusing per MAR.   Reg diet  Patient supplied glutamine taken to pharmacy to be verified  Safety precautions in place  Plan of care to continue      Problem: PAIN - ADULT  Goal: Verbalizes/displays adequate comfort level or patient's stated pain goal  Description: INTERVENTIONS:  - Encourage pt to monitor pain and request assistance  - Assess pain using appropriate pain scale  - Administer analgesics based on type and severity of pain and evaluate response  - Implement non-pharmacological measures as appropriate and evaluate response  - Consider cultural and social influences on pain and pain management  - Manage/alleviate anxiety  - Utilize distraction and/or relaxation techniques  - Monitor for opioid side effects  - Notify MD/LIP if interventions unsuccessful or patient reports new pain  - Anticipate increased pain with activity and pre-medicate as appropriate  Outcome: Progressing

## 2023-07-03 NOTE — PLAN OF CARE
The patient is A/Ox4   On RA, no SOB  Tele - NSR  Vitals Stable  Afebrile  C/O back and leg pain, improved with PRN and scheduled medications per MAR. The patient wants to dc home today. Awaiting for clearance from hem/onc. Safety precautions in place. Staff will continue to monitor.                Problem: Patient/Family Goals  Goal: Patient/Family Long Term Goal  Description: Patient's Long Term Goal: dc    Interventions:  - medications   - See additional Care Plan goals for specific interventions  Outcome: Progressing  Goal: Patient/Family Short Term Goal  Description: Patient's Short Term Goal: safety    Interventions:   - frequent rounding   - See additional Care Plan goals for specific interventions  Outcome: Progressing     Problem: PAIN - ADULT  Goal: Verbalizes/displays adequate comfort level or patient's stated pain goal  Description: INTERVENTIONS:  - Encourage pt to monitor pain and request assistance  - Assess pain using appropriate pain scale  - Administer analgesics based on type and severity of pain and evaluate response  - Implement non-pharmacological measures as appropriate and evaluate response  - Consider cultural and social influences on pain and pain management  - Manage/alleviate anxiety  - Utilize distraction and/or relaxation techniques  - Monitor for opioid side effects  - Notify MD/LIP if interventions unsuccessful or patient reports new pain  - Anticipate increased pain with activity and pre-medicate as appropriate  Outcome: Progressing

## 2023-07-03 NOTE — DISCHARGE SUMMARY
[facility]  DISCHARGE SUMMARY     Elissa Reyes Patient Status:  Inpatient    8/3/1985 MRN [id number]   [facility] 3NE-A Attending No att. providers found   Hosp Day # 1 PCP Karey Storm MD     Date of Admission: 2023  Date of Discharge:  7/3/2023     Discharge Disposition: Home or Self Care    Discharge Diagnosis:  #Chest pain with chronic troponin elevation, resolved     #Sickle cell pain crisis        #Sickle cell anemia  #History of acute chest requiring exchange transfusion     #Mildly elevated d-dimer  -CTA neg for PE     #Hypokalemia  -Replace potassium     # pulmonary nodule, outpt follow up CT    History of Present Illness:   Elissa Reyes is a 44year old female with a history of sickle cell disease who presents with RLE and chest pain. Patient has a history of acute chest requiring exchange transfusion. She normally states sickle cell pain controlled with MSContin and Oxy. She presents to the ER d/t RLE pain and chest pain which did not respond to oral analgesics at home. Patient states RLE pain rated 4-5/10, she was still able to ambulate. Chest pain, left sided, clenching, pressure-like began around 7pm. No shortness of breath. On room air. Currently pain rated 8/10. She is receiving second dose of Morphine during visit. Patient states presentation similar to April admission. Brief Synopsis: Patient admitted for further work-up of her chest pain. CT chest negative for any acute pulmonary embolism. She was conservative managed for her sickle cell pain crisis with IV fluid, O2, pain meds. Her medications have been adjusted per her hematologist.  She is cleared for discharge today and will follow up as outpatient. Lace+ Score: 54  59-90 High Risk  29-58 Medium Risk  0-28   Low Risk       TCM Follow-Up Recommendation:  LACE 29-58:  Moderate Risk of readmission after discharge from the Hospitals in [place].      Consultants:  Hematologist    Discharge Medication List:     Discharge Medications        CHANGE how you take these medications        Instructions Prescription details   hydroxyurea 500 MG Caps  Commonly known as: Hydrea  What changed: how much to take      Take 2 capsules (1,000 mg total) by mouth daily. Quantity: 180 capsule  Refills: 3            CONTINUE taking these medications        Instructions Prescription details   aspirin 81 MG Tbec      Take 1 tablet (81 mg total) by mouth daily. Refills: 0     Endari 5 g Pack  Generic drug: Glutamine (Sickle Cell)      Take 15 g by mouth 2 (two) times a day. Quantity: 180 each  Refills: 11     folic acid 1 MG Tabs  Commonly known as: Folvite      Take 1 tablet (1 mg total) by mouth daily. Quantity: 90 tablet  Refills: 3     metoprolol succinate ER 25 MG Tb24  Commonly known as: Toprol XL      Take 1 tablet (25 mg total) by mouth daily. Refills: 0     morphINE ER 60 MG Tbcr  Commonly known as: MS Contin      Take 1 tablet (60 mg total) by mouth every 8 (eight) hours. Quantity: 90 tablet  Refills: 0     Naloxone HCl 4 MG/0.1ML Liqd      4 mg by Nasal route as needed. If patient remains unresponsive, repeat dose in other nostril 2-5 minutes after first dose. Quantity: 1 kit  Refills: 0     ondansetron 8 MG tablet  Commonly known as: Zofran      Take 1 tablet (8 mg total) by mouth every 8 (eight) hours as needed for Nausea. Quantity: 30 tablet  Refills: 3     oxyCODONE HCl 20 MG Tabs      Take 1 tablet (20 mg total) by mouth every 3 (three) hours as needed. Quantity: 150 tablet  Refills: 0     VITAMIN B-12 OR      Take 200 mcg by mouth daily. Refills: 0               Where to Get Your Medications        These medications were sent to [facility].  [phone] [id number]      Phone: [phone]   hydroxyurea 500 MG Caps         [place]    Follow-up appointment:   Margarita Wesley MD  [address] [address] [place] [address] [phone]    Follow up in 1 week(s)      Fletcher Lepe MD  [address] [facility]  [phone]    Follow up in 1 week(s)      Appointments for Next 30 Days 2023 - 8/3/2023      None            Vital signs:       Physical Exam:    General: No acute distress   Lungs: clear to auscultation  Cardiovascular: S1, S2  Abdomen: Soft      -----------------------------------------------------------------------------------------------  PATIENT DISCHARGE INSTRUCTIONS: See electronic chart    Carolyn Raphael MD    Total time spent on discharge plannin minutes     The [facility] makes medical notes like these available to patients in the interest of transparency. Please be advised this is a medical document. Medical documents are intended to carry relevant information, facts as evident, and the clinical opinion of the practitioner. The medical note is intended as peer to peer communication and may appear blunt or direct. It is written in medical language and may contain abbreviations or verbiage that are unfamiliar.

## 2023-07-05 ENCOUNTER — PATIENT OUTREACH (OUTPATIENT)
Dept: CASE MANAGEMENT | Age: 38
End: 2023-07-05

## 2023-07-06 LAB
HGB A2 MFR BLD: 1.4 % (ref 1.5–3.5)
HGB F MFR BLD: 42 % (ref 0–2)
HGB PNL BLD ELPH: 0 % (ref 95.5–100)
HGB S BLD QL SOLY: POSITIVE
HGB S MFR BLD: 56.6 %

## 2023-07-12 ENCOUNTER — OFFICE VISIT (OUTPATIENT)
Dept: HEMATOLOGY/ONCOLOGY | Facility: HOSPITAL | Age: 38
End: 2023-07-12
Attending: INTERNAL MEDICINE
Payer: COMMERCIAL

## 2023-07-12 VITALS
OXYGEN SATURATION: 97 % | WEIGHT: 150.63 LBS | DIASTOLIC BLOOD PRESSURE: 72 MMHG | SYSTOLIC BLOOD PRESSURE: 110 MMHG | BODY MASS INDEX: 24 KG/M2 | TEMPERATURE: 98 F | RESPIRATION RATE: 16 BRPM | HEART RATE: 74 BPM

## 2023-07-12 DIAGNOSIS — E53.8 VITAMIN B12 DEFICIENCY: ICD-10-CM

## 2023-07-12 DIAGNOSIS — D57.419 SICKLE CELL ANEMIA WITH COEXISTENT ALPHA-THALASSEMIA WITH CRISIS (HCC): ICD-10-CM

## 2023-07-12 DIAGNOSIS — R11.0 NAUSEA: ICD-10-CM

## 2023-07-12 DIAGNOSIS — D57.00 SICKLE CELL PAIN CRISIS (HCC): ICD-10-CM

## 2023-07-12 DIAGNOSIS — D57.00 SICKLE CELL PAIN CRISIS (HCC): Primary | ICD-10-CM

## 2023-07-12 DIAGNOSIS — D57.1 SICKLE CELL DISEASE WITHOUT CRISIS (HCC): ICD-10-CM

## 2023-07-12 DIAGNOSIS — D57.00 SICKLE CELL DISEASE WITH CRISIS (HCC): Primary | ICD-10-CM

## 2023-07-12 DIAGNOSIS — E86.0 DEHYDRATION: ICD-10-CM

## 2023-07-12 DIAGNOSIS — E87.6 HYPOKALEMIA: ICD-10-CM

## 2023-07-12 DIAGNOSIS — D53.9 MACROCYTIC ANEMIA: ICD-10-CM

## 2023-07-12 LAB
ALBUMIN SERPL-MCNC: 4.1 G/DL (ref 3.4–5)
ALBUMIN/GLOB SERPL: 0.8 {RATIO} (ref 1–2)
ALP LIVER SERPL-CCNC: 48 U/L
ALT SERPL-CCNC: 18 U/L
ANION GAP SERPL CALC-SCNC: 6 MMOL/L (ref 0–18)
AST SERPL-CCNC: 15 U/L (ref 15–37)
BASOPHILS # BLD AUTO: 0.01 X10(3) UL (ref 0–0.2)
BASOPHILS NFR BLD AUTO: 0.2 %
BILIRUB SERPL-MCNC: 0.7 MG/DL (ref 0.1–2)
BUN BLD-MCNC: 7 MG/DL (ref 7–18)
CALCIUM BLD-MCNC: 8.8 MG/DL (ref 8.5–10.1)
CHLORIDE SERPL-SCNC: 105 MMOL/L (ref 98–112)
CO2 SERPL-SCNC: 25 MMOL/L (ref 21–32)
CREAT BLD-MCNC: 0.72 MG/DL
EOSINOPHIL # BLD AUTO: 0.04 X10(3) UL (ref 0–0.7)
EOSINOPHIL NFR BLD AUTO: 0.8 %
ERYTHROCYTE [DISTWIDTH] IN BLOOD BY AUTOMATED COUNT: 13.8 %
FASTING STATUS PATIENT QL REPORTED: NO
GFR SERPLBLD BASED ON 1.73 SQ M-ARVRAT: 110 ML/MIN/1.73M2 (ref 60–?)
GLOBULIN PLAS-MCNC: 5 G/DL (ref 2.8–4.4)
GLUCOSE BLD-MCNC: 95 MG/DL (ref 70–99)
HCT VFR BLD AUTO: 24.6 %
HGB BLD-MCNC: 8.8 G/DL
HGB RETIC QN AUTO: 34.1 PG (ref 28.2–36.6)
IMM GRANULOCYTES # BLD AUTO: 0.02 X10(3) UL (ref 0–1)
IMM GRANULOCYTES NFR BLD: 0.4 %
IMM RETICS NFR: 0.24 RATIO (ref 0.1–0.3)
LDH SERPL L TO P-CCNC: 167 U/L
LYMPHOCYTES # BLD AUTO: 1.85 X10(3) UL (ref 1–4)
LYMPHOCYTES NFR BLD AUTO: 37.6 %
MCH RBC QN AUTO: 39.1 PG (ref 26–34)
MCHC RBC AUTO-ENTMCNC: 35.8 G/DL (ref 31–37)
MCV RBC AUTO: 109.3 FL
MONOCYTES # BLD AUTO: 0.75 X10(3) UL (ref 0.1–1)
MONOCYTES NFR BLD AUTO: 15.2 %
NEUTROPHILS # BLD AUTO: 2.25 X10 (3) UL (ref 1.5–7.7)
NEUTROPHILS # BLD AUTO: 2.25 X10(3) UL (ref 1.5–7.7)
NEUTROPHILS NFR BLD AUTO: 45.8 %
OSMOLALITY SERPL CALC.SUM OF ELEC: 280 MOSM/KG (ref 275–295)
PLATELET # BLD AUTO: 357 10(3)UL (ref 150–450)
POTASSIUM SERPL-SCNC: 3 MMOL/L (ref 3.5–5.1)
PROT SERPL-MCNC: 9.1 G/DL (ref 6.4–8.2)
RBC # BLD AUTO: 2.25 X10(6)UL
RETICS # AUTO: 87.1 X10(3) UL (ref 22.5–147.5)
RETICS/RBC NFR AUTO: 3.9 %
SODIUM SERPL-SCNC: 136 MMOL/L (ref 136–145)
VIT B12 SERPL-MCNC: 1792 PG/ML (ref 193–986)
WBC # BLD AUTO: 4.9 X10(3) UL (ref 4–11)

## 2023-07-12 PROCEDURE — 96365 THER/PROPH/DIAG IV INF INIT: CPT

## 2023-07-12 PROCEDURE — 99215 OFFICE O/P EST HI 40 MIN: CPT | Performed by: INTERNAL MEDICINE

## 2023-07-12 PROCEDURE — 96375 TX/PRO/DX INJ NEW DRUG ADDON: CPT

## 2023-07-12 RX ORDER — MORPHINE SULFATE 4 MG/ML
6 INJECTION, SOLUTION INTRAMUSCULAR; INTRAVENOUS ONCE
Status: COMPLETED | OUTPATIENT
Start: 2023-07-12 | End: 2023-07-12

## 2023-07-12 RX ORDER — POTASSIUM CHLORIDE 750 MG/1
40 TABLET, EXTENDED RELEASE ORAL ONCE
Status: CANCELLED
Start: 2023-08-09 | End: 2023-08-09

## 2023-07-12 RX ORDER — METOCLOPRAMIDE HYDROCHLORIDE 5 MG/ML
10 INJECTION INTRAMUSCULAR; INTRAVENOUS ONCE
Status: CANCELLED
Start: 2023-07-12 | End: 2023-07-12

## 2023-07-12 RX ORDER — METOCLOPRAMIDE HYDROCHLORIDE 5 MG/ML
10 INJECTION INTRAMUSCULAR; INTRAVENOUS ONCE
Status: CANCELLED
Start: 2023-08-09 | End: 2023-08-09

## 2023-07-12 RX ORDER — MORPHINE SULFATE 4 MG/ML
6 INJECTION, SOLUTION INTRAMUSCULAR; INTRAVENOUS ONCE
Start: 2023-08-02 | End: 2023-08-02

## 2023-07-12 RX ORDER — POTASSIUM CHLORIDE 750 MG/1
40 TABLET, EXTENDED RELEASE ORAL ONCE
Status: CANCELLED
Start: 2023-07-12 | End: 2023-07-12

## 2023-07-12 RX ORDER — METOCLOPRAMIDE HYDROCHLORIDE 5 MG/ML
10 INJECTION INTRAMUSCULAR; INTRAVENOUS ONCE
Status: COMPLETED | OUTPATIENT
Start: 2023-07-12 | End: 2023-07-12

## 2023-07-12 RX ORDER — DIPHENHYDRAMINE HCL 25 MG
CAPSULE ORAL ONCE
Start: 2023-08-02 | End: 2023-08-02

## 2023-07-12 RX ORDER — POTASSIUM CHLORIDE 20 MEQ/1
40 TABLET, EXTENDED RELEASE ORAL ONCE
Status: COMPLETED | OUTPATIENT
Start: 2023-07-12 | End: 2023-07-12

## 2023-07-12 RX ORDER — DIPHENHYDRAMINE HCL 25 MG
CAPSULE ORAL ONCE
Status: COMPLETED | OUTPATIENT
Start: 2023-07-12 | End: 2023-07-12

## 2023-07-12 RX ADMIN — POTASSIUM CHLORIDE 40 MEQ: 20 TABLET, EXTENDED RELEASE ORAL at 10:06:00

## 2023-07-12 RX ADMIN — METOCLOPRAMIDE HYDROCHLORIDE 10 MG: 5 INJECTION INTRAMUSCULAR; INTRAVENOUS at 10:06:00

## 2023-07-12 RX ADMIN — DIPHENHYDRAMINE HCL 25 MG: 25 MG CAPSULE ORAL at 09:56:00

## 2023-07-12 RX ADMIN — MORPHINE SULFATE 6 MG: 4 INJECTION, SOLUTION INTRAMUSCULAR; INTRAVENOUS at 09:53:00

## 2023-07-12 NOTE — PROGRESS NOTES
Education Record     Learner:  Patient     Disease / Diagnosis: sickle cell anemia     Barriers / Limitations:  None                Comments:     Method:  Discussion                Comments:     General Topics:  Plan of care reviewed                Comments:     Outcome:  Shows understanding                Comments: [facility] f/up and crizanlizumab infusion. Pt states she has not been feeling well since being home from the hospital. States on Sunday she has 12hr episode of nausea and diarrhea. Pt states she still feels dehydrated. Pain is 6-7/10. Reports dizziness.

## 2023-07-12 NOTE — PROGRESS NOTES
Education Record    Learner:  Patient    Disease / Wicho Ernandez cell disease with crisis     Barriers / Limitations:  None   Comments:    Method:  Brief focused   Comments:    General Topics:  Infection, Medication, Pain, Precautions, Procedure, Side effects and symptom management, Plan of care reviewed, and Fall risk and prevention   Comments:    Outcome:  Shows understanding   Comments:

## 2023-07-13 PROBLEM — E86.0 DEHYDRATION: Status: ACTIVE | Noted: 2023-07-13

## 2023-07-28 RX ORDER — MORPHINE SULFATE 60 MG/1
60 TABLET, FILM COATED, EXTENDED RELEASE ORAL EVERY 8 HOURS SCHEDULED
Qty: 90 TABLET | Refills: 0 | Status: SHIPPED | OUTPATIENT
Start: 2023-07-28

## 2023-07-28 RX ORDER — OXYCODONE HYDROCHLORIDE 20 MG/1
20 TABLET ORAL
Qty: 150 TABLET | Refills: 0 | Status: SHIPPED | OUTPATIENT
Start: 2023-07-28

## 2023-07-28 NOTE — TELEPHONE ENCOUNTER
Patient called for refill  morphINE ER 60 MG Oral Tab CR, oxyCODONE HCl 20 MG Oral Tab. Goes to [facility], [address] [place] [address].

## 2023-08-23 ENCOUNTER — TELEPHONE (OUTPATIENT)
Dept: HEMATOLOGY/ONCOLOGY | Facility: HOSPITAL | Age: 38
End: 2023-08-23

## 2023-08-23 RX ORDER — OXYCODONE HYDROCHLORIDE 20 MG/1
20 TABLET ORAL
Qty: 150 TABLET | Refills: 0 | Status: SHIPPED | OUTPATIENT
Start: 2023-08-23

## 2023-08-23 NOTE — TELEPHONE ENCOUNTER
SANDY needs a refill on the following medication:oxyCODONE HCl 20 MG Oral Tab  Take 1 tablet (20 mg total) by mouth every 3 (three) hours as needed., Normal, Disp-150 tablet [id number]- [place], [place] - [address] [facility] [address]  [phone], [phone] Thanks Soy lee

## 2023-09-06 ENCOUNTER — OFFICE VISIT (OUTPATIENT)
Dept: HEMATOLOGY/ONCOLOGY | Facility: HOSPITAL | Age: 38
End: 2023-09-06
Attending: INTERNAL MEDICINE
Payer: COMMERCIAL

## 2023-09-06 VITALS
OXYGEN SATURATION: 97 % | SYSTOLIC BLOOD PRESSURE: 106 MMHG | TEMPERATURE: 97 F | WEIGHT: 153 LBS | BODY MASS INDEX: 24.59 KG/M2 | HEIGHT: 65.98 IN | RESPIRATION RATE: 16 BRPM | HEART RATE: 89 BPM | DIASTOLIC BLOOD PRESSURE: 57 MMHG

## 2023-09-06 DIAGNOSIS — D53.9 MACROCYTIC ANEMIA: ICD-10-CM

## 2023-09-06 DIAGNOSIS — D57.1 SICKLE CELL DISEASE WITHOUT CRISIS (HCC): Primary | ICD-10-CM

## 2023-09-06 DIAGNOSIS — D57.00 SICKLE CELL PAIN CRISIS (HCC): ICD-10-CM

## 2023-09-06 DIAGNOSIS — D57.419 SICKLE CELL ANEMIA WITH COEXISTENT ALPHA-THALASSEMIA WITH CRISIS (HCC): ICD-10-CM

## 2023-09-06 DIAGNOSIS — F11.90 CHRONIC, CONTINUOUS USE OF OPIOIDS: ICD-10-CM

## 2023-09-06 DIAGNOSIS — D57.00 SICKLE CELL DISEASE WITH CRISIS (HCC): Primary | ICD-10-CM

## 2023-09-06 LAB
ALBUMIN SERPL-MCNC: 3.9 G/DL (ref 3.4–5)
ALBUMIN/GLOB SERPL: 0.8 {RATIO} (ref 1–2)
ALP LIVER SERPL-CCNC: 43 U/L
ALT SERPL-CCNC: 22 U/L
ANION GAP SERPL CALC-SCNC: 5 MMOL/L (ref 0–18)
AST SERPL-CCNC: 15 U/L (ref 15–37)
BASOPHILS # BLD AUTO: 0.01 X10(3) UL (ref 0–0.2)
BASOPHILS NFR BLD AUTO: 0.2 %
BILIRUB SERPL-MCNC: 0.9 MG/DL (ref 0.1–2)
BUN BLD-MCNC: 8 MG/DL (ref 7–18)
CALCIUM BLD-MCNC: 8.9 MG/DL (ref 8.5–10.1)
CHLORIDE SERPL-SCNC: 107 MMOL/L (ref 98–112)
CO2 SERPL-SCNC: 25 MMOL/L (ref 21–32)
CREAT BLD-MCNC: 0.5 MG/DL
EGFRCR SERPLBLD CKD-EPI 2021: 122 ML/MIN/1.73M2 (ref 60–?)
EOSINOPHIL # BLD AUTO: 0.02 X10(3) UL (ref 0–0.7)
EOSINOPHIL NFR BLD AUTO: 0.5 %
ERYTHROCYTE [DISTWIDTH] IN BLOOD BY AUTOMATED COUNT: 14.7 %
FASTING STATUS PATIENT QL REPORTED: NO
GLOBULIN PLAS-MCNC: 5 G/DL (ref 2.8–4.4)
GLUCOSE BLD-MCNC: 96 MG/DL (ref 70–99)
HCT VFR BLD AUTO: 24.5 %
HGB BLD-MCNC: 8.9 G/DL
HGB RETIC QN AUTO: 41.6 PG (ref 28.2–36.6)
IMM GRANULOCYTES # BLD AUTO: 0.01 X10(3) UL (ref 0–1)
IMM GRANULOCYTES NFR BLD: 0.2 %
IMM RETICS NFR: 0.4 RATIO (ref 0.1–0.3)
LYMPHOCYTES # BLD AUTO: 1.43 X10(3) UL (ref 1–4)
LYMPHOCYTES NFR BLD AUTO: 34.5 %
MCH RBC QN AUTO: 37.7 PG (ref 26–34)
MCHC RBC AUTO-ENTMCNC: 36.3 G/DL (ref 31–37)
MCV RBC AUTO: 103.8 FL
MONOCYTES # BLD AUTO: 0.51 X10(3) UL (ref 0.1–1)
MONOCYTES NFR BLD AUTO: 12.3 %
NEUTROPHILS # BLD AUTO: 2.16 X10 (3) UL (ref 1.5–7.7)
NEUTROPHILS # BLD AUTO: 2.16 X10(3) UL (ref 1.5–7.7)
NEUTROPHILS NFR BLD AUTO: 52.3 %
OSMOLALITY SERPL CALC.SUM OF ELEC: 282 MOSM/KG (ref 275–295)
PLATELET # BLD AUTO: 324 10(3)UL (ref 150–450)
POTASSIUM SERPL-SCNC: 3.6 MMOL/L (ref 3.5–5.1)
PROT SERPL-MCNC: 8.9 G/DL (ref 6.4–8.2)
RBC # BLD AUTO: 2.36 X10(6)UL
RETICS # AUTO: 135.9 X10(3) UL (ref 22.5–147.5)
RETICS/RBC NFR AUTO: 5.8 %
SODIUM SERPL-SCNC: 137 MMOL/L (ref 136–145)
WBC # BLD AUTO: 4.1 X10(3) UL (ref 4–11)

## 2023-09-06 PROCEDURE — 99214 OFFICE O/P EST MOD 30 MIN: CPT | Performed by: INTERNAL MEDICINE

## 2023-09-06 PROCEDURE — 96365 THER/PROPH/DIAG IV INF INIT: CPT

## 2023-09-06 RX ORDER — MORPHINE SULFATE 60 MG/1
60 TABLET, FILM COATED, EXTENDED RELEASE ORAL EVERY 8 HOURS SCHEDULED
Qty: 90 TABLET | Refills: 0 | Status: SHIPPED | OUTPATIENT
Start: 2023-09-06

## 2023-09-06 RX ORDER — DIPHENHYDRAMINE HCL 25 MG
CAPSULE ORAL ONCE
Start: 2023-10-04 | End: 2023-10-04

## 2023-09-06 RX ORDER — ASPIRIN 81 MG/1
81 TABLET ORAL DAILY
Qty: 90 TABLET | Refills: 3 | Status: SHIPPED | OUTPATIENT
Start: 2023-09-06

## 2023-09-06 RX ORDER — MORPHINE SULFATE 4 MG/ML
6 INJECTION, SOLUTION INTRAMUSCULAR; INTRAVENOUS ONCE
Start: 2023-10-04 | End: 2023-10-04

## 2023-09-06 NOTE — PROGRESS NOTES
Education Record    Learner:  Patient    Disease / Mary Lento cell    Barriers / Limitations:  None   Comments:    Method:  Discussion   Comments:    General Topics:  Medication and Side effects and symptom management   Comments:    Outcome:  Shows understanding   Comments:

## 2023-09-06 NOTE — PROGRESS NOTES
Education Record     Learner:  Patient     Disease / Diagnosis: sickle cell anemia     Barriers / Limitations:  None                Comments:     Method:  Discussion                Comments:     General Topics:  Plan of care reviewed                Comments:     Outcome:  Shows understanding                Comments: MD f/up and crizanlizumab infusion. Pt states her back pain is 4/10 and took her pain medications this morning. Pt taking her hydrea and endari as directed.

## 2023-09-08 ENCOUNTER — TELEPHONE (OUTPATIENT)
Dept: HEMATOLOGY/ONCOLOGY | Facility: HOSPITAL | Age: 38
End: 2023-09-08

## 2023-09-15 ENCOUNTER — OFFICE VISIT (OUTPATIENT)
Dept: HEMATOLOGY/ONCOLOGY | Facility: HOSPITAL | Age: 38
End: 2023-09-15
Attending: INTERNAL MEDICINE
Payer: COMMERCIAL

## 2023-09-15 ENCOUNTER — HOSPITAL ENCOUNTER (OUTPATIENT)
Dept: GENERAL RADIOLOGY | Facility: HOSPITAL | Age: 38
Discharge: HOME | End: 2023-09-15
Attending: NURSE PRACTITIONER
Payer: COMMERCIAL

## 2023-09-15 ENCOUNTER — TELEPHONE (OUTPATIENT)
Dept: HEMATOLOGY/ONCOLOGY | Age: 38
End: 2023-09-15

## 2023-09-15 ENCOUNTER — OFFICE VISIT (OUTPATIENT)
Dept: HEMATOLOGY/ONCOLOGY | Facility: HOSPITAL | Age: 38
End: 2023-09-15
Attending: NURSE PRACTITIONER
Payer: COMMERCIAL

## 2023-09-15 VITALS
BODY MASS INDEX: 24 KG/M2 | DIASTOLIC BLOOD PRESSURE: 78 MMHG | SYSTOLIC BLOOD PRESSURE: 114 MMHG | RESPIRATION RATE: 16 BRPM | OXYGEN SATURATION: 100 % | HEART RATE: 70 BPM | TEMPERATURE: 99 F | WEIGHT: 151 LBS

## 2023-09-15 DIAGNOSIS — D53.9 MACROCYTIC ANEMIA: ICD-10-CM

## 2023-09-15 DIAGNOSIS — D57.00 SICKLE CELL PAIN CRISIS (HCC): ICD-10-CM

## 2023-09-15 DIAGNOSIS — R07.89 OTHER CHEST PAIN: ICD-10-CM

## 2023-09-15 DIAGNOSIS — D57.00 SICKLE CELL DISEASE WITH CRISIS (HCC): Primary | ICD-10-CM

## 2023-09-15 DIAGNOSIS — D57.419 SICKLE CELL ANEMIA WITH COEXISTENT ALPHA-THALASSEMIA WITH CRISIS (HCC): ICD-10-CM

## 2023-09-15 DIAGNOSIS — D57.00 SICKLE CELL DISEASE WITH CRISIS (HCC): ICD-10-CM

## 2023-09-15 LAB
BASOPHILS # BLD AUTO: 0.02 X10(3) UL (ref 0–0.2)
BASOPHILS NFR BLD AUTO: 0.4 %
EOSINOPHIL # BLD AUTO: 0.04 X10(3) UL (ref 0–0.7)
EOSINOPHIL NFR BLD AUTO: 0.7 %
ERYTHROCYTE [DISTWIDTH] IN BLOOD BY AUTOMATED COUNT: 14.6 %
HCT VFR BLD AUTO: 25.8 %
HGB BLD-MCNC: 9.3 G/DL
HGB RETIC QN AUTO: 38.4 PG (ref 28.2–36.6)
IMM GRANULOCYTES # BLD AUTO: 0.02 X10(3) UL (ref 0–1)
IMM GRANULOCYTES NFR BLD: 0.4 %
IMM RETICS NFR: 0.31 RATIO (ref 0.1–0.3)
LYMPHOCYTES # BLD AUTO: 1.66 X10(3) UL (ref 1–4)
LYMPHOCYTES NFR BLD AUTO: 30.9 %
MCH RBC QN AUTO: 36.8 PG (ref 26–34)
MCHC RBC AUTO-ENTMCNC: 36 G/DL (ref 31–37)
MCV RBC AUTO: 102 FL
MONOCYTES # BLD AUTO: 0.58 X10(3) UL (ref 0.1–1)
MONOCYTES NFR BLD AUTO: 10.8 %
NEUTROPHILS # BLD AUTO: 3.06 X10 (3) UL (ref 1.5–7.7)
NEUTROPHILS # BLD AUTO: 3.06 X10(3) UL (ref 1.5–7.7)
NEUTROPHILS NFR BLD AUTO: 56.8 %
PLATELET # BLD AUTO: 478 10(3)UL (ref 150–450)
RBC # BLD AUTO: 2.53 X10(6)UL
RETICS # AUTO: 104 X10(3) UL (ref 22.5–147.5)
RETICS/RBC NFR AUTO: 4.1 %
WBC # BLD AUTO: 5.4 X10(3) UL (ref 4–11)

## 2023-09-15 PROCEDURE — 99214 OFFICE O/P EST MOD 30 MIN: CPT | Performed by: NURSE PRACTITIONER

## 2023-09-15 PROCEDURE — 85045 AUTOMATED RETICULOCYTE COUNT: CPT | Performed by: NURSE PRACTITIONER

## 2023-09-15 PROCEDURE — 85025 COMPLETE CBC W/AUTO DIFF WBC: CPT | Performed by: NURSE PRACTITIONER

## 2023-09-15 PROCEDURE — 96376 TX/PRO/DX INJ SAME DRUG ADON: CPT

## 2023-09-15 PROCEDURE — 96374 THER/PROPH/DIAG INJ IV PUSH: CPT

## 2023-09-15 PROCEDURE — 96361 HYDRATE IV INFUSION ADD-ON: CPT

## 2023-09-15 PROCEDURE — 71046 X-RAY EXAM CHEST 2 VIEWS: CPT | Performed by: NURSE PRACTITIONER

## 2023-09-15 RX ORDER — MORPHINE SULFATE 4 MG/ML
6 INJECTION, SOLUTION INTRAMUSCULAR; INTRAVENOUS ONCE
Start: 2023-10-04 | End: 2023-10-04

## 2023-09-15 RX ORDER — DIPHENHYDRAMINE HCL 25 MG
CAPSULE ORAL ONCE
Start: 2023-10-04 | End: 2023-10-04

## 2023-09-15 RX ORDER — MORPHINE SULFATE 4 MG/ML
6 INJECTION, SOLUTION INTRAMUSCULAR; INTRAVENOUS ONCE
Status: COMPLETED | OUTPATIENT
Start: 2023-09-15 | End: 2023-09-15

## 2023-09-15 RX ADMIN — MORPHINE SULFATE 6 MG: 4 INJECTION, SOLUTION INTRAMUSCULAR; INTRAVENOUS at 13:45:00

## 2023-09-15 RX ADMIN — MORPHINE SULFATE 6 MG: 4 INJECTION, SOLUTION INTRAMUSCULAR; INTRAVENOUS at 16:01:00

## 2023-09-15 RX ADMIN — MORPHINE SULFATE 6 MG: 4 INJECTION, SOLUTION INTRAMUSCULAR; INTRAVENOUS at 15:13:00

## 2023-09-15 NOTE — TELEPHONE ENCOUNTER
Dr Zach Lord patient - sickle cell disease with crisis. Patient reports she started having increased pain and chest tightness at 1 am yesterday. She denies chest pain or dyspnea. She is having pain in her lower back and both legs from her thighs to her knees. She is taking her Morphine ER 60 mg every 12  hrs and her Oxycodone HCL 20 mg every three hours. She is currently rating her pain \"7/10. \"    Patient booked for an ACV with Alia Real at 1pm. I updated Moriah Boyer RN [facility] the patient will need IV hydration and probable IV pain meds.

## 2023-09-15 NOTE — TELEPHONE ENCOUNTER
I attempted to reach Skye to do a telephone assessment. No answer. I left a voice mail message asking her to please return my call.

## 2023-09-15 NOTE — TELEPHONE ENCOUNTER
Patient called ask if she could come in today for hydration? Says she's having a sickle cell crisis.

## 2023-09-22 RX ORDER — OXYCODONE HYDROCHLORIDE 20 MG/1
20 TABLET ORAL
Qty: 150 TABLET | Refills: 0 | Status: SHIPPED | OUTPATIENT
Start: 2023-09-22

## 2023-09-22 NOTE — TELEPHONE ENCOUNTER
Patient called for refill     oxyCODONE HCl 20 MG Oral Tab.  Goes to [facility], [address] [facility]

## 2023-10-04 ENCOUNTER — OFFICE VISIT (OUTPATIENT)
Dept: HEMATOLOGY/ONCOLOGY | Facility: HOSPITAL | Age: 38
End: 2023-10-04
Attending: INTERNAL MEDICINE
Payer: COMMERCIAL

## 2023-10-04 VITALS
HEART RATE: 87 BPM | RESPIRATION RATE: 18 BRPM | BODY MASS INDEX: 23.95 KG/M2 | SYSTOLIC BLOOD PRESSURE: 118 MMHG | DIASTOLIC BLOOD PRESSURE: 79 MMHG | OXYGEN SATURATION: 100 % | WEIGHT: 149 LBS | TEMPERATURE: 98 F | HEIGHT: 65.98 IN

## 2023-10-04 DIAGNOSIS — D57.00 SICKLE CELL PAIN CRISIS (HCC): ICD-10-CM

## 2023-10-04 DIAGNOSIS — D57.419 SICKLE CELL ANEMIA WITH COEXISTENT ALPHA-THALASSEMIA WITH CRISIS (HCC): ICD-10-CM

## 2023-10-04 DIAGNOSIS — D57.00 SICKLE CELL DISEASE WITH CRISIS (HCC): Primary | ICD-10-CM

## 2023-10-04 PROCEDURE — 96365 THER/PROPH/DIAG IV INF INIT: CPT

## 2023-10-04 PROCEDURE — 96375 TX/PRO/DX INJ NEW DRUG ADDON: CPT

## 2023-10-04 PROCEDURE — 96361 HYDRATE IV INFUSION ADD-ON: CPT

## 2023-10-04 RX ORDER — DIPHENHYDRAMINE HCL 25 MG
CAPSULE ORAL ONCE
Start: 2023-11-01 | End: 2023-11-01

## 2023-10-04 RX ORDER — DIPHENHYDRAMINE HCL 25 MG
50 CAPSULE ORAL ONCE
Status: COMPLETED | OUTPATIENT
Start: 2023-10-04 | End: 2023-10-04

## 2023-10-04 RX ORDER — MORPHINE SULFATE 4 MG/ML
6 INJECTION, SOLUTION INTRAMUSCULAR; INTRAVENOUS ONCE
Start: 2023-11-01 | End: 2023-11-01

## 2023-10-04 RX ORDER — MORPHINE SULFATE 4 MG/ML
6 INJECTION, SOLUTION INTRAMUSCULAR; INTRAVENOUS ONCE
Status: COMPLETED | OUTPATIENT
Start: 2023-10-04 | End: 2023-10-04

## 2023-10-04 RX ADMIN — MORPHINE SULFATE 6 MG: 4 INJECTION, SOLUTION INTRAMUSCULAR; INTRAVENOUS at 09:21:00

## 2023-10-04 RX ADMIN — DIPHENHYDRAMINE HCL 50 MG: 25 MG CAPSULE ORAL at 09:15:00

## 2023-10-04 NOTE — PROGRESS NOTES
Education Record    Learner:  Patient    Disease / [place] cell anemia with coexistent alpha-thalassemia with crisis     Barriers / Limitations:  None   Comments:    Method:  Brief focused   Comments:    General Topics:  Diet, Infection, Medication, Pain, Precautions, Procedure, Side effects and symptom management, Plan of care reviewed, and Fall risk and prevention   Comments:    Outcome:  Shows understanding   Comments:    Patient premedicated with Benadryl 50 mg PO + she has received Morphine 6 mg and I had started her saline bolus until her Adekveo infusion was available. We paused saline and initiated Adekveo infusion , but after 10 minutes of Adekveo -patient started c/o strong joint pain behind her sternum. \"Same thing happened last time so we paused infusion for a bit until chest pain passed\" . I had paused infusion and continued for a 30 minutes with saline only until chest pain resolved on its own. Chest pain resolved very fast and we were  able to complete Adekveo infusion without further issues. .. Patient asked for second dose of Morphine originally but Pharmacy had to take some time to refill inventory so patient had to wait and she had opted NOT to wait and get second dose . She was discharged in yunier condition.  \"I just want to go home and lie down \"

## 2023-10-13 ENCOUNTER — TELEPHONE (OUTPATIENT)
Dept: HEMATOLOGY/ONCOLOGY | Facility: HOSPITAL | Age: 38
End: 2023-10-13

## 2023-10-13 NOTE — TELEPHONE ENCOUNTER
LVM to pt that did not receive fax. Can check [facility] on Monday.  If refill needed pharmacy can send us a request, if authorization needed our billing team can work on request.

## 2023-10-13 NOTE — TELEPHONE ENCOUNTER
Patient called to see if you received a call/fax from [facility] regarding [address] Please call patient back. Thank you.

## 2023-10-23 ENCOUNTER — TELEPHONE (OUTPATIENT)
Dept: HEMATOLOGY/ONCOLOGY | Facility: HOSPITAL | Age: 38
End: 2023-10-23

## 2023-10-23 ENCOUNTER — OFFICE VISIT (OUTPATIENT)
Dept: HEMATOLOGY/ONCOLOGY | Facility: HOSPITAL | Age: 38
End: 2023-10-23
Attending: INTERNAL MEDICINE
Payer: COMMERCIAL

## 2023-10-23 VITALS
BODY MASS INDEX: 23.95 KG/M2 | WEIGHT: 149 LBS | RESPIRATION RATE: 18 BRPM | HEIGHT: 65.98 IN | OXYGEN SATURATION: 99 % | TEMPERATURE: 97 F | DIASTOLIC BLOOD PRESSURE: 84 MMHG | HEART RATE: 89 BPM | SYSTOLIC BLOOD PRESSURE: 136 MMHG

## 2023-10-23 DIAGNOSIS — D57.00 SICKLE CELL CRISIS (HCC): ICD-10-CM

## 2023-10-23 DIAGNOSIS — D57.419 SICKLE CELL ANEMIA WITH COEXISTENT ALPHA-THALASSEMIA WITH CRISIS (HCC): ICD-10-CM

## 2023-10-23 DIAGNOSIS — D57.00 SICKLE CELL DISEASE WITH CRISIS (HCC): Primary | ICD-10-CM

## 2023-10-23 DIAGNOSIS — D57.00 SICKLE CELL CRISIS (HCC): Primary | ICD-10-CM

## 2023-10-23 DIAGNOSIS — D57.00 SICKLE CELL PAIN CRISIS (HCC): ICD-10-CM

## 2023-10-23 LAB
ALBUMIN SERPL-MCNC: 3.8 G/DL (ref 3.4–5)
ALBUMIN/GLOB SERPL: 0.7 {RATIO} (ref 1–2)
ALP LIVER SERPL-CCNC: 48 U/L
ALT SERPL-CCNC: 29 U/L
ANION GAP SERPL CALC-SCNC: 4 MMOL/L (ref 0–18)
AST SERPL-CCNC: 22 U/L (ref 15–37)
BASOPHILS # BLD AUTO: 0 X10(3) UL (ref 0–0.2)
BASOPHILS NFR BLD AUTO: 0 %
BILIRUB SERPL-MCNC: 1.4 MG/DL (ref 0.1–2)
BUN BLD-MCNC: 5 MG/DL (ref 7–18)
CALCIUM BLD-MCNC: 9.1 MG/DL (ref 8.5–10.1)
CHLORIDE SERPL-SCNC: 108 MMOL/L (ref 98–112)
CO2 SERPL-SCNC: 27 MMOL/L (ref 21–32)
CREAT BLD-MCNC: 0.63 MG/DL
EGFRCR SERPLBLD CKD-EPI 2021: 116 ML/MIN/1.73M2 (ref 60–?)
EOSINOPHIL # BLD AUTO: 0.03 X10(3) UL (ref 0–0.7)
EOSINOPHIL NFR BLD AUTO: 0.7 %
ERYTHROCYTE [DISTWIDTH] IN BLOOD BY AUTOMATED COUNT: 14.7 %
GLOBULIN PLAS-MCNC: 5.1 G/DL (ref 2.8–4.4)
GLUCOSE BLD-MCNC: 122 MG/DL (ref 70–99)
HCT VFR BLD AUTO: 25.2 %
HGB BLD-MCNC: 9 G/DL
HGB RETIC QN AUTO: 40.7 PG (ref 28.2–36.6)
IMM GRANULOCYTES # BLD AUTO: 0.02 X10(3) UL (ref 0–1)
IMM GRANULOCYTES NFR BLD: 0.5 %
IMM RETICS NFR: 0.37 RATIO (ref 0.1–0.3)
LYMPHOCYTES # BLD AUTO: 1.55 X10(3) UL (ref 1–4)
LYMPHOCYTES NFR BLD AUTO: 36.5 %
MCH RBC QN AUTO: 38 PG (ref 26–34)
MCHC RBC AUTO-ENTMCNC: 35.7 G/DL (ref 31–37)
MCV RBC AUTO: 106.3 FL
MONOCYTES # BLD AUTO: 0.51 X10(3) UL (ref 0.1–1)
MONOCYTES NFR BLD AUTO: 12 %
NEUTROPHILS # BLD AUTO: 2.14 X10 (3) UL (ref 1.5–7.7)
NEUTROPHILS # BLD AUTO: 2.14 X10(3) UL (ref 1.5–7.7)
NEUTROPHILS NFR BLD AUTO: 50.3 %
OSMOLALITY SERPL CALC.SUM OF ELEC: 287 MOSM/KG (ref 275–295)
PLATELET # BLD AUTO: 312 10(3)UL (ref 150–450)
POTASSIUM SERPL-SCNC: 3.6 MMOL/L (ref 3.5–5.1)
PROT SERPL-MCNC: 8.9 G/DL (ref 6.4–8.2)
RBC # BLD AUTO: 2.37 X10(6)UL
RETICS # AUTO: 132.5 X10(3) UL (ref 22.5–147.5)
RETICS/RBC NFR AUTO: 5.6 %
SODIUM SERPL-SCNC: 139 MMOL/L (ref 136–145)
WBC # BLD AUTO: 4.3 X10(3) UL (ref 4–11)

## 2023-10-23 PROCEDURE — 36415 COLL VENOUS BLD VENIPUNCTURE: CPT

## 2023-10-23 PROCEDURE — 96376 TX/PRO/DX INJ SAME DRUG ADON: CPT

## 2023-10-23 PROCEDURE — 96361 HYDRATE IV INFUSION ADD-ON: CPT

## 2023-10-23 PROCEDURE — 80053 COMPREHEN METABOLIC PANEL: CPT

## 2023-10-23 PROCEDURE — 85025 COMPLETE CBC W/AUTO DIFF WBC: CPT

## 2023-10-23 PROCEDURE — 99214 OFFICE O/P EST MOD 30 MIN: CPT | Performed by: CLINICAL NURSE SPECIALIST

## 2023-10-23 PROCEDURE — 96374 THER/PROPH/DIAG INJ IV PUSH: CPT

## 2023-10-23 PROCEDURE — 85045 AUTOMATED RETICULOCYTE COUNT: CPT

## 2023-10-23 RX ORDER — MORPHINE SULFATE 4 MG/ML
6 INJECTION, SOLUTION INTRAMUSCULAR; INTRAVENOUS ONCE
Status: COMPLETED | OUTPATIENT
Start: 2023-10-23 | End: 2023-10-23

## 2023-10-23 RX ORDER — DIPHENHYDRAMINE HCL 25 MG
25 CAPSULE ORAL ONCE
Status: COMPLETED | OUTPATIENT
Start: 2023-10-23 | End: 2023-10-23

## 2023-10-23 RX ORDER — DIPHENHYDRAMINE HCL 25 MG
CAPSULE ORAL ONCE
Start: 2023-10-30 | End: 2023-10-30

## 2023-10-23 RX ORDER — OXYCODONE HYDROCHLORIDE 20 MG/1
20 TABLET ORAL
Qty: 150 TABLET | Refills: 0 | Status: SHIPPED | OUTPATIENT
Start: 2023-10-23

## 2023-10-23 RX ORDER — MORPHINE SULFATE 4 MG/ML
6 INJECTION, SOLUTION INTRAMUSCULAR; INTRAVENOUS ONCE
Start: 2023-10-30 | End: 2023-10-30

## 2023-10-23 RX ADMIN — MORPHINE SULFATE 6 MG: 4 INJECTION, SOLUTION INTRAMUSCULAR; INTRAVENOUS at 14:09:00

## 2023-10-23 RX ADMIN — DIPHENHYDRAMINE HCL 25 MG: 25 MG CAPSULE ORAL at 14:07:00

## 2023-10-23 RX ADMIN — MORPHINE SULFATE 6 MG: 4 INJECTION, SOLUTION INTRAMUSCULAR; INTRAVENOUS at 15:12:00

## 2023-10-23 NOTE — TELEPHONE ENCOUNTER
Skye reports she started having a sickle cell crisis over the weekend. She is out of her Oxycodone HCL 20 mg. She has been taking her morphine. She has also been taking her crizanlizumab-tmca and hydroxyurea. Patient is requesting a refill for her oxycodone 20 mg tabs. She is completely out. I am booking her for an ACV with Doris Pina at 1pm today.

## 2023-10-23 NOTE — TELEPHONE ENCOUNTER
Toxicities: [employer] patient with sickle cell disease. Sickle Cell Pain Lower Back & Bilateral Legs    I attempted to reach Skye to do a telephone assessment. I left a voice mail message asking her to please return my call at her earliest convenience.

## 2023-10-23 NOTE — PROGRESS NOTES
Pt completed 1 L 0.9NS and received 1st and 2nd dose of Morphine. Pt felt comfortable being discharged to home with pain level down to 5/10. Pt states she will  oral Rx and has a ride coming to get her now. RAQUEL Devi aware of patient update and gave okay for discharge at this time.      Education Record    Learner:  Patient    Disease / Diagnosis: sickle cell pain crisis    Barriers / Limitations:  None   Comments:    Method:  Discussion   Comments:    General Topics:  Plan of care reviewed   Comments:    Outcome:  Shows understanding   Comments:

## 2023-10-23 NOTE — TELEPHONE ENCOUNTER
Patient called. She is in a lot of pain for the last 24 hours in her lower back and both legs. She has pain sometimes when she walks. She has a heating on her back to relieve her pain. She has run out of her Oxycodone. No other symptoms. Please calll back.

## 2023-10-23 NOTE — PROGRESS NOTES
Patient presents with:  Pain    Pt Is here today for an acute care visit for sickle cell crisis. Pt c/o lower back/ bilateral leg pain 8/10 and chest pain. Pt said she ran out of her oxycodone which was helping with the acute sickle cell crisis symptom. She did have one bout of a bloody nose. Pt denies SOB, swelling, and fevers.      Education Record    Learner:  Patient    Disease / Diagnosis: sickle cell    Barriers / Limitations:  None   Comments:    Method:  Brief focused   Comments:    General Topics:  Pain and Plan of care reviewed   Comments:    Outcome:  Shows understanding   Comments:

## 2023-11-01 ENCOUNTER — OFFICE VISIT (OUTPATIENT)
Dept: HEMATOLOGY/ONCOLOGY | Facility: HOSPITAL | Age: 38
End: 2023-11-01
Attending: INTERNAL MEDICINE
Payer: COMMERCIAL

## 2023-11-01 VITALS
BODY MASS INDEX: 24.27 KG/M2 | TEMPERATURE: 97 F | OXYGEN SATURATION: 99 % | SYSTOLIC BLOOD PRESSURE: 123 MMHG | DIASTOLIC BLOOD PRESSURE: 77 MMHG | WEIGHT: 151 LBS | HEART RATE: 92 BPM | RESPIRATION RATE: 18 BRPM | HEIGHT: 65.98 IN

## 2023-11-01 DIAGNOSIS — D57.00 SICKLE CELL DISEASE WITH CRISIS (HCC): Primary | ICD-10-CM

## 2023-11-01 DIAGNOSIS — D57.1 SICKLE CELL DISEASE WITHOUT CRISIS (HCC): ICD-10-CM

## 2023-11-01 DIAGNOSIS — D57.419 SICKLE CELL ANEMIA WITH COEXISTENT ALPHA-THALASSEMIA WITH CRISIS (HCC): ICD-10-CM

## 2023-11-01 DIAGNOSIS — D57.00 SICKLE CELL PAIN CRISIS (HCC): ICD-10-CM

## 2023-11-01 DIAGNOSIS — D57.00 SICKLE CELL PAIN CRISIS (HCC): Primary | ICD-10-CM

## 2023-11-01 LAB
ALBUMIN SERPL-MCNC: 4 G/DL (ref 3.4–5)
ALBUMIN/GLOB SERPL: 0.8 {RATIO} (ref 1–2)
ALP LIVER SERPL-CCNC: 52 U/L
ALT SERPL-CCNC: 21 U/L
ANION GAP SERPL CALC-SCNC: 6 MMOL/L (ref 0–18)
AST SERPL-CCNC: 18 U/L (ref 15–37)
BASOPHILS # BLD AUTO: 0.02 X10(3) UL (ref 0–0.2)
BASOPHILS NFR BLD AUTO: 0.3 %
BILIRUB SERPL-MCNC: 1 MG/DL (ref 0.1–2)
BUN BLD-MCNC: 6 MG/DL (ref 9–23)
CALCIUM BLD-MCNC: 9.3 MG/DL (ref 8.5–10.1)
CHLORIDE SERPL-SCNC: 110 MMOL/L (ref 98–112)
CO2 SERPL-SCNC: 26 MMOL/L (ref 21–32)
CREAT BLD-MCNC: 0.63 MG/DL
EGFRCR SERPLBLD CKD-EPI 2021: 116 ML/MIN/1.73M2 (ref 60–?)
EOSINOPHIL # BLD AUTO: 0.07 X10(3) UL (ref 0–0.7)
EOSINOPHIL NFR BLD AUTO: 1.2 %
ERYTHROCYTE [DISTWIDTH] IN BLOOD BY AUTOMATED COUNT: 14.5 %
FASTING STATUS PATIENT QL REPORTED: NO
GLOBULIN PLAS-MCNC: 4.9 G/DL (ref 2.8–4.4)
GLUCOSE BLD-MCNC: 99 MG/DL (ref 70–99)
HCT VFR BLD AUTO: 25.3 %
HGB BLD-MCNC: 9.1 G/DL
HGB RETIC QN AUTO: 36.1 PG (ref 28.2–36.6)
IMM GRANULOCYTES # BLD AUTO: 0.03 X10(3) UL (ref 0–1)
IMM GRANULOCYTES NFR BLD: 0.5 %
IMM RETICS NFR: 0.42 RATIO (ref 0.1–0.3)
LYMPHOCYTES # BLD AUTO: 2.51 X10(3) UL (ref 1–4)
LYMPHOCYTES NFR BLD AUTO: 43.3 %
MCH RBC QN AUTO: 37.9 PG (ref 26–34)
MCHC RBC AUTO-ENTMCNC: 36 G/DL (ref 31–37)
MCV RBC AUTO: 105.4 FL
MONOCYTES # BLD AUTO: 0.65 X10(3) UL (ref 0.1–1)
MONOCYTES NFR BLD AUTO: 11.2 %
NEUTROPHILS # BLD AUTO: 2.52 X10 (3) UL (ref 1.5–7.7)
NEUTROPHILS # BLD AUTO: 2.52 X10(3) UL (ref 1.5–7.7)
NEUTROPHILS NFR BLD AUTO: 43.5 %
OSMOLALITY SERPL CALC.SUM OF ELEC: 292 MOSM/KG (ref 275–295)
PLATELET # BLD AUTO: 420 10(3)UL (ref 150–450)
POTASSIUM SERPL-SCNC: 3.4 MMOL/L (ref 3.5–5.1)
PROT SERPL-MCNC: 8.9 G/DL (ref 6.4–8.2)
RBC # BLD AUTO: 2.4 X10(6)UL
RETICS # AUTO: 120.5 X10(3) UL (ref 22.5–147.5)
RETICS/RBC NFR AUTO: 5 %
SODIUM SERPL-SCNC: 142 MMOL/L (ref 136–145)
WBC # BLD AUTO: 5.8 X10(3) UL (ref 4–11)

## 2023-11-01 PROCEDURE — 99215 OFFICE O/P EST HI 40 MIN: CPT | Performed by: INTERNAL MEDICINE

## 2023-11-01 PROCEDURE — 96376 TX/PRO/DX INJ SAME DRUG ADON: CPT

## 2023-11-01 PROCEDURE — 96361 HYDRATE IV INFUSION ADD-ON: CPT

## 2023-11-01 PROCEDURE — 96365 THER/PROPH/DIAG IV INF INIT: CPT

## 2023-11-01 PROCEDURE — 96375 TX/PRO/DX INJ NEW DRUG ADDON: CPT

## 2023-11-01 RX ORDER — MORPHINE SULFATE 4 MG/ML
6 INJECTION, SOLUTION INTRAMUSCULAR; INTRAVENOUS ONCE
Status: COMPLETED | OUTPATIENT
Start: 2023-11-01 | End: 2023-11-01

## 2023-11-01 RX ORDER — MORPHINE SULFATE 4 MG/ML
6 INJECTION, SOLUTION INTRAMUSCULAR; INTRAVENOUS
Qty: 4 ML | Refills: 0 | Status: DISPENSED | OUTPATIENT
Start: 2023-11-01 | End: 2023-11-01

## 2023-11-01 RX ORDER — MORPHINE SULFATE 60 MG/1
60 TABLET, FILM COATED, EXTENDED RELEASE ORAL EVERY 8 HOURS SCHEDULED
Qty: 90 TABLET | Refills: 0 | Status: SHIPPED | OUTPATIENT
Start: 2023-11-01

## 2023-11-01 RX ORDER — DIPHENHYDRAMINE HCL 25 MG
CAPSULE ORAL ONCE
Start: 2023-11-29 | End: 2023-11-29

## 2023-11-01 RX ORDER — MORPHINE SULFATE 4 MG/ML
6 INJECTION, SOLUTION INTRAMUSCULAR; INTRAVENOUS ONCE
Start: 2023-11-29 | End: 2023-11-29

## 2023-11-01 RX ADMIN — MORPHINE SULFATE 6 MG: 4 INJECTION, SOLUTION INTRAMUSCULAR; INTRAVENOUS at 10:25:00

## 2023-11-01 RX ADMIN — MORPHINE SULFATE 6 MG: 4 INJECTION, SOLUTION INTRAMUSCULAR; INTRAVENOUS at 09:25:00

## 2023-11-01 NOTE — PROGRESS NOTES
Education Record     Learner:  Patient     Disease / Diagnosis: sickle cell anemia     Barriers / Limitations:  None                Comments:     Method:  Discussion                Comments:     General Topics:  Plan of care reviewed                Comments:     Outcome:  Shows understanding                Comments: MD f/up and crizanlizumab infusion. Pt states her back pain and leg pain are 6/10 and took her oxy this morning. Pt taking her hydrea and endari as directed. Pt will get pain meds and fluids today as well.

## 2023-11-01 NOTE — PROGRESS NOTES
Education Record    Learner:  Patient    Disease / Diagnosis: Here for Adakveo infusion. Barriers / Limitations:  None    Method:  Brief focused, printed material and  reinforcement    General Topics:  Plan of care reviewed    Outcome:  Shows understanding. Pt saw MD today. Pain medication as well as IV fluids ordered to be given in addition to the Adakveo infusion. Morphine x2 doses given with minimal pain relief. Left in stable condition. Appts made for infusion in 1 month and MD and infusion in 2 months.

## 2023-11-13 ENCOUNTER — TELEPHONE (OUTPATIENT)
Dept: HEMATOLOGY/ONCOLOGY | Facility: HOSPITAL | Age: 38
End: 2023-11-13

## 2023-11-13 ENCOUNTER — OFFICE VISIT (OUTPATIENT)
Dept: HEMATOLOGY/ONCOLOGY | Facility: HOSPITAL | Age: 38
End: 2023-11-13
Attending: CLINICAL NURSE SPECIALIST
Payer: COMMERCIAL

## 2023-11-13 ENCOUNTER — OFFICE VISIT (OUTPATIENT)
Dept: HEMATOLOGY/ONCOLOGY | Facility: HOSPITAL | Age: 38
End: 2023-11-13
Attending: INTERNAL MEDICINE
Payer: COMMERCIAL

## 2023-11-13 VITALS
TEMPERATURE: 99 F | BODY MASS INDEX: 23.76 KG/M2 | OXYGEN SATURATION: 97 % | SYSTOLIC BLOOD PRESSURE: 120 MMHG | HEART RATE: 78 BPM | RESPIRATION RATE: 18 BRPM | HEIGHT: 65.98 IN | DIASTOLIC BLOOD PRESSURE: 80 MMHG | WEIGHT: 147.81 LBS

## 2023-11-13 DIAGNOSIS — D57.00 SICKLE CELL PAIN CRISIS (HCC): ICD-10-CM

## 2023-11-13 DIAGNOSIS — D57.00 SICKLE CELL DISEASE WITH CRISIS (HCC): Primary | ICD-10-CM

## 2023-11-13 DIAGNOSIS — D57.00 SICKLE CELL CRISIS (HCC): Primary | ICD-10-CM

## 2023-11-13 DIAGNOSIS — D57.419 SICKLE CELL ANEMIA WITH COEXISTENT ALPHA-THALASSEMIA WITH CRISIS (HCC): ICD-10-CM

## 2023-11-13 PROCEDURE — 96374 THER/PROPH/DIAG INJ IV PUSH: CPT

## 2023-11-13 PROCEDURE — 96375 TX/PRO/DX INJ NEW DRUG ADDON: CPT

## 2023-11-13 PROCEDURE — 96361 HYDRATE IV INFUSION ADD-ON: CPT

## 2023-11-13 PROCEDURE — 96376 TX/PRO/DX INJ SAME DRUG ADON: CPT

## 2023-11-13 RX ORDER — MORPHINE SULFATE 4 MG/ML
6 INJECTION, SOLUTION INTRAMUSCULAR; INTRAVENOUS ONCE
Start: 2023-11-27 | End: 2023-11-27

## 2023-11-13 RX ORDER — DIPHENHYDRAMINE HCL 25 MG
CAPSULE ORAL ONCE
Start: 2023-11-27 | End: 2023-11-27

## 2023-11-13 RX ORDER — MORPHINE SULFATE 4 MG/ML
6 INJECTION, SOLUTION INTRAMUSCULAR; INTRAVENOUS ONCE
Status: COMPLETED | OUTPATIENT
Start: 2023-11-13 | End: 2023-11-13

## 2023-11-13 RX ORDER — ONDANSETRON 2 MG/ML
8 INJECTION INTRAMUSCULAR; INTRAVENOUS ONCE
Status: COMPLETED | OUTPATIENT
Start: 2023-11-13 | End: 2023-11-13

## 2023-11-13 RX ADMIN — MORPHINE SULFATE 6 MG: 4 INJECTION, SOLUTION INTRAMUSCULAR; INTRAVENOUS at 11:29:00

## 2023-11-13 RX ADMIN — ONDANSETRON 8 MG: 2 INJECTION INTRAMUSCULAR; INTRAVENOUS at 11:39:00

## 2023-11-13 RX ADMIN — MORPHINE SULFATE 6 MG: 4 INJECTION, SOLUTION INTRAMUSCULAR; INTRAVENOUS at 12:56:00

## 2023-11-13 NOTE — PROGRESS NOTES
Pt here for ACV along with hydration and IV pain management. Reports lower back pain and BLE pain since Thursday. Pt states she is taking oral pain meds as directed, but unable to gain adequate relief. Last oxycodone dose for breakthrough pain was today at 0500 & MS ER dose taken this morning at 0600. Okay per Eunice Ryan NP to infuse PRN orders for hydration bolus and morphine 6 mg IVP today prior to NP assessment. Pt also requesting anti-emetic for nausea; verbal order received from NP to give ondansetron 8 mg IVP today. 1240- upon completion of hydration, pt still reporting pain level 7/10 and is requesting additional dose of morphine IVP. Spoke with Eunice Ryan NP and received order to give additional dose. Pt reports pain relief 5/10 on pain scale after additional dose and observation. Pt states she feels comfortable discharging to home at this time. Understands to report back if pain control is still inadequate at home.

## 2023-11-13 NOTE — TELEPHONE ENCOUNTER
Called patient and scheduled an ACV at 1030 with Carol Fuller.
SANDY calling sickle cell pt says she needs hydration having pain in rib cage area back nausea.  On morphine not helping. chucho
Sickle Cell    Called patient, she states having pain in rib cage area and back. Has taken MS and Oxy which only lasts a short time. Rates currently 7/10 with MS taken last at 6 am.    Denies fevers but has had some chills, no sob or cough. Nausea - Grade 2 - having nausea with a little vomiting from pain  Having some lightheadedness. Requesting hydration.
Is This A New Presentation, Or A Follow-Up?: Skin Lesion
How Severe Is Your Skin Lesion?: mild
Has Your Skin Lesion Been Treated?: not been treated

## 2023-11-20 RX ORDER — OXYCODONE HYDROCHLORIDE 20 MG/1
20 TABLET ORAL
Qty: 150 TABLET | Refills: 0 | Status: SHIPPED | OUTPATIENT
Start: 2023-11-20

## 2023-11-28 ENCOUNTER — TELEPHONE (OUTPATIENT)
Dept: HEMATOLOGY/ONCOLOGY | Facility: HOSPITAL | Age: 38
End: 2023-11-28

## 2023-11-28 NOTE — TELEPHONE ENCOUNTER
Patient called [facility] prior auth for her infusions. Through rep at [facility] [place] please call prior auth at [id number]. Please let pt know when authorized. chucho

## 2023-11-29 ENCOUNTER — APPOINTMENT (OUTPATIENT)
Dept: HEMATOLOGY/ONCOLOGY | Facility: HOSPITAL | Age: 38
End: 2023-11-29
Attending: INTERNAL MEDICINE
Payer: COMMERCIAL

## 2023-12-05 ENCOUNTER — TELEPHONE (OUTPATIENT)
Dept: HEMATOLOGY/ONCOLOGY | Age: 38
End: 2023-12-05

## 2023-12-05 NOTE — TELEPHONE ENCOUNTER
Kendrick Putnam from [address] [facility] is calling to check if the pt still takes Glutamine.  The last prescription was on 10/31/22 and if she is she would need a new script with a Prior auth-NL

## 2023-12-05 NOTE — TELEPHONE ENCOUNTER
Returned call from Okarche from [facility]. Will send in new prescription and follow up regarding cover my meds information that needs to be corrected and be sure PA for medication is followed. Kimani conveyed understanding.

## 2023-12-06 RX ORDER — GLUTAMINE 5 G/1
15 POWDER, FOR SOLUTION ORAL 2 TIMES DAILY
Qty: 180 EACH | Refills: 11 | Status: SHIPPED | OUTPATIENT
Start: 2023-12-06

## 2023-12-15 RX ORDER — OXYCODONE HYDROCHLORIDE 20 MG/1
20 TABLET ORAL
Qty: 150 TABLET | Refills: 0 | Status: SHIPPED | OUTPATIENT
Start: 2023-12-15 | End: 2024-01-12

## 2023-12-15 RX ORDER — MORPHINE SULFATE 60 MG/1
60 TABLET, FILM COATED, EXTENDED RELEASE ORAL EVERY 8 HOURS SCHEDULED
Qty: 90 TABLET | Refills: 0 | Status: SHIPPED | OUTPATIENT
Start: 2023-12-15 | End: 2024-01-12

## 2023-12-27 ENCOUNTER — APPOINTMENT (OUTPATIENT)
Dept: HEMATOLOGY/ONCOLOGY | Facility: HOSPITAL | Age: 38
End: 2023-12-27
Attending: INTERNAL MEDICINE
Payer: COMMERCIAL

## 2023-12-27 VITALS
HEART RATE: 86 BPM | DIASTOLIC BLOOD PRESSURE: 74 MMHG | RESPIRATION RATE: 18 BRPM | WEIGHT: 153 LBS | TEMPERATURE: 97 F | OXYGEN SATURATION: 91 % | BODY MASS INDEX: 25 KG/M2 | SYSTOLIC BLOOD PRESSURE: 116 MMHG

## 2023-12-27 DIAGNOSIS — D57.1 SICKLE CELL DISEASE WITHOUT CRISIS (HCC): Primary | ICD-10-CM

## 2023-12-27 DIAGNOSIS — D53.9 MACROCYTIC ANEMIA: ICD-10-CM

## 2023-12-27 DIAGNOSIS — F11.90 CHRONIC, CONTINUOUS USE OF OPIOIDS: ICD-10-CM

## 2023-12-27 LAB
ALBUMIN SERPL-MCNC: 3.7 G/DL (ref 3.4–5)
ALBUMIN/GLOB SERPL: 0.8 {RATIO} (ref 1–2)
ALP LIVER SERPL-CCNC: 50 U/L
ALT SERPL-CCNC: 29 U/L
ANION GAP SERPL CALC-SCNC: 5 MMOL/L (ref 0–18)
AST SERPL-CCNC: 34 U/L (ref 15–37)
BASOPHILS # BLD AUTO: 0.02 X10(3) UL (ref 0–0.2)
BASOPHILS NFR BLD AUTO: 0.3 %
BILIRUB SERPL-MCNC: 1 MG/DL (ref 0.1–2)
BUN BLD-MCNC: 4 MG/DL (ref 9–23)
CALCIUM BLD-MCNC: 8.8 MG/DL (ref 8.5–10.1)
CHLORIDE SERPL-SCNC: 108 MMOL/L (ref 98–112)
CO2 SERPL-SCNC: 28 MMOL/L (ref 21–32)
CREAT BLD-MCNC: 0.53 MG/DL
EGFRCR SERPLBLD CKD-EPI 2021: 121 ML/MIN/1.73M2 (ref 60–?)
EOSINOPHIL # BLD AUTO: 0.12 X10(3) UL (ref 0–0.7)
EOSINOPHIL NFR BLD AUTO: 1.8 %
ERYTHROCYTE [DISTWIDTH] IN BLOOD BY AUTOMATED COUNT: 14.9 %
GLOBULIN PLAS-MCNC: 4.4 G/DL (ref 2.8–4.4)
GLUCOSE BLD-MCNC: 107 MG/DL (ref 70–99)
HCT VFR BLD AUTO: 24.5 %
HGB BLD-MCNC: 8.5 G/DL
HGB RETIC QN AUTO: 39 PG (ref 28.2–36.6)
IMM GRANULOCYTES # BLD AUTO: 0.03 X10(3) UL (ref 0–1)
IMM GRANULOCYTES NFR BLD: 0.4 %
IMM RETICS NFR: 0.42 RATIO (ref 0.1–0.3)
LDH SERPL L TO P-CCNC: 212 U/L
LYMPHOCYTES # BLD AUTO: 2.26 X10(3) UL (ref 1–4)
LYMPHOCYTES NFR BLD AUTO: 33.2 %
MCH RBC QN AUTO: 36.6 PG (ref 26–34)
MCHC RBC AUTO-ENTMCNC: 34.7 G/DL (ref 31–37)
MCV RBC AUTO: 105.6 FL
MONOCYTES # BLD AUTO: 0.85 X10(3) UL (ref 0.1–1)
MONOCYTES NFR BLD AUTO: 12.5 %
NEUTROPHILS # BLD AUTO: 3.53 X10 (3) UL (ref 1.5–7.7)
NEUTROPHILS # BLD AUTO: 3.53 X10(3) UL (ref 1.5–7.7)
NEUTROPHILS NFR BLD AUTO: 51.8 %
OSMOLALITY SERPL CALC.SUM OF ELEC: 289 MOSM/KG (ref 275–295)
PLATELET # BLD AUTO: 417 10(3)UL (ref 150–450)
POTASSIUM SERPL-SCNC: 3.4 MMOL/L (ref 3.5–5.1)
PROT SERPL-MCNC: 8.1 G/DL (ref 6.4–8.2)
RBC # BLD AUTO: 2.32 X10(6)UL
RETICS # AUTO: 178.6 X10(3) UL (ref 22.5–147.5)
RETICS/RBC NFR AUTO: 7.7 %
SODIUM SERPL-SCNC: 141 MMOL/L (ref 136–145)
WBC # BLD AUTO: 6.8 X10(3) UL (ref 4–11)

## 2023-12-27 PROCEDURE — 99214 OFFICE O/P EST MOD 30 MIN: CPT | Performed by: INTERNAL MEDICINE

## 2023-12-27 NOTE — PROGRESS NOTES
Patient here for f/u. Patient c/o 5/10 lower back pain. Patient currently taking oxycodone and morphine er. Patient states that the pain regimen is working. Patient denies HA and SOB. Patient states she has a good appetite and adequate fluid intake. Patient has no further concerns or complaints at this time.     Education Record    Learner:  Patient    Disease / Diagnosis: sickle cell anemia     Barriers / Limitations:  None   Comments:    Method:  Discussion   Comments:    General Topics:  Diet, Medication, Pain, Precautions, Side effects and symptom management, and Plan of care reviewed   Comments:    Outcome:  Shows understanding   Comments:

## 2023-12-28 ENCOUNTER — TELEPHONE (OUTPATIENT)
Dept: HEMATOLOGY/ONCOLOGY | Facility: HOSPITAL | Age: 38
End: 2023-12-28

## 2023-12-28 RX ORDER — HYDROXYUREA 500 MG/1
1500 CAPSULE ORAL DAILY
Qty: 270 CAPSULE | Refills: 3 | Status: SHIPPED | OUTPATIENT
Start: 2023-12-28

## 2023-12-28 NOTE — TELEPHONE ENCOUNTER
----- Message from Edwin Toney MD sent at 12/28/2023  2:41 AM CST -----  Please call patient and advise her to increase the hydroxyurea from 1000 mg daily to 1500 mg daily going forward.      Thanks    Tacos Mane

## 2023-12-28 NOTE — TELEPHONE ENCOUNTER
Left voicemail. Unable to leave detailed message d/t no patient identifiers on voicemail message. Awaiting call back. Will send [employer] message.

## 2024-01-02 ENCOUNTER — OFFICE VISIT (OUTPATIENT)
Dept: HEMATOLOGY/ONCOLOGY | Facility: HOSPITAL | Age: 39
End: 2024-01-02
Attending: NURSE PRACTITIONER
Payer: COMMERCIAL

## 2024-01-02 ENCOUNTER — OFFICE VISIT (OUTPATIENT)
Dept: HEMATOLOGY/ONCOLOGY | Facility: HOSPITAL | Age: 39
End: 2024-01-02
Attending: INTERNAL MEDICINE
Payer: COMMERCIAL

## 2024-01-02 ENCOUNTER — TELEPHONE (OUTPATIENT)
Dept: HEMATOLOGY/ONCOLOGY | Facility: HOSPITAL | Age: 39
End: 2024-01-02

## 2024-01-02 VITALS
TEMPERATURE: 98 F | DIASTOLIC BLOOD PRESSURE: 66 MMHG | WEIGHT: 152 LBS | OXYGEN SATURATION: 100 % | RESPIRATION RATE: 16 BRPM | HEART RATE: 83 BPM | SYSTOLIC BLOOD PRESSURE: 106 MMHG | BODY MASS INDEX: 25 KG/M2

## 2024-01-02 DIAGNOSIS — D57.419 SICKLE CELL ANEMIA WITH COEXISTENT ALPHA-THALASSEMIA WITH CRISIS (HCC): ICD-10-CM

## 2024-01-02 DIAGNOSIS — D57.00 SICKLE CELL PAIN CRISIS (HCC): ICD-10-CM

## 2024-01-02 DIAGNOSIS — D57.1 SICKLE CELL DISEASE WITHOUT CRISIS (HCC): ICD-10-CM

## 2024-01-02 DIAGNOSIS — D57.00 SICKLE CELL DISEASE WITH CRISIS (HCC): Primary | ICD-10-CM

## 2024-01-02 DIAGNOSIS — D53.9 MACROCYTIC ANEMIA: ICD-10-CM

## 2024-01-02 PROCEDURE — 96361 HYDRATE IV INFUSION ADD-ON: CPT

## 2024-01-02 PROCEDURE — 96374 THER/PROPH/DIAG INJ IV PUSH: CPT

## 2024-01-02 PROCEDURE — 96376 TX/PRO/DX INJ SAME DRUG ADON: CPT

## 2024-01-02 PROCEDURE — 99214 OFFICE O/P EST MOD 30 MIN: CPT | Performed by: NURSE PRACTITIONER

## 2024-01-02 RX ORDER — MORPHINE SULFATE 4 MG/ML
6 INJECTION, SOLUTION INTRAMUSCULAR; INTRAVENOUS ONCE
Start: 2024-01-23 | End: 2024-01-23

## 2024-01-02 RX ORDER — MORPHINE SULFATE 4 MG/ML
6 INJECTION, SOLUTION INTRAMUSCULAR; INTRAVENOUS
Status: DISCONTINUED | OUTPATIENT
Start: 2024-01-02 | End: 2024-01-02

## 2024-01-02 RX ADMIN — MORPHINE SULFATE 6 MG: 4 INJECTION, SOLUTION INTRAMUSCULAR; INTRAVENOUS at 15:32:00

## 2024-01-02 RX ADMIN — MORPHINE SULFATE 6 MG: 4 INJECTION, SOLUTION INTRAMUSCULAR; INTRAVENOUS at 14:32:00

## 2024-01-02 NOTE — PROGRESS NOTES
Cancer Center Progress Note    Patient Name: Oluwaseun Oluwadamilola Ogunyemi   YOB: 1985   Medical Record Number: [id number]   [facility]: [id number]   Date of visit: 1/2/2024       Chief Complaint/Reason for Visit:  Chief Complaint   Patient presents with    Follow - Up    Pain      Hematologic History:  *Sickle cell disease, hgb SS  -early 2000's moved from [facility] to   -2016/2017- 1st pregnancy c/b worsening pain crises  -2018- 2nd pregnancy c/b worsening pain crises, including episode of acute chest requiring RBC exchange.  -early 2019- started hydroxyurea, titrated up to 2000mg daily  -7/2020- started Voxelotor, but dc'd shortly after starting d/t poor tolerability with diarrhea, fatigue, elevated LFTs, was not very helpful either.   -5/28/21- 3/2022- received crizanlizumab (Adakveo) however c/b infusion reaction 3/2022 and was only slightly helpful therefore not continued  -10/2021- moved from [facility] (followed with Dr. Walton of [facility]) to [place].   -4/2022- started L-glutamine (Endari) 15g PO BID, cont'd hydrea  -8/2022- moved from [place] (prev followed by Dr. Esau Blount) to [place], [place]  -3/27/23- resumed crizanlizumab d/t more freq acute pain episodes   -5/8/23- reduced hydrea to 1500mg daily (d/t hgb 7.4, abs retic 47K)   -7/2/23- reduced hydrea to 1000mg daily (d/t hgb 7.0, abs retic 59K)   -12/28/23-hydrea dose increased to 1500mg     History of Present Illness:   Oluwaseun Oluwadamilola Ogunyemi is a 38 year old patient of Dr. Mccormick's with sickle cell disease as above. She was last seen for follow up 12/27/23 and at that time was doing well.   She presents today for sickle cell crisis management. She states pain began to worsen Saturday into Sunday, she was in bed for 12 hours due to pain. She was taking max doses of prescribed oxycodone and morphine ER. At that time she got her pain down to a 5 but was unable to control pain long term. Right now she rates her pain at  a 8/10 primarily to her thighs and shoulders, some pain to her rib cage. She denies chest pain or shortness of breath. No recent fevers or signs of infection, no known sick contacts. She has been eating and drinking well, yesterday she increased her fluids given increase in pain. She continues to take hydrea 1000mg - has not increased dose yet.   She has no nausea or vomiting  No bleeding concerns     Problem List:  Patient Active Problem List   Diagnosis    Sickle cell crisis (HCC)    Sickle cell anemia (HCC)    Hypokalemia    Sickle cell pain crisis (HCC)    At risk for negative response to nurse controlled analgesia    Constipation due to opioid therapy    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)    Nausea    Chronic, continuous use of opioids    Elevated troponin    Macrocytic anemia    Chest pain of uncertain etiology    Dehydration        Medical History:  Past Medical History:   Diagnosis Date    Abnormal antibody titer 2017    Anti-C, Anti-E, Anti-K, Warm Auto Antibody    Acute chest syndrome due to sickle cell crisis ([facility]) 2021    Acute chest syndrome due to sickle-cell disease ([facility]) 2018    RBC exchange for acute chest    Chronic, continuous use of opioids     Constipation due to opioid therapy     History of transfusion     multiple blood transfusions, most of which were during pregnancies    Hypokalemia 2022    Nausea 2023    Sickle cell anemia (HCC)     Sickle cell anemia with coexistent alpha-thalassemia with crisis ([facility]) 2023    Sickle cell crisis ([facility])     Frequent crises    Sickle cell crisis ([facility])     Sickle cell pain crisis ([facility]) 2022       Surgical History:  Past Surgical History:   Procedure Laterality Date                   Allergies:  Allergies   Allergen Reactions    Piperacillin Sod-Tazobactam So ITCHING       Family History:  Family History   Problem Relation Age of Onset    Hypertension Mother          unknown    Cataracts Mother     No Known Problems Father         was told cardiac arrest    No Known Problems Brother     Sickle Cell Maternal Aunt         passed from kidney failure    DVT/VTE Other     Breast Cancer Neg     Ovarian Cancer Neg     Colon Cancer Neg        Social History:  Social History     Socioeconomic History    Marital status:      Spouse name: Not on file    Number of children: Not on file    Years of education: Not on file    Highest education level: Not on file   Occupational History    Not on file   Tobacco Use    Smoking status: Never    Smokeless tobacco: Never   Vaping Use    Vaping Use: Never used   Substance and Sexual Activity    Alcohol use: Never     Comment: No history of excessive use    Drug use: Never    Sexual activity: Yes     Partners: Male     Birth control/protection: Condom   Other Topics Concern    Not on file   Social History Narrative    , has 2 children ages 4 and 5 as of 9/2022.  Not employed.     Social Determinants of Health     Financial Resource Strain: Low Risk  (4/28/2023)    Financial Resource Strain     Difficulty of Paying Living Expenses: Not very hard     Med Affordability: Not on file   Food Insecurity: Not on file   Transportation Needs: No Transportation Needs (4/28/2023)    Transportation Needs     Lack of Transportation: No   Physical Activity: Not on file   Stress: Not on file   Social Connections: Not on file   Housing Stability: Not on file       Medications:    Current Outpatient Medications:     hydroxyurea 500 MG Oral Cap, Take 3 capsules (1,500 mg total) by mouth daily., Disp: 270 capsule, Rfl: 3    morphINE ER 60 MG Oral Tab CR, Take 1 tablet (60 mg total) by mouth every 8 (eight) hours., Disp: 90 tablet, Rfl: 0    oxyCODONE HCl 20 MG Oral Tab, Take 1 tablet (20 mg total) by mouth every 3 (three) hours as needed., Disp: 150 tablet, Rfl: 0    Glutamine, Sickle Cell, (ENDARI) 5 g Oral Powd Pack, Take 15 g by mouth in the morning  and 15 g before bedtime., Disp: 180 each, Rfl: 11    aspirin 81 MG Oral Tab EC, Take 1 tablet (81 mg total) by mouth daily., Disp: 90 tablet, Rfl: 3    ondansetron (ZOFRAN) 8 MG tablet, Take 1 tablet (8 mg total) by mouth every 8 (eight) hours as needed for Nausea., Disp: 30 tablet, Rfl: 3    folic acid 1 MG Oral Tab, Take 1 tablet (1 mg total) by mouth daily., Disp: 90 tablet, Rfl: 3    Naloxone HCl 4 MG/0.1ML Nasal Liquid, 4 mg by Nasal route as needed. If patient remains unresponsive, repeat dose in other nostril 2-5 minutes after first dose., Disp: 1 kit, Rfl: 0    metoprolol succinate ER 25 MG Oral Tablet 24 Hr, Take 1 tablet (25 mg total) by mouth daily., Disp: , Rfl:     Cyanocobalamin (VITAMIN B-12 OR), Take 200 mcg by mouth daily., Disp: , Rfl:     Review of Systems:  A comprehensive 14 point review of systems was completed.  Pertinent positives and negatives noted in the HPI.    Performance Status: ECOG 0 - Fully active, able to carry on all predisease activities without restrictions.    Physical Examination:  Vital Signs: Height: --  Weight: 68.9 kg (152 lb) (01/02 1404)  BSA (Calculated - sq m): --  Pulse: 83 (01/02 1404)  BP: 106/66 (01/02 1404)  Temp: 98 °F (36.7 °C) (01/02 1404)  Do Not Use - Resp Rate: --  SpO2: 100 % (01/02 1404)    General: Patient is alert and oriented x 3, not in acute distress.  Chest: Clear to auscultation. Respirations unlabored.   Heart: Regular rate and rhythm.   Abdomen: Soft, non-distended, non-tender with present bowel sounds.  Extremities: No edema.  Neurological: Grossly intact.   Skin: warm, dry, no erythema or rash   Psych/Depression: mood and affect are appropriate.     Labs:   No results found for this or any previous visit (from the past 72 hour(s)).    Impression/Plan    Sickle cell disease:  -Symptoms consistent with history of crises. No active symptoms of acute chest. No signs of infection. Last crises 11/13/23.  -Continue hydrea, increase dose to 1500mg per  Dr. Mccormick last office visit. Reviewed with her.   -Continue crizanlizumab every 4 weeks,     -Will given 1L IVF in clinic  -Morphine 6mg up to 3 doses  -Oral benadryl was taken at home around 9AM        Planned Follow Up:  1/24/24 as scheduled, sooner if needed      Risk Level: HIGH, sickle cell disease, receiving systemic therapy requiring close monitoring     The 21st Century Cures Act makes medical notes like these available to patients in the interest of transparency. Please be advised this is a medical document. Medical documents are intended to carry relevant information, facts as evident, and the clinical opinion of the practitioner. The medical note is intended as peer to peer communication and may appear blunt or direct. It is written in medical language and may contain abbreviations or verbiage that are unfamiliar.     Electronically Signed by:    ROSHNI Dunn-BC  Nurse Practitioner  [facility]

## 2024-01-02 NOTE — TELEPHONE ENCOUNTER
I called Skye and offered her a 1:30 pm ACV with JUAN Wallace and IV hydration at 2pm. She agreed. I updated Adela, Charge RN [facility].  
Patient called.  She has had pain in lower back and joints since early this morning.  The pain medication has helped a little.  Please call back.  Thank you.   
Toxicities: Hydroxyurea    Dr Mccormick patient with Sickle Cell Disease having a sickle cell crisis today.    Pain: (Patient reports she has not missed any doses of her hydroxyrurea. She woke up with pain in both her shoulders, both knees, both thighs and her lower back. No chest pain or dyspnea. She took Morphine sulfate ER 60 mg at 7:30 am today. She is also taking Oxycodone HCL 20 mg every 3 hrs for pain. Her last dose was at 10 am today. She reports her pain was \"7/10\" before she took her pain medication. Her pain is still \"7/10.\" She is asking if she can come in for IV hydration and pain medication today?)    I told Skye I need to update Dr Mccormick and check with Adela, Charge Nurse Infusion to see if they can add her on today. I will call her back.                                                                                                 
13-Jun-2020 20:33

## 2024-01-02 NOTE — PROGRESS NOTES
Pt tolerated IVF and pain medication as ordered. After two doses of morphine pt stated that pain was down to 5/10, which she reports as her baseline. Pt stated she was comfortable discharging to home and will update clinic if unable manage pain at home. Update given to RAQUEL Pagan.     Education Record    Learner:  Patient    Disease / Diagnosis: sickle cell crisis     Barriers / Limitations:  None   Comments:    Method:  Discussion   Comments:    General Topics:  Plan of care reviewed   Comments:    Outcome:  Shows understanding   Comments:

## 2024-01-03 NOTE — PLAN OF CARE
Pt a/o x4. VSS on RA. Afebrile. C/o generalized pain 7-8/10, prns given. Pt reports \"some relief\" after pain meds. Meds per MAR. IVF infusing. Pt up to bathroom w/ SBA. Fall risk protocol followed, call light within reach.      Problem: PAIN - ADULT  Goal: Verbalizes/displays adequate comfort level or patient's stated pain goal  Description: INTERVENTIONS:  - Encourage pt to monitor pain and request assistance  - Assess pain using appropriate pain scale  - Administer analgesics based on type and severity of pain and evaluate response  - Implement non-pharmacological measures as appropriate and evaluate response  - Consider cultural and social influences on pain and pain management  - Manage/alleviate anxiety  - Utilize distraction and/or relaxation techniques  - Monitor for opioid side effects  - Notify MD/LIP if interventions unsuccessful or patient reports new pain  - Anticipate increased pain with activity and pre-medicate as appropriate  Outcome: Progressing Never smoker

## 2024-01-12 DIAGNOSIS — D57.00 SICKLE CELL PAIN CRISIS (HCC): Primary | ICD-10-CM

## 2024-01-12 RX ORDER — OXYCODONE HYDROCHLORIDE 20 MG/1
20 TABLET ORAL
Qty: 150 TABLET | Refills: 0 | Status: SHIPPED | OUTPATIENT
Start: 2024-01-12

## 2024-01-12 RX ORDER — MORPHINE SULFATE 60 MG/1
60 TABLET, FILM COATED, EXTENDED RELEASE ORAL EVERY 8 HOURS SCHEDULED
Qty: 90 TABLET | Refills: 0 | Status: SHIPPED | OUTPATIENT
Start: 2024-01-12

## 2024-01-24 ENCOUNTER — OFFICE VISIT (OUTPATIENT)
Dept: HEMATOLOGY/ONCOLOGY | Facility: HOSPITAL | Age: 39
End: 2024-01-24
Attending: INTERNAL MEDICINE
Payer: COMMERCIAL

## 2024-01-24 VITALS
DIASTOLIC BLOOD PRESSURE: 77 MMHG | HEART RATE: 94 BPM | WEIGHT: 150 LBS | BODY MASS INDEX: 24.11 KG/M2 | SYSTOLIC BLOOD PRESSURE: 116 MMHG | TEMPERATURE: 98 F | HEIGHT: 65.98 IN | RESPIRATION RATE: 16 BRPM | OXYGEN SATURATION: 97 %

## 2024-01-24 DIAGNOSIS — D57.419 SICKLE CELL ANEMIA WITH COEXISTENT ALPHA-THALASSEMIA WITH CRISIS (HCC): ICD-10-CM

## 2024-01-24 DIAGNOSIS — D57.1 SICKLE CELL DISEASE WITHOUT CRISIS (HCC): ICD-10-CM

## 2024-01-24 DIAGNOSIS — D57.00 SICKLE CELL PAIN CRISIS (HCC): ICD-10-CM

## 2024-01-24 DIAGNOSIS — D57.00 SICKLE CELL PAIN CRISIS (HCC): Primary | ICD-10-CM

## 2024-01-24 DIAGNOSIS — D57.00 SICKLE CELL DISEASE WITH CRISIS (HCC): Primary | ICD-10-CM

## 2024-01-24 LAB
ALBUMIN SERPL-MCNC: 4.2 G/DL (ref 3.4–5)
ALBUMIN/GLOB SERPL: 0.9 {RATIO} (ref 1–2)
ALP LIVER SERPL-CCNC: 50 U/L
ANION GAP SERPL CALC-SCNC: 3 MMOL/L (ref 0–18)
AST SERPL-CCNC: 30 U/L (ref 15–37)
BASOPHILS # BLD AUTO: 0.03 X10(3) UL (ref 0–0.2)
BASOPHILS NFR BLD AUTO: 0.6 %
BILIRUB SERPL-MCNC: 1.2 MG/DL (ref 0.1–2)
BUN BLD-MCNC: 4 MG/DL (ref 9–23)
CALCIUM BLD-MCNC: 8.9 MG/DL (ref 8.5–10.1)
CHLORIDE SERPL-SCNC: 111 MMOL/L (ref 98–112)
CO2 SERPL-SCNC: 24 MMOL/L (ref 21–32)
CREAT BLD-MCNC: 0.61 MG/DL
EGFRCR SERPLBLD CKD-EPI 2021: 117 ML/MIN/1.73M2 (ref 60–?)
EOSINOPHIL # BLD AUTO: 0.07 X10(3) UL (ref 0–0.7)
EOSINOPHIL NFR BLD AUTO: 1.5 %
ERYTHROCYTE [DISTWIDTH] IN BLOOD BY AUTOMATED COUNT: 15.1 %
GLOBULIN PLAS-MCNC: 4.7 G/DL (ref 2.8–4.4)
GLUCOSE BLD-MCNC: 99 MG/DL (ref 70–99)
HCT VFR BLD AUTO: 24.7 %
HGB BLD-MCNC: 9.2 G/DL
HGB RETIC QN AUTO: 42.4 PG (ref 28.2–36.6)
IMM GRANULOCYTES # BLD AUTO: 0.01 X10(3) UL (ref 0–1)
IMM GRANULOCYTES NFR BLD: 0.2 %
IMM RETICS NFR: 0.3 RATIO (ref 0.1–0.3)
LYMPHOCYTES # BLD AUTO: 2.56 X10(3) UL (ref 1–4)
LYMPHOCYTES NFR BLD AUTO: 53.1 %
MCH RBC QN AUTO: 38.2 PG (ref 26–34)
MCHC RBC AUTO-ENTMCNC: 37.2 G/DL (ref 31–37)
MCV RBC AUTO: 102.5 FL
MONOCYTES # BLD AUTO: 0.46 X10(3) UL (ref 0.1–1)
MONOCYTES NFR BLD AUTO: 9.5 %
NEUTROPHILS # BLD AUTO: 1.69 X10 (3) UL (ref 1.5–7.7)
NEUTROPHILS # BLD AUTO: 1.69 X10(3) UL (ref 1.5–7.7)
NEUTROPHILS NFR BLD AUTO: 35.1 %
OSMOLALITY SERPL CALC.SUM OF ELEC: 283 MOSM/KG (ref 275–295)
PLATELET # BLD AUTO: 316 10(3)UL (ref 150–450)
POTASSIUM SERPL-SCNC: 4.2 MMOL/L (ref 3.5–5.1)
PROT SERPL-MCNC: 8.9 G/DL (ref 6.4–8.2)
RBC # BLD AUTO: 2.41 X10(6)UL
RETICS # AUTO: 115.2 X10(3) UL (ref 22.5–147.5)
RETICS/RBC NFR AUTO: 4.8 %
SODIUM SERPL-SCNC: 138 MMOL/L (ref 136–145)
WBC # BLD AUTO: 4.8 X10(3) UL (ref 4–11)

## 2024-01-24 PROCEDURE — 96375 TX/PRO/DX INJ NEW DRUG ADDON: CPT

## 2024-01-24 PROCEDURE — 96376 TX/PRO/DX INJ SAME DRUG ADON: CPT

## 2024-01-24 PROCEDURE — 96361 HYDRATE IV INFUSION ADD-ON: CPT

## 2024-01-24 PROCEDURE — 96365 THER/PROPH/DIAG IV INF INIT: CPT

## 2024-01-24 PROCEDURE — 99215 OFFICE O/P EST HI 40 MIN: CPT | Performed by: INTERNAL MEDICINE

## 2024-01-24 RX ORDER — MORPHINE SULFATE 4 MG/ML
6 INJECTION, SOLUTION INTRAMUSCULAR; INTRAVENOUS ONCE
Status: COMPLETED | OUTPATIENT
Start: 2024-01-24 | End: 2024-01-24

## 2024-01-24 RX ORDER — MORPHINE SULFATE 4 MG/ML
6 INJECTION, SOLUTION INTRAMUSCULAR; INTRAVENOUS ONCE
Start: 2024-02-20 | End: 2024-02-20

## 2024-01-24 RX ADMIN — MORPHINE SULFATE 6 MG: 4 INJECTION, SOLUTION INTRAMUSCULAR; INTRAVENOUS at 09:28:00

## 2024-01-24 RX ADMIN — MORPHINE SULFATE 6 MG: 4 INJECTION, SOLUTION INTRAMUSCULAR; INTRAVENOUS at 11:02:00

## 2024-01-24 NOTE — PROGRESS NOTES
Hematology Clinic Follow Up Visit    Patient Name: Oluwaseun Oluwadamilola Ogunyemi  Medical Record Number: [id number]   YOB: 1985    PCP: Clive St MD     Reason for Consultation:  Oluwaseun Oluwadamilola Ogunyemi was seen today for the diagnosis of hgb SS/sickle cell disease    Hematologic History:  *Sickle cell disease, hgb SS  -early 2000's moved from [facility] to   -2016/2017- 1st pregnancy c/b worsening pain crises  -2018- 2nd pregnancy c/b worsening pain crises, including episode of acute chest requiring RBC exchange.  -early 2019- started hydroxyurea, titrated up to 2000mg daily  -7/2020- started Voxelotor, but dc'd shortly after starting d/t poor tolerability with diarrhea, fatigue, elevated LFTs, was not very helpful either.   -5/28/21- 3/2022- received crizanlizumab (Adakveo) however c/b infusion reaction 3/2022 and was only slightly helpful therefore not continued  -10/2021- moved from [facility] (followed with Dr. Walton of [facility]) to [place].   -4/2022- started L-glutamine (Endari) 15g PO BID, cont'd hydrea  -8/2022- moved from [place] (prev followed by Dr. Esau Blount) to [place], [place]  -3/27/23- resumed crizanlizumab d/t more freq acute pain episodes   -5/8/23- reduced hydrea to 1500mg daily (d/t hgb 7.4, abs retic 47K)   -7/2/23- reduced hydrea to 1000mg daily (d/t hgb 7.0, abs retic 59K)   -12/27/23- increased Hydrea to 1500 mg daily    ================================  Interval events: Presents for follow-up.  She is due for her next crizanlizumab infusion, her insurance will now start covering this again.  Since last visit she has been taking Hydrea 1500 mg daily which is increased from prior dosing.  She has not had any side effects with this.      She has not had any recent hospitalizations for pain episodes.  She does report having some lingering more frequent milder acute pain episodes lately especially the last few days.  Pain involves her back and  waist.  Overnight she had some chest pain but this is since resolved.    At home she is taking MS Contin 60 mg q8hrs and oxycodone IR 20 mg q3 hrs prn.      She is also taking Endari 15 g twice daily and folic acid 1 mg daily.      No recent fevers or infections.  No increased dyspnea, chest pain, bowel, or bladder changes.    She received an updated COVID-19 vaccine booster and influenza vaccine this season already.    Hospitalizations and ED visits for acute vaso-occlusive pain episodes since moving from [place] to [place] in 2022:  -22- hospitalization  -22- ED  -22- hospitalization  10/8/22- hospitalization  -10/29/22- ED  -22- ED  -22- ED   -23- hospitalization  -23- hospitalization  -23- hospitalization    Past Medical History:  Past Medical History:   Diagnosis Date    Abnormal antibody titer 2017    Anti-C, Anti-E, Anti-K, Warm Auto Antibody    Acute chest syndrome due to sickle cell crisis (HCC) 2021    Acute chest syndrome due to sickle-cell disease (HCC) 2018    RBC exchange for acute chest    Chronic, continuous use of opioids     Constipation due to opioid therapy     History of transfusion     multiple blood transfusions, most of which were during pregnancies    Hypokalemia 2022    Nausea 2023    Sickle cell anemia (HCC)     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) 2023    Sickle cell crisis (HCC)     Frequent crises    Sickle cell crisis (HCC)     Sickle cell pain crisis (HCC) 2022     Past Surgical History:   Procedure Laterality Date            2018     Home Medications:   oxyCODONE HCl 20 MG Oral Tab Take 1 tablet (20 mg total) by mouth every 3 (three) hours as needed. 150 tablet 0    morphINE ER 60 MG Oral Tab CR Take 1 tablet (60 mg total) by mouth every 8 (eight) hours. 90 tablet 0    hydroxyurea 500 MG Oral Cap Take 3 capsules (1,500 mg total) by mouth daily.  270 capsule 3    Glutamine, Sickle Cell, (ENDARI) 5 g Oral Powd Pack Take 15 g by mouth in the morning and 15 g before bedtime. 180 each 11    aspirin 81 MG Oral Tab EC Take 1 tablet (81 mg total) by mouth daily. 90 tablet 3    ondansetron (ZOFRAN) 8 MG tablet Take 1 tablet (8 mg total) by mouth every 8 (eight) hours as needed for Nausea. 30 tablet 3    folic acid 1 MG Oral Tab Take 1 tablet (1 mg total) by mouth daily. 90 tablet 3    Naloxone HCl 4 MG/0.1ML Nasal Liquid 4 mg by Nasal route as needed. If patient remains unresponsive, repeat dose in other nostril 2-5 minutes after first dose. 1 kit 0    metoprolol succinate ER 25 MG Oral Tablet 24 Hr Take 1 tablet (25 mg total) by mouth daily.      Cyanocobalamin (VITAMIN B-12 OR) Take 200 mcg by mouth daily.       Allergies:   Allergies   Allergen Reactions    Piperacillin Sod-Tazobactam So ITCHING       Psychosocial History:  Social History     Social History Narrative    , has 2 children ages 4 and 5 as of 9/2022.  Not employed.     Social History     Socioeconomic History    Marital status:    Tobacco Use    Smoking status: Never    Smokeless tobacco: Never   Vaping Use    Vaping Use: Never used   Substance and Sexual Activity    Alcohol use: Never     Comment: No history of excessive use    Drug use: Never    Sexual activity: Yes     Partners: Male     Birth control/protection: Condom   Social History Narrative    , has 2 children ages 4 and 5 as of 9/2022.  Not employed.     Social Determinants of Health     Financial Resource Strain: Low Risk  (4/28/2023)    Financial Resource Strain     Difficulty of Paying Living Expenses: Not very hard   Transportation Needs: No Transportation Needs (4/28/2023)    Transportation Needs     Lack of Transportation: No       Family Medical History:  Family History   Problem Relation Age of Onset    Hypertension Mother         unknown    Cataracts Mother     No Known Problems Father         was told cardiac  arrest    No Known Problems Brother     Sickle Cell Maternal Aunt         passed from kidney failure    DVT/VTE Other     Breast Cancer Neg     Ovarian Cancer Neg     Colon Cancer Neg      Review of Systems:  A 10-point ROS was done with pertinent positives and negative per the HPI    Vital Signs:  Height: 167.6 cm (5' 5.98\") (01/24 0831)  Weight: 68 kg (150 lb) (01/24 0831)  BSA (Calculated - sq m): 1.77 sq meters (01/24 0831)  Pulse: 94 (01/24 0831)  BP: 116/77 (01/24 0831)  Temp: 97.6 °F (36.4 °C) (01/24 0831)  Do Not Use - Resp Rate: --  SpO2: 97 % (01/24 0831)    Wt Readings from Last 6 Encounters:   01/24/24 68 kg (150 lb)   01/02/24 68.9 kg (152 lb)   12/27/23 69.4 kg (153 lb)   11/13/23 67 kg (147 lb 12.8 oz)   11/01/23 68.5 kg (151 lb)   10/23/23 67.6 kg (149 lb)     Physical Examination:  General: Patient is alert and oriented, not in acute distress  Psych: Mood and affect are appropriate  Eyes: EOMI  ENT: Oropharynx is clear  CV: Regular rate and rhythm, no murmurs, no LE edema  Respiratory: Lungs clear to auscultation bilaterally  GI/Abd: Soft, non-tender with normoactive bowel sounds, no hepatosplenomegaly  Neurological: Grossly intact   Lymphatics: No palpable lymphadenopathy  Skin: no rashes or petechiae    Laboratory:  Lab Results   Component Value Date    WBC 4.8 01/24/2024    WBC 6.8 12/27/2023    WBC 5.8 11/01/2023    HGB 9.2 (L) 01/24/2024    HGB 8.5 (L) 12/27/2023    HGB 9.1 (L) 11/01/2023    HCT 24.7 (L) 01/24/2024    .5 (H) 01/24/2024    MCH 38.2 (H) 01/24/2024    MCHC 37.2 (H) 01/24/2024    RDW 15.1 01/24/2024    .0 01/24/2024    .0 12/27/2023    .0 11/01/2023     Lab Results   Component Value Date    GLU 99 01/24/2024    BUN 4 (L) 01/24/2024    CREATSERUM 0.61 01/24/2024    CREATSERUM 0.53 (L) 12/27/2023    CREATSERUM 0.63 11/01/2023    ANIONGAP 3 01/24/2024    CA 8.9 01/24/2024    OSMOCALC 283 01/24/2024    ALKPHO 50 01/24/2024    AST 30 01/24/2024    ALT   01/24/2024      Comment:      Due to  backorder we are temporarily unable to offer hospital-based ALT testing at [facility].   If urgently needed, please order ALT test code [id number].   The new order will need a new venipuncture and will be sent to [facility] for testing.   The expected turnaround time will be within 24 hours.     BILT 1.2 01/24/2024    TP 8.9 (H) 01/24/2024    ALB 4.2 01/24/2024    GLOBULIN 4.7 (H) 01/24/2024     01/24/2024    K 4.2 01/24/2024     01/24/2024    CO2 24.0 01/24/2024     Lab Results   Component Value Date     12/27/2023     07/12/2023     05/08/2023     03/27/2023     02/16/2023     12/22/2022     09/05/2022     No results found for: \"PTT\", \"PT\", \"INR\"    Lab Results   Component Value Date    RETICABSOL 115.2 01/24/2024    RETICABSOL 178.6 (H) 12/27/2023    RETICABSOL 120.5 11/01/2023    RETICABSOL 132.5 10/23/2023    RETICABSOL 104.0 09/15/2023    RETICABSOL 135.9 09/06/2023    RETICABSOL 87.1 07/12/2023    RETICABSOL 59.1 07/01/2023    RETICABSOL 86.6 06/05/2023    RETICABSOL 47.8 05/08/2023     Lab Results   Component Value Date    CORINNE 279.5 (H) 02/16/2023    CORINNE 395.5 (H) 09/05/2022     Lab Results   Component Value Date    SAT 28 02/16/2023    SAT 36 09/05/2022     Lab Results   Component Value Date    B12 1,792 (H) 07/12/2023    B12 182 (L) 05/08/2023     Lab Results   Component Value Date    MMA 95 05/08/2023     Component      Latest Ref Rng & Units 9/5/2022   HEMOGLOBIN A      95.5 - 100.0 % <1.0 (L)   HEMOGLOBIN A2      1.5 - 3.5 % 1.3 (L)   HEMOGLOBIN F (FETAL)      0.0 - 2.0 % 45.0 (H)   HEMOGLOBIN S      % 53.7   HGB Electophoresis Interp       Overall, 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 Although HgB F is often elevated in patients with sickle cell disease, such elevations do not commonly exceed 15% of the total hemoglobin. Possible  considerations for the degree of HgB F elevation as seen in this patient may include therapy effect (Hydroxyurea) vs. hereditary persistence of fetal hemoglobin (HPFH).      Impression & Plan:     *Hgb SS/Sickle cell disease  -+hx of acute chest in 2018 requiring RBC exchange.  She reports having less than 10 transfusions in her lifetime, most of the transfusions she received were during prior pregnancies.   -She has had an excellent response with hydroxyurea with marked increase in hemoglobin F.  Previously had to reduce dosing from 2000 mg daily to 1000 mg daily due to worsening hypoproliferative anemia but now seems to be doing better.  Today her she meets criteria for escalation of hydrea as her ANC is >1000, abs retic ct >100K, and plt ct >150, but will hold off increasing since her dose was escalated only 4 weeks ago.    -Continue hydroxyurea 1500 mg daily at this time.    -Started L-glutamine 15 g BID 4/2022, in effort to reduce frequency of acute pain episodes, so far she thinks this might be helpful, will continue this.    -restarted Crizanlizumab (adakveo) 3/16/23, tolerating well without complication.  Continue crizanlizumab 5 mg/kg q4 weeks.  The frequency of her hospitalizations and pain crises have decreased since she started this.    -Previously intolerant to Voxelotor   -Recommend Folvite 1 mg daily to be continued indefinitely  -Recommend annual ophthalmology visits  -She already received an influenza and updated COVID-19 vaccine this season.  -encouraged to make an appt with Dr. Flaco Villarreal at [facility] to discuss the recently FDA approved gene therapies or HSCT for sickle cell disease.     *macrocytic anemia  -d/t sickle cell anemia + hydroxyurea effect + B12 def  -stable/improved today  -Continue vitamin B-12 1000 mcg PO daily to be continued indefinitely  -continue folvite 1mg daily indefinitely  -repeat CBC and retic ct in 4 weeks    *Chronic pain management  -Oxycodone 20 mg IR PO q3hrs prn  -MS  Contin 60mg q8 hrs.   -Duloxetine was not helpful    *Acute pain episode  -We will give IV fluid hydration and IV morphine as below while she is in the infusion unit today for acute pain flare      *Management recommendations of acute pain episodes not controlled with home meds  -Hydration with IV fluids with 0.9 NS until determined to be euvolemic, then maintained with 0.5 NS as relative hypernatremia can increase RBC sickling.  -Recommend prompt management of painful symptoms with parenteral opioids- recommend starting IV morphine 6 mg prn up to 3 doses for additional pain relief.  If needed, a morphine PCA can be initiated on admission.  For pruritus related to opiates consider PO Benadryl or cetirizine.  I favor avoiding IV Benadryl.   -For any acute pain episode evaluation for potential infection or development of acute chest should be considered.  -standing order remains in place for IV morphine 6 mg up to 3 doses prn + 1 L of 0.9 NS IVF + PO benadryl 25-50mg prn for acute pain episodes that can be managed as an outpatient in the infusion center PRN in effort to avoid ED visits as able.     RTC in 4 weeks    Esau Mccormick MD  Hematology/Medical Oncology  [facility]

## 2024-01-24 NOTE — PROGRESS NOTES
Pt here for Adakveo; IV fluids; pain medications . Pt denies any issues or concerns.      Ordering MD: Dr. Mccormick  Order Exp: Adakveo and Morphine - 3/16/2024; IVF on 1/2/25       Pt tolerated infusion without difficulty or complaint. Reviewed next apt date/time: Yes - next appointment set up for 4 weeks from today for PL; MD; Adakveo infusion      Education Record  Learner:  Patient  Disease / Diagnosis: Sickle Cell Anemia  Barriers / Limitations:  None  Method:  Brief focused and Reinforcement  General Topics:  Medication, Pain, and Plan of care reviewed  Outcome:  Shows understanding  Patient requested if she could only have half of the fluids - ok per Dr. Mccormick.

## 2024-01-24 NOTE — PROGRESS NOTES
Patient here for 1 month f/u and possible Adakveo infusion. Pain states that she has pain in lower back. Patient rates pain 5/10. Patient took oxycodone and morphine at 0200. Patient states she fell asleep. Patient denies dyspnea, headache or chest pain. Patient states she had chest pain at 0100 today. Patient states she had mild discomfort around her chest, since has resolved. Patient states she has been having ongoing sickle cell crisis for last couple days. Patient states she has been hydrating well. Patient has no further concerns or complaints at this time.     Education Record    Learner:  Patient    Disease / Diagnosis: sickle cell    Barriers / Limitations:  None   Comments:    Method:  Discussion   Comments:    General Topics:  Diet, Medication, Pain, Side effects and symptom management, and Plan of care reviewed   Comments:    Outcome:  Shows understanding   Comments:

## 2024-02-05 ENCOUNTER — OFFICE VISIT (OUTPATIENT)
Dept: HEMATOLOGY/ONCOLOGY | Facility: HOSPITAL | Age: 39
End: 2024-02-05
Attending: INTERNAL MEDICINE
Payer: COMMERCIAL

## 2024-02-05 ENCOUNTER — OFFICE VISIT (OUTPATIENT)
Dept: HEMATOLOGY/ONCOLOGY | Facility: HOSPITAL | Age: 39
End: 2024-02-05
Attending: NURSE PRACTITIONER
Payer: COMMERCIAL

## 2024-02-05 ENCOUNTER — TELEPHONE (OUTPATIENT)
Dept: HEMATOLOGY/ONCOLOGY | Facility: HOSPITAL | Age: 39
End: 2024-02-05

## 2024-02-05 VITALS
HEIGHT: 65.98 IN | RESPIRATION RATE: 16 BRPM | DIASTOLIC BLOOD PRESSURE: 73 MMHG | TEMPERATURE: 97 F | BODY MASS INDEX: 23.95 KG/M2 | OXYGEN SATURATION: 100 % | HEART RATE: 69 BPM | WEIGHT: 149 LBS | SYSTOLIC BLOOD PRESSURE: 110 MMHG

## 2024-02-05 DIAGNOSIS — D57.00 SICKLE CELL PAIN CRISIS (HCC): ICD-10-CM

## 2024-02-05 DIAGNOSIS — D57.00 SICKLE CELL DISEASE WITH CRISIS (HCC): Primary | ICD-10-CM

## 2024-02-05 DIAGNOSIS — D57.419 SICKLE CELL ANEMIA WITH COEXISTENT ALPHA-THALASSEMIA WITH CRISIS (HCC): ICD-10-CM

## 2024-02-05 DIAGNOSIS — D57.00 SICKLE CELL PAIN CRISIS (HCC): Primary | ICD-10-CM

## 2024-02-05 DIAGNOSIS — D57.00 SICKLE CELL DISEASE WITH CRISIS (HCC): ICD-10-CM

## 2024-02-05 PROCEDURE — 96361 HYDRATE IV INFUSION ADD-ON: CPT

## 2024-02-05 PROCEDURE — 96376 TX/PRO/DX INJ SAME DRUG ADON: CPT

## 2024-02-05 PROCEDURE — 96374 THER/PROPH/DIAG INJ IV PUSH: CPT

## 2024-02-05 RX ORDER — MORPHINE SULFATE 4 MG/ML
6 INJECTION, SOLUTION INTRAMUSCULAR; INTRAVENOUS ONCE
Status: COMPLETED | OUTPATIENT
Start: 2024-02-05 | End: 2024-02-05

## 2024-02-05 RX ORDER — MORPHINE SULFATE 4 MG/ML
6 INJECTION, SOLUTION INTRAMUSCULAR; INTRAVENOUS ONCE
Start: 2024-02-19 | End: 2024-02-19

## 2024-02-05 RX ADMIN — MORPHINE SULFATE 6 MG: 4 INJECTION, SOLUTION INTRAMUSCULAR; INTRAVENOUS at 13:33:00

## 2024-02-05 RX ADMIN — MORPHINE SULFATE 6 MG: 4 INJECTION, SOLUTION INTRAMUSCULAR; INTRAVENOUS at 14:35:00

## 2024-02-05 NOTE — TELEPHONE ENCOUNTER
Toxicities: Crizanlizumab-tmca/Hydroxyurea    Sickle Cell Pain    Patient reports she is having sickle cell pain in her upper back, both shoulders radiating down her spine to the back of her rib cage. She also has pain in both legs. She had some chest pain early this morning that resolved with her taking her morphine sulfate ER 60mg and oxycodone. She denies dyspnea at rest or with exertion. She is still rating her pain \"8/10.\" She is requesting to come for IV hydration and pain management.     I told her I would call her back shortly.

## 2024-02-05 NOTE — PROGRESS NOTES
Pt here for IVF and pain medications . Pt denies any issues or concerns.      Ordering MD: Dr. Mccormick ; seen by RAQUEL Pagan for acute care visit today  Order Exp: PRN orders for sickle cell crisis     Pt tolerated infusion without difficulty or complaint. Reviewed next apt date/time: Yes - Patient has next next appointment for next Adakveo infusion with a follow up with Dr. Mccormick that day.      Education Record  Learner:  Patient  Disease / Diagnosis: Sickle Cell pain crisis  Barriers / Limitations:  Pain  Method:  Brief focused and Reinforcement  General Topics:  Plan of care reviewed and Fall risk and prevention; pain  Outcome:  Shows understanding  Pain score went down to about 5/10 and hour after the first dose. Patient asked for another dose prior to discharge. Dr. Mccormick ordered for Morphine 6 mg IV every hour x 3 doses. Delmis GARCÍA aware and was ok with this. Vital signs taken and recorded at the end of her infusion. Reinforced fall prevention measures. Discharged in good condition.

## 2024-02-05 NOTE — PROGRESS NOTES
Cancer Center Progress Note    Patient Name: Oluwaseun Oluwadamilola Ogunyemi   YOB: 1985   Medical Record Number: [id number]   [facility]: [id number]   Date of visit: 2/5/2024       Chief Complaint/Reason for Visit:  Chief Complaint   Patient presents with    Pain      Hematologic History:  *Sickle cell disease, hgb SS  -early 2000's moved from [facility] to   -2016/2017- 1st pregnancy c/b worsening pain crises  -2018- 2nd pregnancy c/b worsening pain crises, including episode of acute chest requiring RBC exchange.  -early 2019- started hydroxyurea, titrated up to 2000mg daily  -7/2020- started Voxelotor, but dc'd shortly after starting d/t poor tolerability with diarrhea, fatigue, elevated LFTs, was not very helpful either.   -5/28/21- 3/2022- received crizanlizumab (Adakveo) however c/b infusion reaction 3/2022 and was only slightly helpful therefore not continued  -10/2021- moved from [facility] (followed with Dr. Walton of [facility]/O [facility]) to [place].   -4/2022- started L-glutamine (Endari) 15g PO BID, cont'd hydrea  -8/2022- moved from [place] (prev followed by Dr. Esau Blount) to [place], [place]  -3/27/23- resumed crizanlizumab d/t more freq acute pain episodes   -5/8/23- reduced hydrea to 1500mg daily (d/t hgb 7.4, abs retic 47K)   -7/2/23- reduced hydrea to 1000mg daily (d/t hgb 7.0, abs retic 59K)   -12/28/23-hydrea dose increased to 1500mg     History of Present Illness:   Oluwaseun Oluwadamilola Ogunyemi is a 38 year old patient of Dr. Mccormick's with sickle cell disease as above. She was last seen for follow up 1/24/24 and was having ongoing pain at that time. She received IV hydration and pain medication while here in the clinic.  She presents today for sickle cell crisis management. She states she has been having on and off worsening pain throughout the weekend. She was quite active this weekend and contributing some of her pain to that. Today she explains her pain felt different, not  responding to pain medication. Around 5AM pain worsened along with mild chest pain. No shortness of breath. She took her oxycodone around 0600 and by 7AM the pain slightly improved and chest pain resolved. She took scheduled dose of MS Contin around 0830 and called the office with worsening pain complaints. She has not taken any pain medication since then. She rates pain an 8/10 currently present in bilateral arms and legs.   No recent hospitalizations due to pain.  She has been taking Hydrea 1500mg, Endari, folic acid as directed. Receiving Adakveo infusions monthly.  She denies any fevers or recent infections, no known sick contacts. No abnormal bleeding.       Problem List:  Patient Active Problem List   Diagnosis    Sickle cell crisis (HCC)    Sickle cell anemia (HCC)    Hypokalemia    Sickle cell pain crisis (HCC)    At risk for negative response to nurse controlled analgesia    Constipation due to opioid therapy    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)    Nausea    Chronic, continuous use of opioids    Elevated troponin    Macrocytic anemia    Chest pain of uncertain etiology    Dehydration        Medical History:  Past Medical History:   Diagnosis Date    Abnormal antibody titer 02/02/2017    Anti-C, Anti-E, Anti-K, Warm Auto Antibody    Acute chest syndrome due to sickle cell crisis (HCC) 11/29/2021    Acute chest syndrome due to sickle-cell disease (HCC) 2018    RBC exchange for acute chest    Chronic, continuous use of opioids     Constipation due to opioid therapy     History of transfusion     multiple blood transfusions, most of which were during pregnancies    Hypokalemia 09/19/2022    Nausea 03/17/2023    Sickle cell anemia (HCC)     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) 03/16/2023    Sickle cell crisis (HCC)     Frequent crises    Sickle cell crisis (HCC)     Sickle cell pain crisis (HCC) 09/20/2022       Surgical History:  Past  Surgical History:   Procedure Laterality Date      2017      2018       Allergies:  Allergies   Allergen Reactions    Piperacillin Sod-Tazobactam So ITCHING       Family History:  Family History   Problem Relation Age of Onset    Hypertension Mother         unknown    Cataracts Mother     No Known Problems Father         was told cardiac arrest    No Known Problems Brother     Sickle Cell Maternal Aunt         passed from kidney failure    DVT/VTE Other     Breast Cancer Neg     Ovarian Cancer Neg     Colon Cancer Neg        Social History:  Social History     Socioeconomic History    Marital status:      Spouse name: Not on file    Number of children: Not on file    Years of education: Not on file    Highest education level: Not on file   Occupational History    Not on file   Tobacco Use    Smoking status: Never    Smokeless tobacco: Never   Vaping Use    Vaping Use: Never used   Substance and Sexual Activity    Alcohol use: Never     Comment: No history of excessive use    Drug use: Never    Sexual activity: Yes     Partners: Male     Birth control/protection: Condom   Other Topics Concern    Not on file   Social History Narrative    , has 2 children ages 4 and 5 as of 2022.  Not employed.     Social Determinants of Health     Financial Resource Strain: Low Risk  (2023)    Financial Resource Strain     Difficulty of Paying Living Expenses: Not very hard     Med Affordability: Not on file   Food Insecurity: Not on file   Transportation Needs: No Transportation Needs (2023)    Transportation Needs     Lack of Transportation: No   Physical Activity: Not on file   Stress: Not on file   Social Connections: Not on file   Housing Stability: Not on file       Medications:    Current Outpatient Medications:     oxyCODONE HCl 20 MG Oral Tab, Take 1 tablet (20 mg total) by mouth every 3 (three) hours as needed., Disp: 150 tablet, Rfl: 0    morphINE ER 60 MG Oral Tab CR, Take 1  tablet (60 mg total) by mouth every 8 (eight) hours., Disp: 90 tablet, Rfl: 0    hydroxyurea 500 MG Oral Cap, Take 3 capsules (1,500 mg total) by mouth daily., Disp: 270 capsule, Rfl: 3    Glutamine, Sickle Cell, (ENDARI) 5 g Oral Powd Pack, Take 15 g by mouth in the morning and 15 g before bedtime., Disp: 180 each, Rfl: 11    aspirin 81 MG Oral Tab EC, Take 1 tablet (81 mg total) by mouth daily., Disp: 90 tablet, Rfl: 3    ondansetron (ZOFRAN) 8 MG tablet, Take 1 tablet (8 mg total) by mouth every 8 (eight) hours as needed for Nausea., Disp: 30 tablet, Rfl: 3    folic acid 1 MG Oral Tab, Take 1 tablet (1 mg total) by mouth daily., Disp: 90 tablet, Rfl: 3    Naloxone HCl 4 MG/0.1ML Nasal Liquid, 4 mg by Nasal route as needed. If patient remains unresponsive, repeat dose in other nostril 2-5 minutes after first dose., Disp: 1 kit, Rfl: 0    metoprolol succinate ER 25 MG Oral Tablet 24 Hr, Take 1 tablet (25 mg total) by mouth daily., Disp: , Rfl:     Cyanocobalamin (VITAMIN B-12 OR), Take 200 mcg by mouth daily., Disp: , Rfl:     Review of Systems:  A comprehensive 14 point review of systems was completed.  Pertinent positives and negatives noted in the HPI.    Performance Status: ECOG 0 - Fully active, able to carry on all predisease activities without restrictions.    Physical Examination:  Vital Signs: Height: 167.6 cm (5' 5.98\") (02/05 1308)  Weight: 67.6 kg (149 lb) (02/05 1308)  BSA (Calculated - sq m): 1.76 sq meters (02/05 1308)  Pulse: 83 (02/05 1308)  BP: 113/71 (02/05 1308)  Temp: 97.3 °F (36.3 °C) (02/05 1308)  Do Not Use - Resp Rate: --  SpO2: 100 % (02/05 1308)    GENERAL:  Well developed, well nourished, no acute distress  RESPIRATORY: Effortless breathing; symmetric expansion and BS. No crackles or wheezes.   CARDIOVASCULAR: Regular, normal rate, S1, S2 normal, no peripheral edema   ABDOMEN: Soft, non-distended, nontender, no hepatosplenomegaly, BS normal  MUSCULOSKELETAL: No deformities or joint  swelling, normal gait  EXTREMITIES: no limb erythema or edema; No cyanosis  NEUROLOGIC: Alert and oriented x 3; appropriate affect; no focal deficits   SKIN: No rash, petechiae or purpura; no jaundice  PSYCH: mood and affect appropriate      Labs:   No results found for this or any previous visit (from the past 72 hour(s)).    Impression/Plan    Sickle cell disease:  -Continue hydrea 1500mg   -Continue crizanlizumab every 4 weeks - last infusion was 1/24/24. Continue folic acid and Endari.    Acute pain crisis:  -Symptoms consistent with history of crises. No active symptoms of acute chest. No signs of infection. No SOB. Last crises 1/2/24.  -Will give 1L IVF in clinic  -Morphine 6mg up to 3 doses as needed  -Oral benadryl as needed  -Monitor vitals      Planned Follow Up:  2/21/24 as scheduled, sooner if needed      Risk Level: HIGH, sickle cell disease, receiving systemic therapy requiring close monitoring     The 21st Century Cures Act makes medical notes like these available to patients in the interest of transparency. Please be advised this is a medical document. Medical documents are intended to carry relevant information, facts as evident, and the clinical opinion of the practitioner. The medical note is intended as peer to peer communication and may appear blunt or direct. It is written in medical language and may contain abbreviations or verbiage that are unfamiliar.     Electronically Signed by:    ROSHNI Dunn-BC  Nurse Practitioner  [facility]

## 2024-02-05 NOTE — TELEPHONE ENCOUNTER
Oswaldo smith says she has pain in her waist ribs chest. She took some morphine to ease the chest pain. She asked for hydration. chucho

## 2024-02-05 NOTE — TELEPHONE ENCOUNTER
I called Skye and told her I could book a 1pm ACV with JUAN Wallace today. She agreed. Adela, Charge RN Infusion said she could accommodate IV hydration as well.

## 2024-02-12 DIAGNOSIS — D57.00 SICKLE CELL PAIN CRISIS (HCC): ICD-10-CM

## 2024-02-12 RX ORDER — MORPHINE SULFATE 60 MG/1
60 TABLET, FILM COATED, EXTENDED RELEASE ORAL EVERY 8 HOURS SCHEDULED
Qty: 90 TABLET | Refills: 0 | Status: SHIPPED | OUTPATIENT
Start: 2024-02-12 | End: 2024-03-07

## 2024-02-12 RX ORDER — OXYCODONE HYDROCHLORIDE 20 MG/1
20 TABLET ORAL
Qty: 150 TABLET | Refills: 0 | Status: SHIPPED | OUTPATIENT
Start: 2024-02-12 | End: 2024-03-07

## 2024-02-12 NOTE — TELEPHONE ENCOUNTER
Patient calling to request refill on medications oxyCODONE HCl 20 MG Oral Tab  and morphINE ER 60 MG Oral Tab CR. Thank you Carlene

## 2024-02-21 ENCOUNTER — OFFICE VISIT (OUTPATIENT)
Dept: HEMATOLOGY/ONCOLOGY | Facility: HOSPITAL | Age: 39
End: 2024-02-21
Attending: INTERNAL MEDICINE
Payer: COMMERCIAL

## 2024-02-21 VITALS
WEIGHT: 156 LBS | HEART RATE: 91 BPM | HEIGHT: 65.98 IN | TEMPERATURE: 97 F | RESPIRATION RATE: 18 BRPM | BODY MASS INDEX: 25.07 KG/M2 | DIASTOLIC BLOOD PRESSURE: 72 MMHG | SYSTOLIC BLOOD PRESSURE: 110 MMHG | OXYGEN SATURATION: 99 %

## 2024-02-21 DIAGNOSIS — D57.00 SICKLE CELL PAIN CRISIS (HCC): Primary | ICD-10-CM

## 2024-02-21 DIAGNOSIS — D57.1 SICKLE CELL DISEASE WITHOUT CRISIS (HCC): ICD-10-CM

## 2024-02-21 DIAGNOSIS — D57.00 SICKLE CELL PAIN CRISIS (HCC): ICD-10-CM

## 2024-02-21 DIAGNOSIS — D57.419 SICKLE CELL ANEMIA WITH COEXISTENT ALPHA-THALASSEMIA WITH CRISIS (HCC): ICD-10-CM

## 2024-02-21 DIAGNOSIS — D57.00 SICKLE CELL DISEASE WITH CRISIS (HCC): Primary | ICD-10-CM

## 2024-02-21 LAB
BASOPHILS # BLD AUTO: 0.01 X10(3) UL (ref 0–0.2)
BASOPHILS NFR BLD AUTO: 0.2 %
EOSINOPHIL # BLD AUTO: 0.04 X10(3) UL (ref 0–0.7)
EOSINOPHIL NFR BLD AUTO: 0.8 %
ERYTHROCYTE [DISTWIDTH] IN BLOOD BY AUTOMATED COUNT: 16 %
HCT VFR BLD AUTO: 23.9 %
HGB BLD-MCNC: 8.7 G/DL
HGB RETIC QN AUTO: 38.3 PG (ref 28.2–36.6)
IMM GRANULOCYTES # BLD AUTO: 0.01 X10(3) UL (ref 0–1)
IMM GRANULOCYTES NFR BLD: 0.2 %
IMM RETICS NFR: 0.38 RATIO (ref 0.1–0.3)
LYMPHOCYTES # BLD AUTO: 2.46 X10(3) UL (ref 1–4)
LYMPHOCYTES NFR BLD AUTO: 52.1 %
MCH RBC QN AUTO: 39.4 PG (ref 26–34)
MCHC RBC AUTO-ENTMCNC: 36.4 G/DL (ref 31–37)
MCV RBC AUTO: 108.1 FL
MONOCYTES # BLD AUTO: 0.68 X10(3) UL (ref 0.1–1)
MONOCYTES NFR BLD AUTO: 14.4 %
NEUTROPHILS # BLD AUTO: 1.52 X10 (3) UL (ref 1.5–7.7)
NEUTROPHILS # BLD AUTO: 1.52 X10(3) UL (ref 1.5–7.7)
NEUTROPHILS NFR BLD AUTO: 32.3 %
PLATELET # BLD AUTO: 347 10(3)UL (ref 150–450)
RBC # BLD AUTO: 2.21 X10(6)UL
RETICS # AUTO: 116 X10(3) UL (ref 22.5–147.5)
RETICS/RBC NFR AUTO: 5.3 %
WBC # BLD AUTO: 4.7 X10(3) UL (ref 4–11)

## 2024-02-21 PROCEDURE — 96365 THER/PROPH/DIAG IV INF INIT: CPT

## 2024-02-21 PROCEDURE — 96375 TX/PRO/DX INJ NEW DRUG ADDON: CPT

## 2024-02-21 PROCEDURE — 99215 OFFICE O/P EST HI 40 MIN: CPT | Performed by: INTERNAL MEDICINE

## 2024-02-21 RX ORDER — MORPHINE SULFATE 4 MG/ML
6 INJECTION, SOLUTION INTRAMUSCULAR; INTRAVENOUS ONCE
Start: 2024-03-20 | End: 2024-03-20

## 2024-02-21 RX ORDER — MORPHINE SULFATE 4 MG/ML
6 INJECTION, SOLUTION INTRAMUSCULAR; INTRAVENOUS ONCE
Status: COMPLETED | OUTPATIENT
Start: 2024-02-21 | End: 2024-02-21

## 2024-02-21 RX ADMIN — MORPHINE SULFATE 6 MG: 4 INJECTION, SOLUTION INTRAMUSCULAR; INTRAVENOUS at 13:25:00

## 2024-02-21 NOTE — PROGRESS NOTES
Hematology Clinic Follow Up Visit    Patient Name: Oluwaseun Oluwadamilola Ogunyemi  Medical Record Number: [id number]   YOB: 1985    PCP: Clive St MD     Reason for Consultation:  Oluwaseun Oluwadamilola Ogunyemi was seen today for the diagnosis of hgb SS/sickle cell disease    Hematologic History:  *Sickle cell disease, hgb SS  -early 2000's moved from [facility] to   -2016/2017- 1st pregnancy c/b worsening pain crises  -2018- 2nd pregnancy c/b worsening pain crises, including episode of acute chest requiring RBC exchange.  -early 2019- started hydroxyurea, titrated up to 2000mg daily  -7/2020- started Voxelotor, but dc'd shortly after starting d/t poor tolerability with diarrhea, fatigue, elevated LFTs, was not very helpful either.   -5/28/21- 3/2022- received crizanlizumab (Adakveo) however c/b infusion reaction 3/2022 and was only slightly helpful therefore not continued  -10/2021- moved from [facility] (followed with Dr. Walton of [facility]) to [place].   -4/2022- started L-glutamine (Endari) 15g PO BID, cont'd hydrea  -8/2022- moved from [place] (prev followed by Dr. Esau Blount) to [place], [place]  -3/27/23- resumed crizanlizumab d/t more freq acute pain episodes   -5/8/23- reduced hydrea to 1500mg daily (d/t hgb 7.4, abs retic 47K)   -7/2/23- reduced hydrea to 1000mg daily (d/t hgb 7.0, abs retic 59K)   -12/27/23- increased Hydrea to 1500 mg daily    ================================  Interval events: Presents for follow-up and for next crizanlizumab infusion.  She has been taking Hydrea at 1500 mg daily dose over the last 2 months.  She has not had any side effects with this.      She has not had any recent hospitalizations for pain episodes.  She does report having had increased sickle cell related pains since yesterday that she has been dealing with because she knew she was coming in today.  She is requesting IV fluids and IV opiates today in the infusion center.      At  home she is taking MS Contin 60 mg q8hrs and oxycodone IR 20 mg q3 hrs prn.      She is also taking Endari 15 g twice daily and folic acid 1 mg daily.      No recent fevers or infections.  No increased dyspnea, chest pain, bowel, or bladder changes.    She received an updated COVID-19 vaccine booster and influenza vaccine this season.    Hospitalizations and ED visits for acute vaso-occlusive pain episodes since moving from [facility] in 2022:  -22- hospitalization  -22- ED  -22- hospitalization  10/8/22- hospitalization  -10/29/22- ED  -22- ED  -22- ED   -23- hospitalization  -23- hospitalization  -23- hospitalization    Past Medical History:  Past Medical History:   Diagnosis Date    Abnormal antibody titer 2017    Anti-C, Anti-E, Anti-K, Warm Auto Antibody    Acute chest syndrome due to sickle cell crisis (HCC) 2021    Acute chest syndrome due to sickle-cell disease (HCC) 2018    RBC exchange for acute chest    Chronic, continuous use of opioids     Constipation due to opioid therapy     History of transfusion     multiple blood transfusions, most of which were during pregnancies    Hypokalemia 2022    Nausea 2023    Sickle cell anemia (HCC)     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) 2023    Sickle cell crisis (HCC)     Frequent crises    Sickle cell crisis (HCC)     Sickle cell pain crisis (HCC) 2022     Past Surgical History:   Procedure Laterality Date            2018     Home Medications:   morphINE ER 60 MG Oral Tab CR Take 1 tablet (60 mg total) by mouth every 8 (eight) hours. 90 tablet 0    oxyCODONE HCl 20 MG Oral Tab Take 1 tablet (20 mg total) by mouth every 3 (three) hours as needed. 150 tablet 0    hydroxyurea 500 MG Oral Cap Take 3 capsules (1,500 mg total) by mouth daily. 270 capsule 3    Glutamine, Sickle Cell, (ENDARI) 5 g Oral Powd Pack Take 15 g by  mouth in the morning and 15 g before bedtime. 180 each 11    aspirin 81 MG Oral Tab EC Take 1 tablet (81 mg total) by mouth daily. 90 tablet 3    ondansetron (ZOFRAN) 8 MG tablet Take 1 tablet (8 mg total) by mouth every 8 (eight) hours as needed for Nausea. 30 tablet 3    folic acid 1 MG Oral Tab Take 1 tablet (1 mg total) by mouth daily. 90 tablet 3    Naloxone HCl 4 MG/0.1ML Nasal Liquid 4 mg by Nasal route as needed. If patient remains unresponsive, repeat dose in other nostril 2-5 minutes after first dose. 1 kit 0    metoprolol succinate ER 25 MG Oral Tablet 24 Hr Take 1 tablet (25 mg total) by mouth daily.      Cyanocobalamin (VITAMIN B-12 OR) Take 200 mcg by mouth daily.       Allergies:   Allergies   Allergen Reactions    Piperacillin Sod-Tazobactam So ITCHING       Psychosocial History:  Social History     Social History Narrative    , has 2 children ages 4 and 5 as of 9/2022.  Not employed.     Social History     Socioeconomic History    Marital status:    Tobacco Use    Smoking status: Never    Smokeless tobacco: Never   Vaping Use    Vaping Use: Never used   Substance and Sexual Activity    Alcohol use: Never     Comment: No history of excessive use    Drug use: Never    Sexual activity: Yes     Partners: Male     Birth control/protection: Condom   Social History Narrative    , has 2 children ages 4 and 5 as of 9/2022.  Not employed.     Social Determinants of Health     Financial Resource Strain: Low Risk  (4/28/2023)    Financial Resource Strain     Difficulty of Paying Living Expenses: Not very hard   Transportation Needs: No Transportation Needs (4/28/2023)    Transportation Needs     Lack of Transportation: No       Family Medical History:  Family History   Problem Relation Age of Onset    Hypertension Mother         unknown    Cataracts Mother     No Known Problems Father         was told cardiac arrest    No Known Problems Brother     Sickle Cell Maternal Aunt         passed  from kidney failure    DVT/VTE Other     Breast Cancer Neg     Ovarian Cancer Neg     Colon Cancer Neg      Review of Systems:  A 10-point ROS was done with pertinent positives and negative per the HPI    Vital Signs:  Height: 167.6 cm (5' 5.98\") (02/21 1224)  Weight: 70.8 kg (156 lb) (02/21 1224)  BSA (Calculated - sq m): 1.8 sq meters (02/21 1224)  Pulse: 91 (02/21 1224)  BP: 110/72 (02/21 1224)  Temp: 97.3 °F (36.3 °C) (02/21 1224)  Do Not Use - Resp Rate: --  SpO2: 99 % (02/21 1224)    Wt Readings from Last 6 Encounters:   02/21/24 70.8 kg (156 lb)   02/05/24 67.6 kg (149 lb)   01/24/24 68 kg (150 lb)   01/02/24 68.9 kg (152 lb)   12/27/23 69.4 kg (153 lb)   11/13/23 67 kg (147 lb 12.8 oz)     Physical Examination:  General: Patient is alert and oriented, not in acute distress  Psych: Mood and affect are appropriate  Eyes: EOMI  ENT: Oropharynx is clear  CV: Regular rate and rhythm, no murmurs, no LE edema  Respiratory: Lungs clear to auscultation bilaterally  GI/Abd: Soft, non-tender with normoactive bowel sounds, no hepatosplenomegaly  Neurological: Grossly intact   Lymphatics: No palpable lymphadenopathy  Skin: no rashes or petechiae    Laboratory:  Lab Results   Component Value Date    WBC 4.7 02/21/2024    WBC 4.8 01/24/2024    WBC 6.8 12/27/2023    HGB 8.7 (L) 02/21/2024    HGB 9.2 (L) 01/24/2024    HGB 8.5 (L) 12/27/2023    HCT 23.9 (L) 02/21/2024    .1 (H) 02/21/2024    MCH 39.4 (H) 02/21/2024    MCHC 36.4 02/21/2024    RDW 16.0 02/21/2024    .0 02/21/2024    .0 01/24/2024    .0 12/27/2023     Lab Results   Component Value Date    GLU 99 01/24/2024    BUN 4 (L) 01/24/2024    CREATSERUM 0.61 01/24/2024    CREATSERUM 0.53 (L) 12/27/2023    CREATSERUM 0.63 11/01/2023    ANIONGAP 3 01/24/2024    CA 8.9 01/24/2024    OSMOCALC 283 01/24/2024    ALKPHO 50 01/24/2024    AST 30 01/24/2024    ALT  01/24/2024      Comment:      Due to  backorder we are temporarily unable to  offer hospital-based ALT testing at [facility].   If urgently needed, please order ALT test code [id number].   The new order will need a new venipuncture and will be sent to [facility] for testing.   The expected turnaround time will be within 24 hours.     BILT 1.2 01/24/2024    TP 8.9 (H) 01/24/2024    ALB 4.2 01/24/2024    GLOBULIN 4.7 (H) 01/24/2024     01/24/2024    K 4.2 01/24/2024     01/24/2024    CO2 24.0 01/24/2024     Lab Results   Component Value Date     12/27/2023     07/12/2023     05/08/2023     03/27/2023     02/16/2023     12/22/2022     09/05/2022     No results found for: \"PTT\", \"PT\", \"INR\"    Lab Results   Component Value Date    RETICABSOL 116.0 02/21/2024    RETICABSOL 115.2 01/24/2024    RETICABSOL 178.6 (H) 12/27/2023    RETICABSOL 120.5 11/01/2023    RETICABSOL 132.5 10/23/2023    RETICABSOL 104.0 09/15/2023    RETICABSOL 135.9 09/06/2023    RETICABSOL 87.1 07/12/2023    RETICABSOL 59.1 07/01/2023    RETICABSOL 86.6 06/05/2023     Lab Results   Component Value Date    CORINNE 279.5 (H) 02/16/2023    CORINNE 395.5 (H) 09/05/2022     Lab Results   Component Value Date    SAT 28 02/16/2023    SAT 36 09/05/2022     Lab Results   Component Value Date    B12 1,792 (H) 07/12/2023    B12 182 (L) 05/08/2023     Lab Results   Component Value Date    MMA 95 05/08/2023     Component      Latest Ref Rng & Units 9/5/2022   HEMOGLOBIN A      95.5 - 100.0 % <1.0 (L)   HEMOGLOBIN A2      1.5 - 3.5 % 1.3 (L)   HEMOGLOBIN F (FETAL)      0.0 - 2.0 % 45.0 (H)   HEMOGLOBIN S      % 53.7   HGB Electophoresis Interp       Overall, 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 Although HgB F is often elevated in patients with sickle cell disease, such elevations do not commonly exceed 15% of the total hemoglobin. Possible considerations for the degree of HgB F elevation as seen in this patient may include  therapy effect (Hydroxyurea) vs. hereditary persistence of fetal hemoglobin (HPFH).      Impression & Plan:     *Hgb SS/Sickle cell disease  -+hx of acute chest in 2018 requiring RBC exchange.  She reports having less than 10 transfusions in her lifetime, most of the transfusions she received were during prior pregnancies.   -She has had an excellent response with hydroxyurea with marked increase in hemoglobin F.  Previously had to reduce dosing from 2000 mg daily to 1000 mg daily due to worsening hypoproliferative anemia but now seems to be doing better.  Today her she meets criteria for escalation of hydrea as her ANC is >1000, abs retic ct >100K, and plt ct >150, but will hold off increasing for 1 more month since her dose was escalated only 8 weeks ago, if continues to meet criteria for escalation next month will escalate back to 2000 mg daily.    -Continue hydroxyurea 1500 mg daily at this time.    -Started L-glutamine 15 g BID 4/2022, in effort to reduce frequency of acute pain episodes, so far she thinks this might be helpful, will continue this.    -restarted Crizanlizumab (adakveo) 3/16/23, tolerating well without complication.  Continue crizanlizumab 5 mg/kg q4 weeks.  The frequency of her hospitalizations and pain crises have decreased since she started this.    -Previously intolerant to Voxelotor   -Recommend Folvite 1 mg daily to be continued indefinitely  -Recommend annual ophthalmology visits  -She already received an influenza and updated COVID-19 vaccine this season.  -encouraged to make an appt with Dr. Flaco Villarreal at [facility] to discuss the recently FDA approved gene therapies or HSCT for sickle cell disease, she is working on scheduling this.     *macrocytic anemia  -d/t sickle cell anemia + hydroxyurea effect + B12 def  -stable/improved today  -Continue vitamin B-12 1000 mcg PO daily to be continued indefinitely  -continue folvite 1mg daily indefinitely  -repeat CBC and retic ct in 4  weeks    *Chronic pain management  -Oxycodone 20 mg IR PO q3hrs prn  -MS Contin 60mg q8 hrs.   -Duloxetine was not helpful    *Acute pain episode  -We will give IV fluid hydration and IV morphine as below while she is in the infusion unit today for acute pain flare      *Management recommendations of acute pain episodes not controlled with home meds  -Hydration with IV fluids with 0.9 NS until determined to be euvolemic, then maintained with 0.5 NS as relative hypernatremia can increase RBC sickling.  -Recommend prompt management of painful symptoms with parenteral opioids- recommend starting IV morphine 6 mg prn up to 3 doses for additional pain relief.  If needed, a morphine PCA can be initiated on admission.  For pruritus related to opiates consider PO Benadryl or cetirizine.  I favor avoiding IV Benadryl.   -For any acute pain episode evaluation for potential infection or development of acute chest should be considered.  -standing order remains in place for IV morphine 6 mg up to 3 doses prn + 1 L of 0.9 NS IVF + PO benadryl 25-50mg prn for acute pain episodes that can be managed as an outpatient in the infusion center PRN in effort to avoid ED visits as able.     RTC in 4 weeks    Esau Mccormick MD  Hematology/Medical Oncology  [facility]

## 2024-02-21 NOTE — PROGRESS NOTES
Education Record     Learner:  Patient     Disease / Diagnosis: sickle cell anemia     Barriers / Limitations:  None                Comments:     Method:  Discussion                Comments:     General Topics:  Plan of care reviewed                Comments:     Outcome:  Shows understanding                Comments: MD f/up and crizanlizumab infusion. Pt states she is having back pain. States she felt she was in crisis yesterday but states she was going to be here today so she did not call or come in.  Pt taking her hydrea and endari as directed. Pt will get pain meds and fluids today as well.

## 2024-02-21 NOTE — PROGRESS NOTES
Education Record    Learner:  Patient    Disease / Diagnosis: Sickle cell anemia    Barriers / Limitations:  None   Comments:    Method:  Brief focused and Reinforcement   Comments:    General Topics:  Plan of care reviewed   Comments:    Outcome:  Shows understanding   Comments:    Patient declined IVF. Morphine given per PRN orders. Patient tolerated Adakveo and discharged in stable condition.

## 2024-03-07 DIAGNOSIS — D57.00 SICKLE CELL CRISIS (HCC): ICD-10-CM

## 2024-03-07 DIAGNOSIS — D57.00 SICKLE CELL PAIN CRISIS (HCC): ICD-10-CM

## 2024-03-07 RX ORDER — FOLIC ACID 1 MG/1
1 TABLET ORAL DAILY
Qty: 90 TABLET | Refills: 3 | Status: SHIPPED | OUTPATIENT
Start: 2024-03-07

## 2024-03-08 ENCOUNTER — OFFICE VISIT (OUTPATIENT)
Dept: HEMATOLOGY/ONCOLOGY | Facility: HOSPITAL | Age: 39
End: 2024-03-08
Attending: INTERNAL MEDICINE
Payer: COMMERCIAL

## 2024-03-08 ENCOUNTER — TELEPHONE (OUTPATIENT)
Dept: HEMATOLOGY/ONCOLOGY | Facility: HOSPITAL | Age: 39
End: 2024-03-08

## 2024-03-08 VITALS
SYSTOLIC BLOOD PRESSURE: 120 MMHG | DIASTOLIC BLOOD PRESSURE: 78 MMHG | HEIGHT: 65.98 IN | BODY MASS INDEX: 24.27 KG/M2 | OXYGEN SATURATION: 100 % | TEMPERATURE: 97 F | RESPIRATION RATE: 18 BRPM | HEART RATE: 99 BPM | WEIGHT: 151 LBS

## 2024-03-08 DIAGNOSIS — D57.00 SICKLE CELL PAIN CRISIS (HCC): ICD-10-CM

## 2024-03-08 DIAGNOSIS — E86.0 DEHYDRATION: ICD-10-CM

## 2024-03-08 DIAGNOSIS — R19.7 DIARRHEA, UNSPECIFIED TYPE: ICD-10-CM

## 2024-03-08 DIAGNOSIS — D57.00 SICKLE CELL DISEASE WITH CRISIS (HCC): Primary | ICD-10-CM

## 2024-03-08 DIAGNOSIS — D57.00 SICKLE CELL PAIN CRISIS (HCC): Primary | ICD-10-CM

## 2024-03-08 DIAGNOSIS — D57.419 SICKLE CELL ANEMIA WITH COEXISTENT ALPHA-THALASSEMIA WITH CRISIS (HCC): ICD-10-CM

## 2024-03-08 DIAGNOSIS — D57.1 SICKLE CELL DISEASE WITHOUT CRISIS (HCC): ICD-10-CM

## 2024-03-08 LAB
BASOPHILS # BLD AUTO: 0.01 X10(3) UL (ref 0–0.2)
BASOPHILS NFR BLD AUTO: 0.2 %
EOSINOPHIL # BLD AUTO: 0.06 X10(3) UL (ref 0–0.7)
EOSINOPHIL NFR BLD AUTO: 1.3 %
ERYTHROCYTE [DISTWIDTH] IN BLOOD BY AUTOMATED COUNT: 14.7 %
HCT VFR BLD AUTO: 26.3 %
HGB BLD-MCNC: 9.5 G/DL
HGB RETIC QN AUTO: 38.6 PG (ref 28.2–36.6)
IMM GRANULOCYTES # BLD AUTO: 0.03 X10(3) UL (ref 0–1)
IMM GRANULOCYTES NFR BLD: 0.7 %
IMM RETICS NFR: 0.32 RATIO (ref 0.1–0.3)
LYMPHOCYTES # BLD AUTO: 1.21 X10(3) UL (ref 1–4)
LYMPHOCYTES NFR BLD AUTO: 26.4 %
MCH RBC QN AUTO: 38.9 PG (ref 26–34)
MCHC RBC AUTO-ENTMCNC: 36.1 G/DL (ref 31–37)
MCV RBC AUTO: 107.8 FL
MONOCYTES # BLD AUTO: 0.38 X10(3) UL (ref 0.1–1)
MONOCYTES NFR BLD AUTO: 8.3 %
NEUTROPHILS # BLD AUTO: 2.9 X10 (3) UL (ref 1.5–7.7)
NEUTROPHILS # BLD AUTO: 2.9 X10(3) UL (ref 1.5–7.7)
NEUTROPHILS NFR BLD AUTO: 63.1 %
PLATELET # BLD AUTO: 535 10(3)UL (ref 150–450)
RBC # BLD AUTO: 2.44 X10(6)UL
RETICS # AUTO: 86.6 X10(3) UL (ref 22.5–147.5)
RETICS/RBC NFR AUTO: 3.6 %
WBC # BLD AUTO: 4.6 X10(3) UL (ref 4–11)

## 2024-03-08 PROCEDURE — 96376 TX/PRO/DX INJ SAME DRUG ADON: CPT

## 2024-03-08 PROCEDURE — 96374 THER/PROPH/DIAG INJ IV PUSH: CPT

## 2024-03-08 PROCEDURE — 99215 OFFICE O/P EST HI 40 MIN: CPT | Performed by: CLINICAL NURSE SPECIALIST

## 2024-03-08 PROCEDURE — 85045 AUTOMATED RETICULOCYTE COUNT: CPT

## 2024-03-08 PROCEDURE — 85025 COMPLETE CBC W/AUTO DIFF WBC: CPT

## 2024-03-08 PROCEDURE — 96361 HYDRATE IV INFUSION ADD-ON: CPT

## 2024-03-08 PROCEDURE — 36415 COLL VENOUS BLD VENIPUNCTURE: CPT

## 2024-03-08 RX ORDER — MORPHINE SULFATE 4 MG/ML
6 INJECTION, SOLUTION INTRAMUSCULAR; INTRAVENOUS ONCE
Start: 2024-03-20 | End: 2024-03-20

## 2024-03-08 RX ORDER — MORPHINE SULFATE 60 MG/1
60 TABLET, FILM COATED, EXTENDED RELEASE ORAL EVERY 8 HOURS SCHEDULED
Qty: 90 TABLET | Refills: 0 | Status: SHIPPED | OUTPATIENT
Start: 2024-03-08

## 2024-03-08 RX ORDER — MORPHINE SULFATE 4 MG/ML
6 INJECTION, SOLUTION INTRAMUSCULAR; INTRAVENOUS ONCE
Status: COMPLETED | OUTPATIENT
Start: 2024-03-08 | End: 2024-03-08

## 2024-03-08 RX ORDER — OXYCODONE HYDROCHLORIDE 20 MG/1
20 TABLET ORAL
Qty: 150 TABLET | Refills: 0 | Status: SHIPPED | OUTPATIENT
Start: 2024-03-08

## 2024-03-08 RX ADMIN — MORPHINE SULFATE 6 MG: 4 INJECTION, SOLUTION INTRAMUSCULAR; INTRAVENOUS at 14:27:00

## 2024-03-08 RX ADMIN — MORPHINE SULFATE 6 MG: 4 INJECTION, SOLUTION INTRAMUSCULAR; INTRAVENOUS at 13:13:00

## 2024-03-08 NOTE — PROGRESS NOTES
Cancer Center Progress Note    Patient Name: Oluwaseun Oluwadamilola Ogunyemi   YOB: 1985   Medical Record Number: [id number]   [facility]: [id number]   Date of visit: 3/8/24  Provider: JUAN Weathers  Referring Physician: Esua Mccormick MD         Chief Complaint:  Follow up     The 21st Century Cures Act makes medical notes like these available to patients in the interest of transparency. Please be advised this is a medical document. Medical documents are intended to carry relevant information, facts as evident, and the clinical opinion of the practitioner. The medical note is intended as peer to peer communication and may appear blunt or direct. It is written in medical language and may contain abbreviations or verbiage that are unfamiliar.        Hematologic History:  *Sickle cell disease, hgb SS  -early 2000's moved from [facility] to   -2016/2017- 1st pregnancy c/b worsening pain crises  -2018- 2nd pregnancy c/b worsening pain crises, including episode of acute chest requiring RBC exchange.  -early 2019- started hydroxyurea, titrated up to 2000mg daily  -7/2020- started Voxelotor, but dc'd shortly after starting d/t poor tolerability with diarrhea, fatigue, elevated LFTs, was not very helpful either.   -5/28/21- 3/2022- received crizanlizumab (Adakveo) however c/b infusion reaction 3/2022 and was only slightly helpful therefore not continued  -10/2021- moved from [facility] (followed with Dr. Walton of [facility] H/O [facility]) to [place].   -4/2022- started L-glutamine (Endari) 15g PO BID, cont'd hydrea  -8/2022- moved from [place] (prev followed by Dr. Esau Blount) to [place], [place]  -3//27/23- resumed crizanlizumab d/t more freq acute pain episodes   -5/8/23- reduced hydrea to 1500mg daily (d/t hgb 7.4, abs retic 47K)   -7/2/23- reduced hydrea to 1000mg daily (d/t hgb 7.0, abs retic 59K)   -12/27/23- increased Hydrea to 1500 mg daily       Interval Events:  38 year old  patient of Dr. Mccormick's   who presents with typical sickle cell pain crisis.  She is on hydrea 1500mg daily and Endari.  Receives crizanlizumab infusion every 4 wks, She developed diarrhea x5 yesterday, stopped after Imodium.  No BM today.  No fever.  No N/V.  Denies sick exposures.  No rash, no URI.  She is on MS Contin 60mg q8hrs and Oxycodone 20mg q3 hrs.  Ran out of Oxycodone last night therefore none taken today.  Presents with pain in the ribs, legs, lower back.  No chest pain.  No dyspnea or cough.    Allergies:  Allergies   Allergen Reactions    Piperacillin Sod-Tazobactam So ITCHING       Social History     Socioeconomic History    Marital status:    Tobacco Use    Smoking status: Never    Smokeless tobacco: Never   Vaping Use    Vaping Use: Never used   Substance and Sexual Activity    Alcohol use: Never     Comment: No history of excessive use    Drug use: Never    Sexual activity: Yes     Partners: Male     Birth control/protection: Condom        Past Medical History:   Diagnosis Date    Abnormal antibody titer 2017    Anti-C, Anti-E, Anti-K, Warm Auto Antibody    Acute chest syndrome due to sickle cell crisis (HCC) 2021    Acute chest syndrome due to sickle-cell disease (HCC) 2018    RBC exchange for acute chest    Chronic, continuous use of opioids     Constipation due to opioid therapy     History of transfusion     multiple blood transfusions, most of which were during pregnancies    Hypokalemia 2022    Nausea 2023    Sickle cell anemia (HCC)     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) 2023    Sickle cell crisis (HCC)     Frequent crises    Sickle cell crisis (HCC)     Sickle cell pain crisis (HCC) 2022        Past Surgical History:   Procedure Laterality Date                      Vital Signs:  Height: 167.6 cm (5' 5.98\") (1151)  Weight: 68.5 kg (151 lb) (1151)  BSA (Calculated - sq m): 1.77 sq meters (  1151)  Pulse: 99 (03/08 1151)  BP: 120/78 (03/08 1151)  Temp: 96.9 °F (36.1 °C) (03/08 1151)  Do Not Use - Resp Rate: --  SpO2: 100 % (03/08 1151)          Medications:    Current Outpatient Medications:     oxyCODONE HCl 20 MG Oral Tab, Take 1 tablet (20 mg total) by mouth every 3 (three) hours as needed., Disp: 150 tablet, Rfl: 0    morphINE ER 60 MG Oral Tab CR, Take 1 tablet (60 mg total) by mouth every 8 (eight) hours., Disp: 90 tablet, Rfl: 0    folic acid 1 MG Oral Tab, Take 1 tablet (1 mg total) by mouth daily., Disp: 90 tablet, Rfl: 3    hydroxyurea 500 MG Oral Cap, Take 3 capsules (1,500 mg total) by mouth daily., Disp: 270 capsule, Rfl: 3    Glutamine, Sickle Cell, (ENDARI) 5 g Oral Powd Pack, Take 15 g by mouth in the morning and 15 g before bedtime., Disp: 180 each, Rfl: 11    aspirin 81 MG Oral Tab EC, Take 1 tablet (81 mg total) by mouth daily., Disp: 90 tablet, Rfl: 3    ondansetron (ZOFRAN) 8 MG tablet, Take 1 tablet (8 mg total) by mouth every 8 (eight) hours as needed for Nausea., Disp: 30 tablet, Rfl: 3    Naloxone HCl 4 MG/0.1ML Nasal Liquid, 4 mg by Nasal route as needed. If patient remains unresponsive, repeat dose in other nostril 2-5 minutes after first dose., Disp: 1 kit, Rfl: 0    metoprolol succinate ER 25 MG Oral Tablet 24 Hr, Take 1 tablet (25 mg total) by mouth daily., Disp: , Rfl:     Cyanocobalamin (VITAMIN B-12 OR), Take 200 mcg by mouth daily., Disp: , Rfl:     Review of Systems:   As in HPI    Physical Examination:  General: Awake, alert, oriented x3, no acute distress.    HEENT:  Anicteric, conjunctivae and sclerae clear, no sinus tenderness, no oropharyngeal lesion/thrush, mucous membranes are dry  Neck:  Supple, no tenderness, no masses or adenopathy  Lungs:  Clear to auscultation bilaterally  CV:  Regular rate and rhythm  Abdomen:  Non-distended, normoactive bowel sounds, soft,nontender, no hepatosplenomegaly.  Extremities:  No edema, no tenderness  Neuro:  CN 2-12  intact    ECO-2    Recent Results (from the past 24 hour(s))   RETICULOCYTE COUNT [E]    Collection Time: 24 11:56 AM   Result Value Ref Range    Retic% 3.6 (H) 0.5 - 2.5 %    Retic Absolute 86.6 22.5 - 147.5 x10(3) uL    Retic IRF 0.318 (H) 0.100 - 0.300 Ratio    Reticulocyte Hemoglobin Equivalent 38.6 (H) 28.2 - 36.6 pg   CBC W/ DIFFERENTIAL    Collection Time: 24 11:56 AM   Result Value Ref Range    WBC 4.6 4.0 - 11.0 x10(3) uL    RBC 2.44 (L) 3.80 - 5.30 x10(6)uL    HGB 9.5 (L) 12.0 - 16.0 g/dL    HCT 26.3 (L) 35.0 - 48.0 %    .0 (H) 150.0 - 450.0 10(3)uL    .8 (H) 80.0 - 100.0 fL    MCH 38.9 (H) 26.0 - 34.0 pg    MCHC 36.1 31.0 - 37.0 g/dL    RDW 14.7 %    Neutrophil Absolute Prelim 2.90 1.50 - 7.70 x10 (3) uL    Neutrophil Absolute 2.90 1.50 - 7.70 x10(3) uL    Lymphocyte Absolute 1.21 1.00 - 4.00 x10(3) uL    Monocyte Absolute 0.38 0.10 - 1.00 x10(3) uL    Eosinophil Absolute 0.06 0.00 - 0.70 x10(3) uL    Basophil Absolute 0.01 0.00 - 0.20 x10(3) uL    Immature Granulocyte Absolute 0.03 0.00 - 1.00 x10(3) uL    Neutrophil % 63.1 %    Lymphocyte % 26.4 %    Monocyte % 8.3 %    Eosinophil % 1.3 %    Basophil % 0.2 %    Immature Granulocyte % 0.7 %         Impression/Plan:    Sickle cell pain crisis:  Symptoms are c/w her history of crises.  No active symptom of acute chest at this time.  She has no evidence of infection.  She continues on Hydrea 1500mg daily, folate, q4wks crizanlizumab, and Endari.    Today:  1 liter 0.9NS  IV morphine up to 3 doses  Benadryl 25mg-50mg  PO      JUAN Weathers  [facility]

## 2024-03-08 NOTE — TELEPHONE ENCOUNTER
Patient started with leg and back pain during the night.  She is taking her pain medication.  She has run out of her pain medication as well.  She states she needs hydration.  Please call her back.  Thank you.

## 2024-03-08 NOTE — PROGRESS NOTES
Patient here for an acute care visit for sickle cell pain crisis. Was seen  by RAQUEL Mueller in the treatment area. Orders received to give patient's usual fluids and pain medication orders from Dr. Mccormick. IVF given over 60 minutes. Patient received a total of 2 doses of Morphine 6 mg IV 1 hour apart for generalized pain with a score of 9/10 prior to giving pain medications. Patient advised to call for issues/questions/concerns. Patient reminded of her follow up and Adakveo infusion appointment on 3/20 at 2:30 PM. Patient said she will not be able to make the 2:30 appointment because she has to  her kids from school and requested to have it moved to AM appointment that day. Request sent to PSR to change schedule times. Patient discharged in good condition. Received a call from PSR , Real, that 2:30 is the only time available for MD on that day. At this time patient already left. Asked Real to call patient to reschedule this appointment.

## 2024-03-08 NOTE — TELEPHONE ENCOUNTER
Toxicities Hydroxyurea    Sickle Cell Crisis Pain/Hydration    Skye reports she ran out of her medications last night. She is having pain in her ribs, both legs and lower back. She is asking to come in for IV hydration.    I updated her that her refills for her pain medications were sent to her pharmacy this morning. I booked her for an ACV with JUAN Medina at 11:30 am. Freddie, Charge RN Infusion said she can add her on anytime for hydration.

## 2024-03-20 ENCOUNTER — TELEPHONE (OUTPATIENT)
Dept: HEMATOLOGY/ONCOLOGY | Facility: HOSPITAL | Age: 39
End: 2024-03-20

## 2024-03-20 ENCOUNTER — APPOINTMENT (OUTPATIENT)
Dept: HEMATOLOGY/ONCOLOGY | Facility: HOSPITAL | Age: 39
End: 2024-03-20
Attending: INTERNAL MEDICINE
Payer: COMMERCIAL

## 2024-03-20 DIAGNOSIS — D57.1 SICKLE CELL DISEASE WITHOUT CRISIS (HCC): Primary | ICD-10-CM

## 2024-03-20 NOTE — TELEPHONE ENCOUNTER
Pt is calling, she has an appt to come in today but is not feeling well, she has a stuffy nose, body aches and a fever of 101.0. Please call pt back-NL

## 2024-03-20 NOTE — TELEPHONE ENCOUNTER
Toxicities: Crizanlizumab-tmca/Hydroxyurea    Fever/Runny Nose/Post Nasal Drip/Head Congestion/Sore Throat/Generalized Body Aches    Skye reports she started to not feel well yesterday afternoon. She had a runny nose, post nasal drip. Overnight she started to get a fever, head congestion, sore throat and generalized body aches. Her fever at 6 am this morning was 101. She took two extra strength tylenol and her fever came down to 98.6. She denies any dysuria. She took a home covid test and it was negative. No know sick or covid postive contacts. No current symptoms of sickle cell crisis.     Skye will not be coming for her appointment today. She will call back to reschedule when she is feeling better.

## 2024-04-04 ENCOUNTER — OFFICE VISIT (OUTPATIENT)
Dept: HEMATOLOGY/ONCOLOGY | Facility: HOSPITAL | Age: 39
End: 2024-04-04
Attending: INTERNAL MEDICINE
Payer: COMMERCIAL

## 2024-04-04 VITALS
RESPIRATION RATE: 18 BRPM | BODY MASS INDEX: 24.75 KG/M2 | HEIGHT: 65.98 IN | TEMPERATURE: 98 F | OXYGEN SATURATION: 97 % | SYSTOLIC BLOOD PRESSURE: 132 MMHG | WEIGHT: 154 LBS | HEART RATE: 97 BPM | DIASTOLIC BLOOD PRESSURE: 75 MMHG

## 2024-04-04 DIAGNOSIS — D57.419 SICKLE CELL ANEMIA WITH COEXISTENT ALPHA-THALASSEMIA WITH CRISIS (HCC): ICD-10-CM

## 2024-04-04 DIAGNOSIS — D53.9 MACROCYTIC ANEMIA: ICD-10-CM

## 2024-04-04 DIAGNOSIS — D57.1 SICKLE CELL DISEASE WITHOUT CRISIS (HCC): ICD-10-CM

## 2024-04-04 DIAGNOSIS — D57.419 SICKLE CELL ANEMIA WITH COEXISTENT ALPHA-THALASSEMIA WITH CRISIS (HCC): Primary | ICD-10-CM

## 2024-04-04 DIAGNOSIS — D57.00 SICKLE CELL PAIN CRISIS (HCC): ICD-10-CM

## 2024-04-04 DIAGNOSIS — D57.00 SICKLE CELL DISEASE WITH CRISIS (HCC): Primary | ICD-10-CM

## 2024-04-04 LAB
ALBUMIN SERPL-MCNC: 3.8 G/DL (ref 3.4–5)
ALBUMIN/GLOB SERPL: 0.7 {RATIO} (ref 1–2)
ALP LIVER SERPL-CCNC: 68 U/L
ALT SERPL-CCNC: 40 U/L
ANION GAP SERPL CALC-SCNC: 4 MMOL/L (ref 0–18)
AST SERPL-CCNC: 28 U/L (ref 15–37)
BASOPHILS # BLD AUTO: 0.01 X10(3) UL (ref 0–0.2)
BASOPHILS NFR BLD AUTO: 0.2 %
BILIRUB SERPL-MCNC: 0.7 MG/DL (ref 0.1–2)
BUN BLD-MCNC: 3 MG/DL (ref 9–23)
CALCIUM BLD-MCNC: 8.9 MG/DL (ref 8.5–10.1)
CHLORIDE SERPL-SCNC: 108 MMOL/L (ref 98–112)
CO2 SERPL-SCNC: 27 MMOL/L (ref 21–32)
CREAT BLD-MCNC: 0.58 MG/DL
EGFRCR SERPLBLD CKD-EPI 2021: 118 ML/MIN/1.73M2 (ref 60–?)
EOSINOPHIL # BLD AUTO: 0.06 X10(3) UL (ref 0–0.7)
EOSINOPHIL NFR BLD AUTO: 1.2 %
ERYTHROCYTE [DISTWIDTH] IN BLOOD BY AUTOMATED COUNT: 15.5 %
GLOBULIN PLAS-MCNC: 5.2 G/DL (ref 2.8–4.4)
GLUCOSE BLD-MCNC: 96 MG/DL (ref 70–99)
HCT VFR BLD AUTO: 22.2 %
HGB BLD-MCNC: 8.2 G/DL
HGB RETIC QN AUTO: 38.7 PG (ref 28.2–36.6)
IMM GRANULOCYTES # BLD AUTO: 0.02 X10(3) UL (ref 0–1)
IMM GRANULOCYTES NFR BLD: 0.4 %
IMM RETICS NFR: 0.38 RATIO (ref 0.1–0.3)
LYMPHOCYTES # BLD AUTO: 1.83 X10(3) UL (ref 1–4)
LYMPHOCYTES NFR BLD AUTO: 37.5 %
MCH RBC QN AUTO: 39 PG (ref 26–34)
MCHC RBC AUTO-ENTMCNC: 36.9 G/DL (ref 31–37)
MCV RBC AUTO: 105.7 FL
MONOCYTES # BLD AUTO: 0.62 X10(3) UL (ref 0.1–1)
MONOCYTES NFR BLD AUTO: 12.7 %
NEUTROPHILS # BLD AUTO: 2.34 X10 (3) UL (ref 1.5–7.7)
NEUTROPHILS # BLD AUTO: 2.34 X10(3) UL (ref 1.5–7.7)
NEUTROPHILS NFR BLD AUTO: 48 %
OSMOLALITY SERPL CALC.SUM OF ELEC: 284 MOSM/KG (ref 275–295)
PLATELET # BLD AUTO: 823 10(3)UL (ref 150–450)
PLATELET MORPHOLOGY: NORMAL
POTASSIUM SERPL-SCNC: 4 MMOL/L (ref 3.5–5.1)
PROT SERPL-MCNC: 9 G/DL (ref 6.4–8.2)
RBC # BLD AUTO: 2.1 X10(6)UL
RETICS # AUTO: 116.1 X10(3) UL (ref 22.5–147.5)
RETICS/RBC NFR AUTO: 5.5 %
SODIUM SERPL-SCNC: 139 MMOL/L (ref 136–145)
WBC # BLD AUTO: 4.9 X10(3) UL (ref 4–11)

## 2024-04-04 PROCEDURE — 96361 HYDRATE IV INFUSION ADD-ON: CPT

## 2024-04-04 PROCEDURE — 96365 THER/PROPH/DIAG IV INF INIT: CPT

## 2024-04-04 PROCEDURE — 99214 OFFICE O/P EST MOD 30 MIN: CPT | Performed by: INTERNAL MEDICINE

## 2024-04-04 RX ORDER — DIPHENHYDRAMINE HCL 25 MG
CAPSULE ORAL ONCE
Status: CANCELLED
Start: 2024-05-02 | End: 2024-05-02

## 2024-04-04 RX ORDER — MORPHINE SULFATE 4 MG/ML
6 INJECTION, SOLUTION INTRAMUSCULAR; INTRAVENOUS ONCE
Status: CANCELLED
Start: 2024-05-02 | End: 2024-05-02

## 2024-04-04 RX ORDER — HYDROXYUREA 500 MG/1
2000 CAPSULE ORAL DAILY
Qty: 360 CAPSULE | Refills: 3 | Status: SHIPPED | OUTPATIENT
Start: 2024-04-04

## 2024-04-04 RX ORDER — DIPHENHYDRAMINE HCL 25 MG
CAPSULE ORAL ONCE
Status: CANCELLED
Start: 2024-04-05 | End: 2024-04-05

## 2024-04-04 RX ORDER — MORPHINE SULFATE 4 MG/ML
6 INJECTION, SOLUTION INTRAMUSCULAR; INTRAVENOUS ONCE
Status: CANCELLED
Start: 2024-04-05 | End: 2024-04-05

## 2024-04-04 NOTE — PROGRESS NOTES
Hematology Clinic Follow Up Visit    Patient Name: Oluwaseun Oluwadamilola Ogunyemi  Medical Record Number: [id number]   YOB: 1985    PCP: Clive St MD     Reason for Consultation:  Oluwaseun Oluwadamilola Ogunyemi was seen today for the diagnosis of hgb SS/sickle cell disease    Hematologic History:  *Sickle cell disease, hgb SS  -early 2000's moved from [facility] to   -2016/2017- 1st pregnancy c/b worsening pain crises  -2018- 2nd pregnancy c/b worsening pain crises, including episode of acute chest requiring RBC exchange.  -early 2019- started hydroxyurea, titrated up to 2000mg daily  -7/2020- started Voxelotor, but dc'd shortly after starting d/t poor tolerability with diarrhea, fatigue, elevated LFTs, was not very helpful either.   -5/28/21- 3/2022- received crizanlizumab (Adakveo) however c/b infusion reaction 3/2022 and was only slightly helpful therefore not continued  -10/2021- moved from [facility] (followed with Dr. Walton of [facility] H/O [facility]) to [place].   -4/2022- started L-glutamine (Endari) 15g PO BID, cont'd hydrea  -8/2022- moved from [place] (prev followed by Dr. Esau Blount) to [place], [place]  -3/27/23- resumed crizanlizumab d/t more freq acute pain episodes   -5/8/23- reduced hydrea to 1500mg daily (d/t hgb 7.4, abs retic 47K)   -7/2/23- reduced hydrea to 1000mg daily (d/t hgb 7.0, abs retic 59K)   -12/27/23- increased Hydrea to 1500 mg daily  -4/4/24- increased Hydrea to 2000 mg daily    ================================  Interval events: Presents for follow-up and for next crizanlizumab infusion.  She has been taking Hydrea at 1500 mg daily dose over the last 3 months.  She has not had any side effects with this.      She has not had any recent hospitalizations for pain episodes.  Today she reports that she is having a good day and denies any increased pain.  At home she is taking MS Contin 60 mg q8hrs and oxycodone IR 20 mg q3 hrs prn.  She is also taking Endari 15 g  twice daily and folic acid 1 mg daily.      A couple weeks ago she had a URI with fever and sore throat and therefore crizanlizumab infusion was delayed 2 weeks until today.  She is feeling better at this point.  She never had any respiratory symptoms.    No increased dyspnea, chest pain, bowel, or bladder changes.    She received an updated COVID-19 vaccine booster and influenza vaccine this season.    Hospitalizations and ED visits for acute vaso-occlusive pain episodes since moving from [facility] in 2022:  -22- hospitalization  -22- ED  -22- hospitalization  10/8/22- hospitalization  -10/29/22- ED  -22- ED  -22- ED   -23- hospitalization  -23- hospitalization  -23- hospitalization    Past Medical History:  Past Medical History:   Diagnosis Date    Abnormal antibody titer 2017    Anti-C, Anti-E, Anti-K, Warm Auto Antibody    Acute chest syndrome due to sickle cell crisis (HCC) 2021    Acute chest syndrome due to sickle-cell disease (HCC) 2018    RBC exchange for acute chest    Chronic, continuous use of opioids     Constipation due to opioid therapy     History of transfusion     multiple blood transfusions, most of which were during pregnancies    Hypokalemia 2022    Nausea 2023    Sickle cell anemia (HCC)     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) 2023    Sickle cell crisis (HCC)     Frequent crises    Sickle cell crisis (HCC)     Sickle cell pain crisis (HCC) 2022     Past Surgical History:   Procedure Laterality Date            2018     Home Medications:   oxyCODONE HCl 20 MG Oral Tab Take 1 tablet (20 mg total) by mouth every 3 (three) hours as needed. 150 tablet 0    morphINE ER 60 MG Oral Tab CR Take 1 tablet (60 mg total) by mouth every 8 (eight) hours. 90 tablet 0    folic acid 1 MG Oral Tab Take 1 tablet (1 mg total) by mouth daily. 90 tablet 3    hydroxyurea  500 MG Oral Cap Take 3 capsules (1,500 mg total) by mouth daily. 270 capsule 3    Glutamine, Sickle Cell, (ENDARI) 5 g Oral Powd Pack Take 15 g by mouth in the morning and 15 g before bedtime. 180 each 11    aspirin 81 MG Oral Tab EC Take 1 tablet (81 mg total) by mouth daily. 90 tablet 3    ondansetron (ZOFRAN) 8 MG tablet Take 1 tablet (8 mg total) by mouth every 8 (eight) hours as needed for Nausea. 30 tablet 3    Naloxone HCl 4 MG/0.1ML Nasal Liquid 4 mg by Nasal route as needed. If patient remains unresponsive, repeat dose in other nostril 2-5 minutes after first dose. 1 kit 0    metoprolol succinate ER 25 MG Oral Tablet 24 Hr Take 1 tablet (25 mg total) by mouth daily.      Cyanocobalamin (VITAMIN B-12 OR) Take 200 mcg by mouth daily.       Allergies:   Allergies   Allergen Reactions    Piperacillin Sod-Tazobactam So ITCHING       Psychosocial History:  Social History     Social History Narrative    , has 2 children ages 4 and 5 as of 9/2022.  Not employed.     Social History     Socioeconomic History    Marital status:    Tobacco Use    Smoking status: Never    Smokeless tobacco: Never   Vaping Use    Vaping Use: Never used   Substance and Sexual Activity    Alcohol use: Never     Comment: No history of excessive use    Drug use: Never    Sexual activity: Yes     Partners: Male     Birth control/protection: Condom   Social History Narrative    , has 2 children ages 4 and 5 as of 9/2022.  Not employed.     Social Determinants of Health     Financial Resource Strain: Low Risk  (4/28/2023)    Financial Resource Strain     Difficulty of Paying Living Expenses: Not very hard   Transportation Needs: No Transportation Needs (4/28/2023)    Transportation Needs     Lack of Transportation: No       Family Medical History:  Family History   Problem Relation Age of Onset    Hypertension Mother         unknown    Cataracts Mother     No Known Problems Father         was told cardiac arrest    No Known  Problems Brother     Sickle Cell Maternal Aunt         passed from kidney failure    DVT/VTE Other     Breast Cancer Neg     Ovarian Cancer Neg     Colon Cancer Neg      Review of Systems:  A 10-point ROS was done with pertinent positives and negative per the HPI    Vital Signs:  Height: 167.6 cm (5' 5.98\") (04/04 1115)  Weight: 69.9 kg (154 lb) (04/04 1115)  BSA (Calculated - sq m): 1.79 sq meters (04/04 1115)  Pulse: 97 (04/04 1115)  BP: 132/75 (04/04 1115)  Temp: 97.5 °F (36.4 °C) (04/04 1115)  Do Not Use - Resp Rate: --  SpO2: 97 % (04/04 1115)    Wt Readings from Last 6 Encounters:   04/04/24 69.9 kg (154 lb)   03/08/24 68.5 kg (151 lb)   02/21/24 70.8 kg (156 lb)   02/05/24 67.6 kg (149 lb)   01/24/24 68 kg (150 lb)   01/02/24 68.9 kg (152 lb)     Physical Examination:  General: Patient is alert and oriented, not in acute distress  Psych: Mood and affect are appropriate  Eyes: EOMI  ENT: Oropharynx is clear  CV: Regular rate and rhythm, no murmurs, no LE edema  Respiratory: Lungs clear to auscultation bilaterally  GI/Abd: Soft, non-tender with normoactive bowel sounds, no hepatosplenomegaly  Neurological: Grossly intact   Lymphatics: No palpable lymphadenopathy  Skin: no rashes or petechiae    Laboratory:  Lab Results   Component Value Date    WBC 4.9 04/04/2024    WBC 4.6 03/08/2024    WBC 4.7 02/21/2024    HGB 8.2 (L) 04/04/2024    HGB 9.5 (L) 03/08/2024    HGB 8.7 (L) 02/21/2024    HCT 22.2 (L) 04/04/2024    .7 (H) 04/04/2024    MCH 39.0 (H) 04/04/2024    MCHC 36.9 04/04/2024    RDW 15.5 04/04/2024    .0 (H) 04/04/2024    .0 (H) 03/08/2024    .0 02/21/2024     Lab Results   Component Value Date    GLU 96 04/04/2024    BUN 3 (L) 04/04/2024    CREATSERUM 0.58 04/04/2024    CREATSERUM 0.61 01/24/2024    CREATSERUM 0.53 (L) 12/27/2023    ANIONGAP 4 04/04/2024    CA 8.9 04/04/2024    OSMOCALC 284 04/04/2024    ALKPHO 68 04/04/2024    AST 28 04/04/2024    ALT 40 04/04/2024    BILT 0.7  04/04/2024    TP 9.0 (H) 04/04/2024    ALB 3.8 04/04/2024    GLOBULIN 5.2 (H) 04/04/2024     04/04/2024    K 4.0 04/04/2024     04/04/2024    CO2 27.0 04/04/2024     Lab Results   Component Value Date     12/27/2023     07/12/2023     05/08/2023     03/27/2023     02/16/2023     12/22/2022     09/05/2022     No results found for: \"PTT\", \"PT\", \"INR\"    Lab Results   Component Value Date    RETICABSOL 116.1 04/04/2024    RETICABSOL 86.6 03/08/2024    RETICABSOL 116.0 02/21/2024    RETICABSOL 115.2 01/24/2024    RETICABSOL 178.6 (H) 12/27/2023    RETICABSOL 120.5 11/01/2023    RETICABSOL 132.5 10/23/2023    RETICABSOL 104.0 09/15/2023    RETICABSOL 135.9 09/06/2023    RETICABSOL 87.1 07/12/2023     Lab Results   Component Value Date    CORINNE 279.5 (H) 02/16/2023    CORINNE 395.5 (H) 09/05/2022     Lab Results   Component Value Date    SAT 28 02/16/2023    SAT 36 09/05/2022     Lab Results   Component Value Date    B12 1,792 (H) 07/12/2023    B12 182 (L) 05/08/2023     Lab Results   Component Value Date    MMA 95 05/08/2023     Component      Latest Ref Rng & Units 9/5/2022   HEMOGLOBIN A      95.5 - 100.0 % <1.0 (L)   HEMOGLOBIN A2      1.5 - 3.5 % 1.3 (L)   HEMOGLOBIN F (FETAL)      0.0 - 2.0 % 45.0 (H)   HEMOGLOBIN S      % 53.7   HGB Electophoresis Interp       Overall, 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 Although HgB F is often elevated in patients with sickle cell disease, such elevations do not commonly exceed 15% of the total hemoglobin. Possible considerations for the degree of HgB F elevation as seen in this patient may include therapy effect (Hydroxyurea) vs. hereditary persistence of fetal hemoglobin (HPFH).      Impression & Plan:     *Hgb SS/Sickle cell disease  -+hx of acute chest in 2018 requiring RBC exchange.  She reports having less than 10 transfusions in her lifetime, most of the  transfusions she received were during prior pregnancies.   -She has had an excellent response with hydroxyurea with marked increase in hemoglobin F.  Previously had to reduce dosing from 2000 mg daily to 1000 mg daily due to worsening hypoproliferative anemia but now seems to be doing better.  Today she meets criteria for escalation of hydrea back to 2000mg daily as her CBC shows her ANC is >1000, abs retic ct >100K, and plt ct >150.    -Increase hydroxyurea to 2000 mg daily at this time.    -Started L-glutamine 15 g BID 4/2022, in effort to reduce frequency of acute pain episodes, so far she thinks this might be helpful, will continue this.    -restarted Crizanlizumab (adakveo) 3/16/23, tolerating well without complication.  Continue crizanlizumab 5 mg/kg q4 weeks-dose today.  The frequency of her hospitalizations and pain crises have decreased since she started this.    -Previously intolerant to Voxelotor   -Recommend Folvite 1 mg daily to be continued indefinitely  -Recommend annual ophthalmology visits  -She already received an influenza and updated COVID-19 vaccine this season.  -I previously encouraged to make an appt with Dr. Flaco Villarreal at [facility] to discuss the recently FDA approved gene therapies or HSCT for sickle cell disease.  If her insurance does not cover [facility], alternatively can recommend she go to [facility].    *macrocytic anemia  -d/t sickle cell anemia + hydroxyurea effect + B12 def  -stable today  -Continue vitamin B-12 1000 mcg PO daily to be continued indefinitely  -continue folvite 1mg daily indefinitely  -Follow CBC    *Chronic pain management  -Oxycodone 20 mg IR PO q3hrs prn  -MS Contin 60mg q8 hrs.   -Duloxetine was not helpful    *Management recommendations of acute pain episodes not controlled with home meds  -Hydration with IV fluids with 0.9 NS until determined to be euvolemic, then maintained with 0.5 NS as relative hypernatremia can increase RBC sickling.  -Recommend prompt  management of painful symptoms with parenteral opioids- recommend starting IV morphine 6 mg prn up to 3 doses for additional pain relief.  If needed, a morphine PCA can be initiated on admission.  For pruritus related to opiates consider PO Benadryl or cetirizine.  I favor avoiding IV Benadryl.   -For any acute pain episode evaluation for potential infection or development of acute chest should be considered.  -standing order remains in place for IV morphine 6 mg up to 3 doses prn + 1 L of 0.9 NS IVF + PO benadryl 25-50mg prn for acute pain episodes that can be managed as an outpatient in the infusion center PRN in effort to avoid ED visits as able.     RTC in 8 weeks    Esau Mccormick MD  Hematology/Medical Oncology  [facility]

## 2024-04-04 NOTE — PROGRESS NOTES
Education Record     Learner:  Patient     Disease / Diagnosis: sickle cell anemia     Barriers / Limitations:  None                Comments:     Method:  Discussion                Comments:     General Topics:  Plan of care reviewed                Comments:     Outcome:  Shows understanding                Comments: MD f/up and crizanlizumab infusion. Pt states she had pain this morning but is fine now. Denies any SOB. Pt taking her hydrea and endari as directed.

## 2024-04-04 NOTE — PROGRESS NOTES
Education Record    Learner:  Patient     Disease / Diagnosis: sickle cell anemia    Barriers / Limitations:  None   Comments:    Method:  Discussion   Comments:    General Topics:  Medication, Side effects and symptom management, Plan of care reviewed, and Fall risk and prevention   Comments:    Outcome:  Shows understanding   Comments:     Adakveo infusion and 1L 0.9NS infused without complication. Next appts scheduled, AVS printed. Pt instructed to increase hydroxyurea per MD order, see new prescription sent. Pt discharged in stable condition.

## 2024-04-08 ENCOUNTER — TELEPHONE (OUTPATIENT)
Dept: HEMATOLOGY/ONCOLOGY | Facility: HOSPITAL | Age: 39
End: 2024-04-08

## 2024-04-08 ENCOUNTER — OFFICE VISIT (OUTPATIENT)
Dept: HEMATOLOGY/ONCOLOGY | Facility: HOSPITAL | Age: 39
End: 2024-04-08
Attending: INTERNAL MEDICINE
Payer: COMMERCIAL

## 2024-04-08 VITALS
HEIGHT: 65.98 IN | SYSTOLIC BLOOD PRESSURE: 123 MMHG | DIASTOLIC BLOOD PRESSURE: 84 MMHG | OXYGEN SATURATION: 100 % | HEART RATE: 88 BPM | WEIGHT: 151.81 LBS | BODY MASS INDEX: 24.4 KG/M2 | TEMPERATURE: 97 F

## 2024-04-08 DIAGNOSIS — D57.419 SICKLE CELL ANEMIA WITH COEXISTENT ALPHA-THALASSEMIA WITH CRISIS (HCC): ICD-10-CM

## 2024-04-08 DIAGNOSIS — D57.00 SICKLE CELL PAIN CRISIS (HCC): ICD-10-CM

## 2024-04-08 DIAGNOSIS — D57.00 SICKLE CELL DISEASE WITH CRISIS (HCC): Primary | ICD-10-CM

## 2024-04-08 DIAGNOSIS — R11.0 NAUSEA: Primary | ICD-10-CM

## 2024-04-08 PROCEDURE — 96361 HYDRATE IV INFUSION ADD-ON: CPT

## 2024-04-08 PROCEDURE — 99214 OFFICE O/P EST MOD 30 MIN: CPT | Performed by: CLINICAL NURSE SPECIALIST

## 2024-04-08 PROCEDURE — 96376 TX/PRO/DX INJ SAME DRUG ADON: CPT

## 2024-04-08 PROCEDURE — 96375 TX/PRO/DX INJ NEW DRUG ADDON: CPT

## 2024-04-08 PROCEDURE — 96374 THER/PROPH/DIAG INJ IV PUSH: CPT

## 2024-04-08 RX ORDER — MORPHINE SULFATE 4 MG/ML
6 INJECTION, SOLUTION INTRAMUSCULAR; INTRAVENOUS ONCE
Status: COMPLETED | OUTPATIENT
Start: 2024-04-08 | End: 2024-04-08

## 2024-04-08 RX ORDER — MORPHINE SULFATE 60 MG/1
60 TABLET, FILM COATED, EXTENDED RELEASE ORAL EVERY 8 HOURS SCHEDULED
Qty: 90 TABLET | Refills: 0 | Status: SHIPPED | OUTPATIENT
Start: 2024-04-08 | End: 2024-04-30

## 2024-04-08 RX ORDER — MORPHINE SULFATE 4 MG/ML
6 INJECTION, SOLUTION INTRAMUSCULAR; INTRAVENOUS ONCE
Start: 2024-04-29 | End: 2024-04-29

## 2024-04-08 RX ORDER — DIPHENHYDRAMINE HCL 25 MG
CAPSULE ORAL ONCE
Start: 2024-04-29 | End: 2024-04-29

## 2024-04-08 RX ORDER — ONDANSETRON 2 MG/ML
4 INJECTION INTRAMUSCULAR; INTRAVENOUS ONCE
Status: COMPLETED | OUTPATIENT
Start: 2024-04-08 | End: 2024-04-08

## 2024-04-08 RX ORDER — ONDANSETRON 2 MG/ML
4 INJECTION INTRAMUSCULAR; INTRAVENOUS ONCE
Status: CANCELLED
Start: 2024-04-08 | End: 2024-04-08

## 2024-04-08 RX ORDER — OXYCODONE HYDROCHLORIDE 20 MG/1
20 TABLET ORAL
Qty: 150 TABLET | Refills: 0 | Status: SHIPPED | OUTPATIENT
Start: 2024-04-08 | End: 2024-04-30

## 2024-04-08 RX ORDER — ONDANSETRON 2 MG/ML
4 INJECTION INTRAMUSCULAR; INTRAVENOUS ONCE
Status: CANCELLED
Start: 2024-04-29 | End: 2024-04-29

## 2024-04-08 RX ADMIN — MORPHINE SULFATE 6 MG: 4 INJECTION, SOLUTION INTRAMUSCULAR; INTRAVENOUS at 12:27:00

## 2024-04-08 RX ADMIN — ONDANSETRON 4 MG: 2 INJECTION INTRAMUSCULAR; INTRAVENOUS at 11:41:00

## 2024-04-08 RX ADMIN — MORPHINE SULFATE 6 MG: 4 INJECTION, SOLUTION INTRAMUSCULAR; INTRAVENOUS at 11:33:00

## 2024-04-08 NOTE — PROGRESS NOTES
Cancer Center Progress Note    Patient Name: Oluwaseun Oluwadamilola Ogunyemi   YOB: 1985   Medical Record Number: [id number]   [facility]: [id number]   Date of visit: 4/8/24  Provider: JUAN Weathers  Referring Physician: Esau Mccormick MD         Chief Complaint:  Follow up     The 21st Century Cures Act makes medical notes like these available to patients in the interest of transparency. Please be advised this is a medical document. Medical documents are intended to carry relevant information, facts as evident, and the clinical opinion of the practitioner. The medical note is intended as peer to peer communication and may appear blunt or direct. It is written in medical language and may contain abbreviations or verbiage that are unfamiliar.        Hematologic History:  *Sickle cell disease, hgb SS  -early 2000's moved from [facility] to   -2016/2017- 1st pregnancy c/b worsening pain crises  -2018- 2nd pregnancy c/b worsening pain crises, including episode of acute chest requiring RBC exchange.  -early 2019- started hydroxyurea, titrated up to 2000mg daily  -7/2020- started Voxelotor, but dc'd shortly after starting d/t poor tolerability with diarrhea, fatigue, elevated LFTs, was not very helpful either.   -5/28/21- 3/2022- received crizanlizumab (Adakveo) however c/b infusion reaction 3/2022 and was only slightly helpful therefore not continued  -10/2021- moved from [facility] (followed with Dr. Walton of [facility]) to [place].   -4/2022- started L-glutamine (Endari) 15g PO BID, cont'd hydrea  -8/2022- moved from [place] (prev followed by Dr. Esau Blount) to [place], [place]  -3//27/23- resumed crizanlizumab d/t more freq acute pain episodes   -5/8/23- reduced hydrea to 1500mg daily (d/t hgb 7.4, abs retic 47K)   -7/2/23- reduced hydrea to 1000mg daily (d/t hgb 7.0, abs retic 59K)   -12/27/23- increased Hydrea to 1500 mg daily       Interval Events:  38 year old  patient of Dr. Mccormick's   who presents with typical sickle cell pain crisis.  Had URI 3 wks ago.  Currently, no fever.  She had tmax of 99.1.  No rash.  She is on MS Contin 60mg q8hrs and Oxycodone 20mg q3 hrs.  Ran out of Oxycodone last night therefore none taken today.  Avg number of tabs per day is 4 up to 6 when she has more pain.  Presents with pain in the shoulders, sternal bone, ribs, legs, lower back.  No pleuritic chest pain.  No dyspnea or cough.  She has nausea and headaches.      Allergies:  Allergies   Allergen Reactions    Piperacillin Sod-Tazobactam So ITCHING       Social History     Socioeconomic History    Marital status:    Tobacco Use    Smoking status: Never    Smokeless tobacco: Never   Vaping Use    Vaping Use: Never used   Substance and Sexual Activity    Alcohol use: Never     Comment: No history of excessive use    Drug use: Never    Sexual activity: Yes     Partners: Male     Birth control/protection: Condom        Past Medical History:   Diagnosis Date    Abnormal antibody titer 2017    Anti-C, Anti-E, Anti-K, Warm Auto Antibody    Acute chest syndrome due to sickle cell crisis (HCC) 2021    Acute chest syndrome due to sickle-cell disease (HCC) 2018    RBC exchange for acute chest    Chronic, continuous use of opioids     Constipation due to opioid therapy     History of transfusion     multiple blood transfusions, most of which were during pregnancies    Hypokalemia 2022    Nausea 2023    Sickle cell anemia (HCC)     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) 2023    Sickle cell crisis (HCC)     Frequent crises    Sickle cell crisis (HCC)     Sickle cell pain crisis (HCC) 2022        Past Surgical History:   Procedure Laterality Date                      Vital Signs:  Height: 167.6 cm (5' 5.98\") (1122)  Weight: 68.9 kg (151 lb 12.8 oz) (1122)  BSA (Calculated - sq m): 1.78 sq meters (1122)  Pulse: 88  (04/08 1122)  BP: 123/84 (04/08 1122)  Temp: 97.3 °F (36.3 °C) (04/08 1122)  Do Not Use - Resp Rate: --  SpO2: 100 % (04/08 1122)            Medications:    Current Outpatient Medications:     morphINE ER 60 MG Oral Tab CR, Take 1 tablet (60 mg total) by mouth every 8 (eight) hours., Disp: 90 tablet, Rfl: 0    oxyCODONE HCl 20 MG Oral Tab, Take 1 tablet (20 mg total) by mouth every 3 (three) hours as needed., Disp: 150 tablet, Rfl: 0    hydroxyurea 500 MG Oral Cap, Take 4 capsules (2,000 mg total) by mouth daily., Disp: 360 capsule, Rfl: 3    folic acid 1 MG Oral Tab, Take 1 tablet (1 mg total) by mouth daily., Disp: 90 tablet, Rfl: 3    Glutamine, Sickle Cell, (ENDARI) 5 g Oral Powd Pack, Take 15 g by mouth in the morning and 15 g before bedtime., Disp: 180 each, Rfl: 11    aspirin 81 MG Oral Tab EC, Take 1 tablet (81 mg total) by mouth daily., Disp: 90 tablet, Rfl: 3    ondansetron (ZOFRAN) 8 MG tablet, Take 1 tablet (8 mg total) by mouth every 8 (eight) hours as needed for Nausea., Disp: 30 tablet, Rfl: 3    Naloxone HCl 4 MG/0.1ML Nasal Liquid, 4 mg by Nasal route as needed. If patient remains unresponsive, repeat dose in other nostril 2-5 minutes after first dose., Disp: 1 kit, Rfl: 0    metoprolol succinate ER 25 MG Oral Tablet 24 Hr, Take 1 tablet (25 mg total) by mouth daily., Disp: , Rfl:     Cyanocobalamin (VITAMIN B-12 OR), Take 200 mcg by mouth daily., Disp: , Rfl:     Review of Systems:   As in HPI    Physical Examination:  General: Awake, alert, oriented x3, no acute distress.    HEENT:  Anicteric, conjunctivae and sclerae clear, no sinus tenderness, no oropharyngeal lesion/thrush, mucous membranes are dry  Neck:  Supple, no tenderness, no masses or adenopathy  Lungs:  Clear to auscultation bilaterally  CV:  Regular rate and rhythm  Abdomen:  Non-distended, normoactive bowel sounds, soft,nontender, no hepatosplenomegaly.  Extremities:  No edema, no tenderness  Neuro:  CN 2-12 intact    ECOG:  1-2      Impression/Plan:    Sickle cell pain crisis with nausea:  Symptoms are c/w her history of crises.  No active symptom of acute chest at this time.  She has no evidence of infection.  She continues on Hydrea 1500mg daily, folate, q4wks crizanlizumab, and Endari.  She ran out of her Oxycodone for breakthrough pain.  Takes MS Contin 60mg q8hrs also.  She is having nausea but no emesis.      Today:  1 liter 0.9NS  IV morphine up to 3 doses  Benadryl 25mg-50mg  PO  IV Zofran 4mg  Dr. Mccormick sent new rx for Oxycodone 20mg tabs.      JUAN Weathers  [facility]

## 2024-04-08 NOTE — TELEPHONE ENCOUNTER
Patient called and said she has a lot of pain and has run out of her pain medication.  She wants to come in for hydration.  Pain in lower back shoulder blades and in her rib cage.  She has a headache on and off.  Symptoms started the last 14 hours.

## 2024-04-08 NOTE — TELEPHONE ENCOUNTER
Dr Mccormick patient having sickle cell crisis.    Headache/Bilateral Shoulders/Right Ribs/Lower Back    Skye reports she ran out of her pain medications yesterday. She is having intermittent headache pain, pain in both her shoulders, her right side of her rib cage and her lower back. She denies chest pain, dyspnea at rest or with exertion.     Skye is asking to come in for IV pain medication and IV hydration. She also needs refills on her pain medication. I booked her for an ACV with JUAN Medina at 11 am. Adela, Infusion Charge RN said she can take her at 11:30 am today.

## 2024-04-08 NOTE — PROGRESS NOTES
Chief Complaint   Patient presents with    Pain     Pt is here for a sick call - sickle pain crisis. She reports that yesterday she started having pain and had run out of her pain medications. Pain today in the sternum to bilateral clavicle and rib cage, lower back and legs. Nausea with no vomiting, headaches.     Education Record    Learner:  Patient    Disease / Diagnosis: sickle cell anemia    Barriers / Limitations:  Pain   Comments:    Method:  Brief focused   Comments:    General Topics:  Diet, Medication, Pain, and Plan of care reviewed   Comments:    Outcome:  Shows understanding   Comments:

## 2024-04-08 NOTE — PROGRESS NOTES
Education Record    Learner:  Patient    Disease / Diagnosis: Here for Acute care visit as well as IV fluids and pain meds.     Barriers / Limitations:  None    Method:  Brief focused, printed material and  reinforcement    General Topics:  Plan of care reviewed    Outcome:  Shows understanding. APN ordered IV Fluids as well as Pain meds every 1 hour as needed for a max of 3 doses. 2 doses of pain meds given with moderate relief of patient's pain. Left in stable condition. Pt aware to call us if there is any changes.

## 2024-04-24 RX ORDER — ONDANSETRON HYDROCHLORIDE 8 MG/1
8 TABLET, FILM COATED ORAL EVERY 8 HOURS PRN
Qty: 9 TABLET | Refills: 13 | Status: SHIPPED | OUTPATIENT
Start: 2024-04-24

## 2024-04-30 DIAGNOSIS — D57.00 SICKLE CELL PAIN CRISIS (HCC): ICD-10-CM

## 2024-04-30 RX ORDER — MORPHINE SULFATE 60 MG/1
60 TABLET, FILM COATED, EXTENDED RELEASE ORAL EVERY 8 HOURS SCHEDULED
Qty: 90 TABLET | Refills: 0 | Status: SHIPPED | OUTPATIENT
Start: 2024-04-30 | End: 2024-05-28

## 2024-04-30 RX ORDER — OXYCODONE HYDROCHLORIDE 20 MG/1
20 TABLET ORAL
Qty: 150 TABLET | Refills: 0 | Status: SHIPPED | OUTPATIENT
Start: 2024-04-30 | End: 2024-05-28

## 2024-04-30 NOTE — TELEPHONE ENCOUNTER
Patient is calling for a refill on her Oxycodone 20 mg oral tablet and Morphine 60 mg oral tablet, patient is almost out of medication. Please sent to [facility] on [address] in [place].

## 2024-05-03 ENCOUNTER — OFFICE VISIT (OUTPATIENT)
Dept: HEMATOLOGY/ONCOLOGY | Facility: HOSPITAL | Age: 39
End: 2024-05-03
Attending: INTERNAL MEDICINE
Payer: COMMERCIAL

## 2024-05-03 VITALS
DIASTOLIC BLOOD PRESSURE: 69 MMHG | HEART RATE: 98 BPM | SYSTOLIC BLOOD PRESSURE: 103 MMHG | WEIGHT: 153.38 LBS | TEMPERATURE: 98 F | RESPIRATION RATE: 16 BRPM | BODY MASS INDEX: 24.65 KG/M2 | OXYGEN SATURATION: 100 % | HEIGHT: 65.98 IN

## 2024-05-03 DIAGNOSIS — D57.419 SICKLE CELL ANEMIA WITH COEXISTENT ALPHA-THALASSEMIA WITH CRISIS (HCC): ICD-10-CM

## 2024-05-03 DIAGNOSIS — D57.00 SICKLE CELL DISEASE WITH CRISIS (HCC): Primary | ICD-10-CM

## 2024-05-03 DIAGNOSIS — D57.1 SICKLE CELL DISEASE WITHOUT CRISIS (HCC): ICD-10-CM

## 2024-05-03 DIAGNOSIS — D57.00 SICKLE CELL PAIN CRISIS (HCC): ICD-10-CM

## 2024-05-03 LAB
BASOPHILS # BLD AUTO: 0.01 X10(3) UL (ref 0–0.2)
BASOPHILS NFR BLD AUTO: 0.2 %
EOSINOPHIL # BLD AUTO: 0.14 X10(3) UL (ref 0–0.7)
EOSINOPHIL NFR BLD AUTO: 2.3 %
ERYTHROCYTE [DISTWIDTH] IN BLOOD BY AUTOMATED COUNT: 16.8 %
HCT VFR BLD AUTO: 22.9 %
HGB BLD-MCNC: 8 G/DL
IMM GRANULOCYTES # BLD AUTO: 0.03 X10(3) UL (ref 0–1)
IMM GRANULOCYTES NFR BLD: 0.5 %
LYMPHOCYTES # BLD AUTO: 2.48 X10(3) UL (ref 1–4)
LYMPHOCYTES NFR BLD AUTO: 41.4 %
MCH RBC QN AUTO: 39.4 PG (ref 26–34)
MCHC RBC AUTO-ENTMCNC: 34.9 G/DL (ref 31–37)
MCV RBC AUTO: 112.8 FL
MONOCYTES # BLD AUTO: 0.34 X10(3) UL (ref 0.1–1)
MONOCYTES NFR BLD AUTO: 5.7 %
NEUTROPHILS # BLD AUTO: 2.99 X10 (3) UL (ref 1.5–7.7)
NEUTROPHILS # BLD AUTO: 2.99 X10(3) UL (ref 1.5–7.7)
NEUTROPHILS NFR BLD AUTO: 49.9 %
PLATELET # BLD AUTO: 526 10(3)UL (ref 150–450)
RBC # BLD AUTO: 2.03 X10(6)UL
WBC # BLD AUTO: 6 X10(3) UL (ref 4–11)

## 2024-05-03 PROCEDURE — 96361 HYDRATE IV INFUSION ADD-ON: CPT

## 2024-05-03 PROCEDURE — 85025 COMPLETE CBC W/AUTO DIFF WBC: CPT

## 2024-05-03 PROCEDURE — 96376 TX/PRO/DX INJ SAME DRUG ADON: CPT

## 2024-05-03 PROCEDURE — 96365 THER/PROPH/DIAG IV INF INIT: CPT

## 2024-05-03 PROCEDURE — 96375 TX/PRO/DX INJ NEW DRUG ADDON: CPT

## 2024-05-03 PROCEDURE — 36415 COLL VENOUS BLD VENIPUNCTURE: CPT

## 2024-05-03 RX ORDER — MORPHINE SULFATE 4 MG/ML
6 INJECTION, SOLUTION INTRAMUSCULAR; INTRAVENOUS ONCE
Status: COMPLETED | OUTPATIENT
Start: 2024-05-03 | End: 2024-05-03

## 2024-05-03 RX ORDER — DIPHENHYDRAMINE HCL 25 MG
CAPSULE ORAL ONCE
Start: 2024-05-27 | End: 2024-05-27

## 2024-05-03 RX ORDER — MORPHINE SULFATE 4 MG/ML
6 INJECTION, SOLUTION INTRAMUSCULAR; INTRAVENOUS ONCE
Start: 2024-05-27 | End: 2024-05-27

## 2024-05-03 RX ADMIN — MORPHINE SULFATE 6 MG: 4 INJECTION, SOLUTION INTRAMUSCULAR; INTRAVENOUS at 11:52:00

## 2024-05-03 RX ADMIN — MORPHINE SULFATE 6 MG: 4 INJECTION, SOLUTION INTRAMUSCULAR; INTRAVENOUS at 10:43:00

## 2024-05-03 NOTE — PROGRESS NOTES
Education Record    Learner:  Patient    Disease / Diagnosis: Here for Adakveo    Barriers / Limitations:  None    Method:  Brief focused, printed material and  reinforcement    General Topics:  Plan of care reviewed    Outcome:  Shows understanding. Pt tolerated treatment well. IVF and pain meds administered along with treatment today. Left in stable condition. Appt in place with MD for next visit.

## 2024-05-28 ENCOUNTER — TELEPHONE (OUTPATIENT)
Dept: HEMATOLOGY/ONCOLOGY | Facility: HOSPITAL | Age: 39
End: 2024-05-28

## 2024-05-28 ENCOUNTER — OFFICE VISIT (OUTPATIENT)
Dept: HEMATOLOGY/ONCOLOGY | Facility: HOSPITAL | Age: 39
End: 2024-05-28
Attending: INTERNAL MEDICINE

## 2024-05-28 VITALS
BODY MASS INDEX: 24.75 KG/M2 | DIASTOLIC BLOOD PRESSURE: 81 MMHG | HEIGHT: 65.98 IN | SYSTOLIC BLOOD PRESSURE: 120 MMHG | RESPIRATION RATE: 18 BRPM | WEIGHT: 154 LBS | TEMPERATURE: 98 F | OXYGEN SATURATION: 100 % | HEART RATE: 72 BPM

## 2024-05-28 DIAGNOSIS — D57.00 SICKLE CELL PAIN CRISIS (HCC): ICD-10-CM

## 2024-05-28 DIAGNOSIS — D57.419 SICKLE CELL ANEMIA WITH COEXISTENT ALPHA-THALASSEMIA WITH CRISIS (HCC): ICD-10-CM

## 2024-05-28 DIAGNOSIS — D57.00 SICKLE CELL DISEASE WITH CRISIS (HCC): Primary | ICD-10-CM

## 2024-05-28 DIAGNOSIS — D57.00 SICKLE CELL PAIN CRISIS (HCC): Primary | ICD-10-CM

## 2024-05-28 PROCEDURE — 96374 THER/PROPH/DIAG INJ IV PUSH: CPT

## 2024-05-28 PROCEDURE — 96361 HYDRATE IV INFUSION ADD-ON: CPT

## 2024-05-28 PROCEDURE — 99214 OFFICE O/P EST MOD 30 MIN: CPT | Performed by: CLINICAL NURSE SPECIALIST

## 2024-05-28 RX ORDER — MORPHINE SULFATE 4 MG/ML
6 INJECTION, SOLUTION INTRAMUSCULAR; INTRAVENOUS ONCE
Status: COMPLETED | OUTPATIENT
Start: 2024-05-28 | End: 2024-05-28

## 2024-05-28 RX ORDER — MORPHINE SULFATE 60 MG/1
60 TABLET, FILM COATED, EXTENDED RELEASE ORAL EVERY 8 HOURS SCHEDULED
Qty: 90 TABLET | Refills: 0 | Status: CANCELLED | OUTPATIENT
Start: 2024-05-28

## 2024-05-28 RX ORDER — OXYCODONE HYDROCHLORIDE 20 MG/1
20 TABLET ORAL
Qty: 150 TABLET | Refills: 0 | Status: SHIPPED | OUTPATIENT
Start: 2024-05-28

## 2024-05-28 RX ORDER — MORPHINE SULFATE 60 MG/1
60 TABLET, FILM COATED, EXTENDED RELEASE ORAL EVERY 8 HOURS SCHEDULED
Qty: 90 TABLET | Refills: 0 | Status: SHIPPED | OUTPATIENT
Start: 2024-05-28

## 2024-05-28 RX ORDER — DIPHENHYDRAMINE HCL 25 MG
CAPSULE ORAL ONCE
Status: CANCELLED
Start: 2024-06-25 | End: 2024-06-25

## 2024-05-28 RX ORDER — MORPHINE SULFATE 4 MG/ML
6 INJECTION, SOLUTION INTRAMUSCULAR; INTRAVENOUS ONCE
Status: CANCELLED
Start: 2024-06-25 | End: 2024-06-25

## 2024-05-28 RX ORDER — OXYCODONE HYDROCHLORIDE 20 MG/1
20 TABLET ORAL
Qty: 150 TABLET | Refills: 0 | Status: CANCELLED | OUTPATIENT
Start: 2024-05-28

## 2024-05-28 RX ADMIN — MORPHINE SULFATE 6 MG: 4 INJECTION, SOLUTION INTRAMUSCULAR; INTRAVENOUS at 14:36:00

## 2024-05-28 NOTE — TELEPHONE ENCOUNTER
SANDY  [phone]  Having Sickle Cell crisis pain in shoulder , legs, back, around rib cage. I rain out of medication. Felt nausea took medication. No vomiting, fever. Thanks Lyndsey

## 2024-05-28 NOTE — PROGRESS NOTES
Education Record    Learner:  Patient    Disease / Diagnosis: pt here for IVF and pain meds for SS flare up    Barriers / Limitations:  None   Comments:    Method:  Brief focused   Comments:    General Topics:  Plan of care reviewed   Comments:    Outcome:  Patient understands   Comments:

## 2024-05-28 NOTE — TELEPHONE ENCOUNTER
Patient called, having SS pain to lower back, shoulder, right rib cage and requesting hydration.  Denies fevers, no sob or chest pain.  Had some nausea but took antiemetic.    Also requesting refill for MS ER and Oxycodone  Hydration scheduled for 130 pm today in [place]    Next Adakveo/ MD visit 5/30.

## 2024-05-28 NOTE — PROGRESS NOTES
Cancer Center Progress Note    Patient Name: Oluwaseun Oluwadamilola Ogunyemi   YOB: 1985   Medical Record Number: [id number]   [facility]: [id number]   Date of visit: 5/28/24  Provider: JUAN Weathers  Referring Physician: Esau Mccormick MD       NOTE TO THE PATIENT:  The 21st Century Cures Act makes medical notes like these available to patients in the interest of transparency. Please be advised this is a medical document. Medical documents are intended to carry relevant information, facts as evident, and the clinical opinion of the practitioner. The medical note is intended as peer to peer communication and may appear blunt or direct. It is written in medical language and may contain abbreviations or verbiage that are unfamiliar.       Chief Complaint:  Follow up          Hematologic History:  *Sickle cell disease, hgb SS  -early 2000's moved from [facility] to   -2016/2017- 1st pregnancy c/b worsening pain crises  -2018- 2nd pregnancy c/b worsening pain crises, including episode of acute chest requiring RBC exchange.  -early 2019- started hydroxyurea, titrated up to 2000mg daily  -7/2020- started Voxelotor, but dc'd shortly after starting d/t poor tolerability with diarrhea, fatigue, elevated LFTs, was not very helpful either.   -5/28/21- 3/2022- received crizanlizumab (Adakveo) however c/b infusion reaction 3/2022 and was only slightly helpful therefore not continued  -10/2021- moved from [facility] (followed with Dr. Walton of [facility] H/[facility]) to [place].   -4/2022- started L-glutamine (Endari) 15g PO BID, cont'd hydrea  -8/2022- moved from [place] (prev followed by Dr. Esau Blount) to [place], [place]  -3//27/23- resumed crizanlizumab d/t more freq acute pain episodes   -5/8/23- reduced hydrea to 1500mg daily (d/t hgb 7.4, abs retic 47K)   -7/2/23- reduced hydrea to 1000mg daily (d/t hgb 7.0, abs retic 59K)   -12/27/23- increased Hydrea to 1500 mg daily   -4/4/24- inceased Hydrea to 2000mg  daily      Interval Events:  38 year old  patient of Dr. Abarca  who presents with typical sickle cell pain crisis-bilateral legs, lower back, bilateral shoulders.  Having bilat rib pains R>L side.  No pleuritic pain.  No sternal/chest pain.  No SOB or cough.  No recent infections.  Eating and drinking well.  She states she thinks her triggers this time is the weather change and dehydration.  Currently, no fever.  Ran out of her morphine and Oxycodone since the weekend.  She is on MS Contin 60mg q8hrs and Oxycodone 20mg q3 hrs.   Avg number of tabs per day is 4 up to 6 when she has more pain.  .      Allergies:  Allergies   Allergen Reactions    Piperacillin Sod-Tazobactam So ITCHING       Social History     Socioeconomic History    Marital status:    Tobacco Use    Smoking status: Never    Smokeless tobacco: Never   Vaping Use    Vaping status: Never Used   Substance and Sexual Activity    Alcohol use: Never     Comment: No history of excessive use    Drug use: Never    Sexual activity: Yes     Partners: Male     Birth control/protection: Condom        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)    Sickle cell crisis (HCC)    Frequent crises    Sickle cell crisis (HCC)    Sickle cell pain crisis (HCC)        Past Surgical History:   Procedure Laterality Date                      Vital Signs:  Height: 167.6 cm (5' 5.98\") ( 134)  Weight: 69.9 kg (154 lb) (1346)  BSA (Calculated - sq  m): 1.79 sq meters (05/28 1346)  Pulse: 72 (05/28 1346)  BP: 120/81 (05/28 1346)  Temp: 97.5 °F (36.4 °C) (05/28 1346)  Do Not Use - Resp Rate: --  SpO2: 100 % (05/28 1346)            Medications:    Current Outpatient Medications:     morphINE ER 60 MG Oral Tab CR, Take 1 tablet (60 mg total) by mouth every 8 (eight) hours., Disp: 90 tablet, Rfl: 0    oxyCODONE HCl 20 MG Oral Tab, Take 1 tablet (20 mg total) by mouth every 3 (three) hours as needed., Disp: 150 tablet, Rfl: 0    ondansetron (ZOFRAN) 8 MG tablet, Take 1 tablet (8 mg total) by mouth every 8 (eight) hours as needed for Nausea., Disp: 9 tablet, Rfl: 13    hydroxyurea 500 MG Oral Cap, Take 4 capsules (2,000 mg total) by mouth daily., Disp: 360 capsule, Rfl: 3    folic acid 1 MG Oral Tab, Take 1 tablet (1 mg total) by mouth daily., Disp: 90 tablet, Rfl: 3    Glutamine, Sickle Cell, (ENDARI) 5 g Oral Powd Pack, Take 15 g by mouth in the morning and 15 g before bedtime., Disp: 180 each, Rfl: 11    aspirin 81 MG Oral Tab EC, Take 1 tablet (81 mg total) by mouth daily., Disp: 90 tablet, Rfl: 3    Naloxone HCl 4 MG/0.1ML Nasal Liquid, 4 mg by Nasal route as needed. If patient remains unresponsive, repeat dose in other nostril 2-5 minutes after first dose. (Patient not taking: Reported on 4/8/2024), Disp: 1 kit, Rfl: 0    metoprolol succinate ER 25 MG Oral Tablet 24 Hr, Take 1 tablet (25 mg total) by mouth daily., Disp: , Rfl:     Cyanocobalamin (VITAMIN B-12 OR), Take 200 mcg by mouth daily., Disp: , Rfl:     Review of Systems:   As in HPI    Physical Examination:  General: Awake, alert, oriented x3, no acute distress.    HEENT:  Anicteric, conjunctivae and sclerae clear, no sinus tenderness, no oropharyngeal lesion/thrush, mucous membranes are dry  Neck:  Supple, no tenderness, no masses or adenopathy  Lungs:  Clear to auscultation bilaterally  CV:  Regular rate and rhythm  Abdomen:  Non-distended, normoactive bowel sounds, soft,nontender, no  hepatosplenomegaly.  Extremities:  No edema, no tenderness  Neuro:  CN 2-12 intact    ECO      Impression/Plan:    Sickle cell pain crisi:  Symptoms are c/w her history of crises.  No active symptom of acute chest syndrome at this time.  She has no evidence of infection.  She continues on Hydrea 2000 mg daily, folate, q4wks crizanlizumab which will be given on , and Endari.  She ran out of her Oxycodone for breakthrough pain and MS Contin 60mg q8hrs both of which was refilled today by Dr. Mccormick.      Today:  1 liter 0.9NS  IV morphine 6mg up to 3 doses  Benadryl 25mg-50mg  PO    Dr. Mccormick sent new rx for Oxycodone 20mg tabs and MS Contin 60mg.  RTC  for MD and Crizanlizumab.    JUAN Weathers  [facility]

## 2024-05-30 ENCOUNTER — OFFICE VISIT (OUTPATIENT)
Dept: HEMATOLOGY/ONCOLOGY | Facility: HOSPITAL | Age: 39
End: 2024-05-30
Attending: INTERNAL MEDICINE

## 2024-05-30 VITALS
HEIGHT: 65.98 IN | WEIGHT: 159.5 LBS | DIASTOLIC BLOOD PRESSURE: 79 MMHG | OXYGEN SATURATION: 100 % | HEART RATE: 101 BPM | SYSTOLIC BLOOD PRESSURE: 117 MMHG | BODY MASS INDEX: 25.63 KG/M2 | TEMPERATURE: 98 F

## 2024-05-30 DIAGNOSIS — D57.1 SICKLE CELL DISEASE WITHOUT CRISIS (HCC): ICD-10-CM

## 2024-05-30 DIAGNOSIS — D57.00 SICKLE CELL PAIN CRISIS (HCC): Primary | ICD-10-CM

## 2024-05-30 DIAGNOSIS — D57.00 SICKLE CELL PAIN CRISIS (HCC): ICD-10-CM

## 2024-05-30 DIAGNOSIS — D53.9 MACROCYTIC ANEMIA: ICD-10-CM

## 2024-05-30 DIAGNOSIS — D57.419 SICKLE CELL ANEMIA WITH COEXISTENT ALPHA-THALASSEMIA WITH CRISIS (HCC): ICD-10-CM

## 2024-05-30 DIAGNOSIS — D57.00 SICKLE CELL DISEASE WITH CRISIS (HCC): Primary | ICD-10-CM

## 2024-05-30 LAB
ALBUMIN SERPL-MCNC: 3.7 G/DL (ref 3.4–5)
ALBUMIN/GLOB SERPL: 0.8 {RATIO} (ref 1–2)
ALP LIVER SERPL-CCNC: 52 U/L
ALT SERPL-CCNC: 23 U/L
ANION GAP SERPL CALC-SCNC: 8 MMOL/L (ref 0–18)
AST SERPL-CCNC: 40 U/L (ref 15–37)
BASOPHILS # BLD AUTO: 0 X10(3) UL (ref 0–0.2)
BASOPHILS NFR BLD AUTO: 0 %
BILIRUB SERPL-MCNC: 0.5 MG/DL (ref 0.1–2)
BUN BLD-MCNC: 4 MG/DL (ref 9–23)
CALCIUM BLD-MCNC: 8.5 MG/DL (ref 8.5–10.1)
CHLORIDE SERPL-SCNC: 106 MMOL/L (ref 98–112)
CO2 SERPL-SCNC: 25 MMOL/L (ref 21–32)
CREAT BLD-MCNC: 0.62 MG/DL
EGFRCR SERPLBLD CKD-EPI 2021: 116 ML/MIN/1.73M2 (ref 60–?)
EOSINOPHIL # BLD AUTO: 0.01 X10(3) UL (ref 0–0.7)
EOSINOPHIL NFR BLD AUTO: 0.2 %
ERYTHROCYTE [DISTWIDTH] IN BLOOD BY AUTOMATED COUNT: 16.6 %
FASTING STATUS PATIENT QL REPORTED: NO
GLOBULIN PLAS-MCNC: 4.9 G/DL (ref 2.8–4.4)
GLUCOSE BLD-MCNC: 136 MG/DL (ref 70–99)
HCT VFR BLD AUTO: 23.1 %
HGB BLD-MCNC: 8.3 G/DL
HGB RETIC QN AUTO: 35.8 PG (ref 28.2–36.6)
IMM GRANULOCYTES # BLD AUTO: 0.02 X10(3) UL (ref 0–1)
IMM GRANULOCYTES NFR BLD: 0.4 %
IMM RETICS NFR: 0.37 RATIO (ref 0.1–0.3)
LYMPHOCYTES # BLD AUTO: 1.04 X10(3) UL (ref 1–4)
LYMPHOCYTES NFR BLD AUTO: 23 %
MCH RBC QN AUTO: 41.3 PG (ref 26–34)
MCHC RBC AUTO-ENTMCNC: 35.9 G/DL (ref 31–37)
MCV RBC AUTO: 114.9 FL
MONOCYTES # BLD AUTO: 0.57 X10(3) UL (ref 0.1–1)
MONOCYTES NFR BLD AUTO: 12.6 %
NEUTROPHILS # BLD AUTO: 2.88 X10 (3) UL (ref 1.5–7.7)
NEUTROPHILS # BLD AUTO: 2.88 X10(3) UL (ref 1.5–7.7)
NEUTROPHILS NFR BLD AUTO: 63.8 %
OSMOLALITY SERPL CALC.SUM OF ELEC: 287 MOSM/KG (ref 275–295)
PLATELET # BLD AUTO: 630 10(3)UL (ref 150–450)
POTASSIUM SERPL-SCNC: 3.2 MMOL/L (ref 3.5–5.1)
PROT SERPL-MCNC: 8.6 G/DL (ref 6.4–8.2)
RBC # BLD AUTO: 2.01 X10(6)UL
RETICS # AUTO: 88.8 X10(3) UL (ref 22.5–147.5)
RETICS/RBC NFR AUTO: 4.4 %
SODIUM SERPL-SCNC: 139 MMOL/L (ref 136–145)
WBC # BLD AUTO: 4.5 X10(3) UL (ref 4–11)

## 2024-05-30 PROCEDURE — 96376 TX/PRO/DX INJ SAME DRUG ADON: CPT

## 2024-05-30 PROCEDURE — 96361 HYDRATE IV INFUSION ADD-ON: CPT

## 2024-05-30 PROCEDURE — 96375 TX/PRO/DX INJ NEW DRUG ADDON: CPT

## 2024-05-30 PROCEDURE — 96365 THER/PROPH/DIAG IV INF INIT: CPT

## 2024-05-30 PROCEDURE — 99215 OFFICE O/P EST HI 40 MIN: CPT | Performed by: INTERNAL MEDICINE

## 2024-05-30 RX ORDER — DIPHENHYDRAMINE HCL 25 MG
CAPSULE ORAL ONCE
Start: 2024-06-25 | End: 2024-06-25

## 2024-05-30 RX ORDER — MORPHINE SULFATE 4 MG/ML
6 INJECTION, SOLUTION INTRAMUSCULAR; INTRAVENOUS ONCE
Status: COMPLETED | OUTPATIENT
Start: 2024-05-30 | End: 2024-05-30

## 2024-05-30 RX ORDER — MORPHINE SULFATE 4 MG/ML
6 INJECTION, SOLUTION INTRAMUSCULAR; INTRAVENOUS ONCE
Start: 2024-06-25 | End: 2024-06-25

## 2024-05-30 RX ADMIN — MORPHINE SULFATE 6 MG: 4 INJECTION, SOLUTION INTRAMUSCULAR; INTRAVENOUS at 12:21:00

## 2024-05-30 RX ADMIN — MORPHINE SULFATE 6 MG: 4 INJECTION, SOLUTION INTRAMUSCULAR; INTRAVENOUS at 13:25:00

## 2024-05-30 RX ADMIN — MORPHINE SULFATE 6 MG: 4 INJECTION, SOLUTION INTRAMUSCULAR; INTRAVENOUS at 11:19:00

## 2024-05-30 NOTE — PROGRESS NOTES
Hematology Clinic Follow Up Visit    Patient Name: Oluwaseun Oluwadamilola Ogunyemi  Medical Record Number: [id number]   YOB: 1985    PCP: Clive St MD     Reason for Consultation:  Oluwaseun Oluwadamilola Ogunyemi was seen today for the diagnosis of hgb SS/sickle cell disease    Hematologic History:  *Sickle cell disease, hgb SS  -early 2000's moved from [facility] to   -2016/2017- 1st pregnancy c/b worsening pain crises  -2018- 2nd pregnancy c/b worsening pain crises, including episode of acute chest requiring RBC exchange.  -early 2019- started hydroxyurea, titrated up to 2000mg daily  -7/2020- started Voxelotor, but dc'd shortly after starting d/t poor tolerability with diarrhea, fatigue, elevated LFTs, was not very helpful either.   -5/28/21- 3/2022- received crizanlizumab (Adakveo) however c/b infusion reaction 3/2022 and was only slightly helpful therefore not continued  -10/2021- moved from [facility] (followed with Dr. Walton of [facility]) to [place].   -4/2022- started L-glutamine (Endari) 15g PO BID, cont'd hydrea  -8/2022- moved from [place] (prev followed by Dr. Esau Blount) to [place], [place]  -3/27/23- resumed crizanlizumab d/t more freq acute pain episodes   -5/8/23- reduced hydrea to 1500mg daily (d/t hgb 7.4, abs retic 47K)   -7/2/23- reduced hydrea to 1000mg daily (d/t hgb 7.0, abs retic 59K)   -12/27/23- increased Hydrea to 1500 mg daily  -4/4/24- increased Hydrea to 2000 mg daily    ================================  Interval events: Presents for follow-up and for next crizanlizumab infusion.  She has been taking Hydrea at 2000 mg daily dose over the 2 months.  She has not had any side effects with this.      Her last week she has struggled with an ongoing acute pain episode that has not seemed to clear.  She was seen in clinic 2 days ago and received IV fluids and IV morphine with partial improvement but has not lasted.  At home she is taking MS Contin 60 mg  q8hrs and oxycodone IR 20 mg q3 hrs prn.  Lately she is taking oxycodone every 3 hours.  She is also taking Endari 15 g twice daily and folic acid 1 mg daily.      She has not required hospitalizations for a acute vaso-occlusive crisis since 2023.      No recent fevers or infections.  No increased dyspnea, chest pain, bowel, or bladder changes.    She received an updated COVID-19 vaccine booster and influenza vaccine this season.    Hospitalizations and ED visits for acute vaso-occlusive pain episodes since moving from [facility] in 2022:  -22- hospitalization  -22- ED  -22- hospitalization  10/8/22- hospitalization  -10/29/22- ED  -22- ED  -22- ED   -23- hospitalization  -23- hospitalization  -23- hospitalization    Past Medical History:  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 90 tablet 0    oxyCODONE HCl 20 MG Oral Tab Take 1 tablet (20 mg total) by mouth every 3 (three) hours as needed. 150 tablet 0    ondansetron (ZOFRAN) 8 MG tablet Take 1 tablet (8 mg total) by mouth every 8 (eight) hours as needed for Nausea. 9 tablet 13    hydroxyurea 500 MG Oral Cap  Take 4 capsules (2,000 mg total) by mouth daily. 360 capsule 3    folic acid 1 MG Oral Tab Take 1 tablet (1 mg total) by mouth daily. 90 tablet 3    Glutamine, Sickle Cell, (ENDARI) 5 g Oral Powd Pack Take 15 g by mouth in the morning and 15 g before bedtime. 180 each 11    aspirin 81 MG Oral Tab EC Take 1 tablet (81 mg total) by mouth daily. 90 tablet 3    Naloxone HCl 4 MG/0.1ML Nasal Liquid 4 mg by Nasal route as needed. If patient remains unresponsive, repeat dose in other nostril 2-5 minutes after first dose. 1 kit 0    metoprolol succinate ER 25 MG Oral Tablet 24 Hr Take 1 tablet (25 mg total) by mouth daily.      Cyanocobalamin (VITAMIN B-12 OR) Take 200 mcg by mouth daily.       Allergies:   Allergies   Allergen Reactions    Piperacillin Sod-Tazobactam So ITCHING       Psychosocial History:  Social History     Social History Narrative    , has 2 children ages 4 and 5 as of 9/2022.  Not employed.     Social History     Socioeconomic History    Marital status:    Tobacco Use    Smoking status: Never    Smokeless tobacco: Never   Vaping Use    Vaping status: Never Used   Substance and Sexual Activity    Alcohol use: Never     Comment: No history of excessive use    Drug use: Never    Sexual activity: Yes     Partners: Male     Birth control/protection: Condom   Social History Narrative    , has 2 children ages 4 and 5 as of 9/2022.  Not employed.     Social Determinants of Health     Financial Resource Strain: Low Risk  (4/28/2023)    Financial Resource Strain     Difficulty of Paying Living Expenses: Not very hard   Transportation Needs: No Transportation Needs (4/28/2023)    Transportation Needs     Lack of Transportation: No       Family Medical History:  Family History   Problem Relation Age of Onset    Hypertension Mother         unknown    Cataracts Mother     No Known Problems Father         was told cardiac arrest    No Known Problems Brother     Sickle Cell Maternal Aunt          passed from kidney failure    DVT/VTE Other     Breast Cancer Neg     Ovarian Cancer Neg     Colon Cancer Neg      Review of Systems:  A 10-point ROS was done with pertinent positives and negative per the HPI    Vital Signs:  Height: 167.6 cm (5' 5.98\") (05/30 1021)  Weight: 72.3 kg (159 lb 8 oz) (05/30 1021)  BSA (Calculated - sq m): 1.82 sq meters (05/30 1021)  Pulse: 101 (05/30 1021)  BP: 117/79 (05/30 1021)  Temp: 98.3 °F (36.8 °C) (05/30 1021)  Do Not Use - Resp Rate: --  SpO2: 100 % (05/30 1021)    Wt Readings from Last 6 Encounters:   05/30/24 72.3 kg (159 lb 8 oz)   05/28/24 69.9 kg (154 lb)   05/03/24 69.6 kg (153 lb 6.4 oz)   04/08/24 68.9 kg (151 lb 12.8 oz)   04/04/24 69.9 kg (154 lb)   03/08/24 68.5 kg (151 lb)     Physical Examination:  General: Patient is alert and oriented, not in acute distress  Psych: Mood and affect are appropriate  Eyes: EOMI  ENT: Oropharynx is clear  CV: Regular rate and rhythm, no murmurs, no LE edema  Respiratory: Lungs clear to auscultation bilaterally  GI/Abd: Soft, non-tender with normoactive bowel sounds, no hepatosplenomegaly  Neurological: Grossly intact   Lymphatics: No palpable lymphadenopathy  Skin: no rashes or petechiae    Laboratory:  Lab Results   Component Value Date    WBC 4.5 05/30/2024    WBC 6.0 05/03/2024    WBC 4.9 04/04/2024    HGB 8.3 (L) 05/30/2024    HGB 8.0 (L) 05/03/2024    HGB 8.2 (L) 04/04/2024    HCT 23.1 (L) 05/30/2024    .9 (H) 05/30/2024    MCH 41.3 (H) 05/30/2024    MCHC 35.9 05/30/2024    RDW 16.6 05/30/2024    .0 (H) 05/30/2024    .0 (H) 05/03/2024    .0 (H) 04/04/2024     Lab Results   Component Value Date     (H) 05/30/2024    BUN 4 (L) 05/30/2024    CREATSERUM 0.62 05/30/2024    CREATSERUM 0.58 04/04/2024    CREATSERUM 0.61 01/24/2024    ANIONGAP 8 05/30/2024    CA 8.5 05/30/2024    OSMOCALC 287 05/30/2024    ALKPHO 52 05/30/2024    AST 40 (H) 05/30/2024    ALT 23 05/30/2024    BILT 0.5 05/30/2024    TP  8.6 (H) 05/30/2024    ALB 3.7 05/30/2024    GLOBULIN 4.9 (H) 05/30/2024     05/30/2024    K 3.2 (L) 05/30/2024     05/30/2024    CO2 25.0 05/30/2024     Lab Results   Component Value Date     12/27/2023     07/12/2023     05/08/2023     03/27/2023     02/16/2023     12/22/2022     09/05/2022     No results found for: \"PTT\", \"PT\", \"INR\"    Lab Results   Component Value Date    RETICABSOL 88.8 05/30/2024    RETICABSOL 116.1 04/04/2024    RETICABSOL 86.6 03/08/2024    RETICABSOL 116.0 02/21/2024    RETICABSOL 115.2 01/24/2024    RETICABSOL 178.6 (H) 12/27/2023    RETICABSOL 120.5 11/01/2023    RETICABSOL 132.5 10/23/2023    RETICABSOL 104.0 09/15/2023    RETICABSOL 135.9 09/06/2023     Lab Results   Component Value Date    CORINNE 279.5 (H) 02/16/2023    CORINNE 395.5 (H) 09/05/2022     Lab Results   Component Value Date    SAT 28 02/16/2023    SAT 36 09/05/2022     Lab Results   Component Value Date    B12 1,792 (H) 07/12/2023    B12 182 (L) 05/08/2023     Lab Results   Component Value Date    MMA 95 05/08/2023     Component      Latest Ref Rng & Units 9/5/2022   HEMOGLOBIN A      95.5 - 100.0 % <1.0 (L)   HEMOGLOBIN A2      1.5 - 3.5 % 1.3 (L)   HEMOGLOBIN F (FETAL)      0.0 - 2.0 % 45.0 (H)   HEMOGLOBIN S      % 53.7   HGB Electophoresis Interp       Overall, 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 Although HgB F is often elevated in patients with sickle cell disease, such elevations do not commonly exceed 15% of the total hemoglobin. Possible considerations for the degree of HgB F elevation as seen in this patient may include therapy effect (Hydroxyurea) vs. hereditary persistence of fetal hemoglobin (HPFH).      Impression & Plan:     *Hgb SS/Sickle cell disease  -+hx of acute chest in 2018 requiring RBC exchange.  She reports having less than 10 transfusions in her lifetime, most of the transfusions  she received were during prior pregnancies.   -She has had an excellent response with hydroxyurea with marked increase in hemoglobin F.  Previously had to reduce dosing from 2000 mg daily to 1000 mg daily due to worsening hypoproliferative anemia but now seems to be doing better and have been able to escalate her dosing back up to hydroxyurea 2000 mg daily with adequate blood counts.  -Continue hydroxyurea to 2000 mg daily  -Started L-glutamine 15 g BID 4/2022, in effort to reduce frequency of acute pain episodes, so far she thinks this might be helpful, will continue this.    -restarted Crizanlizumab (adakveo) 3/16/23, tolerating well without complication.  Continue crizanlizumab 5 mg/kg q4 weeks-dose today.  The frequency of her hospitalizations and pain crises have decreased since she started this.    -Previously intolerant to Voxelotor   -Recommend Folvite 1 mg daily to be continued indefinitely  -Recommend annual ophthalmology visits  -She already received an influenza and updated COVID-19 vaccine this season.  -I previously encouraged to make an appt with Dr. Flaco Villarreal at [facility] to discuss the recently FDA approved gene therapies or HSCT for sickle cell disease.  I also provided her with contact information for Dr. Bryce Aviles at [facility] as an option as well.     *macrocytic anemia  -d/t sickle cell anemia + hydroxyurea effect + B12 def  -stable today  -Continue vitamin B-12 1000 mcg PO daily to be continued indefinitely  -continue folvite 1mg daily indefinitely  -Follow CBC    *Chronic pain management  -Oxycodone 20 mg IR PO q3hrs prn- will prescribe#180 tabs going forward  -MS Contin 60mg q8 hrs.   -Duloxetine was not helpful    *Acute pain episode  -We will give IV fluid hydration and IV morphine as below while she is in the infusion unit today for acute pain flare.  If her pain cannot be adequately controlled with this I encouraged her to go to the ED to be admitted.    *Management recommendations of  acute pain episodes not controlled with home meds  -Hydration with IV fluids with 0.9 NS until determined to be euvolemic, then maintained with 0.5 NS as relative hypernatremia can increase RBC sickling.  -Recommend prompt management of painful symptoms with parenteral opioids- recommend starting IV morphine 6 mg prn up to 3 doses for additional pain relief.  If needed, a morphine PCA can be initiated on admission.  For pruritus related to opiates consider PO Benadryl or cetirizine.  I favor avoiding IV Benadryl.   -For any acute pain episode evaluation for potential infection or development of acute chest should be considered.  -standing order remains in place for IV morphine 6 mg up to 3 doses prn + 1 L of 0.9 NS IVF + PO benadryl 25-50mg prn for acute pain episodes that can be managed as an outpatient in the infusion center PRN in effort to avoid ED visits as able.     RTC in 8 weeks    Esau Mccormick MD  Hematology/Medical Oncology  [facility]

## 2024-05-30 NOTE — PROGRESS NOTES
Education Record     Learner:  Patient     Disease / Diagnosis: sickle cell anemia     Barriers / Limitations:  None                Comments:     Method:  Discussion                Comments:     General Topics:  Plan of care reviewed                Comments:     Outcome:  Shows understanding                Comments: MD f/up and crizanlizumab infusion. Pt states she is having pain in her lower back and waist. States its coming up to her shoulders as well. States she is not SOB but is having some slight chest discomfort. Pt taking her hydrea and endari as directed.

## 2024-05-30 NOTE — PROGRESS NOTES
Education Record    Learner:  Patient    Disease / Diagnosis: Pt here for Adakveo and IVF.    Barriers / Limitations:  None    Method:  Brief focused, printed material and  reinforcement    General Topics:  Plan of care reviewed    Outcome:  Shows understanding. Pt tolerated treatment well. Left in stable condition. Pain meds given during visit and if pain isn't under control she was advised by MD to go to the ED. Morphine given x3 with relief.

## 2024-05-31 ENCOUNTER — TELEPHONE (OUTPATIENT)
Dept: HEMATOLOGY/ONCOLOGY | Facility: HOSPITAL | Age: 39
End: 2024-05-31

## 2024-05-31 NOTE — TELEPHONE ENCOUNTER
----- Message from Esau Mccormick sent at 5/30/2024  6:04 PM CDT -----  Please call and advise her to make an appointment with Dr Nikki Aviles at [facility] for possible gene therapy.  She should let us know once she reaches out to make an appointment, then I can email Dr. Aviles to help coordinate the appointment.     [email or web address]    Thanks    Eduard

## 2024-06-20 ENCOUNTER — TELEPHONE (OUTPATIENT)
Dept: HEMATOLOGY/ONCOLOGY | Facility: HOSPITAL | Age: 39
End: 2024-06-20

## 2024-06-20 DIAGNOSIS — D57.00 SICKLE CELL PAIN CRISIS (HCC): ICD-10-CM

## 2024-06-20 RX ORDER — OXYCODONE HYDROCHLORIDE 20 MG/1
20 TABLET ORAL
Qty: 150 TABLET | Refills: 0 | Status: SHIPPED | OUTPATIENT
Start: 2024-06-20

## 2024-06-28 ENCOUNTER — OFFICE VISIT (OUTPATIENT)
Dept: HEMATOLOGY/ONCOLOGY | Facility: HOSPITAL | Age: 39
End: 2024-06-28
Attending: INTERNAL MEDICINE

## 2024-06-28 VITALS
DIASTOLIC BLOOD PRESSURE: 77 MMHG | TEMPERATURE: 98 F | BODY MASS INDEX: 25 KG/M2 | WEIGHT: 153.81 LBS | HEART RATE: 94 BPM | OXYGEN SATURATION: 99 % | SYSTOLIC BLOOD PRESSURE: 116 MMHG | RESPIRATION RATE: 18 BRPM

## 2024-06-28 DIAGNOSIS — D57.00 SICKLE CELL PAIN CRISIS (HCC): ICD-10-CM

## 2024-06-28 DIAGNOSIS — D57.00 SICKLE CELL DISEASE WITH CRISIS (HCC): Primary | ICD-10-CM

## 2024-06-28 DIAGNOSIS — D57.419 SICKLE CELL ANEMIA WITH COEXISTENT ALPHA-THALASSEMIA WITH CRISIS (HCC): ICD-10-CM

## 2024-06-28 PROCEDURE — 96375 TX/PRO/DX INJ NEW DRUG ADDON: CPT

## 2024-06-28 PROCEDURE — 96361 HYDRATE IV INFUSION ADD-ON: CPT

## 2024-06-28 PROCEDURE — 96365 THER/PROPH/DIAG IV INF INIT: CPT

## 2024-06-28 PROCEDURE — 96376 TX/PRO/DX INJ SAME DRUG ADON: CPT

## 2024-06-28 RX ORDER — DIPHENHYDRAMINE HCL 25 MG
CAPSULE ORAL ONCE
Status: CANCELLED
Start: 2024-07-26 | End: 2024-07-26

## 2024-06-28 RX ORDER — MORPHINE SULFATE 4 MG/ML
6 INJECTION, SOLUTION INTRAMUSCULAR; INTRAVENOUS AS NEEDED
Status: DISCONTINUED | OUTPATIENT
Start: 2024-06-28 | End: 2024-06-28

## 2024-06-28 RX ORDER — MORPHINE SULFATE 4 MG/ML
6 INJECTION, SOLUTION INTRAMUSCULAR; INTRAVENOUS ONCE
Status: CANCELLED
Start: 2024-07-26 | End: 2024-07-26

## 2024-06-28 RX ADMIN — MORPHINE SULFATE 6 MG: 4 INJECTION, SOLUTION INTRAMUSCULAR; INTRAVENOUS at 11:43:00

## 2024-06-28 RX ADMIN — MORPHINE SULFATE 6 MG: 4 INJECTION, SOLUTION INTRAMUSCULAR; INTRAVENOUS at 10:39:00

## 2024-06-28 NOTE — PROGRESS NOTES
Education Record    Learner:  Patient    Disease / Diagnosis: sickle cell anemia    Barriers / Limitations:  None   Comments:    Method:  Discussion   Comments:    General Topics:  Medication, Side effects and symptom management, Plan of care reviewed, and Fall risk and prevention   Comments:    Outcome:  Shows understanding   Comments:    Pt of  here for Adakveo infusion. Pt reporting 8/10 pain, 2 doses of 6mg morphine given in addition to 1L IVF. Pt tolerated well, reporting improvement in her pain. Discharged ambulatory in stable condition.

## 2024-07-01 ENCOUNTER — OFFICE VISIT (OUTPATIENT)
Dept: HEMATOLOGY/ONCOLOGY | Facility: HOSPITAL | Age: 39
End: 2024-07-01
Attending: INTERNAL MEDICINE
Payer: COMMERCIAL

## 2024-07-01 ENCOUNTER — TELEPHONE (OUTPATIENT)
Dept: HEMATOLOGY/ONCOLOGY | Facility: HOSPITAL | Age: 39
End: 2024-07-01

## 2024-07-01 VITALS
WEIGHT: 157.63 LBS | HEART RATE: 78 BPM | HEIGHT: 65.98 IN | OXYGEN SATURATION: 99 % | TEMPERATURE: 98 F | SYSTOLIC BLOOD PRESSURE: 121 MMHG | DIASTOLIC BLOOD PRESSURE: 77 MMHG | RESPIRATION RATE: 16 BRPM | BODY MASS INDEX: 25.33 KG/M2

## 2024-07-01 DIAGNOSIS — D57.00 SICKLE CELL DISEASE WITH CRISIS (HCC): Primary | ICD-10-CM

## 2024-07-01 DIAGNOSIS — D57.419 SICKLE CELL ANEMIA WITH COEXISTENT ALPHA-THALASSEMIA WITH CRISIS (HCC): ICD-10-CM

## 2024-07-01 DIAGNOSIS — D57.00 SICKLE CELL PAIN CRISIS (HCC): ICD-10-CM

## 2024-07-01 PROCEDURE — 96376 TX/PRO/DX INJ SAME DRUG ADON: CPT

## 2024-07-01 PROCEDURE — 96374 THER/PROPH/DIAG INJ IV PUSH: CPT

## 2024-07-01 PROCEDURE — 96361 HYDRATE IV INFUSION ADD-ON: CPT

## 2024-07-01 RX ORDER — DIPHENHYDRAMINE HCL 25 MG
CAPSULE ORAL ONCE
Start: 2024-08-19 | End: 2024-08-19

## 2024-07-01 RX ORDER — MORPHINE SULFATE 60 MG/1
60 TABLET, FILM COATED, EXTENDED RELEASE ORAL EVERY 8 HOURS SCHEDULED
Qty: 90 TABLET | Refills: 0 | Status: SHIPPED | OUTPATIENT
Start: 2024-07-01

## 2024-07-01 RX ORDER — MORPHINE SULFATE 4 MG/ML
6 INJECTION, SOLUTION INTRAMUSCULAR; INTRAVENOUS ONCE
Status: COMPLETED | OUTPATIENT
Start: 2024-07-01 | End: 2024-07-01

## 2024-07-01 RX ORDER — MORPHINE SULFATE 4 MG/ML
6 INJECTION, SOLUTION INTRAMUSCULAR; INTRAVENOUS ONCE
Start: 2024-08-19 | End: 2024-08-19

## 2024-07-01 RX ORDER — DIPHENHYDRAMINE HCL 25 MG
CAPSULE ORAL ONCE
Status: COMPLETED | OUTPATIENT
Start: 2024-07-01 | End: 2024-07-01

## 2024-07-01 RX ADMIN — MORPHINE SULFATE 6 MG: 4 INJECTION, SOLUTION INTRAMUSCULAR; INTRAVENOUS at 15:11:00

## 2024-07-01 RX ADMIN — DIPHENHYDRAMINE HCL 25 MG: 25 MG CAPSULE ORAL at 15:16:00

## 2024-07-01 RX ADMIN — MORPHINE SULFATE 6 MG: 4 INJECTION, SOLUTION INTRAMUSCULAR; INTRAVENOUS at 16:14:00

## 2024-07-01 NOTE — TELEPHONE ENCOUNTER
Toxicities: Crizanlizumab-tmca on 6/28/2024    Sickle Cell Crisis Pain    Skye reports that she has been having Sickle Cell pain for the last two days. Her pain is in her lower back and legs. She denies any pain in her chest. No difficulty breathing. She is using her pain medication Morphine ER 60 mg and Oxycodone HCL 20 mg but her pain is still \"very high.\" Skye asked if she could come in for IV hydration and IV pain medication today?    I asked Skye to please hold. I updated RYLAND Mena for Dr Mccormick. She update Dr Mccormick. He said yes. She does not need to see an APRN unless she feels she needs to do so. I updated Skye. She said she does not feel she needs the ACV. I asked her to please hold. I updated Bertha Chapman RN Infusion. She said she can accommodate her at 2:30 pm today. I updated Skye. She agreed with the appointment.

## 2024-07-01 NOTE — PROGRESS NOTES
Patient of Dr. Mccormick here for sickle cell crisis. Complained of 10/10 pain to her lower back (L>R) and bilateral lower extremities. IVF given over 1 hour with morphine 6 mg IV given at the start of IVF and at the end of her hydration, oral Benadryl 25 mg also given. Pain score down to about 6-7 out of 10 prior to the 2nd dose of morphine IV. Patient requested for a refill on her morphine 60 mg. Message sent to Dr. Mccormick's RN, Trina Varela, via secure chat. Trina to send a message to Dr. Mccormick regarding this. Patient advised to call for any issues/questions/concerns. Discharged in fair condition.

## 2024-07-15 DIAGNOSIS — D57.00 SICKLE CELL PAIN CRISIS (HCC): ICD-10-CM

## 2024-07-15 RX ORDER — OXYCODONE HYDROCHLORIDE 20 MG/1
20 TABLET ORAL
Qty: 180 TABLET | Refills: 0 | Status: SHIPPED | OUTPATIENT
Start: 2024-07-15 | End: 2024-08-12

## 2024-07-15 NOTE — TELEPHONE ENCOUNTER
Pt is asking if the amt disp can be increased to 180 instead of 150 tab    Please call pt back. Thank you    FYI- pt states she is out of this medication

## 2024-07-25 ENCOUNTER — OFFICE VISIT (OUTPATIENT)
Dept: HEMATOLOGY/ONCOLOGY | Facility: HOSPITAL | Age: 39
End: 2024-07-25
Attending: INTERNAL MEDICINE
Payer: COMMERCIAL

## 2024-07-25 VITALS
OXYGEN SATURATION: 99 % | DIASTOLIC BLOOD PRESSURE: 74 MMHG | BODY MASS INDEX: 24.65 KG/M2 | SYSTOLIC BLOOD PRESSURE: 114 MMHG | WEIGHT: 155.19 LBS | RESPIRATION RATE: 16 BRPM | TEMPERATURE: 98 F | HEART RATE: 103 BPM | HEIGHT: 66.38 IN

## 2024-07-25 DIAGNOSIS — D57.00 SICKLE CELL PAIN CRISIS (HCC): ICD-10-CM

## 2024-07-25 DIAGNOSIS — D57.419 SICKLE CELL ANEMIA WITH COEXISTENT ALPHA-THALASSEMIA WITH CRISIS (HCC): ICD-10-CM

## 2024-07-25 DIAGNOSIS — D57.00 SICKLE CELL DISEASE WITH CRISIS (HCC): Primary | ICD-10-CM

## 2024-07-25 DIAGNOSIS — D53.9 MACROCYTIC ANEMIA: ICD-10-CM

## 2024-07-25 LAB
ALBUMIN SERPL-MCNC: 4.3 G/DL (ref 3.2–4.8)
ALBUMIN/GLOB SERPL: 1 {RATIO} (ref 1–2)
ALP LIVER SERPL-CCNC: 62 U/L
ALT SERPL-CCNC: 43 U/L
ANION GAP SERPL CALC-SCNC: 5 MMOL/L (ref 0–18)
AST SERPL-CCNC: 54 U/L (ref ?–34)
BASOPHILS # BLD AUTO: 0.01 X10(3) UL (ref 0–0.2)
BASOPHILS NFR BLD AUTO: 0.3 %
BILIRUB SERPL-MCNC: 0.6 MG/DL (ref 0.3–1.2)
BUN BLD-MCNC: 8 MG/DL (ref 9–23)
CALCIUM BLD-MCNC: 9.5 MG/DL (ref 8.7–10.4)
CHLORIDE SERPL-SCNC: 106 MMOL/L (ref 98–112)
CO2 SERPL-SCNC: 27 MMOL/L (ref 21–32)
CREAT BLD-MCNC: 0.57 MG/DL
EGFRCR SERPLBLD CKD-EPI 2021: 118 ML/MIN/1.73M2 (ref 60–?)
EOSINOPHIL # BLD AUTO: 0.04 X10(3) UL (ref 0–0.7)
EOSINOPHIL NFR BLD AUTO: 1.1 %
ERYTHROCYTE [DISTWIDTH] IN BLOOD BY AUTOMATED COUNT: 15 %
GLOBULIN PLAS-MCNC: 4.4 G/DL (ref 2.8–4.4)
GLUCOSE BLD-MCNC: 105 MG/DL (ref 70–99)
HCT VFR BLD AUTO: 22.4 %
HGB BLD-MCNC: 8.2 G/DL
HGB RETIC QN AUTO: 40.2 PG (ref 28.2–36.6)
IMM GRANULOCYTES # BLD AUTO: 0.01 X10(3) UL (ref 0–1)
IMM GRANULOCYTES NFR BLD: 0.3 %
IMM RETICS NFR: 0.28 RATIO (ref 0.1–0.3)
LDH SERPL L TO P-CCNC: 216 U/L
LYMPHOCYTES # BLD AUTO: 1.83 X10(3) UL (ref 1–4)
LYMPHOCYTES NFR BLD AUTO: 49.1 %
MCH RBC QN AUTO: 42.1 PG (ref 26–34)
MCHC RBC AUTO-ENTMCNC: 36.6 G/DL (ref 31–37)
MCV RBC AUTO: 114.9 FL
MONOCYTES # BLD AUTO: 0.33 X10(3) UL (ref 0.1–1)
MONOCYTES NFR BLD AUTO: 8.8 %
NEUTROPHILS # BLD AUTO: 1.51 X10 (3) UL (ref 1.5–7.7)
NEUTROPHILS # BLD AUTO: 1.51 X10(3) UL (ref 1.5–7.7)
NEUTROPHILS NFR BLD AUTO: 40.4 %
OSMOLALITY SERPL CALC.SUM OF ELEC: 285 MOSM/KG (ref 275–295)
PLATELET # BLD AUTO: 422 10(3)UL (ref 150–450)
PLATELET MORPHOLOGY: NORMAL
POTASSIUM SERPL-SCNC: 3.8 MMOL/L (ref 3.5–5.1)
PROT SERPL-MCNC: 8.7 G/DL (ref 5.7–8.2)
RBC # BLD AUTO: 1.95 X10(6)UL
RETICS # AUTO: 55.2 X10(3) UL (ref 22.5–147.5)
RETICS/RBC NFR AUTO: 2.8 %
SODIUM SERPL-SCNC: 138 MMOL/L (ref 136–145)
WBC # BLD AUTO: 3.7 X10(3) UL (ref 4–11)

## 2024-07-25 PROCEDURE — 99215 OFFICE O/P EST HI 40 MIN: CPT | Performed by: CLINICAL NURSE SPECIALIST

## 2024-07-25 PROCEDURE — 96375 TX/PRO/DX INJ NEW DRUG ADDON: CPT

## 2024-07-25 PROCEDURE — 96365 THER/PROPH/DIAG IV INF INIT: CPT

## 2024-07-25 RX ORDER — DIPHENHYDRAMINE HCL 25 MG
25 CAPSULE ORAL ONCE
Status: COMPLETED | OUTPATIENT
Start: 2024-07-25 | End: 2024-07-25

## 2024-07-25 RX ORDER — MORPHINE SULFATE 4 MG/ML
6 INJECTION, SOLUTION INTRAMUSCULAR; INTRAVENOUS ONCE
Start: 2024-08-19 | End: 2024-08-19

## 2024-07-25 RX ORDER — MORPHINE SULFATE 4 MG/ML
6 INJECTION, SOLUTION INTRAMUSCULAR; INTRAVENOUS ONCE
Status: COMPLETED | OUTPATIENT
Start: 2024-07-25 | End: 2024-07-25

## 2024-07-25 RX ORDER — MORPHINE SULFATE 60 MG/1
60 TABLET, FILM COATED, EXTENDED RELEASE ORAL EVERY 8 HOURS SCHEDULED
Qty: 90 TABLET | Refills: 0 | Status: SHIPPED | OUTPATIENT
Start: 2024-07-25

## 2024-07-25 RX ORDER — DIPHENHYDRAMINE HCL 25 MG
CAPSULE ORAL ONCE
Start: 2024-08-19 | End: 2024-08-19

## 2024-07-25 RX ADMIN — DIPHENHYDRAMINE HCL 25 MG: 25 MG CAPSULE ORAL at 11:39:00

## 2024-07-25 RX ADMIN — MORPHINE SULFATE 6 MG: 4 INJECTION, SOLUTION INTRAMUSCULAR; INTRAVENOUS at 11:40:00

## 2024-07-25 NOTE — PROGRESS NOTES
Education Record    Learner:  Patient    Disease / Diagnosis:sickle cell    Barriers / Limitations:  None   Comments:    Method:  Brief focused and Reinforcement   Comments:    General Topics:  Plan of care reviewed   Comments:    Outcome:  Shows understanding   Comments:    Pt completed Adakveo without complication.  Pt was to receive 1L.9ns per MD order but needed to go to another appt so refused the fluids-encouraged pt to increase PO fluid intake.

## 2024-07-25 NOTE — PROGRESS NOTES
Chief Complaint   Patient presents with    Follow - Up    Infusion     Pt is here for treatment - maintenance adakveo/oral hydroxyurea are expected. She is eating and drinking without issue; denies N,V,D,C. Energy is up and down; pain depends on the day. Typically is woken up by pain at 2 AM, lately she can't tell if pain will turn into a full blown pain crisis. Tries not to take as much morphine so she can be present with her kids.    Education Record    Learner:  Patient    Disease / Diagnosis: sickle cell anemia    Barriers / Limitations:  None   Comments:    Method:  Brief focused   Comments:    General Topics:  Diet, Medication, Pain, Side effects and symptom management, and Plan of care reviewed   Comments:    Outcome:  Shows understanding   Comments:

## 2024-07-25 NOTE — PROGRESS NOTES
Cancer Center Progress Note    Patient Name: Oluwaseun Oluwadamilola Ogunyemi   YOB: 1985   Medical Record Number: [id number]   [facility]: [id number]   Date of visit: 7/25/24  Provider: JUAN Weathers  Referring Physician: Esau Mccormick MD       NOTE TO THE PATIENT:  The 21st Century Cures Act makes medical notes like these available to patients in the interest of transparency. Please be advised this is a medical document. Medical documents are intended to carry relevant information, facts as evident, and the clinical opinion of the practitioner. The medical note is intended as peer to peer communication and may appear blunt or direct. It is written in medical language and may contain abbreviations or verbiage that are unfamiliar.       Chief Complaint:  Follow up          Hematologic History:  *Sickle cell disease, hgb SS  -early 2000's moved from [facility] to   -2016/2017- 1st pregnancy c/b worsening pain crises  -2018- 2nd pregnancy c/b worsening pain crises, including episode of acute chest requiring RBC exchange.  -early 2019- started hydroxyurea, titrated up to 2000mg daily  -7/2020- started Voxelotor, but dc'd shortly after starting d/t poor tolerability with diarrhea, fatigue, elevated LFTs, was not very helpful either.   -5/28/21- 3/2022- received crizanlizumab (Adakveo) however c/b infusion reaction 3/2022 and was only slightly helpful therefore not continued  -10/2021- moved from [facility] (followed with Dr. Walton of [facility]) to [place].   -4/2022- started L-glutamine (Endari) 15g PO BID, cont'd hydrea  -8/2022- moved from [place] (prev followed by Dr. Esau Bolunt) to [place], [place]  -3//27/23- resumed crizanlizumab d/t more freq acute pain episodes   -5/8/23- reduced hydrea to 1500mg daily (d/t hgb 7.4, abs retic 47K)   -7/2/23- reduced hydrea to 1000mg daily (d/t hgb 7.0, abs retic 59K)   -12/27/23- increased Hydrea to 1500 mg daily   -4/4/24- inceased Hydrea to 2000mg  daily    Hospitalizations and ED visits for acute vaso-occlusive pain episodes since moving from [place] to [place] in 8/2022:  -9/5/22- hospitalization  -9/19/22- ED  -9/20/22- hospitalization  10/8/22- hospitalization  -10/29/22- ED  -11/6/22- ED  -11/21/22- ED   -1/7/23- hospitalization  -4/22/23- hospitalization  -7/1/23- hospitalization    Interval Events:  38 year old  patient of Dr. Mccormick's  who presents for continuation of crizanlizumab.  She remains on Hydrea 2000mg daily.  At home she is taking MS Contin 60 mg q8hrs and oxycodone IR 20 mg q3 hrs prn.  Lately she is taking oxycodone every 3 hours ATC in the last 3-4 days.  MOst of the time she finds optimal relief but there are other times she has such severe pain that the pain is not as well controlled.  She is tryiing not to take it more frequently bec she wants to stay alert for her kids who are 6 and 7 y.o.  She is also taking Endari 15 g twice daily and folic acid 1 mg daily.   Pain has been more unpredictable-has more episodes when pain intensity is elevated but not full blown crisis pain.  No fever.  No symptoms of infection.  Today her pain is in the lower back.  No chest pain or SOB.  No shoulder, arm, hip or leg pains.  She met with [facility] for opinion regarding Gene Therapy and ASCT.  She is a candidate and will speak to Dr. Mccormick about her plans to enroll this Fall.    Allergies:  Allergies   Allergen Reactions    Piperacillin Sod-Tazobactam So ITCHING       Social History     Socioeconomic History    Marital status:    Tobacco Use    Smoking status: Never    Smokeless tobacco: Never   Vaping Use    Vaping status: Never Used   Substance and Sexual Activity    Alcohol use: Never     Comment: No history of excessive use    Drug use: Never    Sexual activity: Yes     Partners: Male     Birth control/protection: Condom        Past Medical History:    Abnormal antibody titer    Anti-C, Anti-E, Anti-K, Warm Auto Antibody     Acute chest syndrome due to sickle cell crisis (HCC)    Acute chest syndrome due to sickle-cell disease (HCC)    RBC exchange for acute chest    Chronic, continuous use of opioids    Constipation due to opioid therapy    History of transfusion    multiple blood transfusions, most of which were during pregnancies    Hypokalemia    Nausea    Sickle cell anemia (HCC)    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)    Sickle cell crisis (HCC)    Frequent crises    Sickle cell crisis (HCC)    Sickle cell pain crisis (HCC)        Past Surgical History:   Procedure Laterality Date                      Vital Signs:  Height: 168.6 cm (5' 6.38\") (1024)  Weight: 70.4 kg (155 lb 3.2 oz) (1024)  BSA (Calculated - sq m): 1.8 sq meters (1024)  Pulse: 103 (1024)  BP: 114/74 (1024)  Temp: 98.3 °F (36.8 °C) (1024)  Do Not Use - Resp Rate: --  SpO2: 99 % (1024)      Medications:    Current Outpatient Medications:     oxyCODONE HCl 20 MG Oral Tab, Take 1 tablet (20 mg total) by mouth every 3 (three) hours as needed., Disp: 180 tablet, Rfl: 0    morphINE ER 60 MG Oral Tab CR, Take 1 tablet (60 mg total) by mouth every 8 (eight) hours., Disp: 90 tablet, Rfl: 0    ondansetron (ZOFRAN) 8 MG tablet, Take 1 tablet (8 mg total) by mouth every 8 (eight) hours as needed for Nausea., Disp: 9 tablet, Rfl: 13    hydroxyurea 500 MG Oral Cap, Take 4 capsules (2,000 mg total) by mouth daily., Disp: 360 capsule, Rfl: 3    folic acid 1 MG Oral Tab, Take 1 tablet (1 mg total) by mouth daily., Disp: 90 tablet, Rfl: 3    Glutamine, Sickle Cell, (ENDARI) 5 g Oral Powd Pack, Take 15 g by mouth in the morning and 15 g before bedtime., Disp: 180 each, Rfl: 11    aspirin 81 MG Oral Tab EC, Take 1 tablet (81 mg total) by mouth daily., Disp: 90 tablet, Rfl: 3    Naloxone HCl 4 MG/0.1ML Nasal Liquid, 4 mg by Nasal route as needed. If patient remains unresponsive, repeat  dose in other nostril 2-5 minutes after first dose., Disp: 1 kit, Rfl: 0    metoprolol succinate ER 25 MG Oral Tablet 24 Hr, Take 1 tablet (25 mg total) by mouth daily., Disp: , Rfl:     Cyanocobalamin (VITAMIN B-12 OR), Take 200 mcg by mouth daily., Disp: , Rfl:     Review of Systems:   As in HPI    Physical Examination:  General: Awake, alert, oriented x3, no acute distress.    HEENT:  Anicteric, conjunctivae and sclerae clear, no sinus tenderness, no oropharyngeal lesion/thrush, mucous membranes are dry  Neck:  Supple, no tenderness, no masses or adenopathy  Lungs:  Clear to auscultation bilaterally  CV:  Regular rate and rhythm  Abdomen:  Non-distended, normoactive bowel sounds, soft,nontender, no hepatosplenomegaly.  Extremities:  No edema, no tenderness  Neuro:  CN 2-12 intact    ECO    Recent Results (from the past 24 hour(s))   RETICULOCYTE COUNT [E]    Collection Time: 24 10:17 AM   Result Value Ref Range    Retic% 2.8 (H) 0.5 - 2.5 %    Retic Absolute 55.2 22.5 - 147.5 x10(3) uL    Retic IRF 0.278 0.100 - 0.300 Ratio    Reticulocyte Hemoglobin Equivalent 40.2 (H) 28.2 - 36.6 pg   LDH [E]    Collection Time: 24 10:17 AM   Result Value Ref Range     120-246 U/L U/L   COMP METABOLIC PANEL [E]    Collection Time: 24 10:17 AM   Result Value Ref Range    Glucose 105 (H) 70 - 99 mg/dL    Sodium 138 136 - 145 mmol/L    Potassium 3.8 3.5 - 5.1 mmol/L    Chloride 106 98 - 112 mmol/L    CO2 27.0 21.0 - 32.0 mmol/L    Anion Gap 5 0 - 18 mmol/L    BUN 8 (L) 9 - 23 mg/dL    Creatinine 0.57 0.55 - 1.02 mg/dL    Calcium, Total 9.5 8.7 - 10.4 mg/dL    Calculated Osmolality 285 275 - 295 mOsm/kg    eGFR-Cr 118 >=60 mL/min/1.73m2    AST 54 (H) <34 U/L    ALT 43 10 - 49 U/L    Alkaline Phosphatase 62 37 - 98 U/L    Bilirubin, Total 0.6 0.3 - 1.2 mg/dL    Total Protein 8.7 (H) 5.7 - 8.2 g/dL    Albumin 4.3 3.2 - 4.8 g/dL    Globulin  4.4 2.8 - 4.4 g/dL    A/G Ratio 1.0 1.0 - 2.0    Patient Fasting  for CMP? Patient not present    CBC W/ DIFFERENTIAL    Collection Time: 07/25/24 10:17 AM   Result Value Ref Range    WBC 3.7 (L) 4.0 - 11.0 x10(3) uL    RBC 1.95 (L) 3.80 - 5.30 x10(6)uL    HGB 8.2 (L) 12.0 - 16.0 g/dL    HCT 22.4 (L) 35.0 - 48.0 %    .0 150.0 - 450.0 10(3)uL    .9 (H) 80.0 - 100.0 fL    MCH 42.1 (H) 26.0 - 34.0 pg    MCHC 36.6 31.0 - 37.0 g/dL    RDW 15.0 %    Neutrophil Absolute Prelim 1.51 1.50 - 7.70 x10 (3) uL    Neutrophil Absolute 1.51 1.50 - 7.70 x10(3) uL    Lymphocyte Absolute 1.83 1.00 - 4.00 x10(3) uL    Monocyte Absolute 0.33 0.10 - 1.00 x10(3) uL    Eosinophil Absolute 0.04 0.00 - 0.70 x10(3) uL    Basophil Absolute 0.01 0.00 - 0.20 x10(3) uL    Immature Granulocyte Absolute 0.01 0.00 - 1.00 x10(3) uL    Neutrophil % 40.4 %    Lymphocyte % 49.1 %    Monocyte % 8.8 %    Eosinophil % 1.1 %    Basophil % 0.3 %    Immature Granulocyte % 0.3 %   Scan slide    Collection Time: 07/25/24 10:17 AM   Result Value Ref Range    Slide Review Slide reviewed,morphology review added    RBC Morphology Scan    Collection Time: 07/25/24 10:17 AM   Result Value Ref Range    RBC Morphology See morphology below (A) Normal, Slide reviewed, see previous RBC morphology.    Platelet Morphology Normal Normal    Microcytosis 1+      Macrocytosis 2+ (A)         Impression/Plan:  Hgb SS/Sickle cell disease  -Continue hydroxyurea to 2000 mg daily, L-glutamine 15 g BID 4/2022, Folvite 1 mg daily  -Proceed w/ Crizanlizumab (adakveo) 5 mg/kg q4 weeks-dose today.    -Consulted with Dr. Bryce Aviles at [facility].  She's told she's a good candidate Gene Therapy/ASCT and she is expressing interest in pursuing this.  Further discussion with Dr. Mccormick next month.    Anemia:  -Stable.  -Continue vitamin B-12 1000 mcg PO daily to be continued indefinitely  -continue folvite 1mg daily indefinitely  -Follow CBC     *Chronic pain management  -Oxycodone 20 mg IR PO q3hrs prn- will prescribe#180 tabs going  forward  -MS Contin 60mg q8 hrs.        *Acute pain episode  -We will give IV fluid hydration and IV morphine as below while she is in the infusion unit today for acute pain flare.  If her pain cannot be adequately controlled with this I encouraged her to go to the ED to be admitted.  -She will proceed with this today given her uncontrolled back pain.     *Management recommendations of acute pain episodes not controlled with home meds  -Hydration with IV fluids with 0.9 NS until determined to be euvolemic, then maintained with 0.5 NS as relative hypernatremia can increase RBC sickling.  -Recommend prompt management of painful symptoms with parenteral opioids- recommend starting IV morphine 6 mg prn up to 3 doses for additional pain relief.  If needed, a morphine PCA can be initiated on admission.  For pruritus related to opiates consider PO Benadryl or cetirizine.  I favor avoiding IV Benadryl.   -For any acute pain episode evaluation for potential infection or development of acute chest should be considered.  -standing order remains in place for IV morphine 6 mg up to 3 doses prn + 1 L of 0.9 NS IVF + PO benadryl 25-50mg prn for acute pain episodes that can be managed as an outpatient in the infusion center PRN in effort to avoid ED visits as able.    JUAN Weathers  [facility]  [facility]

## 2024-08-12 DIAGNOSIS — D57.00 SICKLE CELL PAIN CRISIS (HCC): ICD-10-CM

## 2024-08-12 RX ORDER — OXYCODONE HYDROCHLORIDE 20 MG/1
20 TABLET ORAL
Qty: 180 TABLET | Refills: 0 | Status: SHIPPED | OUTPATIENT
Start: 2024-08-12 | End: 2024-08-22

## 2024-08-12 NOTE — TELEPHONE ENCOUNTER
Patient is calling requesting a refill on her oxyCODONE HCl 20 MG Oral Tab. Patient states she is completely out of her oxyCODONE HCl 20 MG Oral Tab for the past 24 hours. Please send to [facility]pharmacy #[id number] - [place], [place] - [address]. Patient can be reached at [phone]. Called 8/12/24 [place]

## 2024-08-22 ENCOUNTER — OFFICE VISIT (OUTPATIENT)
Dept: HEMATOLOGY/ONCOLOGY | Facility: HOSPITAL | Age: 39
End: 2024-08-22
Attending: INTERNAL MEDICINE
Payer: COMMERCIAL

## 2024-08-22 VITALS
OXYGEN SATURATION: 100 % | HEIGHT: 66.38 IN | SYSTOLIC BLOOD PRESSURE: 106 MMHG | TEMPERATURE: 97 F | RESPIRATION RATE: 16 BRPM | BODY MASS INDEX: 25.25 KG/M2 | HEART RATE: 85 BPM | WEIGHT: 159 LBS | DIASTOLIC BLOOD PRESSURE: 72 MMHG

## 2024-08-22 DIAGNOSIS — D57.00 SICKLE CELL DISEASE WITH CRISIS (HCC): Primary | ICD-10-CM

## 2024-08-22 DIAGNOSIS — D57.419 SICKLE CELL ANEMIA WITH COEXISTENT ALPHA-THALASSEMIA WITH CRISIS (HCC): ICD-10-CM

## 2024-08-22 DIAGNOSIS — D57.00 SICKLE CELL PAIN CRISIS (HCC): ICD-10-CM

## 2024-08-22 DIAGNOSIS — D53.9 MACROCYTIC ANEMIA: ICD-10-CM

## 2024-08-22 DIAGNOSIS — D57.00 SICKLE CELL CRISIS (HCC): Primary | ICD-10-CM

## 2024-08-22 LAB
ALBUMIN SERPL-MCNC: 4.5 G/DL (ref 3.2–4.8)
ALBUMIN/GLOB SERPL: 1 {RATIO} (ref 1–2)
ALP LIVER SERPL-CCNC: 50 U/L
ALT SERPL-CCNC: 22 U/L
ANION GAP SERPL CALC-SCNC: 2 MMOL/L (ref 0–18)
AST SERPL-CCNC: 29 U/L (ref ?–34)
BASOPHILS # BLD AUTO: 0.01 X10(3) UL (ref 0–0.2)
BASOPHILS NFR BLD AUTO: 0.3 %
BILIRUB SERPL-MCNC: 0.6 MG/DL (ref 0.3–1.2)
BUN BLD-MCNC: 8 MG/DL (ref 9–23)
CALCIUM BLD-MCNC: 9.5 MG/DL (ref 8.7–10.4)
CHLORIDE SERPL-SCNC: 104 MMOL/L (ref 98–112)
CO2 SERPL-SCNC: 29 MMOL/L (ref 21–32)
CREAT BLD-MCNC: 0.58 MG/DL
EGFRCR SERPLBLD CKD-EPI 2021: 117 ML/MIN/1.73M2 (ref 60–?)
EOSINOPHIL # BLD AUTO: 0.04 X10(3) UL (ref 0–0.7)
EOSINOPHIL NFR BLD AUTO: 1.1 %
ERYTHROCYTE [DISTWIDTH] IN BLOOD BY AUTOMATED COUNT: 15.1 %
GLOBULIN PLAS-MCNC: 4.3 G/DL (ref 2–3.5)
GLUCOSE BLD-MCNC: 100 MG/DL (ref 70–99)
HCT VFR BLD AUTO: 23.6 %
HGB BLD-MCNC: 8.5 G/DL
HGB RETIC QN AUTO: 36.3 PG (ref 28.2–36.6)
IMM GRANULOCYTES # BLD AUTO: 0.01 X10(3) UL (ref 0–1)
IMM GRANULOCYTES NFR BLD: 0.3 %
IMM RETICS NFR: 0.35 RATIO (ref 0.1–0.3)
LYMPHOCYTES # BLD AUTO: 1.56 X10(3) UL (ref 1–4)
LYMPHOCYTES NFR BLD AUTO: 43 %
MCH RBC QN AUTO: 41.9 PG (ref 26–34)
MCHC RBC AUTO-ENTMCNC: 36 G/DL (ref 31–37)
MCV RBC AUTO: 116.3 FL
MONOCYTES # BLD AUTO: 0.41 X10(3) UL (ref 0.1–1)
MONOCYTES NFR BLD AUTO: 11.3 %
NEUTROPHILS # BLD AUTO: 1.6 X10 (3) UL (ref 1.5–7.7)
NEUTROPHILS # BLD AUTO: 1.6 X10(3) UL (ref 1.5–7.7)
NEUTROPHILS NFR BLD AUTO: 44 %
OSMOLALITY SERPL CALC.SUM OF ELEC: 278 MOSM/KG (ref 275–295)
PLATELET # BLD AUTO: 327 10(3)UL (ref 150–450)
POTASSIUM SERPL-SCNC: 3.8 MMOL/L (ref 3.5–5.1)
PROT SERPL-MCNC: 8.8 G/DL (ref 5.7–8.2)
RBC # BLD AUTO: 2.03 X10(6)UL
RETICS # AUTO: 59.5 X10(3) UL (ref 22.5–147.5)
RETICS/RBC NFR AUTO: 2.9 %
SODIUM SERPL-SCNC: 135 MMOL/L (ref 136–145)
WBC # BLD AUTO: 3.6 X10(3) UL (ref 4–11)

## 2024-08-22 PROCEDURE — 96365 THER/PROPH/DIAG IV INF INIT: CPT

## 2024-08-22 PROCEDURE — 96375 TX/PRO/DX INJ NEW DRUG ADDON: CPT

## 2024-08-22 PROCEDURE — 96361 HYDRATE IV INFUSION ADD-ON: CPT

## 2024-08-22 PROCEDURE — 96376 TX/PRO/DX INJ SAME DRUG ADON: CPT

## 2024-08-22 PROCEDURE — 99215 OFFICE O/P EST HI 40 MIN: CPT | Performed by: INTERNAL MEDICINE

## 2024-08-22 RX ORDER — HYDROMORPHONE HYDROCHLORIDE 1 MG/ML
1 INJECTION, SOLUTION INTRAMUSCULAR; INTRAVENOUS; SUBCUTANEOUS AS NEEDED
Status: CANCELLED
Start: 2024-09-19

## 2024-08-22 RX ORDER — DIPHENHYDRAMINE HCL 25 MG
CAPSULE ORAL ONCE
Status: COMPLETED | OUTPATIENT
Start: 2024-08-22 | End: 2024-08-22

## 2024-08-22 RX ORDER — OXYCODONE HYDROCHLORIDE 30 MG/1
30 TABLET ORAL
Qty: 180 TABLET | Refills: 0 | Status: SHIPPED | OUTPATIENT
Start: 2024-08-22

## 2024-08-22 RX ORDER — HYDROMORPHONE HYDROCHLORIDE 1 MG/ML
1-2 INJECTION, SOLUTION INTRAMUSCULAR; INTRAVENOUS; SUBCUTANEOUS AS NEEDED
Status: CANCELLED
Start: 2024-10-17

## 2024-08-22 RX ORDER — MORPHINE SULFATE 60 MG/1
60 TABLET, FILM COATED, EXTENDED RELEASE ORAL EVERY 8 HOURS SCHEDULED
Qty: 90 TABLET | Refills: 0 | Status: SHIPPED | OUTPATIENT
Start: 2024-08-22

## 2024-08-22 RX ORDER — HYDROMORPHONE HYDROCHLORIDE 1 MG/ML
1-2 INJECTION, SOLUTION INTRAMUSCULAR; INTRAVENOUS; SUBCUTANEOUS AS NEEDED
Start: 2024-10-17

## 2024-08-22 RX ORDER — DIPHENHYDRAMINE HCL 25 MG
CAPSULE ORAL ONCE
Start: 2024-09-19 | End: 2024-09-19

## 2024-08-22 RX ORDER — HYDROMORPHONE HYDROCHLORIDE 1 MG/ML
1 INJECTION, SOLUTION INTRAMUSCULAR; INTRAVENOUS; SUBCUTANEOUS ONCE
Status: COMPLETED | OUTPATIENT
Start: 2024-08-22 | End: 2024-08-22

## 2024-08-22 RX ADMIN — HYDROMORPHONE HYDROCHLORIDE 1 MG: 1 INJECTION, SOLUTION INTRAMUSCULAR; INTRAVENOUS; SUBCUTANEOUS at 15:14:00

## 2024-08-22 RX ADMIN — DIPHENHYDRAMINE HCL 25 MG: 25 MG CAPSULE ORAL at 14:44:00

## 2024-08-22 RX ADMIN — HYDROMORPHONE HYDROCHLORIDE 1 MG: 1 INJECTION, SOLUTION INTRAMUSCULAR; INTRAVENOUS; SUBCUTANEOUS at 15:43:00

## 2024-08-22 RX ADMIN — HYDROMORPHONE HYDROCHLORIDE 1 MG: 1 INJECTION, SOLUTION INTRAMUSCULAR; INTRAVENOUS; SUBCUTANEOUS at 14:45:00

## 2024-08-22 NOTE — PROGRESS NOTES
Education Record     Learner:  Patient     Disease / Diagnosis: sickle cell anemia     Barriers / Limitations:  None                Comments:     Method:  Discussion                Comments:     General Topics:  Plan of care reviewed                Comments:     Outcome:  Shows understanding                Comments: MD f/up and crizanlizumab infusion. Pt states she is having chest pain and lower back pain down to 6/10 from 9/10.  Pt taking her hydrea and endari as directed.

## 2024-08-22 NOTE — PROGRESS NOTES
Hematology Clinic Follow Up Visit    Patient Name: Oluwaseun Oluwadamilola Ogunyemi  Medical Record Number: [id number]   YOB: 1985    PCP: Clive St MD     Reason for Consultation:  Oluwaseun Oluwadamilola Ogunyemi was seen today for the diagnosis of hgb SS/sickle cell disease    Hematologic History:  *Sickle cell disease, hgb SS  -early 2000's moved from [facility] to   -2016/2017- 1st pregnancy c/b worsening pain crises  -2018- 2nd pregnancy c/b worsening pain crises, including episode of acute chest requiring RBC exchange.  -early 2019- started hydroxyurea, titrated up to 2000mg daily  -7/2020- started Voxelotor, but dc'd shortly after starting d/t poor tolerability with diarrhea, fatigue, elevated LFTs, was not very helpful either.   -5/28/21- 3/2022- received crizanlizumab (Adakveo) however c/b infusion reaction 3/2022 and was only slightly helpful therefore not continued  -10/2021- moved from [facility] (followed with Dr. Walton of [facility]) to [place].   -4/2022- started L-glutamine (Endari) 15g PO BID, cont'd hydrea  -8/2022- moved from [place] (prev followed by Dr. sEau Blount) to [place], [place]  -3/27/23- resumed crizanlizumab d/t more freq acute pain episodes   -5/8/23- reduced hydrea to 1500mg daily (d/t hgb 7.4, abs retic 47K)   -7/2/23- reduced hydrea to 1000mg daily (d/t hgb 7.0, abs retic 59K)   -12/27/23- increased Hydrea to 1500 mg daily  -4/4/24- increased Hydrea to 2000 mg daily    ================================  Interval events: Presents for follow-up and for next crizanlizumab infusion.  She has been taking Hydrea at 2000 mg daily.  She has not had any side effects with this.  She is also taking Endari 15 g twice daily and folic acid 1 mg daily.     Over the last couple days she is having increased lower back pain related to an acute pain episode.  Earlier pain was 9/10 severity, down to 6/10 severity at the moment because she just took oxycodone an hour  ago.  She feels like she needs additional IV opiates and IV fluids today.  At home she has continued to take MS Contin 60 mg q8hrs and oxycodone IR 20 mg q3 hrs prn.  Lately she is needing to take oxycodone every 2 hours over the last day to try to gain control of her acute pain.  She states that about 20 days over the last month she is only needed to take a single dose of oxycodone in addition to the MS Contin but when has acute pain episodes the 20 mg oxycodone dose does not seem to be sufficient until she has taken more than 1 dose in less than 3 hours.    She had some substernal chest pain last night but it has since resolved.  No increased dyspnea, cough, or fevers.    She has not required hospitalizations for a acute vaso-occlusive crisis since 7/2023.      No recent bowel or bladder changes.    She met with Dr. Aviles at [facility] in June to discuss gene therapy with Desiree.  There program will start offering this in the fall, it sounds like she is a good candidate for this.  She states that she was told that she would be hospitalized therefore 3 to 6 weeks for this.  She is in the process of trying to identify additional friends and family who could support her and her family during this period of time since she has 2 young kids.  She is leaning towards pursuing this as she tends to have more frequent acute pain episodes.    Hospitalizations and ED visits for acute vaso-occlusive pain episodes since moving from [place] to [place] in 8/2022:  -9/5/22- hospitalization  -9/19/22- ED  -9/20/22- hospitalization  10/8/22- hospitalization  -10/29/22- ED  -11/6/22- ED  -11/21/22- ED   -1/7/23- hospitalization  -4/22/23- hospitalization  -7/1/23- hospitalization    Past Medical History:  Past Medical History:    Abnormal antibody titer    Anti-C, Anti-E, Anti-K, Warm Auto Antibody    Acute chest syndrome due to sickle cell crisis (HCC)    Acute chest syndrome due to sickle-cell disease (HCC)     RBC exchange for acute chest    Chronic, continuous use of opioids    Constipation due to opioid therapy    History of transfusion    multiple blood transfusions, most of which were during pregnancies    Hypokalemia    Nausea    Sickle cell anemia (HCC)    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 180 tablet 0    morphINE ER 60 MG Oral Tab CR Take 1 tablet (60 mg total) by mouth every 8 (eight) hours. 90 tablet 0    ondansetron (ZOFRAN) 8 MG tablet Take 1 tablet (8 mg total) by mouth every 8 (eight) hours as needed for Nausea. 9 tablet 13    hydroxyurea 500 MG Oral Cap Take 4 capsules (2,000 mg total) by mouth daily. 360 capsule 3    folic acid 1 MG Oral Tab Take 1 tablet (1 mg total) by mouth daily. 90 tablet 3    Glutamine, Sickle Cell, (ENDARI) 5 g Oral Powd Pack Take 15 g by mouth in the morning and 15 g before bedtime. 180 each 11    aspirin 81 MG Oral Tab EC Take 1 tablet (81 mg total) by mouth daily. 90 tablet 3    Naloxone HCl 4 MG/0.1ML Nasal Liquid 4 mg by Nasal route as needed. If patient remains unresponsive, repeat dose in other nostril 2-5 minutes after first dose. 1 kit 0    metoprolol succinate ER 25 MG Oral Tablet 24 Hr Take 1 tablet (25 mg total) by mouth daily.      Cyanocobalamin (VITAMIN B-12 OR) Take 200 mcg by mouth daily.       Allergies:   Allergies   Allergen Reactions    Piperacillin Sod-Tazobactam So ITCHING       Psychosocial History:  Social History     Social History Narrative    , has 2 children ages 4 and 5 as of 2022.  Not employed.     Social History     Socioeconomic History    Marital status:    Tobacco Use    Smoking  status: Never    Smokeless tobacco: Never   Vaping Use    Vaping status: Never Used   Substance and Sexual Activity    Alcohol use: Never     Comment: No history of excessive use    Drug use: Never    Sexual activity: Yes     Partners: Male     Birth control/protection: Condom   Social History Narrative    , has 2 children ages 4 and 5 as of 9/2022.  Not employed.     Social Determinants of Health     Financial Resource Strain: Low Risk  (4/28/2023)    Financial Resource Strain     Difficulty of Paying Living Expenses: Not very hard   Transportation Needs: No Transportation Needs (4/28/2023)    Transportation Needs     Lack of Transportation: No       Family Medical History:  Family History   Problem Relation Age of Onset    Hypertension Mother         unknown    Cataracts Mother     No Known Problems Father         was told cardiac arrest    No Known Problems Brother     Sickle Cell Maternal Aunt         passed from kidney failure    DVT/VTE Other     Breast Cancer Neg     Ovarian Cancer Neg     Colon Cancer Neg      Review of Systems:  A 10-point ROS was done with pertinent positives and negative per the HPI    Vital Signs:  Height: 168.6 cm (5' 6.38\") (08/22 1335)  Weight: 72.1 kg (159 lb) (08/22 1335)  BSA (Calculated - sq m): 1.82 sq meters (08/22 1335)  Pulse: 85 (08/22 1335)  BP: 106/72 (08/22 1335)  Temp: 97.1 °F (36.2 °C) (08/22 1335)  Do Not Use - Resp Rate: --  SpO2: 100 % (08/22 1335)    Wt Readings from Last 6 Encounters:   08/22/24 72.1 kg (159 lb)   07/25/24 70.4 kg (155 lb 3.2 oz)   07/01/24 71.5 kg (157 lb 9.6 oz)   06/28/24 69.8 kg (153 lb 12.8 oz)   05/30/24 72.3 kg (159 lb 8 oz)   05/28/24 69.9 kg (154 lb)     Physical Examination:  General: Patient is alert and oriented, not in acute distress  Psych: Mood and affect are appropriate  Eyes: EOMI  ENT: Oropharynx is clear  CV: Regular rate and rhythm, no murmurs, no LE edema  Respiratory: Lungs clear to auscultation bilaterally  GI/Abd:  Soft, non-tender with normoactive bowel sounds, no hepatosplenomegaly  Neurological: Grossly intact   Lymphatics: No palpable lymphadenopathy  Skin: no rashes or petechiae    Laboratory:  Lab Results   Component Value Date    WBC 3.6 (L) 08/22/2024    WBC 3.7 (L) 07/25/2024    WBC 4.5 05/30/2024    HGB 8.5 (L) 08/22/2024    HGB 8.2 (L) 07/25/2024    HGB 8.3 (L) 05/30/2024    HCT 23.6 (L) 08/22/2024    .3 (H) 08/22/2024    MCH 41.9 (H) 08/22/2024    MCHC 36.0 08/22/2024    RDW 15.1 08/22/2024    .0 08/22/2024    .0 07/25/2024    .0 (H) 05/30/2024     Lab Results   Component Value Date     (H) 08/22/2024    BUN 8 (L) 08/22/2024    CREATSERUM 0.58 08/22/2024    CREATSERUM 0.57 07/25/2024    CREATSERUM 0.62 05/30/2024    ANIONGAP 2 08/22/2024    CA 9.5 08/22/2024    OSMOCALC 278 08/22/2024    ALKPHO 50 08/22/2024    AST 29 08/22/2024    ALT 22 08/22/2024    BILT 0.6 08/22/2024    TP 8.8 (H) 08/22/2024    ALB 4.5 08/22/2024    GLOBULIN 4.3 (H) 08/22/2024     (L) 08/22/2024    K 3.8 08/22/2024     08/22/2024    CO2 29.0 08/22/2024     Lab Results   Component Value Date     07/25/2024     12/27/2023     07/12/2023     05/08/2023     03/27/2023     02/16/2023     12/22/2022     09/05/2022     No results found for: \"PTT\", \"PT\", \"INR\"    Lab Results   Component Value Date    RETICABSOL 59.5 08/22/2024    RETICABSOL 55.2 07/25/2024    RETICABSOL 88.8 05/30/2024    RETICABSOL 116.1 04/04/2024    RETICABSOL 86.6 03/08/2024    RETICABSOL 116.0 02/21/2024    RETICABSOL 115.2 01/24/2024    RETICABSOL 178.6 (H) 12/27/2023    RETICABSOL 120.5 11/01/2023    RETICABSOL 132.5 10/23/2023     Lab Results   Component Value Date    CORINNE 279.5 (H) 02/16/2023    CORINNE 395.5 (H) 09/05/2022     Lab Results   Component Value Date    SAT 28 02/16/2023    SAT 36 09/05/2022     Lab Results   Component Value Date    B12 1,792 (H) 07/12/2023    B12 182  (L) 05/08/2023     Lab Results   Component Value Date    MMA 95 05/08/2023     Component      Latest Ref Rng & Units 9/5/2022   HEMOGLOBIN A      95.5 - 100.0 % <1.0 (L)   HEMOGLOBIN A2      1.5 - 3.5 % 1.3 (L)   HEMOGLOBIN F (FETAL)      0.0 - 2.0 % 45.0 (H)   HEMOGLOBIN S      % 53.7   HGB Electophoresis Interp       Overall, 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 Although HgB F is often elevated in patients with sickle cell disease, such elevations do not commonly exceed 15% of the total hemoglobin. Possible considerations for the degree of HgB F elevation as seen in this patient may include therapy effect (Hydroxyurea) vs. hereditary persistence of fetal hemoglobin (HPFH).      Impression & Plan:     *Hgb SS/Sickle cell disease  -+hx of acute chest in 2018 requiring RBC exchange.  She reports having less than 10 transfusions in her lifetime, most of the transfusions she received were during prior pregnancies.   -She has had an excellent response with hydroxyurea with marked increase in hemoglobin F.  Previously had to reduce dosing from 2000 mg daily to 1000 mg daily due to worsening hypoproliferative anemia but now seems to be doing better and have been able to escalate her dosing back up to hydroxyurea 2000 mg daily with adequate blood counts.  -Continue hydroxyurea to 2000 mg daily  -Started L-glutamine 15 g BID 4/2022, in effort to reduce frequency of acute pain episodes, so far she thinks this might be helpful, will continue this.    -restarted Crizanlizumab (adakveo) 3/16/23, tolerating well without complication.  Continue crizanlizumab 5 mg/kg q4 weeks-dose today.  The frequency of her hospitalizations and pain crises have decreased since she started this.    -Previously intolerant to Voxelotor   -Recommend Folvite 1 mg daily to be continued indefinitely  -Recommend annual ophthalmology visits  -Advised to stay up-to-date with recommended  vaccinations.    -She has met with  Dr. Bryce Aviles at [facility] to discuss pursuing Casgevy gene therapy.  She plans to discuss further with family and friends to make sure she has enough \"backup\" social support to help with her kids and family before committing, but at this point is leaning towards pursuing this.      *macrocytic anemia  -d/t sickle cell anemia + hydroxyurea effect + B12 def  -stable today  -Continue vitamin B-12 1000 mcg PO daily to be continued indefinitely  -continue folvite 1mg daily indefinitely  -Follow CBC    *Chronic pain management  -Continue MS Contin 60mg q8 hrs.   -Oxycodone dose for breakthrough does not seem to be effective lately, will increase to oxycodone 30 mg IR PO q3hrs prn #180 tabs  -Duloxetine was not helpful    *Acute pain episode  -We will give IV fluid hydration and IV opiates today as below while she is in the infusion unit today for acute pain flare.  Will rotate IV opiate from morphine 6mg IV prn to Dilaudid 1-2mg IV prn today to see if this helps.  Lately she has needed 2 doses of IV morphine 6 mg each before her pain starts to get under control.  If her pain cannot be adequately controlled with this I encouraged her to go to the ED to be admitted.    *Management recommendations of acute pain episodes not controlled with home meds  -Hydration with IV fluids with 0.9 NS until determined to be euvolemic, then maintained with 0.5 NS as relative hypernatremia can increase RBC sickling.  -Recommend prompt management of painful symptoms with parenteral opioids- recommend starting IV Dilaudid 2mg (or IV morphine 10mg) prn up to 3 doses for additional pain relief.  If needed, a morphine PCA can be initiated on admission.  For pruritus related to opiates consider PO Benadryl or cetirizine.  I favor avoiding IV Benadryl.   -For any acute pain episode evaluation for potential infection or development of acute chest should be considered.  -standing order remains in place for IV  dilaudid 1-2mg up to 3 doses prn + 1 L of 0.9 NS IVF + PO benadryl 25-50mg prn for acute pain episodes that can be managed as an outpatient in the infusion center PRN in effort to avoid ED visits as able.     RTC in 4 weeks    Esau Mccormick MD  Hematology/Medical Oncology  [facility]    MDM- high risk given IVF and IV opiates given in infusion area requiring intensive monitoring in infusion unit.  ongoing continuity of complex care related to sickle cell disease.

## 2024-08-22 NOTE — PROGRESS NOTES
Pt here for Adakveo/IVF/dilaudid . Pt denies any issues or concerns.      Ordering Provider: dr arizmendi  Order Exp: ongoing     Pt tolerated infusion without difficulty or complaint. Reviewed next apt date/time: yes one month      Education Record  Learner:  Patient  Disease / Diagnosis: sickle cell  Barriers / Limitations:  None  Method:  Brief focused  General Topics:  Plan of care reviewed  Outcome:  Shows understanding

## 2024-09-03 RX ORDER — ASPIRIN 81 MG/1
81 TABLET, COATED ORAL DAILY
Qty: 90 TABLET | Refills: 3 | Status: SHIPPED | OUTPATIENT
Start: 2024-09-03

## 2024-09-19 ENCOUNTER — OFFICE VISIT (OUTPATIENT)
Dept: HEMATOLOGY/ONCOLOGY | Facility: HOSPITAL | Age: 39
End: 2024-09-19
Attending: INTERNAL MEDICINE
Payer: COMMERCIAL

## 2024-09-19 VITALS
HEART RATE: 83 BPM | WEIGHT: 160 LBS | RESPIRATION RATE: 16 BRPM | SYSTOLIC BLOOD PRESSURE: 110 MMHG | DIASTOLIC BLOOD PRESSURE: 75 MMHG | TEMPERATURE: 98 F | BODY MASS INDEX: 25.41 KG/M2 | HEIGHT: 66.38 IN | OXYGEN SATURATION: 100 %

## 2024-09-19 DIAGNOSIS — D57.00 SICKLE CELL PAIN CRISIS (HCC): ICD-10-CM

## 2024-09-19 DIAGNOSIS — D57.419 SICKLE CELL ANEMIA WITH COEXISTENT ALPHA-THALASSEMIA WITH CRISIS (HCC): ICD-10-CM

## 2024-09-19 DIAGNOSIS — D57.00 SICKLE CELL DISEASE WITH CRISIS (HCC): Primary | ICD-10-CM

## 2024-09-19 DIAGNOSIS — D57.00 SICKLE CELL PAIN CRISIS (HCC): Primary | ICD-10-CM

## 2024-09-19 DIAGNOSIS — D53.9 MACROCYTIC ANEMIA: ICD-10-CM

## 2024-09-19 PROCEDURE — 99215 OFFICE O/P EST HI 40 MIN: CPT | Performed by: INTERNAL MEDICINE

## 2024-09-19 PROCEDURE — 96365 THER/PROPH/DIAG IV INF INIT: CPT

## 2024-09-19 PROCEDURE — 96361 HYDRATE IV INFUSION ADD-ON: CPT

## 2024-09-19 PROCEDURE — 96376 TX/PRO/DX INJ SAME DRUG ADON: CPT

## 2024-09-19 PROCEDURE — 96375 TX/PRO/DX INJ NEW DRUG ADDON: CPT

## 2024-09-19 RX ORDER — DIPHENHYDRAMINE HCL 25 MG
CAPSULE ORAL ONCE
Start: 2024-10-17 | End: 2024-10-17

## 2024-09-19 RX ORDER — HYDROMORPHONE HYDROCHLORIDE 1 MG/ML
1-2 INJECTION, SOLUTION INTRAMUSCULAR; INTRAVENOUS; SUBCUTANEOUS AS NEEDED
Start: 2024-10-17

## 2024-09-19 RX ORDER — HYDROMORPHONE HYDROCHLORIDE 1 MG/ML
1-2 INJECTION, SOLUTION INTRAMUSCULAR; INTRAVENOUS; SUBCUTANEOUS DAILY PRN
Status: DISCONTINUED | OUTPATIENT
Start: 2024-09-19 | End: 2024-09-19

## 2024-09-19 RX ORDER — HYDROMORPHONE HYDROCHLORIDE 1 MG/ML
1-2 INJECTION, SOLUTION INTRAMUSCULAR; INTRAVENOUS; SUBCUTANEOUS ONCE
Status: COMPLETED | OUTPATIENT
Start: 2024-09-19 | End: 2024-09-19

## 2024-09-19 RX ORDER — DIPHENHYDRAMINE HCL 25 MG
CAPSULE ORAL ONCE
Status: COMPLETED | OUTPATIENT
Start: 2024-09-19 | End: 2024-09-19

## 2024-09-19 RX ADMIN — HYDROMORPHONE HYDROCHLORIDE 1 MG: 1 INJECTION, SOLUTION INTRAMUSCULAR; INTRAVENOUS; SUBCUTANEOUS at 13:12:00

## 2024-09-19 RX ADMIN — DIPHENHYDRAMINE HCL 25 MG: 25 MG CAPSULE ORAL at 12:09:00

## 2024-09-19 RX ADMIN — HYDROMORPHONE HYDROCHLORIDE 2 MG: 1 INJECTION, SOLUTION INTRAMUSCULAR; INTRAVENOUS; SUBCUTANEOUS at 12:10:00

## 2024-09-19 NOTE — PROGRESS NOTES
Education Record     Learner:  Patient     Disease / Diagnosis: sickle cell anemia     Barriers / Limitations:  None                Comments:     Method:  Discussion                Comments:     General Topics:  Plan of care reviewed                Comments:     Outcome:  Shows understanding                Comments: MD f/up and crizanlizumab infusion. states she has been having crisis all week and been managing with pain medication around the clock. States today she is having back and mile chest pains 4-5/10.  Pt taking her hydrea and endari as directed.

## 2024-09-27 DIAGNOSIS — D57.00 SICKLE CELL PAIN CRISIS (HCC): ICD-10-CM

## 2024-09-27 RX ORDER — MORPHINE SULFATE 60 MG/1
60 TABLET, FILM COATED, EXTENDED RELEASE ORAL EVERY 8 HOURS SCHEDULED
Qty: 90 TABLET | Refills: 0 | Status: SHIPPED | OUTPATIENT
Start: 2024-09-27 | End: 2024-11-05

## 2024-09-27 RX ORDER — OXYCODONE HYDROCHLORIDE 30 MG/1
30 TABLET ORAL
Qty: 180 TABLET | Refills: 0 | Status: SHIPPED | OUTPATIENT
Start: 2024-09-27 | End: 2024-11-05

## 2024-10-07 ENCOUNTER — APPOINTMENT (OUTPATIENT)
Dept: CT IMAGING | Facility: HOSPITAL | Age: 39
End: 2024-10-07
Attending: STUDENT IN AN ORGANIZED HEALTH CARE EDUCATION/TRAINING PROGRAM
Payer: COMMERCIAL

## 2024-10-07 ENCOUNTER — APPOINTMENT (OUTPATIENT)
Dept: GENERAL RADIOLOGY | Facility: HOSPITAL | Age: 39
End: 2024-10-07
Attending: EMERGENCY MEDICINE
Payer: COMMERCIAL

## 2024-10-07 ENCOUNTER — HOSPITAL ENCOUNTER (INPATIENT)
Facility: HOSPITAL | Age: 39
LOS: 7 days | Discharge: HOME | End: 2024-10-14
Attending: EMERGENCY MEDICINE | Admitting: STUDENT IN AN ORGANIZED HEALTH CARE EDUCATION/TRAINING PROGRAM
Payer: COMMERCIAL

## 2024-10-07 DIAGNOSIS — D64.9 ANEMIA, UNSPECIFIED TYPE: ICD-10-CM

## 2024-10-07 DIAGNOSIS — J18.9 MULTIFOCAL PNEUMONIA: ICD-10-CM

## 2024-10-07 DIAGNOSIS — D57.00 SICKLE CELL CRISIS (HCC): Primary | ICD-10-CM

## 2024-10-07 DIAGNOSIS — R19.7 DIARRHEA OF PRESUMED INFECTIOUS ORIGIN: ICD-10-CM

## 2024-10-07 LAB
ADENOVIRUS PCR:: NOT DETECTED
ALBUMIN SERPL-MCNC: 4.8 G/DL (ref 3.2–4.8)
ALBUMIN/GLOB SERPL: 1 {RATIO} (ref 1–2)
ALP LIVER SERPL-CCNC: 59 U/L
ALT SERPL-CCNC: 28 U/L
ANION GAP SERPL CALC-SCNC: 9 MMOL/L (ref 0–18)
AST SERPL-CCNC: 30 U/L (ref ?–34)
B PARAPERT DNA SPEC QL NAA+PROBE: NOT DETECTED
B PERT DNA SPEC QL NAA+PROBE: NOT DETECTED
BASOPHILS # BLD AUTO: 0.01 X10(3) UL (ref 0–0.2)
BASOPHILS NFR BLD AUTO: 0.1 %
BILIRUB SERPL-MCNC: 0.5 MG/DL (ref 0.3–1.2)
BUN BLD-MCNC: 10 MG/DL (ref 9–23)
C DIFF TOX B STL QL: NEGATIVE
C PNEUM DNA SPEC QL NAA+PROBE: NOT DETECTED
CALCIUM BLD-MCNC: 9.7 MG/DL (ref 8.7–10.4)
CHLORIDE SERPL-SCNC: 103 MMOL/L (ref 98–112)
CO2 SERPL-SCNC: 26 MMOL/L (ref 21–32)
CORONAVIRUS 229E PCR:: NOT DETECTED
CORONAVIRUS HKU1 PCR:: NOT DETECTED
CORONAVIRUS NL63 PCR:: NOT DETECTED
CORONAVIRUS OC43 PCR:: NOT DETECTED
CREAT BLD-MCNC: 0.64 MG/DL
EGFRCR SERPLBLD CKD-EPI 2021: 115 ML/MIN/1.73M2 (ref 60–?)
EOSINOPHIL # BLD AUTO: 0 X10(3) UL (ref 0–0.7)
EOSINOPHIL NFR BLD AUTO: 0 %
ERYTHROCYTE [DISTWIDTH] IN BLOOD BY AUTOMATED COUNT: 14.3 %
FLUAV RNA SPEC QL NAA+PROBE: NOT DETECTED
FLUBV RNA SPEC QL NAA+PROBE: NOT DETECTED
GLOBULIN PLAS-MCNC: 4.7 G/DL (ref 2–3.5)
GLUCOSE BLD-MCNC: 137 MG/DL (ref 70–99)
HCT VFR BLD AUTO: 26 %
HGB BLD-MCNC: 9.7 G/DL
HGB RETIC QN AUTO: 36.2 PG (ref 28.2–36.6)
IMM GRANULOCYTES # BLD AUTO: 0.05 X10(3) UL (ref 0–1)
IMM GRANULOCYTES NFR BLD: 0.5 %
IMM RETICS NFR: 0.39 RATIO (ref 0.1–0.3)
LIPASE SERPL-CCNC: 53 U/L (ref 12–53)
LYMPHOCYTES # BLD AUTO: 0.69 X10(3) UL (ref 1–4)
LYMPHOCYTES NFR BLD AUTO: 7.3 %
MCH RBC QN AUTO: 41.8 PG (ref 26–34)
MCHC RBC AUTO-ENTMCNC: 37.3 G/DL (ref 31–37)
MCV RBC AUTO: 112.1 FL
METAPNEUMOVIRUS PCR:: NOT DETECTED
MONOCYTES # BLD AUTO: 0.61 X10(3) UL (ref 0.1–1)
MONOCYTES NFR BLD AUTO: 6.5 %
MYCOPLASMA PNEUMONIA PCR:: NOT DETECTED
NEUTROPHILS # BLD AUTO: 8.08 X10 (3) UL (ref 1.5–7.7)
NEUTROPHILS # BLD AUTO: 8.08 X10(3) UL (ref 1.5–7.7)
NEUTROPHILS NFR BLD AUTO: 85.6 %
NT-PROBNP SERPL-MCNC: 164 PG/ML (ref ?–125)
OSMOLALITY SERPL CALC.SUM OF ELEC: 287 MOSM/KG (ref 275–295)
PARAINFLUENZA 1 PCR:: NOT DETECTED
PARAINFLUENZA 2 PCR:: NOT DETECTED
PARAINFLUENZA 3 PCR:: NOT DETECTED
PARAINFLUENZA 4 PCR:: NOT DETECTED
PLATELET # BLD AUTO: 422 10(3)UL (ref 150–450)
POTASSIUM SERPL-SCNC: 3.3 MMOL/L (ref 3.5–5.1)
PROT SERPL-MCNC: 9.5 G/DL (ref 5.7–8.2)
RBC # BLD AUTO: 2.32 X10(6)UL
RETICS # AUTO: 70.8 X10(3) UL (ref 22.5–147.5)
RETICS/RBC NFR AUTO: 3.1 %
RHINOVIRUS/ENTERO PCR:: NOT DETECTED
RSV RNA SPEC QL NAA+PROBE: NOT DETECTED
SARS-COV-2 RNA NPH QL NAA+NON-PROBE: NOT DETECTED
SODIUM SERPL-SCNC: 138 MMOL/L (ref 136–145)
WBC # BLD AUTO: 9.4 X10(3) UL (ref 4–11)

## 2024-10-07 PROCEDURE — 71045 X-RAY EXAM CHEST 1 VIEW: CPT | Performed by: EMERGENCY MEDICINE

## 2024-10-07 PROCEDURE — 74176 CT ABD & PELVIS W/O CONTRAST: CPT | Performed by: STUDENT IN AN ORGANIZED HEALTH CARE EDUCATION/TRAINING PROGRAM

## 2024-10-07 PROCEDURE — 71250 CT THORAX DX C-: CPT | Performed by: STUDENT IN AN ORGANIZED HEALTH CARE EDUCATION/TRAINING PROGRAM

## 2024-10-07 PROCEDURE — 99223 1ST HOSP IP/OBS HIGH 75: CPT | Performed by: STUDENT IN AN ORGANIZED HEALTH CARE EDUCATION/TRAINING PROGRAM

## 2024-10-07 RX ORDER — SODIUM PHOSPHATE, DIBASIC AND SODIUM PHOSPHATE, MONOBASIC 7; 19 G/230ML; G/230ML
1 ENEMA RECTAL ONCE AS NEEDED
Status: DISCONTINUED | OUTPATIENT
Start: 2024-10-07 | End: 2024-10-14

## 2024-10-07 RX ORDER — ONDANSETRON 2 MG/ML
4 INJECTION INTRAMUSCULAR; INTRAVENOUS EVERY 6 HOURS PRN
Status: DISCONTINUED | OUTPATIENT
Start: 2024-10-07 | End: 2024-10-14

## 2024-10-07 RX ORDER — HYDROMORPHONE HYDROCHLORIDE 1 MG/ML
1 INJECTION, SOLUTION INTRAMUSCULAR; INTRAVENOUS; SUBCUTANEOUS EVERY 30 MIN PRN
Status: DISCONTINUED | OUTPATIENT
Start: 2024-10-07 | End: 2024-10-08

## 2024-10-07 RX ORDER — DIPHENHYDRAMINE HYDROCHLORIDE 50 MG/ML
25 INJECTION INTRAMUSCULAR; INTRAVENOUS ONCE
Status: COMPLETED | OUTPATIENT
Start: 2024-10-07 | End: 2024-10-07

## 2024-10-07 RX ORDER — BENZONATATE 200 MG/1
200 CAPSULE ORAL 3 TIMES DAILY PRN
Status: DISCONTINUED | OUTPATIENT
Start: 2024-10-07 | End: 2024-10-14

## 2024-10-07 RX ORDER — OXYCODONE HYDROCHLORIDE 15 MG/1
30 TABLET ORAL
Status: DISCONTINUED | OUTPATIENT
Start: 2024-10-07 | End: 2024-10-08

## 2024-10-07 RX ORDER — DIPHENHYDRAMINE HCL 25 MG
25 CAPSULE ORAL EVERY 6 HOURS PRN
COMMUNITY

## 2024-10-07 RX ORDER — ONDANSETRON 2 MG/ML
4 INJECTION INTRAMUSCULAR; INTRAVENOUS ONCE
Status: COMPLETED | OUTPATIENT
Start: 2024-10-07 | End: 2024-10-07

## 2024-10-07 RX ORDER — HYDROMORPHONE HYDROCHLORIDE 1 MG/ML
0.4 INJECTION, SOLUTION INTRAMUSCULAR; INTRAVENOUS; SUBCUTANEOUS EVERY 2 HOUR PRN
Status: DISCONTINUED | OUTPATIENT
Start: 2024-10-07 | End: 2024-10-08

## 2024-10-07 RX ORDER — LEVOFLOXACIN 5 MG/ML
750 INJECTION, SOLUTION INTRAVENOUS EVERY 24 HOURS
Status: DISCONTINUED | OUTPATIENT
Start: 2024-10-07 | End: 2024-10-07

## 2024-10-07 RX ORDER — POTASSIUM CHLORIDE 14.9 MG/ML
20 INJECTION INTRAVENOUS ONCE
Status: COMPLETED | OUTPATIENT
Start: 2024-10-07 | End: 2024-10-07

## 2024-10-07 RX ORDER — HYDROMORPHONE HYDROCHLORIDE 1 MG/ML
1.2 INJECTION, SOLUTION INTRAMUSCULAR; INTRAVENOUS; SUBCUTANEOUS EVERY 2 HOUR PRN
Status: DISCONTINUED | OUTPATIENT
Start: 2024-10-07 | End: 2024-10-08

## 2024-10-07 RX ORDER — ENOXAPARIN SODIUM 100 MG/ML
40 INJECTION SUBCUTANEOUS DAILY
Status: DISCONTINUED | OUTPATIENT
Start: 2024-10-08 | End: 2024-10-14

## 2024-10-07 RX ORDER — HYDROMORPHONE HYDROCHLORIDE 1 MG/ML
0.2 INJECTION, SOLUTION INTRAMUSCULAR; INTRAVENOUS; SUBCUTANEOUS EVERY 2 HOUR PRN
Status: DISCONTINUED | OUTPATIENT
Start: 2024-10-07 | End: 2024-10-07

## 2024-10-07 RX ORDER — ECHINACEA PURPUREA EXTRACT 125 MG
1 TABLET ORAL
Status: DISCONTINUED | OUTPATIENT
Start: 2024-10-07 | End: 2024-10-14

## 2024-10-07 RX ORDER — MORPHINE SULFATE 2 MG/ML
1 INJECTION, SOLUTION INTRAMUSCULAR; INTRAVENOUS EVERY 2 HOUR PRN
Status: DISCONTINUED | OUTPATIENT
Start: 2024-10-07 | End: 2024-10-07

## 2024-10-07 RX ORDER — METOPROLOL SUCCINATE 25 MG/1
25 TABLET, EXTENDED RELEASE ORAL DAILY
Status: DISCONTINUED | OUTPATIENT
Start: 2024-10-07 | End: 2024-10-14

## 2024-10-07 RX ORDER — ASPIRIN 81 MG/1
81 TABLET ORAL DAILY
Status: DISCONTINUED | OUTPATIENT
Start: 2024-10-07 | End: 2024-10-14

## 2024-10-07 RX ORDER — HYDROXYUREA 500 MG/1
2000 CAPSULE ORAL DAILY
Status: DISCONTINUED | OUTPATIENT
Start: 2024-10-07 | End: 2024-10-14

## 2024-10-07 RX ORDER — PROCHLORPERAZINE EDISYLATE 5 MG/ML
5 INJECTION INTRAMUSCULAR; INTRAVENOUS EVERY 8 HOURS PRN
Status: DISCONTINUED | OUTPATIENT
Start: 2024-10-07 | End: 2024-10-14

## 2024-10-07 RX ORDER — HYDROMORPHONE HYDROCHLORIDE 1 MG/ML
0.8 INJECTION, SOLUTION INTRAMUSCULAR; INTRAVENOUS; SUBCUTANEOUS EVERY 2 HOUR PRN
Status: DISCONTINUED | OUTPATIENT
Start: 2024-10-07 | End: 2024-10-07

## 2024-10-07 RX ORDER — HYDROMORPHONE HYDROCHLORIDE 1 MG/ML
0.8 INJECTION, SOLUTION INTRAMUSCULAR; INTRAVENOUS; SUBCUTANEOUS EVERY 2 HOUR PRN
Status: DISCONTINUED | OUTPATIENT
Start: 2024-10-07 | End: 2024-10-08

## 2024-10-07 RX ORDER — MELATONIN
3 NIGHTLY PRN
Status: DISCONTINUED | OUTPATIENT
Start: 2024-10-07 | End: 2024-10-14

## 2024-10-07 RX ORDER — DIPHENHYDRAMINE HYDROCHLORIDE 50 MG/ML
25 INJECTION INTRAMUSCULAR; INTRAVENOUS EVERY 6 HOURS PRN
Status: DISCONTINUED | OUTPATIENT
Start: 2024-10-07 | End: 2024-10-14

## 2024-10-07 RX ORDER — MORPHINE SULFATE 2 MG/ML
2 INJECTION, SOLUTION INTRAMUSCULAR; INTRAVENOUS EVERY 2 HOUR PRN
Status: DISCONTINUED | OUTPATIENT
Start: 2024-10-07 | End: 2024-10-07

## 2024-10-07 RX ORDER — ACETAMINOPHEN 500 MG
1000 TABLET ORAL EVERY 6 HOURS PRN
Status: DISCONTINUED | OUTPATIENT
Start: 2024-10-07 | End: 2024-10-14

## 2024-10-07 RX ORDER — SODIUM CHLORIDE 9 MG/ML
1000 INJECTION, SOLUTION INTRAVENOUS ONCE
Status: COMPLETED | OUTPATIENT
Start: 2024-10-07 | End: 2024-10-07

## 2024-10-07 RX ORDER — DIPHENHYDRAMINE HCL 25 MG
50 CAPSULE ORAL EVERY 6 HOURS PRN
Status: DISCONTINUED | OUTPATIENT
Start: 2024-10-07 | End: 2024-10-07

## 2024-10-07 RX ORDER — BISACODYL 10 MG
10 SUPPOSITORY, RECTAL RECTAL
Status: DISCONTINUED | OUTPATIENT
Start: 2024-10-07 | End: 2024-10-14

## 2024-10-07 RX ORDER — POLYETHYLENE GLYCOL 3350 17 G/17G
17 POWDER, FOR SOLUTION ORAL DAILY PRN
Status: DISCONTINUED | OUTPATIENT
Start: 2024-10-07 | End: 2024-10-14

## 2024-10-07 RX ORDER — MORPHINE SULFATE 2 MG/ML
0.5 INJECTION, SOLUTION INTRAMUSCULAR; INTRAVENOUS EVERY 2 HOUR PRN
Status: DISCONTINUED | OUTPATIENT
Start: 2024-10-07 | End: 2024-10-07

## 2024-10-07 RX ORDER — HYDROMORPHONE HYDROCHLORIDE 1 MG/ML
1 INJECTION, SOLUTION INTRAMUSCULAR; INTRAVENOUS; SUBCUTANEOUS EVERY 30 MIN PRN
Status: COMPLETED | OUTPATIENT
Start: 2024-10-07 | End: 2024-10-07

## 2024-10-07 RX ORDER — SENNOSIDES 8.6 MG
17.2 TABLET ORAL NIGHTLY PRN
Status: DISCONTINUED | OUTPATIENT
Start: 2024-10-07 | End: 2024-10-14

## 2024-10-07 RX ORDER — SODIUM CHLORIDE 9 MG/ML
INJECTION, SOLUTION INTRAVENOUS CONTINUOUS
Status: DISCONTINUED | OUTPATIENT
Start: 2024-10-07 | End: 2024-10-14

## 2024-10-07 RX ORDER — FOLIC ACID 1 MG/1
1 TABLET ORAL DAILY
Status: DISCONTINUED | OUTPATIENT
Start: 2024-10-07 | End: 2024-10-14

## 2024-10-07 RX ORDER — HYDROMORPHONE HYDROCHLORIDE 1 MG/ML
0.4 INJECTION, SOLUTION INTRAMUSCULAR; INTRAVENOUS; SUBCUTANEOUS EVERY 2 HOUR PRN
Status: DISCONTINUED | OUTPATIENT
Start: 2024-10-07 | End: 2024-10-07

## 2024-10-07 NOTE — ED QUICK NOTES
Orders for admission, patient is aware of plan and ready to go upstairs. Any questions, please call ED RN Madison at extension [phone].     Patient Covid vaccination status: Fully vaccinated     COVID Test Ordered in ED: None    COVID Suspicion at Admission: N/A    Running Infusions:      Mental Status/LOC at time of transport: Alert, oriented X4.    Other pertinent information:   CIWA score: N/A   NIH score:  N/A

## 2024-10-07 NOTE — PROGRESS NOTES
NURSING ADMISSION NOTE      Patient admitted via Cart  Oriented to room.  Safety precautions initiated.  Bed in low position.  Call light in reach.    Pt received A&Ox4, drowsy. Temp 99.1F, VSS. C/o 6/10 pain and nausea. Admission navigator and med rec completed. Dr. Agosto at bedside w/ pt's . Heme/onc consulted per orders. K rider infusing from ER. IVF @ 100ml/hr. Fall precautions in place. Call light in reach.

## 2024-10-07 NOTE — ED PROVIDER NOTES
Patient Seen in: [facility] Emergency Department      History     Chief Complaint   Patient presents with    Sickle Cell     Stated Complaint: sickle cell    Subjective:   HPI  ED History source : Patient  39-year-old female with a history of sickle cell disease presents to the emergency department complaining of low back and leg pain consistent with crisis pain since this morning.  The patient also has been vomiting and unable to keep her opioid medications down.  Last ED visit according to the patient was several months ago.  She typically receives infusions every month and the oncology clinic.    Objective:     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)    Sickle cell crisis (HCC)    Frequent crises    Sickle cell crisis (HCC)    Sickle cell pain crisis (HCC)              Past Surgical History:   Procedure Laterality Date      2017      2018                Social History     Socioeconomic History    Marital status:    Tobacco Use    Smoking status: Never    Smokeless tobacco: Never   Vaping Use    Vaping status: Never Used   Substance and Sexual Activity    Alcohol use: Never     Comment: No history of excessive use    Drug use: Never    Sexual activity: Yes     Partners: Male     Birth control/protection: Condom   Social History Narrative    , has 2 children ages 4 and 5 as of 2022.  Not employed.     Social Determinants of Health     Financial Resource Strain: Low Risk  (2023)     Financial Resource Strain     Difficulty of Paying Living Expenses: Not very hard   Food Insecurity: No Food Insecurity (10/7/2024)    Food Insecurity     Food Insecurity: Never true   Transportation Needs: No Transportation Needs (10/7/2024)    Transportation Needs     Lack of Transportation: No   Housing Stability: Low Risk  (10/7/2024)    Housing Stability     Housing Instability: No                  Physical Exam     ED Triage Vitals   BP 10/07/24 1433 136/83   Pulse 10/07/24 1433 75   Resp 10/07/24 1433 18   Temp 10/07/24 1433 98.6 °F (37 °C)   Temp src 10/07/24 1433 Temporal   SpO2 10/07/24 1433 100 %   O2 Device 10/07/24 1515 None (Room air)       Current Vitals:   Vital Signs  BP: (!) 143/94  Pulse: 84  Resp: 22  Temp: 99.1 °F (37.3 °C)  Temp src: Oral  MAP (mmHg): (!) 107    Oxygen Therapy  SpO2: 100 %  O2 Device: None (Room air)        Physical Exam     General Appearance: This is a middle-aged female lying on a gurney.  Vital signs were reviewed per nurses notes.  Patient is afebrile.  HEENT: Normocephalic/atraumatic.  Anicteric sclera.  Oral mucosa is moist.  Oropharynx is normal.  Neck: No adenopathy or thyromegaly.  Lungs are clear to auscultation.  Heart exam: Normal S1-S2 without extra sounds or murmurs.  Regular rate and rhythm.  Abdomen is soft and nontender without masses or rebound.  Extremities are atraumatic.  Skin is dry without rashes or lesions.  Neuroexam: Awake, conversive and moving all 4 extremities well.  ED Course     Labs Reviewed   CBC WITH DIFFERENTIAL WITH PLATELET - Abnormal; Notable for the following components:       Result Value    RBC 2.32 (*)     HGB 9.7 (*)     HCT 26.0 (*)     .1 (*)     MCH 41.8 (*)     MCHC 37.3 (*)     Neutrophil Absolute Prelim 8.08 (*)     Neutrophil Absolute 8.08 (*)     Lymphocyte Absolute 0.69 (*)     All other components within normal limits   COMP METABOLIC PANEL (14) - Abnormal; Notable for the following components:    Glucose 137 (*)      Potassium 3.3 (*)     Total Protein 9.5 (*)     Globulin  4.7 (*)     All other components within normal limits   RETICULOCYTE COUNT - Abnormal; Notable for the following components:    Retic% 3.1 (*)     Retic IRF 0.393 (*)     All other components within normal limits   PRO BETA NATRIURETIC PEPTIDE - Abnormal; Notable for the following components:    Pro-Beta Natriuretic Peptide 164 (*)     All other components within normal limits   LIPASE - Normal   URINALYSIS WITH CULTURE REFLEX   RAINBOW DRAW LAVENDER   RAINBOW DRAW LIGHT GREEN   SPUTUM CULTURE   BLOOD CULTURE   BLOOD CULTURE   ED/MRSA SCREEN BY PCR-CC   C. DIFFICILE(TOXIGENIC)PCR   GI STOOL PANEL BY PCR   RESPIRATORY FLU EXPAND PANEL + COVID-19        XR CHEST AP PORTABLE  (CPT=71045)    Result Date: 10/7/2024  PROCEDURE:  XR CHEST AP PORTABLE  (CPT=71045)  TECHNIQUE:  AP chest radiograph was obtained.  COMPARISON:  [facility] , XR, XR CHEST PA + LAT CHEST (CPT=71046), 9/15/2023, 2:36 PM.  INDICATIONS:  sickle cell  PATIENT STATED HISTORY: (As transcribed by Technologist)  Pt. with sickle cell pain.    FINDINGS:  Cardiac silhouette demonstrates mild prominence of the right atrium which is stable.  Multifocal hazy opacities are noted throughout the lungs which is concerning for infiltrate.  There is no effusion or pneumothorax.  Osseous changes of sickle cell are noted in the humeral heads with changes of AVN            CONCLUSION:  1. Hazy multifocal opacities in the lungs may represent infiltrate versus edema.  Correlate clinically.   LOCATION:  [facility]      Dictated by (CST): Lit Powell MD on 10/07/2024 at 4:54 PM     Finalized by (CST): Lit Powell MD on 10/07/2024 at 4:55 PM       I personally reviewed the images myself and went over results with patient.    Independent interpretation of imaging : Chest x-ray was viewed by me and noted pneumonia versus pulmonary edema.  Intravenous access was obtained.  Laboratory studies were drawn.  Reticulocyte count  is 3.1.  White blood cell count is normal.  Hemoglobin is 9.7.  Potassium is 3.3.    IV fluids, analgesics, antiemetics and Benadryl were ordered.  After 3 doses of analgesics the patient remained symptomatically unchanged.  [facility] was contacted for hospitalization for pain control and antibiotic treatment.  Although the patient has evidence of pneumonia, she is not running a fever and does not have a leukocytosis that would be most consistent with an acute chest syndrome.       MDM      #1.  Sickle cell crisis.  Symptomatically unchanged after multiple doses of analgesics.  Admitted for pain control.    2.  Anemia.  3.  Multifocal pneumonia.  Antibiotics initiated.                Admission disposition: 10/7/2024  5:05 PM           Medical Decision Making      Disposition and Plan     Clinical Impression:  1. Sickle cell crisis (HCC)    2. Anemia, unspecified type    3. Multifocal pneumonia         Disposition:  Admit  10/7/2024  5:05 pm    Follow-up:  No follow-up provider specified.        Medications Prescribed:  Current Discharge Medication List              Supplementary Documentation:         Hospital Problems       Present on Admission  Date Reviewed: 9/20/2024            ICD-10-CM Noted POA    * (Principal) Sickle cell crisis (HCC) D57.00 9/5/2022 Unknown    Anemia, unspecified type D64.9 10/7/2024 Unknown    Multifocal pneumonia J18.9 10/7/2024 Unknown    Sickle cell anemia (HCC) D57.1 Unknown Unknown

## 2024-10-07 NOTE — ED QUICK NOTES
Patient states she has generalized pain, vomiting. States she takes Morphine and Oxycodone for chronic sickle cell pain. Last dose this AM. Has been vomiting, \"so I have nothing.\"

## 2024-10-08 PROBLEM — K52.9 GASTROENTERITIS: Status: ACTIVE | Noted: 2024-10-08

## 2024-10-08 LAB
ADENOVIRUS F 40/41 PCR: NEGATIVE
ALBUMIN SERPL-MCNC: 4.1 G/DL (ref 3.2–4.8)
ALP LIVER SERPL-CCNC: 49 U/L
ALT SERPL-CCNC: 18 U/L
ANION GAP SERPL CALC-SCNC: 8 MMOL/L (ref 0–18)
AST SERPL-CCNC: 19 U/L (ref ?–34)
ASTROVIRUS PCR: NEGATIVE
BASOPHILS # BLD AUTO: 0.01 X10(3) UL (ref 0–0.2)
BASOPHILS NFR BLD AUTO: 0.1 %
BILIRUB DIRECT SERPL-MCNC: 0.2 MG/DL (ref ?–0.3)
BILIRUB SERPL-MCNC: 0.7 MG/DL (ref 0.3–1.2)
BILIRUB UR QL STRIP.AUTO: NEGATIVE
BUN BLD-MCNC: 6 MG/DL (ref 9–23)
C CAYETANENSIS DNA SPEC QL NAA+PROBE: NEGATIVE
CALCIUM BLD-MCNC: 9.5 MG/DL (ref 8.7–10.4)
CAMPY SP DNA.DIARRHEA STL QL NAA+PROBE: NEGATIVE
CHLORIDE SERPL-SCNC: 105 MMOL/L (ref 98–112)
CLARITY UR REFRACT.AUTO: CLEAR
CO2 SERPL-SCNC: 24 MMOL/L (ref 21–32)
COLOR UR AUTO: COLORLESS
CREAT BLD-MCNC: 0.56 MG/DL
CRYPTOSP DNA SPEC QL NAA+PROBE: NEGATIVE
EAEC PAA PLAS AGGR+AATA ST NAA+NON-PRB: NEGATIVE
EC STX1+STX2 + H7 FLIC SPEC NAA+PROBE: NEGATIVE
EGFRCR SERPLBLD CKD-EPI 2021: 118 ML/MIN/1.73M2 (ref 60–?)
ENTAMOEBA HISTOLYTICA PCR: NEGATIVE
EOSINOPHIL # BLD AUTO: 0 X10(3) UL (ref 0–0.7)
EOSINOPHIL NFR BLD AUTO: 0 %
EPEC EAE GENE STL QL NAA+NON-PROBE: NEGATIVE
ERYTHROCYTE [DISTWIDTH] IN BLOOD BY AUTOMATED COUNT: 13.8 %
ETEC LTA+ST1A+ST1B TOX ST NAA+NON-PROBE: NEGATIVE
GIARDIA LAMBLIA PCR: NEGATIVE
GLUCOSE BLD-MCNC: 121 MG/DL (ref 70–99)
GLUCOSE UR STRIP.AUTO-MCNC: NORMAL MG/DL
HCT VFR BLD AUTO: 27.3 %
HGB BLD-MCNC: 10.2 G/DL
HGB RETIC QN AUTO: 40.2 PG (ref 28.2–36.6)
IMM GRANULOCYTES # BLD AUTO: 0.03 X10(3) UL (ref 0–1)
IMM GRANULOCYTES NFR BLD: 0.4 %
IMM RETICS NFR: 0.36 RATIO (ref 0.1–0.3)
KETONES UR STRIP.AUTO-MCNC: NEGATIVE MG/DL
LDH SERPL L TO P-CCNC: 223 U/L
LEUKOCYTE ESTERASE UR QL STRIP.AUTO: NEGATIVE
LYMPHOCYTES # BLD AUTO: 0.77 X10(3) UL (ref 1–4)
LYMPHOCYTES NFR BLD AUTO: 9.4 %
MAGNESIUM SERPL-MCNC: 1.8 MG/DL (ref 1.6–2.6)
MCH RBC QN AUTO: 41.8 PG (ref 26–34)
MCHC RBC AUTO-ENTMCNC: 37.4 G/DL (ref 31–37)
MCV RBC AUTO: 111.9 FL
MONOCYTES # BLD AUTO: 0.61 X10(3) UL (ref 0.1–1)
MONOCYTES NFR BLD AUTO: 7.4 %
MRSA DNA SPEC QL NAA+PROBE: NEGATIVE
NEUTROPHILS # BLD AUTO: 6.78 X10 (3) UL (ref 1.5–7.7)
NEUTROPHILS # BLD AUTO: 6.78 X10(3) UL (ref 1.5–7.7)
NEUTROPHILS NFR BLD AUTO: 82.7 %
NITRITE UR QL STRIP.AUTO: NEGATIVE
NOROVIRUS GI/GII PCR: NEGATIVE
OSMOLALITY SERPL CALC.SUM OF ELEC: 283 MOSM/KG (ref 275–295)
P SHIGELLOIDES DNA STL QL NAA+PROBE: NEGATIVE
PH UR STRIP.AUTO: 6.5 [PH] (ref 5–8)
PLATELET # BLD AUTO: 374 10(3)UL (ref 150–450)
POTASSIUM SERPL-SCNC: 3.4 MMOL/L (ref 3.5–5.1)
PROT SERPL-MCNC: 8.8 G/DL (ref 5.7–8.2)
PROT UR STRIP.AUTO-MCNC: NEGATIVE MG/DL
RBC # BLD AUTO: 2.44 X10(6)UL
RBC UR QL AUTO: NEGATIVE
RETICS # AUTO: 90.3 X10(3) UL (ref 22.5–147.5)
RETICS/RBC NFR AUTO: 3.7 %
ROTAVIRUS A PCR: NEGATIVE
SALMONELLA DNA SPEC QL NAA+PROBE: NEGATIVE
SAPOVIRUS PCR: NEGATIVE
SHIGELLA SP+EIEC IPAH ST NAA+NON-PROBE: NEGATIVE
SODIUM SERPL-SCNC: 137 MMOL/L (ref 136–145)
SP GR UR STRIP.AUTO: 1.01 (ref 1–1.03)
UROBILINOGEN UR STRIP.AUTO-MCNC: NORMAL MG/DL
V CHOLERAE DNA SPEC QL NAA+PROBE: NEGATIVE
VIBRIO DNA SPEC NAA+PROBE: NEGATIVE
WBC # BLD AUTO: 8.2 X10(3) UL (ref 4–11)
YERSINIA DNA SPEC NAA+PROBE: NEGATIVE

## 2024-10-08 PROCEDURE — 99233 SBSQ HOSP IP/OBS HIGH 50: CPT | Performed by: INTERNAL MEDICINE

## 2024-10-08 PROCEDURE — 99255 IP/OBS CONSLTJ NEW/EST HI 80: CPT | Performed by: INTERNAL MEDICINE

## 2024-10-08 RX ORDER — ONDANSETRON 2 MG/ML
4 INJECTION INTRAMUSCULAR; INTRAVENOUS EVERY 6 HOURS PRN
Status: DISCONTINUED | OUTPATIENT
Start: 2024-10-08 | End: 2024-10-08

## 2024-10-08 RX ORDER — NALOXONE HYDROCHLORIDE 0.4 MG/ML
0.08 INJECTION, SOLUTION INTRAMUSCULAR; INTRAVENOUS; SUBCUTANEOUS
Status: DISCONTINUED | OUTPATIENT
Start: 2024-10-08 | End: 2024-10-12

## 2024-10-08 RX ORDER — SODIUM CHLORIDE 9 MG/ML
INJECTION, SOLUTION INTRAVENOUS CONTINUOUS
Status: DISCONTINUED | OUTPATIENT
Start: 2024-10-08 | End: 2024-10-12

## 2024-10-08 RX ORDER — MAGNESIUM OXIDE 400 MG/1
400 TABLET ORAL ONCE
Status: COMPLETED | OUTPATIENT
Start: 2024-10-08 | End: 2024-10-08

## 2024-10-08 RX ORDER — MORPHINE SULFATE 30 MG/1
60 TABLET, FILM COATED, EXTENDED RELEASE ORAL EVERY 8 HOURS SCHEDULED
Status: DISCONTINUED | OUTPATIENT
Start: 2024-10-08 | End: 2024-10-14

## 2024-10-08 RX ORDER — DICYCLOMINE HYDROCHLORIDE 10 MG/1
10 CAPSULE ORAL 3 TIMES DAILY PRN
Status: DISCONTINUED | OUTPATIENT
Start: 2024-10-08 | End: 2024-10-14

## 2024-10-08 RX ORDER — NALBUPHINE HYDROCHLORIDE 10 MG/ML
2.5 INJECTION INTRAMUSCULAR; INTRAVENOUS; SUBCUTANEOUS EVERY 4 HOURS PRN
Status: DISCONTINUED | OUTPATIENT
Start: 2024-10-08 | End: 2024-10-12

## 2024-10-08 RX ORDER — METRONIDAZOLE 500 MG/100ML
500 INJECTION, SOLUTION INTRAVENOUS EVERY 8 HOURS
Status: DISCONTINUED | OUTPATIENT
Start: 2024-10-08 | End: 2024-10-09

## 2024-10-08 RX ORDER — LOPERAMIDE HYDROCHLORIDE 2 MG/1
2 CAPSULE ORAL 4 TIMES DAILY PRN
Status: DISCONTINUED | OUTPATIENT
Start: 2024-10-08 | End: 2024-10-10

## 2024-10-08 NOTE — H&P
[place] HOSPITALIST  History and Physical     Oluwaseun Oluwadamilola Ogunyemi Patient Status:  Inpatient    8/3/1985 MRN [id number]   Location [facility] 4NW-A Attending Hanna Agosto MD   Hosp Day # 0 PCP Clive [address] MD     Chief Complaint: sickle cell     Subjective:    History of Present Illness:     Oluwaseun Oluwadamilola Ogunyemi is a 39 year old female with  h/o sickle cell , h/o acute chest. Pt is presenting with intractable N, vo, diarrhea- multiple loose non bloody stools. She has abdominal cramps as well. She notes she feels unwell and today her sickle cell pain crisis begin to flare up too. No recent travel, no sick contacts. No recent abx     History/Other:    Past Medical History:  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)    Sickle cell crisis (HCC)    Frequent crises    Sickle cell crisis (HCC)    Sickle cell pain crisis (HCC)     Past Surgical History:   Past Surgical History:   Procedure Laterality Date                  Family History:   Family History   Problem Relation Age of Onset    Hypertension Mother         unknown    Cataracts Mother     No Known Problems Father         was told cardiac arrest    No Known Problems Brother     Sickle Cell Maternal Aunt         passed from kidney failure    DVT/VTE Other     Breast Cancer Neg     Ovarian Cancer Neg     Colon Cancer Neg      Social History:    reports that she has never smoked. She has never used smokeless tobacco.  She reports that she does not drink alcohol and does not use drugs.     Allergies:   Allergies   Allergen Reactions    Piperacillin Sod-Tazobactam So ITCHING       Medications:    Current Facility-Administered Medications on File Prior to Encounter   Medication Dose Route Frequency Provider Last Rate Last Admin    [COMPLETED] diphenhydrAMINE (Benadryl) cap/tab 25-50 mg  25-50 mg Oral Once MccormickEsau addison MD   25 mg at 09/19/24 1209    [COMPLETED] sodium chloride 0.9 % IV bolus 1,000 mL  1,000 mL Intravenous Once Esau Mccormick MD   Stopped at 09/19/24 1315    [COMPLETED] crizanlizumab-tmca (Adakveo) 360 mg in sodium chloride 0.9% 100 mL infusion  5 mg/kg Intravenous Once Esau Mccormick MD   Stopped at 09/19/24 1347    [COMPLETED] HYDROmorphone (Dilaudid) 1 MG/ML injection 1-2 mg  1-2 mg Intravenous Once Esau Mccormick MD   1 mg at 09/19/24 1312    [COMPLETED] sodium chloride 0.9 % IV bolus 1,000 mL  1,000 mL Intravenous Once Esau Mccormick MD   Stopped at 08/22/24 1550    [COMPLETED] crizanlizumab-tmca (Adakveo) 360 mg in sodium chloride 0.9% 100 mL infusion  5 mg/kg Intravenous Once Esau Mccormick MD   Stopped at 08/22/24 1538    [COMPLETED] HYDROmorphone (Dilaudid) 1 MG/ML injection 1 mg  1 mg Intravenous Once Esau Mccormick MD   1 mg at 08/22/24 1445    [COMPLETED] diphenhydrAMINE (Benadryl) cap/tab 25-50 mg  25-50 mg Oral Once MccormickEsau addison MD   25 mg at 08/22/24 1444    [COMPLETED] HYDROmorphone (Dilaudid) 1 MG/ML injection 1 mg  1 mg Intravenous Once Esau Mccormick MD   1 mg at 08/22/24 1514    [COMPLETED] HYDROmorphone (Dilaudid) 1 MG/ML injection 1 mg  1 mg Intravenous Once Esau Mccormick MD   1 mg at 08/22/24 1543    [COMPLETED] morphINE PF 4 MG/ML injection 6 mg  6 mg Intravenous Once Esau Mccormick MD   6 mg at 07/25/24 1140    [COMPLETED] diphenhydrAMINE (Benadryl) cap/tab 25 mg  25 mg Oral Once Esau Mccormick MD   25 mg at 07/25/24 1139    [COMPLETED]  sodium chloride 0.9 % IV bolus 1,000 mL  1,000 mL Intravenous Once Esau Mccormick MD   Stopped at 07/25/24 1241    [COMPLETED] crizanlizumab-tmca (Adakveo) 350 mg in sodium chloride 0.9% 100 mL infusion  5 mg/kg Intravenous Once Esau Mccormick MD   Stopped at 07/25/24 1226     Current Outpatient Medications on File Prior to Encounter   Medication Sig Dispense Refill    diphenhydrAMINE 25 MG Oral Cap Take 1 capsule (25 mg total) by mouth every 6 (six) hours as needed for Itching.      OxyCODONE HCl IR 30 MG Oral Tab Take 1 tablet (30 mg total) by mouth every 3 (three) hours as needed for Pain. 180 tablet 0    morphINE ER 60 MG Oral Tab CR Take 1 tablet (60 mg total) by mouth every 8 (eight) hours. 90 tablet 0    ASPIRIN LOW DOSE 81 MG Oral Tab EC TAKE 1 TABLET BY MOUTH EVERY DAY 90 tablet 3    hydroxyurea 500 MG Oral Cap Take 4 capsules (2,000 mg total) by mouth daily. 360 capsule 3    folic acid 1 MG Oral Tab Take 1 tablet (1 mg total) by mouth daily. 90 tablet 3    Glutamine, Sickle Cell, (ENDARI) 5 g Oral Powd Pack Take 15 g by mouth in the morning and 15 g before bedtime. 180 each 11    metoprolol succinate ER 25 MG Oral Tablet 24 Hr Take 1 tablet (25 mg total) by mouth daily.      Cyanocobalamin (VITAMIN B-12 OR) Take 200 mcg by mouth daily.      ondansetron (ZOFRAN) 8 MG tablet Take 1 tablet (8 mg total) by mouth every 8 (eight) hours as needed for Nausea. 9 tablet 13    Naloxone HCl 4 MG/0.1ML Nasal Liquid 4 mg by Nasal route as needed. If patient remains unresponsive, repeat dose in other nostril 2-5 minutes after first dose. 1 kit 0       Review of Systems:   A comprehensive review of systems was completed.    Pertinent positives and negatives noted in the HPI.    Objective:   Physical Exam:    BP (!) 143/94 (BP Location: Right arm)   Pulse 84   Temp 99.1 °F (37.3 °C) (Oral)   Resp 22   Wt 160 lb (72.6 kg)   LMP 10/01/2024 (Approximate)   SpO2 100%   BMI 25.53 kg/m²   General: No acute  distress, Alert  Respiratory: No rhonchi, no wheezes  Cardiovascular: S1, S2. Regular rate and rhythm  Abdomen: Soft,epigastrium is tender, non-distended, positive bowel sounds  Neuro: No new focal deficits  Extremities: No edema      Results:    Labs:      Labs Last 24 Hours:    Recent Labs   Lab 10/07/24  1514   RBC 2.32*   HGB 9.7*   HCT 26.0*   .1*   MCH 41.8*   MCHC 37.3*   RDW 14.3   NEPRELIM 8.08*   WBC 9.4   .0       Recent Labs   Lab 10/07/24  1514   *   BUN 10   CREATSERUM 0.64   EGFRCR 115   CA 9.7   ALB 4.8      K 3.3*      CO2 26.0   ALKPHO 59   AST 30   ALT 28   BILT 0.5   TP 9.5*       No results found for: \"PT\", \"INR\"    No results for input(s): \"TROP\", \"TROPHS\", \"CK\" in the last 168 hours.    Recent Labs   Lab 10/07/24  1514   PBNP 164*       No results for input(s): \"PCT\" in the last 168 hours.    Imaging: Imaging data reviewed in Epic.    Assessment & Plan:      #Intractable N, V, diarrhea  Stool C Diff, PCR  IVF  Anti emetics  Lipase  PPI  CT A/P  #Sickle cell pain crisis likely precipitated by gastrointestinal illness  Monitor Hb, retic, LDH  Onc on Cs  Pain meds   # h/o acute chest  #suspected PNA- concerned about aspiration with this ongoing emesis  IV Abx: cefepime  Viral panel  CT chest   #HypoK        Plan of care discussed with pt, spouse, RN    Hanna Agosto MD    Supplementary Documentation:     The 21st Century Cures Act makes medical notes like these available to patients in the interest of transparency. Please be advised this is a medical document. Medical documents are intended to carry relevant information, facts as evident, and the clinical opinion of the practitioner. The medical note is intended as peer to peer communication and may appear blunt or direct. It is written in medical language and may contain abbreviations or verbiage that are unfamiliar.

## 2024-10-08 NOTE — PLAN OF CARE
Patient had a total of 5 loose stools during shift, medicated with immodium and two doses of antiemetic

## 2024-10-08 NOTE — PLAN OF CARE
Patient is A/O x4, requires dilaudid as needed for severe lower back and leg pain management. Unstable when ambulating, impulsive and high fall risk. Bed alarm activated. Fall risk education provided. VSS and has low grade fever, requested for acetaminophen and administered. Needs addressed and call light within reach.     Problem: RESPIRATORY - ADULT  Goal: Achieves optimal ventilation and oxygenation  Description: INTERVENTIONS:  - Assess for changes in respiratory status  - Assess for changes in mentation and behavior  - Position to facilitate oxygenation and minimize respiratory effort  - Oxygen supplementation based on oxygen saturation or ABGs  - Provide Smoking Cessation handout, if applicable  - Encourage broncho-pulmonary hygiene including cough, deep breathe, Incentive Spirometry  - Assess the need for suctioning and perform as needed  - Assess and instruct to report SOB or any respiratory difficulty  - Respiratory Therapy support as indicated  - Manage/alleviate anxiety  - Monitor for signs/symptoms of CO2 retention  Outcome: Progressing     Problem: HEMATOLOGIC - ADULT  Goal: Maintains hematologic stability  Description: INTERVENTIONS  - Assess for signs and symptoms of bleeding or hemorrhage  - Monitor labs and vital signs for trends  - Administer supportive blood products/factors, fluids and medications as ordered and appropriate  - Administer supportive blood products/factors as ordered and appropriate  Outcome: Progressing

## 2024-10-08 NOTE — CONSULTS
Hematology/Oncology Initial Consultation Note    Patient Name: Oluwaseun Oluwadamilola Ogunyemi  Medical Record Number: [id number]    YOB: 1985   Date of Consultation: 10/8/2024   Physician requesting consultation: Hanna Agosto MD     Reason for Consultation:  Oluwaseun Oluwadamilola Ogunyemi was seen today for the diagnosis of sickle cell disease    Hematologic History:  *Sickle cell disease, hgb SS  -early 2000's moved from [facility] to   -2016/2017- 1st pregnancy c/b worsening pain crises  -2018- 2nd pregnancy c/b worsening pain crises, including episode of acute chest requiring RBC exchange.  -early 2019- started hydroxyurea, titrated up to 2000mg daily  -7/2020- started Voxelotor, but dc'd shortly after starting d/t poor tolerability with diarrhea, fatigue, elevated LFTs, was not very helpful either.   -5/28/21- 3/2022- received crizanlizumab (Adakveo) however c/b infusion reaction 3/2022 and was only slightly helpful therefore not continued  -10/2021- moved from [facility] (followed with Dr. Walton of [facility]) to [place].   -4/2022- started L-glutamine (Endari) 15g PO BID, cont'd hydrea  -8/2022- moved from [place] (prev followed by Dr. Esau Blount) to [place], [place]  -3/27/23- resumed crizanlizumab d/t more freq acute pain episodes   -5/8/23- reduced hydrea to 1500mg daily (d/t hgb 7.4, abs retic 47K)   -7/2/23- reduced hydrea to 1000mg daily (d/t hgb 7.0, abs retic 59K)   -12/27/23- increased Hydrea to 1500 mg daily  -4/4/24- increased Hydrea to 2000 mg daily    =============================  History of Present Illness: 39-year-old female with history of sickle cell disease presents with acute pain crisis precipitated by diarrhea, dehydration, and abdominal pain that started yesterday.  Yesterday started to have upper abdominal pain on and off with associated nausea, vomiting, and diarrhea all day.  She became weaker and developed increasing sickle cell pain crisis related  pain.  Has not had any fevers or bleeding.    As outpatient was on crizanlizumab infusions monthly, Hydrea at 2000 mg daily, Endari 15 g twice daily and folic acid 1 mg daily.      At home she is on MS Contin 60 mg q8hrs oxycodone IR 30 mg q3 hrs prn for pain.     She has not required hospitalizations for an acute vaso-occlusive crisis since 2023.        Past Medical History:  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)    Sickle cell crisis (HCC)    Frequent crises    Sickle cell crisis (HCC)    Sickle cell pain crisis (HCC)     Past Surgical History:   Procedure Laterality Date            2018     Home Medications:  Current Facility-Administered Medications on File Prior to Encounter   Medication Dose Route Frequency Provider Last Rate Last Admin    [COMPLETED] diphenhydrAMINE (Benadryl) cap/tab 25-50 mg  25-50 mg Oral Once Esau Mccormick MD   25 mg at 24 1209    [COMPLETED] sodium chloride 0.9 % IV bolus 1,000 mL  1,000 mL Intravenous Once Esau Mccormick MD   Stopped at 24 1315    [COMPLETED] crizanlizumab-tmca (Adakveo) 360 mg in sodium chloride 0.9% 100 mL infusion  5 mg/kg Intravenous Once Esau Mccormick MD   Stopped at 24 1347    [COMPLETED] HYDROmorphone (Dilaudid) 1 MG/ML injection 1-2 mg  1-2 mg Intravenous Once Esau Mccormick MD   1 mg at 24 1312     Current Outpatient Medications on File Prior to Encounter   Medication Sig Dispense Refill     diphenhydrAMINE 25 MG Oral Cap Take 1 capsule (25 mg total) by mouth every 6 (six) hours as needed for Itching.      OxyCODONE HCl IR 30 MG Oral Tab Take 1 tablet (30 mg total) by mouth every 3 (three) hours as needed for Pain. 180 tablet 0    morphINE ER 60 MG Oral Tab CR Take 1 tablet (60 mg total) by mouth every 8 (eight) hours. 90 tablet 0    ASPIRIN LOW DOSE 81 MG Oral Tab EC TAKE 1 TABLET BY MOUTH EVERY DAY 90 tablet 3    hydroxyurea 500 MG Oral Cap Take 4 capsules (2,000 mg total) by mouth daily. 360 capsule 3    folic acid 1 MG Oral Tab Take 1 tablet (1 mg total) by mouth daily. 90 tablet 3    Glutamine, Sickle Cell, (ENDARI) 5 g Oral Powd Pack Take 15 g by mouth in the morning and 15 g before bedtime. 180 each 11    metoprolol succinate ER 25 MG Oral Tablet 24 Hr Take 1 tablet (25 mg total) by mouth daily.      Cyanocobalamin (VITAMIN B-12 OR) Take 200 mcg by mouth daily.      ondansetron (ZOFRAN) 8 MG tablet Take 1 tablet (8 mg total) by mouth every 8 (eight) hours as needed for Nausea. 9 tablet 13    Naloxone HCl 4 MG/0.1ML Nasal Liquid 4 mg by Nasal route as needed. If patient remains unresponsive, repeat dose in other nostril 2-5 minutes after first dose. 1 kit 0     Current Inpatient Medications:  Inpatient Meds:   metRONIDAZOLE  500 mg Intravenous Q8H    Glutamine (Sickle Cell)  15 g Oral BID    enoxaparin  40 mg Subcutaneous Daily    aspirin  81 mg Oral Daily    folic acid  1 mg Oral Daily    hydroxyurea  2,000 mg Oral Daily    metoprolol succinate ER  25 mg Oral Daily    pantoprazole  40 mg Intravenous Q24H    cefepime  1 g Intravenous Q8H      sodium chloride      HYDROmorphone PF (Dilaudid) 100 mg in sodium chloride 0.9% 100 mL HIGH DOSE PCA      sodium chloride 83 mL/hr at 10/08/24 1056     PRN Meds:    loperamide    dicyclomine    ondansetron    naloxone    nalbuphine    HYDROmorphone    HYDROmorphone    acetaminophen    melatonin    polyethylene glycol (PEG 3350)    sennosides    bisacodyl     fleet enema    ondansetron    prochlorperazine    benzonatate    guaiFENesin    glycerin-hypromellose-    sodium chloride    OxyCODONE HCl IR    influenza virus vaccine PF    diphenhydrAMINE    Allergies:   Allergies   Allergen Reactions    Piperacillin Sod-Tazobactam So ITCHING       Psychosocial History:  Social History     Social History Narrative    , has 2 children ages 4 and 5 as of 9/2022.  Not employed.     Social History     Socioeconomic History    Marital status:    Tobacco Use    Smoking status: Never    Smokeless tobacco: Never   Vaping Use    Vaping status: Never Used   Substance and Sexual Activity    Alcohol use: Never     Comment: No history of excessive use    Drug use: Never    Sexual activity: Yes     Partners: Male     Birth control/protection: Condom   Social History Narrative    , has 2 children ages 4 and 5 as of 9/2022.  Not employed.     Social Determinants of Health     Financial Resource Strain: Low Risk  (4/28/2023)    Financial Resource Strain     Difficulty of Paying Living Expenses: Not very hard   Food Insecurity: No Food Insecurity (10/7/2024)    Food Insecurity     Food Insecurity: Never true   Transportation Needs: No Transportation Needs (10/7/2024)    Transportation Needs     Lack of Transportation: No   Housing Stability: Low Risk  (10/7/2024)    Housing Stability     Housing Instability: No     Family Medical History:  Family History   Problem Relation Age of Onset    Hypertension Mother         unknown    Cataracts Mother     No Known Problems Father         was told cardiac arrest    No Known Problems Brother     Sickle Cell Maternal Aunt         passed from kidney failure    DVT/VTE Other     Breast Cancer Neg     Ovarian Cancer Neg     Colon Cancer Neg      Review of Systems:  A 10-point ROS was done with pertinent positives and negative per the HPI    Vital Signs:  Height: --  Weight: 72.6 kg (160 lb) (10/07 1833)  BSA (Calculated - sq m):  --  Pulse: 73 (10/08 1137)  BP: 138/97 (10/08 1137)  Temp: 98.4 °F (36.9 °C) (10/08 1137)  Do Not Use - Resp Rate: --  SpO2: 97 % (10/08 1137)    Wt Readings from Last 6 Encounters:   10/07/24 72.6 kg (160 lb)   09/19/24 72.6 kg (160 lb)   08/22/24 72.1 kg (159 lb)   07/25/24 70.4 kg (155 lb 3.2 oz)   07/01/24 71.5 kg (157 lb 9.6 oz)   06/28/24 69.8 kg (153 lb 12.8 oz)     Physical Examination:  General: Patient is alert and oriented  Psych: Mood and affect are appropriate  Eyes: EOMI  ENT: Oropharynx is clear  CV: Regular rate and rhythm, no murmurs, no LE edema  Respiratory: Lungs clear to auscultation bilaterally  GI/Abd: Soft, non-tender with normoactive bowel sounds, no hepatosplenomegaly  Neurological: Grossly intact   Lymphatics: No palpable lymphadenopathy  Skin: no rashes or petechiae    Laboratory:  Recent Labs   Lab 10/07/24  1514 10/08/24  0649   WBC 9.4 8.2   HGB 9.7* 10.2*   HCT 26.0* 27.3*   .0 374.0   .1* 111.9*   RDW 14.3 13.8   NEPRELIM 8.08* 6.78     Recent Labs   Lab 10/07/24  1514 10/08/24  0649    137   K 3.3* 3.4*    105   CO2 26.0 24.0   BUN 10 6*   CREATSERUM 0.64 0.56   * 121*   CA 9.7 9.5   TP 9.5* 8.8*   ALB 4.8 4.1   ALKPHO 59 49   AST 30 19   ALT 28 18   BILT 0.5 0.7     No results for input(s): \"PT\", \"INR\", \"PTT\", \"FIB\" in the last 168 hours.    Lab Results   Component Value Date    REITCPERCENT 3.7 (H) 10/08/2024    REITCPERCENT 3.1 (H) 10/07/2024    REITCPERCENT 2.9 (H) 08/22/2024    REITCPERCENT 2.8 (H) 07/25/2024    REITCPERCENT 4.4 (H) 05/30/2024    REITCPERCENT 5.5 (H) 04/04/2024    REITCPERCENT 3.6 (H) 03/08/2024    REITCPERCENT 5.3 (H) 02/21/2024    REITCPERCENT 4.8 (H) 01/24/2024    REITCPERCENT 7.7 (H) 12/27/2023    REITCPERCENT 5.0 (H) 11/01/2023    REITCPERCENT 5.6 (H) 10/23/2023    REITCPERCENT 4.1 (H) 09/15/2023    REITCPERCENT 5.8 (H) 09/06/2023    REITCPERCENT 3.9 (H) 07/12/2023    REITCPERCENT 2.8 (H) 07/01/2023    REITCPERCENT 4.0 (H)  06/05/2023    REITCPERCENT 2.7 (H) 05/08/2023    REITCPERCENT 2.6 (H) 04/21/2023    REITCPERCENT 2.7 (H) 03/27/2023    REITCPERCENT 2.2 03/13/2023    REITCPERCENT 2.9 (H) 02/16/2023    REITCPERCENT 2.8 (H) 01/07/2023    REITCPERCENT 3.1 (H) 12/22/2022    REITCPERCENT 3.4 (H) 12/05/2022    REITCPERCENT 2.7 (H) 11/21/2022    REITCPERCENT 2.2 11/06/2022    REITCPERCENT 3.7 (H) 10/24/2022    REITCPERCENT 3.5 (H) 10/08/2022    REITCPERCENT 2.6 (H) 09/26/2022    REITCPERCENT 3.9 (H) 09/20/2022    REITCPERCENT 4.0 (H) 09/19/2022    REITCPERCENT 2.4 09/06/2022    REITCPERCENT 2.8 (H) 09/05/2022     Lab Results   Component Value Date     10/08/2024     07/25/2024     12/27/2023     07/12/2023     05/08/2023     03/27/2023     02/16/2023     12/22/2022     09/05/2022     Lab Results   Component Value Date    HAPT 60.5 09/05/2022     Imaging:    CT c/a/p 10/8/24: CONCLUSION:     1. Cholelithiasis and a mildly distended gallbladder.  If there is a clinical concern for acute cholecystitis, consider abdominal ultrasound for further assessment.    2. Diffuse fluid-filled colon suggestive of diarrheal state.  A superimposed mild colitis cannot be excluded.     3. Osseous changes from sickle cell disease noted.  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  Additional areas of possible osteonecrosis would include the bilateral femoral heads and right acetabulum.     Impression & Plan:     *abd pain, n/v, diarrhea  -Suspect gastroenteritis or other infection  -GI stool PCR panel and c diff negative  -imodium prn  -continue IVF  -Monitor for improvement  -US abd ordered     *Hgb SS/Sickle cell disease with acute pain crisis  -Likely precipitated by acute GI illness with dehydration  -Pain is not adequately controlled with IV prn dilaudid at this time.  Will switch to Dilaudid PCA.  Continue home MS Contin 60 mg q8hrs.   -Continue hydroxyurea to 2000 mg daily.  Hold  endari and crizanlizumab while hospitalized.   -continue Folvite 1 mg daily.    -supp O2  -IVF     Esau Mccormick MD  Hematology/Medical Oncology  [facility]

## 2024-10-08 NOTE — PLAN OF CARE
Problem: RESPIRATORY - ADULT  Goal: Achieves optimal ventilation and oxygenation  Description: INTERVENTIONS:  - Assess for changes in respiratory status  - Assess for changes in mentation and behavior  - Position to facilitate oxygenation and minimize respiratory effort  - Oxygen supplementation based on oxygen saturation or ABGs  - Provide Smoking Cessation handout, if applicable  - Encourage broncho-pulmonary hygiene including cough, deep breathe, Incentive Spirometry  - Assess the need for suctioning and perform as needed  - Assess and instruct to report SOB or any respiratory difficulty  - Respiratory Therapy support as indicated  - Manage/alleviate anxiety  - Monitor for signs/symptoms of CO2 retention  Outcome: Progressing     Problem: RESPIRATORY - ADULT  Goal: Achieves optimal ventilation and oxygenation  Description: INTERVENTIONS:  - Assess for changes in respiratory status  - Assess for changes in mentation and behavior  - Position to facilitate oxygenation and minimize respiratory effort  - Oxygen supplementation based on oxygen saturation or ABGs  - Provide Smoking Cessation handout, if applicable  - Encourage broncho-pulmonary hygiene including cough, deep breathe, Incentive Spirometry  - Assess the need for suctioning and perform as needed  - Assess and instruct to report SOB or any respiratory difficulty  - Respiratory Therapy support as indicated  - Manage/alleviate anxiety  - Monitor for signs/symptoms of CO2 retention  Outcome: Progressing     Problem: HEMATOLOGIC - ADULT  Goal: Maintains hematologic stability  Description: INTERVENTIONS  - Assess for signs and symptoms of bleeding or hemorrhage  - Monitor labs and vital signs for trends  - Administer supportive blood products/factors, fluids and medications as ordered and appropriate  - Administer supportive blood products/factors as ordered and appropriate  Outcome: Progressing     Problem: HEMATOLOGIC - ADULT  Goal: Maintains hematologic  stability  Description: INTERVENTIONS  - Assess for signs and symptoms of bleeding or hemorrhage  - Monitor labs and vital signs for trends  - Administer supportive blood products/factors, fluids and medications as ordered and appropriate  - Administer supportive blood products/factors as ordered and appropriate  Outcome: Progressing  Patient alert oriented times four ,on room air, patient received three doses of dilaudid 1.2mg during shift for lower back  pain, rates pain at a number 8 and decreases to a number 5   Patient started on high dose dilaudid at 1657, on room air, o2 saturation at 95%, on telemetry sinus rhythm low 80's   Rates pain at a number 3

## 2024-10-08 NOTE — PROGRESS NOTES
[facility]   part of [facility]     Hospitalist Progress Note     Oluwaseun Oluwadamilola Ogunyemi Patient Status:  Inpatient    8/3/1985 MRN [id number]   Location [facility] 4NW-A Attending Pete Murrieta,    Hosp Day # 1 PCP Cl[address] MD     Chief Complaint: Sickle cell crisis    Subjective:     Patient still with pain. It is in her back and abdomen. Having low grade temperatures. No chest pain or SOB.    Objective:    Review of Systems:   A comprehensive review of systems was completed; pertinent positive and negatives stated in subjective.    Vital signs:  Temp:  [98.6 °F (37 °C)-99.8 °F (37.7 °C)] 99.4 °F (37.4 °C)  Pulse:  [75-94] 76  Resp:  [14-27] 16  BP: (119-155)/() 128/92  SpO2:  [95 %-100 %] 95 %    Physical Exam:    General: No acute distress, awake and alert  Respiratory: No rhonchi, no wheezes  Cardiovascular: S1, S2. Regular rate and rhythm  Abdomen: Soft, non-tender, non-distended, positive bowel sounds  Neuro: No new focal deficits  Extremities: No edema    Diagnostic Data:    Labs:  Recent Labs   Lab 10/07/24  1514 10/08/24  0649   WBC 9.4 8.2   HGB 9.7* 10.2*   .1* 111.9*   .0 374.0     Recent Labs   Lab 10/07/24  1514 10/08/24  0649   * 121*   BUN 10 6*   CREATSERUM 0.64 0.56   CA 9.7 9.5   ALB 4.8 4.1    137   K 3.3* 3.4*    105   CO2 26.0 24.0   ALKPHO 59 49   AST 30 19   ALT 28 18   BILT 0.5 0.7   TP 9.5* 8.8*     Estimated Creatinine Clearance: 126.9 mL/min (based on SCr of 0.56 mg/dL).    No results for input(s): \"TROP\", \"TROPHS\", \"CK\" in the last 168 hours.    No results for input(s): \"PTP\", \"INR\" in the last 168 hours.     Microbiology  No results found for this visit on 10/07/24.    Imaging: Reviewed in Epic.    Medications:    potassium chloride  40 mEq Intravenous Once    enoxaparin  40 mg Subcutaneous Daily    aspirin  81 mg Oral Daily    folic acid  1 mg Oral Daily    [Held by provider] Glutamine (Sickle Cell)  15 g Oral  BID    hydroxyurea  2,000 mg Oral Daily    metoprolol succinate ER  25 mg Oral Daily    pantoprazole  40 mg Intravenous Q24H    cefepime  1 g Intravenous Q8H       Assessment & Plan:      #Abdominal discomfort with nausea, vomiting and diarrhea  -CT a/p with diffuse fluid-filled colon suggestive of diarrheal state  as well as cholelithiasis with distended gallbladder  -Check US abdomen  -C diff and GI PCR negative  -PRN imodium, anti-emetics, bentyl  -Given low grade temps and gallbladder findings, will continue Cefepime and add Flagyl. Follow blood cultures  -IVF  -PPI    #Sickle cell pain crisis likely precipitated by above  -Monitor Hb, retic, LDH  -Hematology on consult  -Folic acid, ASA  -Hydroxyurea  -Pain control    #Suspected PNA, ruled out  -CT chest reviewed, no evidence of consolidation and patient denies cough or SOB    #Hypokalemia  -Replace    Pete Murrieta, DO    Supplementary Documentation:     Quality:  DVT Mechanical Prophylaxis:   SCDs,    DVT Pharmacologic Prophylaxis   Medication    enoxaparin (Lovenox) 40 MG/0.4ML SUBQ injection 40 mg         DVT Pharmacologic prophylaxis: Aspirin 81 mg  Code Status: Full  [place]: No urinary catheter in place  ABHAY: TBD    Discharge is dependent on: Clinical state, consultant recs  At this point Ms. Andrade is expected to be discharge to: Home    The 21st Century Cures Act makes medical notes like these available to patients in the interest of transparency. Please be advised this is a medical document. Medical documents are intended to carry relevant information, facts as evident, and the clinical opinion of the practitioner. The medical note is intended as peer to peer communication and may appear blunt or direct. It is written in medical language and may contain abbreviations or verbiage that are unfamiliar.

## 2024-10-09 ENCOUNTER — APPOINTMENT (OUTPATIENT)
Dept: ULTRASOUND IMAGING | Facility: HOSPITAL | Age: 39
End: 2024-10-09
Attending: INTERNAL MEDICINE
Payer: COMMERCIAL

## 2024-10-09 PROBLEM — K80.00 CALCULUS OF GALLBLADDER WITH ACUTE CHOLECYSTITIS WITHOUT OBSTRUCTION: Status: ACTIVE | Noted: 2024-10-09

## 2024-10-09 LAB
ALBUMIN SERPL-MCNC: 4.1 G/DL (ref 3.2–4.8)
ALBUMIN/GLOB SERPL: 1 {RATIO} (ref 1–2)
ALP LIVER SERPL-CCNC: 43 U/L
ALT SERPL-CCNC: 12 U/L
ANION GAP SERPL CALC-SCNC: 5 MMOL/L (ref 0–18)
AST SERPL-CCNC: 13 U/L (ref ?–34)
BASOPHILS # BLD AUTO: 0.01 X10(3) UL (ref 0–0.2)
BASOPHILS NFR BLD AUTO: 0.1 %
BILIRUB SERPL-MCNC: 0.7 MG/DL (ref 0.3–1.2)
BUN BLD-MCNC: 6 MG/DL (ref 9–23)
CALCIUM BLD-MCNC: 8.9 MG/DL (ref 8.7–10.4)
CHLORIDE SERPL-SCNC: 108 MMOL/L (ref 98–112)
CO2 SERPL-SCNC: 26 MMOL/L (ref 21–32)
CREAT BLD-MCNC: 0.6 MG/DL
EGFRCR SERPLBLD CKD-EPI 2021: 116 ML/MIN/1.73M2 (ref 60–?)
EOSINOPHIL # BLD AUTO: 0.04 X10(3) UL (ref 0–0.7)
EOSINOPHIL NFR BLD AUTO: 0.4 %
ERYTHROCYTE [DISTWIDTH] IN BLOOD BY AUTOMATED COUNT: 14.1 %
GLOBULIN PLAS-MCNC: 4 G/DL (ref 2–3.5)
GLUCOSE BLD-MCNC: 110 MG/DL (ref 70–99)
HCT VFR BLD AUTO: 25.4 %
HGB BLD-MCNC: 9.4 G/DL
IMM GRANULOCYTES # BLD AUTO: 0.03 X10(3) UL (ref 0–1)
IMM GRANULOCYTES NFR BLD: 0.3 %
LYMPHOCYTES # BLD AUTO: 1.34 X10(3) UL (ref 1–4)
LYMPHOCYTES NFR BLD AUTO: 14.9 %
MAGNESIUM SERPL-MCNC: 2.2 MG/DL (ref 1.6–2.6)
MCH RBC QN AUTO: 41.6 PG (ref 26–34)
MCHC RBC AUTO-ENTMCNC: 37 G/DL (ref 31–37)
MCV RBC AUTO: 112.4 FL
MONOCYTES # BLD AUTO: 0.99 X10(3) UL (ref 0.1–1)
MONOCYTES NFR BLD AUTO: 11 %
NEUTROPHILS # BLD AUTO: 6.59 X10 (3) UL (ref 1.5–7.7)
NEUTROPHILS # BLD AUTO: 6.59 X10(3) UL (ref 1.5–7.7)
NEUTROPHILS NFR BLD AUTO: 73.3 %
OSMOLALITY SERPL CALC.SUM OF ELEC: 286 MOSM/KG (ref 275–295)
PLATELET # BLD AUTO: 308 10(3)UL (ref 150–450)
POTASSIUM SERPL-SCNC: 3.6 MMOL/L (ref 3.5–5.1)
POTASSIUM SERPL-SCNC: 3.6 MMOL/L (ref 3.5–5.1)
PROT SERPL-MCNC: 8.1 G/DL (ref 5.7–8.2)
RBC # BLD AUTO: 2.26 X10(6)UL
SODIUM SERPL-SCNC: 139 MMOL/L (ref 136–145)
WBC # BLD AUTO: 9 X10(3) UL (ref 4–11)

## 2024-10-09 PROCEDURE — 76705 ECHO EXAM OF ABDOMEN: CPT | Performed by: INTERNAL MEDICINE

## 2024-10-09 PROCEDURE — 99223 1ST HOSP IP/OBS HIGH 75: CPT | Performed by: SURGERY

## 2024-10-09 PROCEDURE — 99232 SBSQ HOSP IP/OBS MODERATE 35: CPT | Performed by: INTERNAL MEDICINE

## 2024-10-09 RX ORDER — METRONIDAZOLE 500 MG/1
500 TABLET ORAL EVERY 8 HOURS SCHEDULED
Status: DISCONTINUED | OUTPATIENT
Start: 2024-10-09 | End: 2024-10-14

## 2024-10-09 NOTE — CONSULTS
[facility]  Report of  Surgical Consultation with History and Physical Exam    Oluwaseun Oluwadamilola Ogunyemi Patient Status:  Inpatient    8/3/1985 MRN [id number]   [facility] 4NW-A Attending Lisha Mustafa, DO   Hosp Day # 2 PCP Clive St MD     Reason for Surgical Consultation:  I am seeing this patient at the request of Dr. Mustafa for cholelithiasis.    History of Present Illness:  Oluwaseun Oluwadamilola Ogunyemi is a a(n) 39 year old female who presented to the ER on 10/7/24 in sickle cell crisis.     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  She states she frequently goes into crisis.  She states the frequency is related to external stressors.  She states she may go into crisis as frequent as every other day.  She states when her crises are less frequent, they may occur every other week.  She follows with Dr. Mccormick as her hematologist.  She currently receives crizanlizumab infusions monthly.    The patient states on  she had spicy or foods.  She states following this she had decreased appetite.    The patient states on Monday of this week, she began experiencing left upper quadrant pain that radiated to the back.  She states she commonly has lower back pain due to her sickle cell disease.    She states the pain was associated with nausea and belching, but no vomiting.  She did have associated loose stools.  She states the pain and associated symptoms caused her to feel very sick and she had to call her  to be taken to the emergency department.    Denies a history of postprandial abdominal pain.      During her workup in this hospitalization, CT scan of the abdomen and pelvis revealed cholelithiasis and a mildly distended gallbladder.  Follow-up ultrasound revealed,  multiple mobile echogenic foci consistent with gallstones, largest measuring 7 x 5 x 10 mm.  Gallbladder wall upper limits of normal thickness.  Common bile duct upper  limits of normal-4 mm.  No gallbladder dilation, surrounding fluid or Bhatti sign.  All other significant findings may be seen in the radiologist report.    The patient is having elevated blood pressures.  Most recent blood pressure was 109/66.  WBCs are within normal limits at 9.0.  Hemoglobin 9.4.  Platelets 308,000.  Electrolytes, LFTs and total bilirubin are all within normal limits.        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 She has never used smokeless tobacco. She reports that she does not drink alcohol and does not use drugs.    Allergies:  Allergies[1]    Medications:    Current Facility-Administered Medications:     metRONIDAZOLE (Flagyl) tab 500 mg, 500 mg, Oral, Q8H PRAVIN    loperamide (Imodium) cap 2 mg, 2 mg, Oral, QID PRN    dicyclomine (Bentyl) cap 10 mg, 10 mg, Oral, TID PRN    Glutamine (Sickle Cell) PACK 15 g *PT  SUPPLIED*, 15 g, Oral, BID    sodium chloride 0.9% infusion, , Intravenous, Continuous    naloxone (Narcan) 0.4 MG/ML injection 0.08 mg, 0.08 mg, Intravenous, Q5 Min PRN    nalbuphine (Nubain) 10 mg/mL injection 2.5 mg, 2.5 mg, Intravenous, Q4H PRN    HYDROmorphone PF (Dilaudid) 100 mg in sodium chloride 0.9% 100 mL HIGH DOSE PCA, , Intravenous, Continuous    HYDROmorphone 1 mg BOLUS FROM HIGH DOSE PCA, 1 mg, Intravenous, Q30 Min PRN    morphINE ER (MS Contin) tab 60 mg, 60 mg, Oral, Q8H PRAVIN    sodium chloride 0.9% infusion, , Intravenous, Continuous    enoxaparin (Lovenox) 40 MG/0.4ML SUBQ injection 40 mg, 40 mg, Subcutaneous, Daily    acetaminophen (Tylenol Extra Strength) tab 1,000 mg, 1,000 mg, Oral, Q6H PRN    melatonin tab 3 mg, 3 mg, Oral, Nightly PRN    polyethylene glycol (PEG 3350) (Miralax) 17 g oral packet 17 g, 17 g, Oral, Daily PRN    sennosides (Senokot) tab 17.2 mg, 17.2 mg, Oral, Nightly PRN    bisacodyl (Dulcolax) 10 MG rectal suppository 10 mg, 10 mg, Rectal, Daily PRN    fleet enema (Fleet) rectal enema 133 mL, 1 enema, Rectal, Once PRN    ondansetron (Zofran) 4 MG/2ML injection 4 mg, 4 mg, Intravenous, Q6H PRN    prochlorperazine (Compazine) 10 MG/2ML injection 5 mg, 5 mg, Intravenous, Q8H PRN    benzonatate (Tessalon) cap 200 mg, 200 mg, Oral, TID PRN    guaiFENesin (Robitussin) 100 MG/5 ML oral liquid 200 mg, 200 mg, Oral, Q4H PRN    glycerin-hypromellose- (Artificial Tears) 0.2-0.2-1 % ophthalmic solution 1 drop, 1 drop, Both Eyes, QID PRN    sodium chloride (Saline Mist) 0.65 % nasal solution 1 spray, 1 spray, Each Nare, Q3H PRN    aspirin DR tab 81 mg, 81 mg, Oral, Daily    folic acid (Folvite) tab 1 mg, 1 mg, Oral, Daily    hydroxyurea (Hydrea) cap 2,000 mg, 2,000 mg, Oral, Daily    metoprolol succinate ER (Toprol XL) 24 hr tab 25 mg, 25 mg, Oral, Daily    influenza virus trivalent PF (Fluzone Trivalent) 0.5 mL IM injection (ages 6 months to 64 years) 0.5 mL, 0.5 mL, Intramuscular,  Prior to discharge    pantoprazole (Protonix) 40 mg in sodium chloride 0.9% PF 10 mL IV push, 40 mg, Intravenous, Q24H    diphenhydrAMINE (Benadryl) 50 mg/mL  injection 25 mg, 25 mg, Intravenous, Q6H PRN    ceFEPIme (Maxipime) 1 g in sodium chloride 0.9% 100 mL IVPB-MBP, 1 g, Intravenous, Q8H    Review of Systems:    Allergic/Immuno:  Review of patient's allergies performed.  Cardiovascular:  Negative for cool extremity and irregular heartbeat/palpitations  Constitutional:  Negative for decreased activity, fever, irritability and lethargy  Endocrine:  Negative for abnormal sleep patterns, increased activity, polydipsia and polyphagia  ENMT:  Negative for ear drainage, hearing loss and nasal drainage  Eyes:  Negative for eye discharge and vision loss  Gastrointestinal: Positive for abdominal pain and nausea   Genitourinary:  Negative for dysuria and hematuria  Hema/Lymph:  Negative for easy bleeding and easy bruising  Integumentary:  Negative for pruritus and rash  Musculoskeletal:  Negative for bone/joint symptoms  Neurological:  Negative for gait disturbance  Psychiatric:  Negative for inappropriate interaction and psychiatric symptoms  Respiratory:  Negative for cough, dyspnea and wheezing      Physical Exam:  Blood pressure 109/66, pulse 89, temperature 98.8 °F (37.1 °C), temperature source Oral, resp. rate 18, weight 160 lb (72.6 kg), last menstrual period 10/01/2024, SpO2 95%, not currently breastfeeding.    General: Alert, orientated x3.  Cooperative.  No apparent distress.    HEENT: Exam is unremarkable.  Without scleral icterus.  Mucous membranes are moist. EOM are WNL.    Neck: No tenderness to palpitation.  Full range of motion to flexion and extension, lateral rotation and lateral flexion of cervical spine.  No JVD. Supple.     Lungs: No secondary use of accessory respiratory musculature.    Abdomen:  soft, nondistended, tenderness to palpation in the epigastrium and left upper quadrant.  No rebound or  guarding.  No peritoneal signs.    Extremities:  No lower extremity edema noted.  Without clubbing or cyanosis.      Skin: Normal texture and turgor.      Laboratory Data and Relevant X-rays:  Recent Labs   Lab 10/07/24  1514 10/08/24  0649 10/09/24  0636   RBC 2.32* 2.44* 2.26*   HGB 9.7* 10.2* 9.4*   HCT 26.0* 27.3* 25.4*   .1* 111.9* 112.4*   MCH 41.8* 41.8* 41.6*   MCHC 37.3* 37.4* 37.0   RDW 14.3 13.8 14.1   NEPRELIM 8.08* 6.78 6.59   WBC 9.4 8.2 9.0   .0 374.0 308.0       Recent Labs   Lab 10/07/24  1514 10/08/24  0649 10/09/24  0636   * 121* 110*   BUN 10 6* 6*   CREATSERUM 0.64 0.56 0.60   CA 9.7 9.5 8.9   ALB 4.8 4.1 4.1    137 139   K 3.3* 3.4* 3.6  3.6    105 108   CO2 26.0 24.0 26.0   ALKPHO 59 49 43   AST 30 19 13   ALT 28 18 12   BILT 0.5 0.7 0.7   TP 9.5* 8.8* 8.1         No results for input(s): \"PTP\", \"INR\", \"PTT\" in the last 168 hours.    Imaging    Narrative   PROCEDURE:  CT CHEST+ABDOMEN+PELVIS(CPT=71250/41913)     COMPARISON:  [facility] , CT, CT ANGIOGRAPHY, CHEST (CPT=71275), 4/22/2023, 2:42 AM.     INDICATIONS:  PNA vs edema     TECHNIQUE:  Following oral contrast administration, unenhanced multislice CT scanning is performed through the chest, abdomen, and pelvis.  Dose reduction techniques were used. Dose information is transmitted to the ACR (American College of Radiology)  NRDR (National Radiology Data Registry) which includes the Dose Index Registry.     PATIENT STATED HISTORY: (As transcribed by Technologist)  Chest pain, nausea, vomiting, diarrhea. History of sickle cell disease.         FINDINGS:  Evaluation of the visceral organs is limited due to the lack of intravenous contrast.     LUNGS:  Scattered atelectasis.  Scattered benign appearing subpleural nodularity.  There is a 3 mm nodule at the right lung apex which is unchanged since 4/22/2023 and therefore benign.  VASCULATURE:  Unremarkable.  THORACIC AORTA:  Unremarkable.  MEDIASTINUM/DEXTER:  There  are minimally prominent mediastinal lymph nodes are nonspecific but may be reactive.  CARDIAC:  Persistent cardiomegaly.  Calcified coronary artery disease.  PLEURA:  Unremarkable.  CHEST WALL:  Unremarkable.  LIVER:  Unremarkable.  BILIARY:  Cholelithiasis and a mildly distended gallbladder.  If there is a clinical concern for acute cholecystitis, consider abdominal ultrasound for further assessment.  SPLEEN:  Calcified small spleen compatible with sickle cell disease.  PANCREAS:  Unremarkable.  ADRENALS:  Unremarkable.  KIDNEYS:  Unremarkable.  AORTA/VASCULAR:  Unremarkable.  RETROPERITONEUM:  Unremarkable.  BOWEL/MESENTERY:  Diffuse fluid-filled colon suggestive of diarrheal state.  A superimposed mild colitis cannot be excluded.    ABDOMINAL WALL:  Unremarkable.  PELVIC ORGANS:  Unremarkable.  LYMPH NODES:  Unremarkable.  BONES:  Osseous changes from sickle cell disease noted.  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  Additional areas of possible osteonecrosis would include the bilateral femoral heads and right acetabulum.  OTHER:  None.                   Impression   CONCLUSION:       1. Cholelithiasis and a mildly distended gallbladder.  If there is a clinical concern for acute cholecystitis, consider abdominal ultrasound for further assessment.     2. Diffuse fluid-filled colon suggestive of diarrheal state.  A superimposed mild colitis cannot be excluded.       3. Osseous changes from sickle cell disease noted.  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  Additional areas of possible osteonecrosis would include the bilateral femoral heads and right acetabulum.        A preliminary report was provided by the [facility] teleradiology service.                 LOCATION:  [facility]           Dictated by (CST): Stromberg, LeRoy, MD on 10/08/2024 at 7:28 AM      Finalized by (CST): Stromberg, LeRoy, MD on 10/08/2024 at 7:40 AM       Narrative   PROCEDURE:  US ABDOMEN LIMITED (CPT=76705)     COMPARISON:   [facility] , CT, CT CHEST+ABDOMEN+PELVIS(CPT=71250/39486), 10/07/2024, 11:57 PM.     INDICATIONS:  Cholelithiasis with dilstended gallbladder     PATIENT STATED HISTORY: (As transcribed by Technologist)  Abnormal findings on CT.  Abdominal pain, Nausea, vomiting and diarrhea for 3 days                       Impression   CONCLUSION:    Stat ultrasound examination of the gallbladder shows multiple mobile echogenic foci consistent with gallstones, largest measuring 7 x 5 x 10 mm.  Gallbladder wall upper limits of normal thickness 3 mm.  Common bile duct upper limits of  normal 4 mm.  No gallbladder dilation, surrounding fluid or Bhatti sign reported.  Correlate for any potential signs or symptoms of developing cholecystitis.  No sign of ascites.       LOCATION:  [facility]        Dictated by (CST): Jayden Jorge MD on 10/09/2024 at 8:57 AM      Finalized by (CST): Jayden Jorge MD on 10/09/2024 at 8:58 AM       Malinda Garrett PA-C  10/9/2024  3:04 PM    Is this a shared or split note between Advanced Practice Provider and Physician? Yes    Impression:  Patient Active Problem List   Diagnosis    Sickle cell crisis (HCC)    Sickle cell anemia (HCC)    Hypokalemia    Sickle cell pain crisis (HCC)    At risk for negative response to nurse controlled analgesia    Constipation due to opioid therapy    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)    Nausea    Chronic, continuous use of opioids    Elevated troponin    Macrocytic anemia    Chest pain of uncertain etiology    Dehydration    Anemia, unspecified type    Multifocal pneumonia    Gastroenteritis       39-year-old female who presents to the emergency department 48 hours ago in sickle cell crisis.  The patient was diagnosed with sickle cell disease at birth.  She frequently does have a series every other day or every other week depending on environmental circumstances.  The patient does follow-up with Dr. Mccormick as her hematologist.   She is on monthly infusion therapy.  The patient reports that recently over the past few days she had been experiencing left upper quadrant abdominal pain which radiated to the left flank.  She denies any fatty food intolerance, postprandial nausea, epigastric or right upper quadrant abdominal pain, abdominal bloating, postprandial diarrhea or other symptoms suggestive of biliary colic.  CT scan of the abdomen pelvis reveals cholelithiasis and a mildly dilated gallbladder.  CBD found to be 4 mm.  Serology reveals LFTs within normal limits, WBC 9.0, hemoglobin 9.4  On examination the patient's abdomen is soft, nondistended, tender to deep palpation in the left upper quadrant and mildly tender in the epigastrium.  Nontender to palpation in the right upper quadrant    Treatment options were revealed in detail with Skye.  Her symptoms are not typical for biliary colic and tenderness is noted in the left upper quadrant.  However, we will proceed with HIDA scan to assess for possible acute cholecystitis.  If acute cholecystitis is identified, would consider cholecystostomy tube as a temporizing measure to avoid surgery inpatient and acute sickle cell crisis and chest x-ray findings of possible pneumonia  Skye is understanding of these treatment recommendations.  She does not have any further questions at this time and will proceed as discussed.  Further recommendations pending HIDA scan    JOSY. Earnestine RAINEY, FACS    Please note that this report has been produced using speech recognition software and may contain errors related to that system including but not limited to errors in grammar, punctuation and spelling as well as words and phrases that possibly may have been recognized inappropriately.  If there are any questions or concerns please contact the dictating provider for clarification.  The 21st Century Cures Act makes medical notes like these available to patients in the interest of trans parency. Please be advised this  is a medical document. Medical documents are intended to carry relevant information, facts as evident, and the clinical opinion of the practitioner. The medical note is intended as peer to peer communication and may appear blunt or direct. It is written in medical language and may contain abbreviations or verbiage that are unfamiliar.  If there are any questions or concerns please contact the dictating provider for clarification.               [1]   Allergies  Allergen Reactions    Piperacillin Sod-Tazobactam So ITCHING

## 2024-10-09 NOTE — PLAN OF CARE
Patient speaks Indonesian, VSS, RA, afebrile and has mild pain. Patient in sickle cell crisis and is on PCA pump. Patient appetite is low and was administered zofran for nausea. Patient had US this morning and gen surgery was consulted. Will be NPO at midnight for HIDA scan tomorrow. Call light and safety precautions in place .    Problem: RESPIRATORY - ADULT  Goal: Achieves optimal ventilation and oxygenation  Description: INTERVENTIONS:  - Assess for changes in respiratory status  - Assess for changes in mentation and behavior  - Position to facilitate oxygenation and minimize respiratory effort  - Oxygen supplementation based on oxygen saturation or ABGs  - Provide Smoking Cessation handout, if applicable  - Encourage broncho-pulmonary hygiene including cough, deep breathe, Incentive Spirometry  - Assess the need for suctioning and perform as needed  - Assess and instruct to report SOB or any respiratory difficulty  - Respiratory Therapy support as indicated  - Manage/alleviate anxiety  - Monitor for signs/symptoms of CO2 retention  Outcome: Progressing     Problem: HEMATOLOGIC - ADULT  Goal: Maintains hematologic stability  Description: INTERVENTIONS  - Assess for signs and symptoms of bleeding or hemorrhage  - Monitor labs and vital signs for trends  - Administer supportive blood products/factors, fluids and medications as ordered and appropriate  - Administer supportive blood products/factors as ordered and appropriate  Outcome: Progressing     Problem: Patient/Family Goals  Goal: Patient/Family Long Term Goal  Description: Patient's Long Term Goal:     Interventions:  - See additional Care Plan goals for specific interventions  Outcome: Progressing  Goal: Patient/Family Short Term Goal  Description: Patient's Short Term Goal:     Interventions:   - See additional Care Plan goals for specific interventions  Outcome: Progressing

## 2024-10-09 NOTE — PROGRESS NOTES
Hematology/Oncology Progress Note    Patient Name: Oluwaseun Oluwadamilola Ogunyemi  Medical Record Number: [id number]    YOB: 1985     Reason for Consultation:  Oluwaseun Oluwadamilola Ogunyemi was seen today for the diagnosis of sickle cell disease    Interval events: Pain is better controlled today on Dilaudid PCA.  Still with intermittent abdominal pain.  Last episode of diarrhea was last night.  No fevers.  Received 4.8 mg of IV Dilaudid over the last 12 hours from PCA.    Current Inpatient Medications:  Inpatient Meds:   metRONIDAZOLE  500 mg Intravenous Q8H    Glutamine (Sickle Cell)  15 g Oral BID    morphINE ER  60 mg Oral Q8H PRAVIN    enoxaparin  40 mg Subcutaneous Daily    aspirin  81 mg Oral Daily    folic acid  1 mg Oral Daily    hydroxyurea  2,000 mg Oral Daily    metoprolol succinate ER  25 mg Oral Daily    pantoprazole  40 mg Intravenous Q24H    cefepime  1 g Intravenous Q8H      sodium chloride 10 mL/hr at 10/08/24 1655    HYDROmorphone PF (Dilaudid) 100 mg in sodium chloride 0.9% 100 mL HIGH DOSE PCA      sodium chloride 83 mL/hr at 10/08/24 1615     PRN Meds:    loperamide    dicyclomine    naloxone    nalbuphine    HYDROmorphone    acetaminophen    melatonin    polyethylene glycol (PEG 3350)    sennosides    bisacodyl    fleet enema    ondansetron    prochlorperazine    benzonatate    guaiFENesin    glycerin-hypromellose-    sodium chloride    influenza virus vaccine PF    diphenhydrAMINE    Allergies:   Allergies   Allergen Reactions    Piperacillin Sod-Tazobactam So ITCHING     Vital Signs:  Height: --  Weight: --  BSA (Calculated - sq m): --  Pulse: 77 (10/09 0055)  BP: 135/94 (10/09 0055)  Temp: (P) 98.5 °F (36.9 °C) (10/09 0430)  Do Not Use - Resp Rate: --  SpO2: 97 % (10/09 0055)    Wt Readings from Last 6 Encounters:   10/07/24 72.6 kg (160 lb)   09/19/24 72.6 kg (160 lb)   08/22/24 72.1 kg (159 lb)   07/25/24 70.4 kg (155 lb 3.2 oz)   07/01/24 71.5 kg (157 lb 9.6 oz)    06/28/24 69.8 kg (153 lb 12.8 oz)     Physical Examination:  General: Patient is alert and oriented  CV: Regular rate and rhythm, no murmurs, no LE edema  Respiratory: Lungs clear to auscultation bilaterally  GI/Abd: Soft, mild upper abdominal tenderness.   Neurological: Grossly intact   Lymphatics: No palpable lymphadenopathy  Skin: no rashes or petechiae    Laboratory:  Recent Labs   Lab 10/07/24  1514 10/08/24  0649 10/09/24  0636   WBC 9.4 8.2 9.0   HGB 9.7* 10.2* 9.4*   HCT 26.0* 27.3* 25.4*   .0 374.0 308.0   .1* 111.9* 112.4*   RDW 14.3 13.8 14.1   NEPRELIM 8.08* 6.78 6.59     Recent Labs   Lab 10/07/24  1514 10/08/24  0649    137   K 3.3* 3.4*    105   CO2 26.0 24.0   BUN 10 6*   CREATSERUM 0.64 0.56   * 121*   CA 9.7 9.5   TP 9.5* 8.8*   ALB 4.8 4.1   ALKPHO 59 49   AST 30 19   ALT 28 18   BILT 0.5 0.7     No results for input(s): \"PT\", \"INR\", \"PTT\", \"FIB\" in the last 168 hours.    Imaging:    CT c/a/p 10/8/24: CONCLUSION:     1. Cholelithiasis and a mildly distended gallbladder.  If there is a clinical concern for acute cholecystitis, consider abdominal ultrasound for further assessment.    2. Diffuse fluid-filled colon suggestive of diarrheal state.  A superimposed mild colitis cannot be excluded.     3. Osseous changes from sickle cell disease noted.  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  Additional areas of possible osteonecrosis would include the bilateral femoral heads and right acetabulum.     Impression & Plan:     *abd pain, n/v, diarrhea  -Suspect gastroenteritis or other infection  -GI stool PCR panel and c diff negative  -imodium prn  -continue IVF  -Monitor for improvement  -US abd pending    *Hgb SS/Sickle cell disease with acute pain crisis  -Likely precipitated by acute GI illness with dehydration  -Pain is better controlled with Dilaudid PCA, continue at this time.  Continue home MS Contin 60 mg q8hrs.   -Continue hydroxyurea to 2000 mg  daily.  Hold endari and crizanlizumab while hospitalized.   -continue Folvite 1 mg daily.    -supp O2  -IVF     Esau Mccormick MD  Hematology/Medical Oncology  [facility]

## 2024-10-09 NOTE — PAYOR COMM NOTE
--------------  ADMISSION REVIEW     Payor: JACINTO SEBASTIÁN  Subscriber #:  [id number]  Authorization Number: [id number]    Admit date: 10/7/24  Admit time:  6:24 PM       History   HPI  39-year-old female with a history of sickle cell disease presents to the emergency department complaining of low back and leg pain consistent with crisis pain since this morning.  The patient also has been vomiting and unable to keep her opioid medications down.  Last ED visit according to the patient was several months ago.  She typically receives infusions every month and the oncology clinic.    ED Triage Vitals   BP 10/07/24 1433 136/83   Pulse 10/07/24 1433 75   Resp 10/07/24 1433 18   Temp 10/07/24 1433 98.6 °F (37 °C)   Temp src 10/07/24 1433 Temporal   SpO2 10/07/24 1433 100 %   O2 Device 10/07/24 1515 None (Room air)     Labs Reviewed   CBC WITH DIFFERENTIAL WITH PLATELET - Abnormal; Notable for the following components:       Result Value    RBC 2.32 (*)     HGB 9.7 (*)     HCT 26.0 (*)     .1 (*)     MCH 41.8 (*)     MCHC 37.3 (*)     Neutrophil Absolute Prelim 8.08 (*)     Neutrophil Absolute 8.08 (*)     Lymphocyte Absolute 0.69 (*)     All other components within normal limits   COMP METABOLIC PANEL (14) - Abnormal; Notable for the following components:    Glucose 137 (*)     Potassium 3.3 (*)     Total Protein 9.5 (*)     Globulin  4.7 (*)     All other components within normal limits   RETICULOCYTE COUNT - Abnormal; Notable for the following components:    Retic% 3.1 (*)     Retic IRF 0.393 (*)     All other components within normal limits   PRO BETA NATRIURETIC PEPTIDE - Abnormal; Notable for the following components:    Pro-Beta Natriuretic Peptide 164 (*)     XR CHEST AP PORTABLE  (CPT=71045)  Result Date: 10/7/2024  CONCLUSION:  1. Hazy multifocal opacities in the lungs may represent infiltrate versus edema.  Correlate clinically.   LOCATION:  [facility]      Dictated by (CST): Lit Powell MD on 10/07/2024 at  4:54 PM     Finalized by (CST): Lit Powell MD on 10/07/2024 at 4:55 PM     Admission disposition: 10/7/2024  5:05 PM    Disposition and Plan     Clinical Impression:  1. Sickle cell crisis (HCC)    2. Anemia, unspecified type    3. Multifocal pneumonia       Disposition:  Admit  10/7/2024  5:05 pm        History and Physical   History of Present Illness:   Oluwaseun Oluwadamilola Ogunyemi is a 39 year old female with  h/o sickle cell , h/o acute chest. Pt is presenting with intractable N, vo, diarrhea- multiple loose non bloody stools. She has abdominal cramps as well. She notes she feels unwell and today her sickle cell pain crisis begin to flare up too. No recent travel, no sick contacts. No recent abx      Lab 10/07/24  1514   RBC 2.32*   HGB 9.7*   HCT 26.0*   .1*   MCH 41.8*   MCHC 37.3*   RDW 14.3   NEPRELIM 8.08*   WBC 9.4   .0      Lab 10/07/24  1514   *   BUN 10   CREATSERUM 0.64   EGFRCR 115   CA 9.7   ALB 4.8      K 3.3*      CO2 26.0   ALKPHO 59   AST 30   ALT 28   BILT 0.5   TP 9.5*      Lab 10/07/24  1514   PBNP 164*    Assessment & Plan:       #Intractable N, V, diarrhea  Stool C Diff, PCR  IVF  Anti emetics  Lipase  PPI  CT A/P  #Sickle cell pain crisis likely precipitated by gastrointestinal illness  Monitor Hb, retic, LDH  Onc on Cs  Pain meds   # h/o acute chest  #suspected PNA- concerned about aspiration with this ongoing emesis  IV Abx: cefepime  Viral panel  CT chest   #HypoK       10/8/24  Patient still with pain. It is in her back and abdomen. Having low grade temperatures. No chest pain or SOB.     Temp:  [98.6 °F (37 °C)-99.8 °F (37.7 °C)] 99.4 °F (37.4 °C)  Pulse:  [75-94] 76  Resp:  [14-27] 16  BP: (119-155)/() 128/92  SpO2:  [95 %-100 %] 95 %     Physical Exam:    General:  awake and alert  Respiratory: No rhonchi, no wheezes  Cardiovascular: S1, S2. Regular rate and rhythm  Abdomen: Soft, non-tender, non-distended, positive bowel sounds  Neuro:  No new focal deficits  Extremities: No edema  Lab 10/07/24  1514 10/08/24  0649   WBC 9.4 8.2   HGB 9.7* 10.2*   .1* 111.9*   .0 374.0      Lab 10/07/24  1514 10/08/24  0649   * 121*   BUN 10 6*   CREATSERUM 0.64 0.56   CA 9.7 9.5   ALB 4.8 4.1    137   K 3.3* 3.4*    105   CO2 26.0 24.0   ALKPHO 59 49   AST 30 19   ALT 28 18   BILT 0.5 0.7   TP 9.5* 8.8*    Medications:    potassium chloride  40 mEq Intravenous Once    enoxaparin  40 mg Subcutaneous Daily    aspirin  81 mg Oral Daily    folic acid  1 mg Oral Daily    hydroxyurea  2,000 mg Oral Daily    metoprolol succinate ER  25 mg Oral Daily    pantoprazole  40 mg Intravenous Q24H    cefepime  1 g Intravenous Q8H      Assessment & Plan:    #Abdominal discomfort with nausea, vomiting and diarrhea  -CT a/p with diffuse fluid-filled colon suggestive of diarrheal state  as well as cholelithiasis with distended gallbladder  -Check US abdomen  -C diff and GI PCR negative  -PRN imodium, anti-emetics, bentyl  -Given low grade temps and gallbladder findings, will continue Cefepime and add Flagyl. Follow blood cultures  -IVF  -PPI     #Sickle cell pain crisis likely precipitated by above  -Monitor Hb, retic, LDH  -Hematology on consult  -Folic acid, ASA  -Hydroxyurea  -Pain control     #Suspected PNA, ruled out  -CT chest reviewed, no evidence of consolidation and patient denies cough or SOB     #Hypokalemia  -Replace        10/9/24  Interval events: Pain is better controlled today on Dilaudid PCA.  Still with intermittent abdominal pain.  Last episode of diarrhea was last night.  No fevers.  Received 4.8 mg of IV Dilaudid over the last 12 hours from PCA.     Current Inpatient Medications:  Inpatient Meds:   metRONIDAZOLE  500 mg Intravenous Q8H    Glutamine (Sickle Cell)  15 g Oral BID    morphINE ER  60 mg Oral Q8H PRAVIN    enoxaparin  40 mg Subcutaneous Daily    aspirin  81 mg Oral Daily    folic acid  1 mg Oral Daily    hydroxyurea  2,000  mg Oral Daily    metoprolol succinate ER  25 mg Oral Daily    pantoprazole  40 mg Intravenous Q24H    cefepime  1 g Intravenous Q8H       sodium chloride 10 mL/hr at 10/08/24 1655    HYDROmorphone PF (Dilaudid) 100 mg in sodium chloride 0.9% 100 mL HIGH DOSE PCA      sodium chloride 83 mL/hr at 10/08/24 1615       Physical Examination:  General: Patient is alert and oriented  CV: Regular rate and rhythm, no murmurs, no LE edema  Respiratory: Lungs clear to auscultation bilaterally  GI/Abd: Soft, mild upper abdominal tenderness.   Neurological: Grossly intact   Lymphatics: No palpable lymphadenopathy  Skin: no rashes or petechiae     Lab 10/07/24  1514 10/08/24  0649 10/09/24  0636   WBC 9.4 8.2 9.0   HGB 9.7* 10.2* 9.4*   HCT 26.0* 27.3* 25.4*   .0 374.0 308.0   .1* 111.9* 112.4*   RDW 14.3 13.8 14.1   NEPRELIM 8.08* 6.78 6.59      Lab 10/07/24  1514 10/08/24  0649    137   K 3.3* 3.4*    105   CO2 26.0 24.0   BUN 10 6*   CREATSERUM 0.64 0.56   * 121*   CA 9.7 9.5   TP 9.5* 8.8*   ALB 4.8 4.1   ALKPHO 59 49   AST 30 19   ALT 28 18   BILT 0.5 0.7      Imaging:    CT c/a/p 10/8/24: CONCLUSION:     1. Cholelithiasis and a mildly distended gallbladder.  If there is a clinical concern for acute cholecystitis, consider abdominal ultrasound for further assessment.    2. Diffuse fluid-filled colon suggestive of diarrheal state.  A superimposed mild colitis cannot be excluded.     3. Osseous changes from sickle cell disease noted.  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  Additional areas of possible osteonecrosis would include the bilateral femoral heads and right acetabulum.      Impression & Plan:      *abd pain, n/v, diarrhea  -Suspect gastroenteritis or other infection  -GI stool PCR panel and c diff negative  -imodium prn  -continue IVF  -Monitor for improvement  -US abd pending     *Hgb SS/Sickle cell disease with acute pain crisis  -Likely precipitated by acute GI illness  with dehydration  -Pain is better controlled with Dilaudid PCA, continue at this time.  Continue home MS Contin 60 mg q8hrs.   -Continue hydroxyurea to 2000 mg daily.  Hold endari and crizanlizumab while hospitalized.   -continue Folvite 1 mg daily.    -supp O2  -IVF      MEDICATIONS ADMINISTERED IN LAST 1 DAY:  aspirin DR tab 81 mg       Date Action Dose Route User    10/8/2024 1047 Given 81 mg Oral Katarina Swanson RN          ceFEPIme (Maxipime) 1 g in sodium chloride 0.9% 100 mL IVPB-MBP       Date Action Dose Route User    10/9/2024 0532 New Bag 1 g Intravenous Roland Rondon RN    10/8/2024 2139 New Bag 1 g Intravenous Roland Rondon RN    10/8/2024 1256 New Bag 1 g Intravenous Katarina Swanson RN          enoxaparin (Lovenox) 40 MG/0.4ML SUBQ injection 40 mg       Date Action Dose Route User    10/8/2024 1041 Given 40 mg Subcutaneous (Left Lower Abdomen) Katarina Swanson RN     HYDROmorphone (Dilaudid) 1 MG/ML injection 1.2 mg       Date Action Dose Route User    10/8/2024 1542 Given 1.2 mg Intravenous Katarina Swanson RN    10/8/2024 1203 Given 1.2 mg Intravenous Katarina Swanson RN          HYDROmorphone PF (Dilaudid) 100 mg in sodium chloride 0.9% 100 mL HIGH DOSE PCA       Date Action Dose Route User    10/8/2024 1657 New Bag (none) Intravenous Katarina Swanson RN          loperamide (Imodium) cap 2 mg       Date Action Dose Route User    10/9/2024 0932 Given 2 mg Oral Madison Kruger RN    10/8/2024 1614 Given 2 mg Oral Katarina Swanson RN          magnesium oxide (Mag-Ox) tab 400 mg       Date Action Dose Route User    10/8/2024 1043 Given 400 mg Oral Katarina Swanson RN          metoprolol succinate ER (Toprol XL) 24 hr tab 25 mg       Date Action Dose Route User    10/8/2024 1156 Given 25 mg Oral Katarina Swanson, RN          metRONIDAZOLE in sodium chloride 0.79% (Flagyl) 5 mg/mL IVPB premix 500 mg       Date Action Dose Route User    10/9/2024  0324 New Bag 500 mg Intravenous Roland Rondon RN    10/8/2024 2023 New Bag 500 mg Intravenous Roland Rondon RN    10/8/2024 1149 New Bag 500 mg Intravenous Katarina Swanson RN          morphINE ER (MS Contin) tab 60 mg       Date Action Dose Route User    10/9/2024 0532 Given 60 mg Oral Roland Rondon RN    10/8/2024 2139 Given 60 mg Oral Roland Rondon RN          ondansetron (Zofran) 4 MG/2ML injection 4 mg       Date Action Dose Route User    10/8/2024 2139 Given 4 mg Intravenous Roland Rondon RN    10/8/2024 1211 Given 4 mg Intravenous Katarina Swanson RN          pantoprazole (Protonix) 40 mg in sodium chloride 0.9% PF 10 mL IV push       Date Action Dose Route User    10/8/2024 2020 Given 40 mg Intravenous Roland Rondon RN          prochlorperazine (Compazine) 10 MG/2ML injection 5 mg       Date Action Dose Route User    10/8/2024 1559 Given 5 mg Intravenous Katarina Swanson RN          sodium chloride 0.9% infusion       Date Action Dose Route User    10/8/2024 1615 New Bag (none) Intravenous Katarina Swanson RN    10/8/2024 1056 Rate/Dose Change (none) Intravenous Katarina Swanson RN          sodium chloride 0.9% infusion       Date Action Dose Route User    10/8/2024 1655 New Bag (none) Intravenous Katarina Swanson RN            Vitals (last day)       Date/Time Temp Pulse Resp BP SpO2 Weight O2 Device O2 Flow Rate (L/min) Who    10/09/24 0926 98.1 °F (36.7 °C) 83 18 112/77 95 % -- None (Room air) -- MB    10/09/24 0055 98.2 °F (36.8 °C) 77 18 135/94 97 % -- None (Room air) -- JT    10/08/24 2000 98.4 °F (36.9 °C) 79 18 136/95 95 % -- None (Room air) -- JT    10/08/24 1137 98.4 °F (36.9 °C) 73 20 138/97 97 % -- None (Room air) -- JH    10/08/24 0801 99.4 °F (37.4 °C) 76 16 128/92 -- -- None (Room air) -- AD    10/08/24 0500 99.8 °F (37.7 °C) 80 15 132/81 95 % -- None (Room air) -- LM

## 2024-10-09 NOTE — PROGRESS NOTES
[facility]   part of [facility]     Hospitalist Progress Note     Oluwaseun Oluwadamilola Ogunyemi Patient Status:  Inpatient    8/3/1985 MRN [id number]   [facility] 4NW-A Attending Pete Murrieta, DO   Hosp Day # 2 PCP [address] MD     Chief Complaint: Sickle cell crisis    Subjective:     Continues to have intermittent loose stools, but overall improving. Abdominal pain is present, but also improving     Objective:    Review of Systems:   A comprehensive review of systems was completed; pertinent positive and negatives stated in subjective.    Vital signs:  Temp:  [98.2 °F (36.8 °C)-98.5 °F (36.9 °C)] (P) 98.5 °F (36.9 °C)  Pulse:  [73-79] 77  Resp:  [18-20] 18  BP: (135-138)/(94-97) 135/94  SpO2:  [95 %-97 %] 97 %    Physical Exam:    General: No acute distress, awake and alert  Respiratory: No rhonchi, no wheezes  Cardiovascular: S1, S2. Regular rate and rhythm  Abdomen: Soft, non-tender, non-distended, positive bowel sounds  Neuro: No new focal deficits  Extremities: No edema    Diagnostic Data:    Labs:  Recent Labs   Lab 10/07/24  1514 10/08/24  0649 10/09/24  0636   WBC 9.4 8.2 9.0   HGB 9.7* 10.2* 9.4*   .1* 111.9* 112.4*   .0 374.0 308.0     Recent Labs   Lab 10/07/24  1514 10/08/24  0649   * 121*   BUN 10 6*   CREATSERUM 0.64 0.56   CA 9.7 9.5   ALB 4.8 4.1    137   K 3.3* 3.4*    105   CO2 26.0 24.0   ALKPHO 59 49   AST 30 19   ALT 28 18   BILT 0.5 0.7   TP 9.5* 8.8*     Estimated Creatinine Clearance: 126.9 mL/min (based on SCr of 0.56 mg/dL).    No results for input(s): \"TROP\", \"TROPHS\", \"CK\" in the last 168 hours.    No results for input(s): \"PTP\", \"INR\" in the last 168 hours.     Microbiology  Hospital Encounter on 10/07/24   1. Blood Culture     Status: None (Preliminary result)    Collection Time: 10/07/24  8:05 PM    Specimen: Blood,peripheral   Result Value Ref Range    Blood Culture Result No Growth 1 Day N/A       Imaging:  Reviewed in Epic.    Medications:    metRONIDAZOLE  500 mg Intravenous Q8H    Glutamine (Sickle Cell)  15 g Oral BID    morphINE ER  60 mg Oral Q8H PRAVIN    enoxaparin  40 mg Subcutaneous Daily    aspirin  81 mg Oral Daily    folic acid  1 mg Oral Daily    hydroxyurea  2,000 mg Oral Daily    metoprolol succinate ER  25 mg Oral Daily    pantoprazole  40 mg Intravenous Q24H    cefepime  1 g Intravenous Q8H       Assessment & Plan:      #Abdominal discomfort with nausea, vomiting and diarrhea  -CT a/p with diffuse fluid-filled colon suggestive of diarrheal state  as well as cholelithiasis with distended gallbladder  -abdominal ultrasound reviewed > gallstones, but no obstruction or cholecystitis   -C diff and GI PCR negative  -PRN imodium, anti-emetics, bentyl  -Given low grade temps and gallbladder findings, will continue Cefepime and add Flagyl. Follow blood cultures  -IVF  -PPI    #Cholelithiasis  - abdominal ultrasound reviewed  - trial soft diet  - general surgery consulted     #Sickle cell pain crisis likely precipitated by above  -Monitor Hb, retic, LDH  -Hematology on consult  -Folic acid, ASA  -Hydroxyurea  -Pain control    #Suspected PNA, ruled out  -CT chest reviewed, no evidence of consolidation and patient denies cough or SOB    #Hypokalemia  -Replace    Lisha Mustafa, DO      Supplementary Documentation:     Quality:  DVT Mechanical Prophylaxis:   SCDs,    DVT Pharmacologic Prophylaxis   Medication    enoxaparin (Lovenox) 40 MG/0.4ML SUBQ injection 40 mg         DVT Pharmacologic prophylaxis: Aspirin 81 mg  Code Status: Full  [place]: No urinary catheter in place  ABHAY: TBD    Discharge is dependent on: Clinical state, consultant recs  At this point Ms. Andrade is expected to be discharge to: Home    The 21st Century Cures Act makes medical notes like these available to patients in the interest of transparency. Please be advised this is a medical document. Medical documents are intended to carry  relevant information, facts as evident, and the clinical opinion of the practitioner. The medical note is intended as peer to peer communication and may appear blunt or direct. It is written in medical language and may contain abbreviations or verbiage that are unfamiliar.

## 2024-10-09 NOTE — SPIRITUAL CARE NOTE
Spiritual Care Visit Note    Patient Name: Oluwaseun Oluwadamilola Ogunyemi Date of Spiritual Care Visit: 10/09/24   : 8/3/1985 Primary Dx: Sickle cell crisis (HCC)       Referred By: Referral From: Nurse    Spiritual Care Taxonomy:    Intended Effects: Demonstrate caring and concern    Methods: Collaborate with care team member;Encouraging spiritual/Jewish practices    Interventions: Active listening;Ask guided questions;Prayer for healing;Explain  role    Visit Type/Summary:     - Spiritual Care: Consulted with RN prior to visit. Offered empathic listening and emotional support. Provided information regarding how to contact Spiritual Care and left a Spiritual Care information card.  remains available as needed for follow up.Patient stated I am a member of [employer] I pray everyday and read biblical text sometimes. I am  with children.     Spiritual Care support can be requested via an Epic consult. For urgent/immediate needs, please contact the On Call  at: [facility]: ext [phone]         Resident  Heather Sandoval MA

## 2024-10-09 NOTE — PLAN OF CARE
Patient is A/O x4, PIV inserted on RFA, IV abt started. Dilaudid PCA kept on left AC. Verbalized good pain relief. Continue to have nausea with improvement from zofran. VSS and afebrile. Needs addressed. Call light within reach. Bed alarm activated due to fall risk.    Problem: RESPIRATORY - ADULT  Goal: Achieves optimal ventilation and oxygenation  Description: INTERVENTIONS:  - Assess for changes in respiratory status  - Assess for changes in mentation and behavior  - Position to facilitate oxygenation and minimize respiratory effort  - Oxygen supplementation based on oxygen saturation or ABGs  - Provide Smoking Cessation handout, if applicable  - Encourage broncho-pulmonary hygiene including cough, deep breathe, Incentive Spirometry  - Assess the need for suctioning and perform as needed  - Assess and instruct to report SOB or any respiratory difficulty  - Respiratory Therapy support as indicated  - Manage/alleviate anxiety  - Monitor for signs/symptoms of CO2 retention  Outcome: Progressing     Problem: HEMATOLOGIC - ADULT  Goal: Maintains hematologic stability  Description: INTERVENTIONS  - Assess for signs and symptoms of bleeding or hemorrhage  - Monitor labs and vital signs for trends  - Administer supportive blood products/factors, fluids and medications as ordered and appropriate  - Administer supportive blood products/factors as ordered and appropriate  Outcome: Progressing

## 2024-10-10 ENCOUNTER — APPOINTMENT (OUTPATIENT)
Dept: NUCLEAR MEDICINE | Facility: HOSPITAL | Age: 39
End: 2024-10-10
Payer: COMMERCIAL

## 2024-10-10 PROCEDURE — 99232 SBSQ HOSP IP/OBS MODERATE 35: CPT | Performed by: INTERNAL MEDICINE

## 2024-10-10 PROCEDURE — 99231 SBSQ HOSP IP/OBS SF/LOW 25: CPT | Performed by: SURGERY

## 2024-10-10 PROCEDURE — 78226 HEPATOBILIARY SYSTEM IMAGING: CPT

## 2024-10-10 RX ORDER — MORPHINE 10 MG/ML
0.6 TINCTURE ORAL 3 TIMES DAILY PRN
Status: DISCONTINUED | OUTPATIENT
Start: 2024-10-10 | End: 2024-10-14

## 2024-10-10 RX ORDER — LOPERAMIDE HYDROCHLORIDE 2 MG/1
2 CAPSULE ORAL
Status: DISCONTINUED | OUTPATIENT
Start: 2024-10-10 | End: 2024-10-14

## 2024-10-10 RX ORDER — PANTOPRAZOLE SODIUM 40 MG/1
40 TABLET, DELAYED RELEASE ORAL
Status: DISCONTINUED | OUTPATIENT
Start: 2024-10-11 | End: 2024-10-14

## 2024-10-10 NOTE — CM/SW NOTE
10/10/24 0900   CM/SW Referral Data   Referral Source Social Work (self-referral)   Informant EMR;Clinical Staff Member   Discharge Needs   Anticipated D/C needs No anticipated discharge needs     SW and RN discussed patient during morning rounds. At this time, there are no identified discharge planning needs per nursing.    Please place consult for social work if any needs are determined.     SW will continue to follow for plan of care changes and remain available for any additional DC needs or concerns.     Elvia Khan MSW, LSW  Discharge Planner   [phone]

## 2024-10-10 NOTE — PLAN OF CARE
Patient alert and orientated, VSS, RA, afebrile and has sickle cell pain and is relieved with use of PCA pump. Patient had HIDA scan this morning and was clear to go back to soft diet. Patient receiving PRN imodium and antiemetic medication for nausea and loose stools. IV fluids running at 100 mL/hr. Call light and safety precautions in place.     Problem: RESPIRATORY - ADULT  Goal: Achieves optimal ventilation and oxygenation  Description: INTERVENTIONS:  - Assess for changes in respiratory status  - Assess for changes in mentation and behavior  - Position to facilitate oxygenation and minimize respiratory effort  - Oxygen supplementation based on oxygen saturation or ABGs  - Provide Smoking Cessation handout, if applicable  - Encourage broncho-pulmonary hygiene including cough, deep breathe, Incentive Spirometry  - Assess the need for suctioning and perform as needed  - Assess and instruct to report SOB or any respiratory difficulty  - Respiratory Therapy support as indicated  - Manage/alleviate anxiety  - Monitor for signs/symptoms of CO2 retention  Outcome: Progressing     Problem: HEMATOLOGIC - ADULT  Goal: Maintains hematologic stability  Description: INTERVENTIONS  - Assess for signs and symptoms of bleeding or hemorrhage  - Monitor labs and vital signs for trends  - Administer supportive blood products/factors, fluids and medications as ordered and appropriate  - Administer supportive blood products/factors as ordered and appropriate  Outcome: Progressing     Problem: Patient/Family Goals  Goal: Patient/Family Long Term Goal  Description: Patient's Long Term Goal:     Interventions:    - See additional Care Plan goals for specific interventions  Outcome: Progressing  Goal: Patient/Family Short Term Goal  Description: Patient's Short Term Goal:     Interventions:   - See additional Care Plan goals for specific interventions  Outcome: Progressing

## 2024-10-10 NOTE — PAYOR COMM NOTE
--------------  CONTINUED STAY REVIEW    Payor: JACINTO SEBASTI[employer]N  Subscriber #:  [id number]  Authorization Number: [id number]    Admit date: 10/7/24  Admit time:  6:24 PM    10/10/24    Just returned from HIDA scan. Reports having her normal sickle cell pain in her abdomen and BLLE. Didn't feel worsening pain or symptoms during HIDA scan      Temp:  [98.1 °F (36.7 °C)-98.8 °F (37.1 °C)] 98.5 °F (36.9 °C)  Pulse:  [67-89] 67  Resp:  [15-18] 15  BP: ()/(45-77) 92/54  SpO2:  [90 %-100 %] 99 %     Physical Exam:    General: awake and alert  Respiratory: No rhonchi, no wheezes  Cardiovascular: S1, S2. Regular rate and rhythm  Abdomen: Soft, non-tender, non-distended, positive bowel sounds  Neuro: No new focal deficits  Extremities: No edema     Lab 10/07/24  1514 10/08/24  0649 10/09/24  0636   WBC 9.4 8.2 9.0   HGB 9.7* 10.2* 9.4*   .1* 111.9* 112.4*   .0 374.0 308.0      Lab 10/07/24  1514 10/08/24  0649 10/09/24  0636   * 121* 110*   BUN 10 6* 6*   CREATSERUM 0.64 0.56 0.60   CA 9.7 9.5 8.9   ALB 4.8 4.1 4.1    137 139   K 3.3* 3.4* 3.6  3.6    105 108   CO2 26.0 24.0 26.0   ALKPHO 59 49 43   AST 30 19 13   ALT 28 18 12   BILT 0.5 0.7 0.7   TP 9.5* 8.8* 8.1   Medications:    metRONIDAZOLE  500 mg Oral Q8H PRAVIN    Glutamine (Sickle Cell)  15 g Oral BID    morphINE ER  60 mg Oral Q8H PRAVIN    enoxaparin  40 mg Subcutaneous Daily    aspirin  81 mg Oral Daily    folic acid  1 mg Oral Daily    hydroxyurea  2,000 mg Oral Daily    metoprolol succinate ER  25 mg Oral Daily    pantoprazole  40 mg Intravenous Q24H    cefepime  1 g Intravenous Q8H       Assessment & Plan:       #Abdominal discomfort with nausea, vomiting and diarrhea  -CT a/p with diffuse fluid-filled colon suggestive of diarrheal state  as well as cholelithiasis with distended gallbladder  -abdominal ultrasound reviewed > gallstones, but no obstruction or cholecystitis   -HIDA 10/10 negative   -C diff and GI PCR negative  -PRN  imodium, anti-emetics, bentyl  -empiric Cefepime and add Flagyl  -blood cultures NGTD  -IVF, PPI, pain control (dilaudid PCA and PTA PO morphine), anti-emetics     #Cholelithiasis  - abdominal ultrasound reviewed  - HIDA negative   - general surgery following > supportive care      #Sickle cell pain crisis likely precipitated by above  -Monitor Hb, retic, LDH  -Hematology following  -Folic acid, ASA  -Hydroxyurea  -Pain control     #Suspected PNA, ruled out  -CT chest reviewed, no evidence of consolidation and patient denies cough or SOB     #Hypokalemia  -Replace     MEDICATIONS ADMINISTERED IN LAST 1 DAY:  aspirin DR tab 81 mg       Date Action Dose Route User    10/10/2024 1118 Given 81 mg Oral Madison Kruger RN          ceFEPIme (Maxipime) 1 g in sodium chloride 0.9% 100 mL IVPB-MBP       Date Action Dose Route User    10/10/2024 1229 New Bag 1 g Intravenous Madison Kruger RN    10/10/2024 0420 New Bag 1 g Intravenous Nichol Moran RN    10/9/2024 2020 New Bag 1 g Intravenous Nichol Moran RN          folic acid (Folvite) tab 1 mg       Date Action Dose Route User    10/10/2024 1119 Given 1 mg Oral Madison Kruger RN          hydroxyurea (Hydrea) cap 2,000 mg       Date Action Dose Route User    10/10/2024 1118 Given 2,000 mg Oral Madison Kruger RN          loperamide (Imodium) cap 2 mg       Date Action Dose Route User    10/10/2024 0004 Given 2 mg Oral Nichol Moran RN    10/9/2024 2152 Given 2 mg Oral Nichol Moran RN    10/9/2024 1557 Given 2 mg Oral Madison Kruger RN          loperamide (Imodium) cap 2 mg       Date Action Dose Route User    10/10/2024 1416 Given 2 mg Oral Madison Kruger RN          metRONIDAZOLE (Flagyl) tab 500 mg       Date Action Dose Route User    10/10/2024 1416 Given 500 mg Oral Madison Kruger RN    10/10/2024 0556 Given 500 mg Oral Nichol Moran RN    10/9/2024 1838 Given 500 mg  Oral Madison Kruger RN          morphINE ER (MS Contin) tab 60 mg       Date Action Dose Route User    10/10/2024 1416 Given 60 mg Oral Madison Kruger RN    10/10/2024 0556 Given 60 mg Oral Nichol Moran RN    10/9/2024 2152 Given 60 mg Oral Nichol Moran RN    10/9/2024 1557 Given 60 mg Oral Madison Kruger RN          ondansetron (Zofran) 4 MG/2ML injection 4 mg       Date Action Dose Route User    10/10/2024 1228 Given 4 mg Intravenous Madison Krguer RN    10/10/2024 0001 Given 4 mg Intravenous Nichol Moran RN          pantoprazole (Protonix) 40 mg in sodium chloride 0.9% PF 10 mL IV push       Date Action Dose Route User    10/9/2024 1834 Given 40 mg Intravenous Madison Kruger RN          prochlorperazine (Compazine) 10 MG/2ML injection 5 mg       Date Action Dose Route User    10/9/2024 1842 Given 5 mg Intravenous Madison Kruger RN          sodium chloride 0.9% infusion       Date Action Dose Route User    10/10/2024 1355 Rate/Dose Change (none) Intravenous Madison Kruger RN            Vitals (last day)       Date/Time Temp Pulse Resp BP SpO2 Weight O2 Device O2 Flow Rate (L/min) [facility]    10/10/24 1428 98 °F (36.7 °C) -- -- -- -- -- -- --     10/10/24 0749 98.3 °F (36.8 °C) 67 16 90/52 99 % -- None (Room air) --     10/10/24 0430 98.5 °F (36.9 °C) 67 15 92/54 99 % -- None (Room air) --     10/09/24 2355 98.5 °F (36.9 °C) 78 16 98/56 99 % -- None (Room air) --     10/09/24 2012 98.8 °F (37.1 °C) 81 16 93/45 100 % -- None (Room air) --     10/09/24 1622 98.3 °F (36.8 °C) 73 18 105/67 90 % -- None (Room air) -- MB    10/09/24 1241 98.8 °F (37.1 °C) 89 18 109/66 95 % -- None (Room air) -- MB    10/09/24 0926 98.1 °F (36.7 °C) 83 18 112/77 95 % -- None (Room air) -- MB    10/09/24 0055 98.2 °F (36.8 °C) 77 18 135/94 97 % -- None (Room air) -- PATSYT

## 2024-10-10 NOTE — PROGRESS NOTES
Hematology/Oncology Progress Note    Patient Name: Oluwaseun Oluwadamilola Ogunyemi  Medical Record Number: [id number]    YOB: 1985     Reason for Consultation:  Oluwaseun Oluwadamilola Ogunyemi was seen today for the diagnosis of sickle cell disease    Interval events: Pain is better controlled today on Dilaudid PCA.  Abdominal pain has improved but still having frequent watery diarrhea.  Increased sickle cell pain in her back and waist earlier this morning but is doing better now.    No fevers.     Current Inpatient Medications:  Inpatient Meds:   metRONIDAZOLE  500 mg Oral Q8H PRAVIN    Glutamine (Sickle Cell)  15 g Oral BID    morphINE ER  60 mg Oral Q8H PRAVIN    enoxaparin  40 mg Subcutaneous Daily    aspirin  81 mg Oral Daily    folic acid  1 mg Oral Daily    hydroxyurea  2,000 mg Oral Daily    metoprolol succinate ER  25 mg Oral Daily    pantoprazole  40 mg Intravenous Q24H    cefepime  1 g Intravenous Q8H      sodium chloride 10 mL/hr at 10/08/24 1655    HYDROmorphone PF (Dilaudid) 100 mg in sodium chloride 0.9% 100 mL HIGH DOSE PCA      sodium chloride 100 mL/hr at 10/10/24 1355     PRN Meds:    opium    loperamide    dicyclomine    naloxone    nalbuphine    HYDROmorphone    acetaminophen    melatonin    polyethylene glycol (PEG 3350)    sennosides    bisacodyl    fleet enema    ondansetron    prochlorperazine    benzonatate    guaiFENesin    glycerin-hypromellose-    sodium chloride    influenza virus vaccine PF    diphenhydrAMINE    Allergies:   Allergies   Allergen Reactions    Piperacillin Sod-Tazobactam So ITCHING     Vital Signs:  Height: --  Weight: --  BSA (Calculated - sq m): --  Pulse: 76 (10/10 1420)  BP: 90/52 (10/10 0749)  Temp: 98 °F (36.7 °C) (10/10 1428)  Do Not Use - Resp Rate: --  SpO2: 99 % (10/10 0749)    Wt Readings from Last 6 Encounters:   10/07/24 72.6 kg (160 lb)   09/19/24 72.6 kg (160 lb)   08/22/24 72.1 kg (159 lb)   07/25/24 70.4 kg (155 lb 3.2 oz)   07/01/24 71.5  kg (157 lb 9.6 oz)   06/28/24 69.8 kg (153 lb 12.8 oz)     Physical Examination:  General: Patient is alert and oriented  CV: Regular rate and rhythm, no murmurs, no LE edema  Respiratory: Lungs clear to auscultation bilaterally  GI/Abd: Soft, non-tender   Neurological: Grossly intact   Lymphatics: No palpable lymphadenopathy  Skin: no rashes or petechiae    Laboratory:  Recent Labs   Lab 10/07/24  1514 10/08/24  0649 10/09/24  0636   WBC 9.4 8.2 9.0   HGB 9.7* 10.2* 9.4*   HCT 26.0* 27.3* 25.4*   .0 374.0 308.0   .1* 111.9* 112.4*   RDW 14.3 13.8 14.1   NEPRELIM 8.08* 6.78 6.59     Recent Labs   Lab 10/07/24  1514 10/08/24  0649 10/09/24  0636    137 139   K 3.3* 3.4* 3.6  3.6    105 108   CO2 26.0 24.0 26.0   BUN 10 6* 6*   CREATSERUM 0.64 0.56 0.60   * 121* 110*   CA 9.7 9.5 8.9   TP 9.5* 8.8* 8.1   ALB 4.8 4.1 4.1   ALKPHO 59 49 43   AST 30 19 13   ALT 28 18 12   BILT 0.5 0.7 0.7     No results for input(s): \"PT\", \"INR\", \"PTT\", \"FIB\" in the last 168 hours.    Imaging:    HIDA 10/10/24: CONCLUSION:    1. No evidence for acute cholecystitis.     US abd 10/9/24: CONCLUSION:    Stat ultrasound examination of the gallbladder shows multiple mobile echogenic foci consistent with gallstones, largest measuring 7 x 5 x 10 mm.  Gallbladder wall upper limits of normal thickness 3 mm.  Common bile duct upper limits of   normal 4 mm.  No gallbladder dilation, surrounding fluid or Bhatti sign reported.  Correlate for any potential signs or symptoms of developing cholecystitis.  No sign of ascites     CT c/a/p 10/8/24: CONCLUSION:     1. Cholelithiasis and a mildly distended gallbladder.  If there is a clinical concern for acute cholecystitis, consider abdominal ultrasound for further assessment.    2. Diffuse fluid-filled colon suggestive of diarrheal state.  A superimposed mild colitis cannot be excluded.     3. Osseous changes from sickle cell disease noted.  There may be bilateral humeral  head osteonecrosis.  Additional areas of possible osteonecrosis would include the bilateral femoral heads and right acetabulum.     Impression & Plan:     *abd pain, n/v, diarrhea  -Suspect gastroenteritis.  No acute cholecystitis based on HIDA scan  -GI stool PCR panel and c diff negative  -Continue imodium prn, increase dosing frequency.  Tincture of opium can be added prn  -continue IVF  -Monitor for improvement    *Hgb SS/Sickle cell disease with acute pain crisis  -Likely precipitated by acute GI illness with dehydration  -Pain is better controlled with Dilaudid PCA, continue at this time.  Continue home MS Contin 60 mg q8hrs.   -Continue hydroxyurea to 2000 mg daily.  Hold endari and crizanlizumab while hospitalized.   -continue Folvite 1 mg daily.    -supp O2  -IVF     Esau Mccormick MD  Hematology/Medical Oncology  [facility]

## 2024-10-10 NOTE — PROGRESS NOTES
Pt is alert and oriented x4. Pt has taken imodium and has complained of nausea and given zofran. Pt has pca dilaudid and is npo for a hepatobilary scan today. Pt has call light in reach.

## 2024-10-10 NOTE — PROGRESS NOTES
[facility]  Progress Note    Oluwaseun Oluwadamilola Ogunyemi Patient Status:  Inpatient    8/3/1985 MRN [id number]   Location [facility] 4NW-A Attending Lisha Mustafa, DO   Hosp Day # 3 PCP Cl[address] MD     Subjective:  Patient was seen and examined at bedside.  She states her abdominal pain has improved today.  She states she had nausea yesterday evening, but this is since resolved.  She had loose stools yesterday evening, but has not had a bowel movement yet this morning.  She does complain of some right hip pain.    Objective/Physical Exam:  BP 92/54 (BP Location: Left arm)   Pulse 67   Temp 98.5 °F (36.9 °C) (Oral)   Resp 15   Wt 160 lb (72.6 kg)   LMP 10/01/2024 (Approximate)   SpO2 99%   BMI 25.53 kg/m²     Intake/Output Summary (Last 24 hours) at 10/10/2024 0654  Last data filed at 10/10/2024 0500  Gross per 24 hour   Intake 878.8 ml   Output --   Net 878.8 ml         General: Alert, oriented x3. No acute distress.  HEENT: Normocephalic, atraumatic. No scleral icterus.  Pulmonary: No respiratory distress, effort normal.   Abdomen: Non-distended, without tympany to percussion. Soft, epigastric discomfort to palpation. No rebound or guarding. No peritoneal signs.   Extremities: No lower extremity edema. No clubbing or cyanosis.   Skin: Warm, dry. No jaundice.       Labs:      No results found for: \"PT\", \"INR\"          Assessment:  Patient Active Problem List   Diagnosis    Sickle cell crisis (HCC)    Sickle cell anemia (HCC)    Hypokalemia    Sickle cell pain crisis (HCC)    At risk for negative response to nurse controlled analgesia    Constipation due to opioid therapy    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)    Nausea    Chronic, continuous use of opioids    Elevated troponin    Macrocytic anemia    Chest pain of uncertain etiology    Dehydration    Anemia, unspecified type    Multifocal pneumonia    Gastroenteritis    Calculus of gallbladder  with acute cholecystitis without obstruction     Cholelithiasis  Sickle cell pain crisis  Concern for pneumonia-has been ruled out    Plan:  Plan for HIDA scan today to further evaluate for cholecystitis.  No plan for acute general surgical intervention during this admission.  Possible laparoscopic cholecystectomy as an outpatient once her sickle cell crisis has resolved  N.p.o. for imaging today  Antiemetics and analgesics per primary.  Patient currently on PCA  DVT prophylaxis with Lovenox  GI prophylaxis with Protonix       My total face time with this patient was 25 minutes. Greater than half of the visit was spent in counseling the patient on the above listed diagnoses and treatment options.     Malinda Garrett PA-C  10/10/2024  6:54 AM    Skye reports abdominal pain is much improved today.  On physical examination abdomen is soft, nondistended, she is nontender to deep palpation in the epigastrium and the right upper quadrant.  She is tolerating a soft diet.  HIDA scan performed today revealed no evidence of acute cholecystitis.  The patient does have known cholelithiasis with a history of sickle cell anemia.  Patient will follow-up with Dr. Denis as outpatient to discuss possible laparoscopic cholecystectomy.  Skye is comfortable with these recommendations.  She has no further questions at this time.  General surgery service will sign off.    I agree with the note above and attest to its accuracy with the following changes below after my interview and examination of the patient    The patient was seen and examined.  Available labs and radiology is noted.    Patty Colby MD FACS    Please note that this report has been produced using speech recognition software and may contain errors related to that system including but not limited to errors in grammar, punctuation and spelling as well as words and phrases that possibly may have been recognized inappropriately.  If there are any questions or  concerns please contact the dictating provider for clarification.    The 21st Century Cures Act makes medical notes like these available to patients in the interest of trans parency. Please be advised this is a medical document. Medical documents are intended to carry relevant information, facts as evident, and the clinical opinion of the practitioner. The medical note is intended as peer to peer communication and may appear blunt or direct. It is written in medical language and may contain abbreviations or verbiage that are unfamiliar.   Again if there are any questions or concerns please contact the dictating provider for clarification.

## 2024-10-10 NOTE — PROGRESS NOTES
[facility]   part of [facility]     Hospitalist Progress Note     Oluwaseun Oluwadamilola Ogunyemi Patient Status:  Inpatient    8/3/1985 MRN [id number]   Location [facility] 4NW-A Attending Pete Murrieta, DO   Hosp Day # 3 PCP Clive St MD     Chief Complaint: Sickle cell crisis    Subjective:     Just returned from HIDA scan. Reports having her normal sickle cell pain in her abdomen and BLLE. Didn't feel worsening pain or symptoms during HIDA scan     Objective:    Review of Systems:   A comprehensive review of systems was completed; pertinent positive and negatives stated in subjective.    Vital signs:  Temp:  [98.1 °F (36.7 °C)-98.8 °F (37.1 °C)] 98.5 °F (36.9 °C)  Pulse:  [67-89] 67  Resp:  [15-18] 15  BP: ()/(45-77) 92/54  SpO2:  [90 %-100 %] 99 %    Physical Exam:    General: No acute distress, awake and alert  Respiratory: No rhonchi, no wheezes  Cardiovascular: S1, S2. Regular rate and rhythm  Abdomen: Soft, non-tender, non-distended, positive bowel sounds  Neuro: No new focal deficits  Extremities: No edema    Diagnostic Data:    Labs:  Recent Labs   Lab 10/07/24  1514 10/08/24  0649 10/09/24  0636   WBC 9.4 8.2 9.0   HGB 9.7* 10.2* 9.4*   .1* 111.9* 112.4*   .0 374.0 308.0     Recent Labs   Lab 10/07/24  1514 10/08/24  0649 10/09/24  0636   * 121* 110*   BUN 10 6* 6*   CREATSERUM 0.64 0.56 0.60   CA 9.7 9.5 8.9   ALB 4.8 4.1 4.1    137 139   K 3.3* 3.4* 3.6  3.6    105 108   CO2 26.0 24.0 26.0   ALKPHO 59 49 43   AST 30 19 13   ALT 28 18 12   BILT 0.5 0.7 0.7   TP 9.5* 8.8* 8.1     Estimated Creatinine Clearance: 118.4 mL/min (based on SCr of 0.6 mg/dL).    No results for input(s): \"TROP\", \"TROPHS\", \"CK\" in the last 168 hours.    No results for input(s): \"PTP\", \"INR\" in the last 168 hours.     Microbiology  Hospital Encounter on 10/07/24   1. Blood Culture     Status: None (Preliminary result)    Collection Time: 10/07/24  8:05 PM     Specimen: Blood,peripheral   Result Value Ref Range    Blood Culture Result No Growth 2 Days N/A       Imaging: Reviewed in Epic.    Medications:    metRONIDAZOLE  500 mg Oral Q8H PRAVIN    Glutamine (Sickle Cell)  15 g Oral BID    morphINE ER  60 mg Oral Q8H PRAVIN    enoxaparin  40 mg Subcutaneous Daily    aspirin  81 mg Oral Daily    folic acid  1 mg Oral Daily    hydroxyurea  2,000 mg Oral Daily    metoprolol succinate ER  25 mg Oral Daily    pantoprazole  40 mg Intravenous Q24H    cefepime  1 g Intravenous Q8H       Assessment & Plan:      #Abdominal discomfort with nausea, vomiting and diarrhea  -CT a/p with diffuse fluid-filled colon suggestive of diarrheal state  as well as cholelithiasis with distended gallbladder  -abdominal ultrasound reviewed > gallstones, but no obstruction or cholecystitis   -HIDA 10/10 negative   -C diff and GI PCR negative  -PRN imodium, anti-emetics, bentyl  -empiric Cefepime and add Flagyl  -blood cultures NGTD  -IVF, PPI, pain control (dilaudid PCA and PTA PO morphine), anti-emetics    #Cholelithiasis  - abdominal ultrasound reviewed  - HIDA negative   - general surgery following > supportive care     #Sickle cell pain crisis likely precipitated by above  -Monitor Hb, retic, LDH  -Hematology following  -Folic acid, ASA  -Hydroxyurea  -Pain control    #Suspected PNA, ruled out  -CT chest reviewed, no evidence of consolidation and patient denies cough or SOB    #Hypokalemia  -Mio Mustafa DO      Supplementary Documentation:     Quality:  DVT Mechanical Prophylaxis:   SCDs,    DVT Pharmacologic Prophylaxis   Medication    enoxaparin (Lovenox) 40 MG/0.4ML SUBQ injection 40 mg         DVT Pharmacologic prophylaxis: Aspirin 81 mg  Code Status: Full  [place]: No urinary catheter in place  ABHAY: TBD    Discharge is dependent on: Clinical state, consultant recs  At this point Ms. Andrade is expected to be discharge to: Home    The 21st Century Cures Act makes medical notes like  these available to patients in the interest of transparency. Please be advised this is a medical document. Medical documents are intended to carry relevant information, facts as evident, and the clinical opinion of the practitioner. The medical note is intended as peer to peer communication and may appear blunt or direct. It is written in medical language and may contain abbreviations or verbiage that are unfamiliar.

## 2024-10-11 LAB
ALBUMIN SERPL-MCNC: 3.5 G/DL (ref 3.2–4.8)
ALBUMIN/GLOB SERPL: 1.1 {RATIO} (ref 1–2)
ALP LIVER SERPL-CCNC: 35 U/L
ALT SERPL-CCNC: 9 U/L
ANION GAP SERPL CALC-SCNC: 7 MMOL/L (ref 0–18)
AST SERPL-CCNC: 13 U/L (ref ?–34)
BASOPHILS # BLD AUTO: 0.02 X10(3) UL (ref 0–0.2)
BASOPHILS NFR BLD AUTO: 0.4 %
BILIRUB SERPL-MCNC: 0.4 MG/DL (ref 0.3–1.2)
BUN BLD-MCNC: 6 MG/DL (ref 9–23)
CALCIUM BLD-MCNC: 8.1 MG/DL (ref 8.7–10.4)
CHLORIDE SERPL-SCNC: 110 MMOL/L (ref 98–112)
CO2 SERPL-SCNC: 25 MMOL/L (ref 21–32)
CREAT BLD-MCNC: 0.48 MG/DL
EGFRCR SERPLBLD CKD-EPI 2021: 123 ML/MIN/1.73M2 (ref 60–?)
EOSINOPHIL # BLD AUTO: 0.14 X10(3) UL (ref 0–0.7)
EOSINOPHIL NFR BLD AUTO: 2.8 %
ERYTHROCYTE [DISTWIDTH] IN BLOOD BY AUTOMATED COUNT: 13.8 %
GLOBULIN PLAS-MCNC: 3.2 G/DL (ref 2–3.5)
GLUCOSE BLD-MCNC: 99 MG/DL (ref 70–99)
HCT VFR BLD AUTO: 21.5 %
HGB BLD-MCNC: 7.8 G/DL
IGA SERPL-MCNC: 278.5 MG/DL (ref 40–350)
IMM GRANULOCYTES # BLD AUTO: 0.02 X10(3) UL (ref 0–1)
IMM GRANULOCYTES NFR BLD: 0.4 %
LYMPHOCYTES # BLD AUTO: 1.31 X10(3) UL (ref 1–4)
LYMPHOCYTES NFR BLD AUTO: 26 %
MCH RBC QN AUTO: 40.4 PG (ref 26–34)
MCHC RBC AUTO-ENTMCNC: 36.3 G/DL (ref 31–37)
MCV RBC AUTO: 111.4 FL
MONOCYTES # BLD AUTO: 0.5 X10(3) UL (ref 0.1–1)
MONOCYTES NFR BLD AUTO: 9.9 %
NEUTROPHILS # BLD AUTO: 3.05 X10 (3) UL (ref 1.5–7.7)
NEUTROPHILS # BLD AUTO: 3.05 X10(3) UL (ref 1.5–7.7)
NEUTROPHILS NFR BLD AUTO: 60.5 %
OSMOLALITY SERPL CALC.SUM OF ELEC: 292 MOSM/KG (ref 275–295)
PLATELET # BLD AUTO: 162 10(3)UL (ref 150–450)
POTASSIUM SERPL-SCNC: 3.3 MMOL/L (ref 3.5–5.1)
PROT SERPL-MCNC: 6.7 G/DL (ref 5.7–8.2)
RBC # BLD AUTO: 1.93 X10(6)UL
SODIUM SERPL-SCNC: 142 MMOL/L (ref 136–145)
T4 FREE SERPL-MCNC: 0.9 NG/DL (ref 0.8–1.7)
TSI SER-ACNC: 6.9 MIU/ML (ref 0.55–4.78)
WBC # BLD AUTO: 5 X10(3) UL (ref 4–11)

## 2024-10-11 PROCEDURE — 99232 SBSQ HOSP IP/OBS MODERATE 35: CPT | Performed by: INTERNAL MEDICINE

## 2024-10-11 RX ORDER — POTASSIUM CHLORIDE 1500 MG/1
40 TABLET, EXTENDED RELEASE ORAL ONCE
Status: COMPLETED | OUTPATIENT
Start: 2024-10-11 | End: 2024-10-11

## 2024-10-11 RX ORDER — CHOLESTYRAMINE LIGHT 4 G/5.7G
4 POWDER, FOR SUSPENSION ORAL
Status: DISCONTINUED | OUTPATIENT
Start: 2024-10-11 | End: 2024-10-14

## 2024-10-11 NOTE — PLAN OF CARE
Pt a/o x4. VSS on RA. C/o pain, nausea & diarrhea. Meds per MAR. IVF per order. On PCA dilaudid.     Fall risk protocol followed, call light within reach.

## 2024-10-11 NOTE — CONSULTS
Consultation Note        Oluwaseun Oluwadamilola Ogunyemi Patient Status:  Inpatient    8/3/1985 MRN [id number]   [facility] 4NW-A Attending Lisha Mustafa, DO   Hosp Day # 4 PCP [address] MD       Reason for Consultation   Acute diarrhea       History of Present Illness   Ms Andrade is a 39 year old female with sickle cell disease who presents with nausea, vomiting, abdominal pain, and diarrhea and a sickle cell crisis. About 4 days ago, suddenly she developed nausea, non-bloody vomiting, diffuse abdominal cramping, and multiple watery stools per day, non-bloody. She denies dysphagia, odynophagia, AC use, NSAID use, GI surgeries, family history of GI diseases, and history of EGD/colonoscopy. Prior to this week, she did not have GI symptoms and had normal formed bowel movements. She denies fevers/chills. She reports that her vomiting has resolved, and her diarrhea has decreased in frequency, although it is still loose. She denies sick contacts and recent travel.     GI PCR, C Diff, and RVP negative.         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)    Sickle cell crisis (HCC)    Frequent crises    Sickle cell crisis (HCC)    Sickle cell pain crisis (HCC)      Past Surgical History:   Procedure Laterality Date                    No recently discontinued medications to reconcile   Medications Ordered Prior to Encounter[1]    Current  Allergies: Allergies[2]    Social History     Occupational History    Not on file   Tobacco Use    Smoking status: Never    Smokeless tobacco: Never   Vaping Use    Vaping status: Never Used   Substance and Sexual Activity    Alcohol use: Never     Comment: No history of excessive use    Drug use: Never    Sexual activity: Yes     Partners: Male     Birth control/protection: Condom       Marital Status:    Lives alone?:    Occupation:   Taking fiber supplements?:    Tattoos?:   Body piercing?:   High risk sexual behavior?:    Advance Directive:          Family History   Problem Relation Age of Onset    Hypertension Mother         unknown    Cataracts Mother     No Known Problems Father         was told cardiac arrest    No Known Problems Brother     Sickle Cell Maternal Aunt         passed from kidney failure    DVT/VTE Other     Breast Cancer Neg     Ovarian Cancer Neg     Colon Cancer Neg               MEDICATIONS      cholestyramine light (Prevalite) powder packet 4 g  4 g Oral Daily with breakfast    [COMPLETED] potassium chloride (Klor-Con M20) tab 40 mEq  40 mEq Oral Once    opium 10 MG/ML (1%) oral tincture 6 mg  0.6 mL Oral TID PRN    loperamide (Imodium) cap 2 mg  2 mg Oral Q3H PRN    pantoprazole (Protonix) DR tab 40 mg  40 mg Oral QAM AC    metRONIDAZOLE (Flagyl) tab 500 mg  500 mg Oral Q8H PRAVIN    [COMPLETED] magnesium oxide (Mag-Ox) tab 400 mg  400 mg Oral Once    [COMPLETED] potassium chloride 40 mEq in 250mL sodium chloride 0.9% IVPB premix  40 mEq Intravenous Once    dicyclomine (Bentyl) cap 10 mg  10 mg Oral TID PRN    Glutamine (Sickle Cell) PACK 15 g *PT SUPPLIED*  15 g Oral BID    sodium chloride 0.9% infusion   Intravenous Continuous    naloxone (Narcan) 0.4 MG/ML injection 0.08 mg  0.08 mg Intravenous Q5 Min PRN    nalbuphine (Nubain) 10 mg/mL injection 2.5 mg  2.5 mg Intravenous Q4H PRN    HYDROmorphone PF (Dilaudid) 100 mg in sodium chloride 0.9% 100 mL HIGH DOSE PCA   Intravenous Continuous     HYDROmorphone 1 mg BOLUS FROM HIGH DOSE PCA  1 mg Intravenous Q30 Min PRN    morphINE ER (MS Contin) tab 60 mg  60 mg Oral Q8H PRAVIN    [COMPLETED] sodium chloride 0.9% infusion 1,000 mL  1,000 mL Intravenous Once    [COMPLETED] ondansetron (Zofran) 4 MG/2ML injection 4 mg  4 mg Intravenous Once    [COMPLETED] HYDROmorphone (Dilaudid) 1 MG/ML injection 1 mg  1 mg Intravenous Q30 Min PRN    [COMPLETED] diphenhydrAMINE (Benadryl) 50 mg/mL  injection 25 mg  25 mg Intravenous Once    [COMPLETED] potassium chloride 20 mEq/100mL IVPB premix 20 mEq  20 mEq Intravenous Once    [COMPLETED] cefTRIAXone (Rocephin) 2 g in sodium chloride 0.9% 100 mL IVPB-ADDV  2 g Intravenous Once    [COMPLETED] azithromycin (Zithromax) 500 mg in sodium chloride 0.9% 250mL IVPB premix  500 mg Intravenous Once    sodium chloride 0.9% infusion   Intravenous Continuous    enoxaparin (Lovenox) 40 MG/0.4ML SUBQ injection 40 mg  40 mg Subcutaneous Daily    acetaminophen (Tylenol Extra Strength) tab 1,000 mg  1,000 mg Oral Q6H PRN    melatonin tab 3 mg  3 mg Oral Nightly PRN    polyethylene glycol (PEG 3350) (Miralax) 17 g oral packet 17 g  17 g Oral Daily PRN    sennosides (Senokot) tab 17.2 mg  17.2 mg Oral Nightly PRN    bisacodyl (Dulcolax) 10 MG rectal suppository 10 mg  10 mg Rectal Daily PRN    fleet enema (Fleet) rectal enema 133 mL  1 enema Rectal Once PRN    ondansetron (Zofran) 4 MG/2ML injection 4 mg  4 mg Intravenous Q6H PRN    prochlorperazine (Compazine) 10 MG/2ML injection 5 mg  5 mg Intravenous Q8H PRN    benzonatate (Tessalon) cap 200 mg  200 mg Oral TID PRN    guaiFENesin (Robitussin) 100 MG/5 ML oral liquid 200 mg  200 mg Oral Q4H PRN    glycerin-hypromellose- (Artificial Tears) 0.2-0.2-1 % ophthalmic solution 1 drop  1 drop Both Eyes QID PRN    sodium chloride (Saline Mist) 0.65 % nasal solution 1 spray  1 spray Each Nare Q3H PRN    aspirin DR tab 81 mg  81 mg Oral Daily    folic acid (Folvite) tab 1 mg  1 mg Oral Daily     hydroxyurea (Hydrea) cap 2,000 mg  2,000 mg Oral Daily    metoprolol succinate ER (Toprol XL) 24 hr tab 25 mg  25 mg Oral Daily    influenza virus trivalent PF (Fluzone Trivalent) 0.5 mL IM injection (ages 6 months to 64 years) 0.5 mL  0.5 mL Intramuscular Prior to discharge    diphenhydrAMINE (Benadryl) 50 mg/mL  injection 25 mg  25 mg Intravenous Q6H PRN    ceFEPIme (Maxipime) 1 g in sodium chloride 0.9% 100 mL IVPB-MBP  1 g Intravenous Q8H              ROS:     A comprehensive 14 point review of systems was completed.  Pertinent positives and negatives noted in the the HPI.          Physical Exam     Vital signs:  /72 (BP Location: Right arm)   Pulse 65   Temp 98.2 °F (36.8 °C) (Oral)   Resp 16   Wt 160 lb (72.6 kg)   LMP 10/01/2024 (Approximate)   SpO2 97%   BMI 25.53 kg/m²         Physical Exam        General: Appears alert, oriented x 3 and in no acute distress.  HEENT: Normal. No scleral icterus.   NECK: Supple. No neck vein distention.   CV: S1 and S2 normal. No murmurs or gallops.  LUNGS: Clear to percussion and auscultation.  ABDOMEN: Soft and non-distended. Non-tender. No masses. Bowel sounds are present.  BACK: No CVA tenderness.  EXTREMITIES: No edema, cyanosis or clubbing.  SKIN: Warm and dry.         IMAGING/LABS       Labs:   Lab Results   Component Value Date    WBC 5.0 10/11/2024    HGB 7.8 10/11/2024    HCT 21.5 10/11/2024    .0 10/11/2024    CREATSERUM 0.48 10/11/2024    BUN 6 10/11/2024     10/11/2024    K 3.3 10/11/2024     10/11/2024    CO2 25.0 10/11/2024    GLU 99 10/11/2024    CA 8.1 10/11/2024    ALB 3.5 10/11/2024    ALKPHO 35 10/11/2024    BILT 0.4 10/11/2024    AST 13 10/11/2024    ALT 9 10/11/2024     Recent Labs   Lab 10/08/24  0649 10/09/24  0636 10/11/24  0718   * 110* 99   BUN 6* 6* 6*   CREATSERUM 0.56 0.60 0.48*   CA 9.5 8.9 8.1*    139 142   K 3.4* 3.6  3.6 3.3*    108 110   CO2 24.0 26.0 25.0     Recent Labs   Lab  10/11/24  0718   RBC 1.93*   HGB 7.8*   HCT 21.5*   .4*   MCH 40.4*   MCHC 36.3   RDW 13.8   NEPRELIM 3.05   WBC 5.0   .0       Recent Labs   Lab 10/08/24  0649 10/09/24  0636 10/11/24  0718   ALT 18 12 9*   AST 19 13 13       Imaging:   NM GB HEPATOBILIARY SCAN  (ZQG=11018)  Narrative: PROCEDURE:  NM GB HEPATOBILIARY SCAN  (CPT=78226)     COMPARISON:  [facility] , CT, CT CHEST+ABDOMEN+PELVIS(CPT=71250/82574), 10/07/2024, 11:57 PM.  [facility] , US, US ABDOMEN LIMITED (CPT=76705), 10/09/2024, 7:56 AM.     INDICATIONS:  cholelithiais, LUQ abdominal pain     TECHNIQUE:  Tc99m labeled mebrofenin was injected IV, and dynamic images of the abdomen were obtained in the anterior projection for one hour.     RADIOPHARMACEUTICAL(S):  6.0 mCi Tc-99m mebrofenin.     FINDINGS:    HEPATIC CLEARANCE:        Normal.  INTRAHEPATIC BILE DUCTS:  Two minutes  COMMON DUCT:              Not visualized despite 2 hours of imaging  SMALL BOWEL:              Not visualized despite 2 hours of imaging  CYSTIC DUCT:              16 minutes                   Impression: CONCLUSION:    1. No evidence for acute cholecystitis.        LOCATION:  [facility]        Dictated by (CST): Vita Acosta MD on 10/10/2024 at 11:30 AM       Finalized by (CST): Vita Acosta MD on 10/10/2024 at 11:33 AM               IMPRESSION:   Ms Andrade is a 39 year old female with sickle cell disease who presents with acute nausea, vomiting, abdominal pain, and diarrhea and a sickle cell crisis. Her GI symptoms are slowly improving, and her enteric infectious studies have been negative. She most likely has an acute infectious gastroenteritis not identified on GI PCR. Ddx includes pancreatic insufficiency, new onset IBD, medication induced diarrhea.             PLAN:   -giardia/crypto Ag  -fecal calprotectin  -pancreatic elastase  -celiac studies  -can continue regular diet from GI perspective  -agree with imodium, tincture of opium prn  -given improvement and acute nature  of symptoms, no role for EGD/colonoscopy at this time, although if she worsens or does not continue to improve will consider           Damien Gibbons MD  11:32 AM  10/11/2024  [facility]  [phone]                  [1]   No current facility-administered medications on file prior to encounter.     Current Outpatient Medications on File Prior to Encounter   Medication Sig Dispense Refill    diphenhydrAMINE 25 MG Oral Cap Take 1 capsule (25 mg total) by mouth every 6 (six) hours as needed for Itching.      OxyCODONE HCl IR 30 MG Oral Tab Take 1 tablet (30 mg total) by mouth every 3 (three) hours as needed for Pain. 180 tablet 0    morphINE ER 60 MG Oral Tab CR Take 1 tablet (60 mg total) by mouth every 8 (eight) hours. 90 tablet 0    ASPIRIN LOW DOSE 81 MG Oral Tab EC TAKE 1 TABLET BY MOUTH EVERY DAY 90 tablet 3    hydroxyurea 500 MG Oral Cap Take 4 capsules (2,000 mg total) by mouth daily. 360 capsule 3    folic acid 1 MG Oral Tab Take 1 tablet (1 mg total) by mouth daily. 90 tablet 3    Glutamine, Sickle Cell, (ENDARI) 5 g Oral Powd Pack Take 15 g by mouth in the morning and 15 g before bedtime. 180 each 11    metoprolol succinate ER 25 MG Oral Tablet 24 Hr Take 1 tablet (25 mg total) by mouth daily.      Cyanocobalamin (VITAMIN B-12 OR) Take 200 mcg by mouth daily.      ondansetron (ZOFRAN) 8 MG tablet Take 1 tablet (8 mg total) by mouth every 8 (eight) hours as needed for Nausea. 9 tablet 13    Naloxone HCl 4 MG/0.1ML Nasal Liquid 4 mg by Nasal route as needed. If patient remains unresponsive, repeat dose in other nostril 2-5 minutes after first dose. 1 kit 0   [2]   Allergies  Allergen Reactions    Piperacillin Sod-Tazobactam So ITCHING

## 2024-10-11 NOTE — PROGRESS NOTES
Hematology/Oncology Progress Note    Patient Name: Oluwaseun Oluwadamilola Ogunyemi  Medical Record Number: [id number]    YOB: 1985     Reason for Consultation:  Oluwaseun Oluwadamilola Ogunyemi was seen today for the diagnosis of sickle cell disease    Interval events: Still with ongoing diarrhea despite taking Imodium more frequently.  No further abdominal pain.  Sickle cell pain is doing better.  No fevers.     Current Inpatient Medications:  Inpatient Meds:   cholestyramine light  4 g Oral Daily with breakfast    pantoprazole  40 mg Oral QAM AC    metRONIDAZOLE  500 mg Oral Q8H PRAVIN    Glutamine (Sickle Cell)  15 g Oral BID    morphINE ER  60 mg Oral Q8H PRAVIN    enoxaparin  40 mg Subcutaneous Daily    aspirin  81 mg Oral Daily    folic acid  1 mg Oral Daily    hydroxyurea  2,000 mg Oral Daily    metoprolol succinate ER  25 mg Oral Daily    cefepime  1 g Intravenous Q8H      sodium chloride 10 mL/hr at 10/08/24 1655    HYDROmorphone PF (Dilaudid) 100 mg in sodium chloride 0.9% 100 mL HIGH DOSE PCA      sodium chloride 100 mL/hr at 10/10/24 1355     PRN Meds:    opium    loperamide    dicyclomine    naloxone    nalbuphine    HYDROmorphone    acetaminophen    melatonin    polyethylene glycol (PEG 3350)    sennosides    bisacodyl    fleet enema    ondansetron    prochlorperazine    benzonatate    guaiFENesin    glycerin-hypromellose-    sodium chloride    influenza virus vaccine PF    diphenhydrAMINE    Allergies:   Allergies   Allergen Reactions    Piperacillin Sod-Tazobactam So ITCHING     Vital Signs:  Height: --  Weight: --  BSA (Calculated - sq m): --  Pulse: 57 (10/11 0606)  BP: 106/67 (10/11 0606)  Temp: 98.4 °F (36.9 °C) (10/11 0606)  Do Not Use - Resp Rate: --  SpO2: 96 % (10/11 0606)    Wt Readings from Last 6 Encounters:   10/07/24 72.6 kg (160 lb)   09/19/24 72.6 kg (160 lb)   08/22/24 72.1 kg (159 lb)   07/25/24 70.4 kg (155 lb 3.2 oz)   07/01/24 71.5 kg (157 lb 9.6 oz)   06/28/24 69.8  kg (153 lb 12.8 oz)     Physical Examination:  General: Patient is alert and oriented  CV: Regular rate and rhythm, no murmurs, no LE edema  Respiratory: Lungs clear to auscultation bilaterally  GI/Abd: Soft, non-tender   Neurological: Grossly intact   Lymphatics: No palpable lymphadenopathy  Skin: no rashes or petechiae    Laboratory:  Recent Labs   Lab 10/07/24  1514 10/08/24  0649 10/09/24  0636   WBC 9.4 8.2 9.0   HGB 9.7* 10.2* 9.4*   HCT 26.0* 27.3* 25.4*   .0 374.0 308.0   .1* 111.9* 112.4*   RDW 14.3 13.8 14.1   NEPRELIM 8.08* 6.78 6.59     Recent Labs   Lab 10/07/24  1514 10/08/24  0649 10/09/24  0636    137 139   K 3.3* 3.4* 3.6  3.6    105 108   CO2 26.0 24.0 26.0   BUN 10 6* 6*   CREATSERUM 0.64 0.56 0.60   * 121* 110*   CA 9.7 9.5 8.9   TP 9.5* 8.8* 8.1   ALB 4.8 4.1 4.1   ALKPHO 59 49 43   AST 30 19 13   ALT 28 18 12   BILT 0.5 0.7 0.7     No results for input(s): \"PT\", \"INR\", \"PTT\", \"FIB\" in the last 168 hours.    Imaging:    HIDA 10/10/24: CONCLUSION:    1. No evidence for acute cholecystitis.     US abd 10/9/24: CONCLUSION:    Stat ultrasound examination of the gallbladder shows multiple mobile echogenic foci consistent with gallstones, largest measuring 7 x 5 x 10 mm.  Gallbladder wall upper limits of normal thickness 3 mm.  Common bile duct upper limits of   normal 4 mm.  No gallbladder dilation, surrounding fluid or Bhatti sign reported.  Correlate for any potential signs or symptoms of developing cholecystitis.  No sign of ascites     CT c/a/p 10/8/24: CONCLUSION:     1. Cholelithiasis and a mildly distended gallbladder.  If there is a clinical concern for acute cholecystitis, consider abdominal ultrasound for further assessment.    2. Diffuse fluid-filled colon suggestive of diarrheal state.  A superimposed mild colitis cannot be excluded.     3. Osseous changes from sickle cell disease noted.  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  Additional  areas of possible osteonecrosis would include the bilateral femoral heads and right acetabulum.     Impression & Plan:     *abd pain, n/v, diarrhea  -Suspect gastroenteritis.  No acute cholecystitis based on HIDA scan.  Abdominal pain is better but still with diarrhea.  -GI stool PCR panel and c diff negative  -Continue imodium prn, encouraged to try tincture of opium prn  -continue IVF  -Consult GI to weigh in    *Hgb SS/Sickle cell disease with acute pain crisis  -Likely precipitated by acute GI illness with dehydration  -Pain is better controlled with Dilaudid PCA, continue for today but if pain continues to be well-controlled can work on transitioning this back to her home oral oxycodone prn.  Continue home MS Contin 60 mg q8hrs.   -Continue hydroxyurea to 2000 mg daily.  Hold endari and crizanlizumab while hospitalized.   -continue Folvite 1 mg daily.    -supp O2  -IVF     Esau Mccormick MD  Hematology/Medical Oncology  [facility]

## 2024-10-11 NOTE — PROGRESS NOTES
[facility]   part of [facility]     Hospitalist Progress Note     Oluwaseun Oluwadamilola Ogunyemi Patient Status:  Inpatient    8/3/1985 MRN [id number]   Location [facility] 4NW-A Attending Pete Murrieta, DO   Hosp Day # 4 PCP Clive St MD     Chief Complaint: Sickle cell crisis    Subjective:     Feeling better this morning, pain improved, but still with persistent loose stools     Objective:    Review of Systems:   A comprehensive review of systems was completed; pertinent positive and negatives stated in subjective.    Vital signs:  Temp:  [98 °F (36.7 °C)-98.4 °F (36.9 °C)] 98.4 °F (36.9 °C)  Pulse:  [57-87] 57  Resp:  [12-17] 12  BP: ()/(48-67) 106/67  SpO2:  [96 %-100 %] 96 %    Physical Exam:    General: No acute distress, awake and alert  Respiratory: No rhonchi, no wheezes  Cardiovascular: S1, S2. Regular rate and rhythm  Abdomen: Soft, non-tender, non-distended, positive bowel sounds  Neuro: No new focal deficits  Extremities: No edema    Diagnostic Data:    Labs:  Recent Labs   Lab 10/07/24  1514 10/08/24  0649 10/09/24  0636   WBC 9.4 8.2 9.0   HGB 9.7* 10.2* 9.4*   .1* 111.9* 112.4*   .0 374.0 308.0     Recent Labs   Lab 10/07/24  1514 10/08/24  0649 10/09/24  0636   * 121* 110*   BUN 10 6* 6*   CREATSERUM 0.64 0.56 0.60   CA 9.7 9.5 8.9   ALB 4.8 4.1 4.1    137 139   K 3.3* 3.4* 3.6  3.6    105 108   CO2 26.0 24.0 26.0   ALKPHO 59 49 43   AST 30 19 13   ALT 28 18 12   BILT 0.5 0.7 0.7   TP 9.5* 8.8* 8.1     Estimated Creatinine Clearance: 118.4 mL/min (based on SCr of 0.6 mg/dL).    No results for input(s): \"TROP\", \"TROPHS\", \"CK\" in the last 168 hours.    No results for input(s): \"PTP\", \"INR\" in the last 168 hours.     Microbiology  Hospital Encounter on 10/07/24   1. Blood Culture     Status: None (Preliminary result)    Collection Time: 10/07/24  8:05 PM    Specimen: Blood,peripheral   Result Value Ref Range    Blood Culture Result No  Growth 3 Days N/A       Imaging: Reviewed in Epic.    Medications:    cholestyramine light  4 g Oral Daily with breakfast    pantoprazole  40 mg Oral QAM AC    metRONIDAZOLE  500 mg Oral Q8H PRAVIN    Glutamine (Sickle Cell)  15 g Oral BID    morphINE ER  60 mg Oral Q8H PRAVIN    enoxaparin  40 mg Subcutaneous Daily    aspirin  81 mg Oral Daily    folic acid  1 mg Oral Daily    hydroxyurea  2,000 mg Oral Daily    metoprolol succinate ER  25 mg Oral Daily    cefepime  1 g Intravenous Q8H       Assessment & Plan:      #Abdominal discomfort with nausea, vomiting and diarrhea  -CT a/p with diffuse fluid-filled colon suggestive of diarrheal state  as well as cholelithiasis with distended gallbladder  -abdominal ultrasound reviewed > gallstones, but no obstruction or cholecystitis   -HIDA 10/10 negative   -C diff and GI PCR negative  -PRN imodium, anti-emetics, bentyl  -empiric Cefepime and add Flagyl  -blood cultures NGTD  -IVF, PPI, pain control (dilaudid PCA and PTA PO morphine), anti-emetics  -add tincture of opium and cholestyramine   -GI consulted     #Cholelithiasis  - abdominal ultrasound reviewed  - HIDA negative   - general surgery following > supportive care     #Sickle cell pain crisis likely precipitated by above  -Monitor Hb, retic, LDH  -Hematology following  -Folic acid, ASA  -Hydroxyurea  -Pain control    #Suspected PNA, ruled out  -CT chest reviewed, no evidence of consolidation and patient denies cough or SOB    #Hypokalemia  -Replace    Lisha Mustafa DO      Supplementary Documentation:     Quality:  DVT Mechanical Prophylaxis:   SCDs,    DVT Pharmacologic Prophylaxis   Medication    enoxaparin (Lovenox) 40 MG/0.4ML SUBQ injection 40 mg         DVT Pharmacologic prophylaxis: Aspirin 81 mg  Code Status: Full  [place]: No urinary catheter in place  ABHAY: TBD    Discharge is dependent on: Clinical state, consultant recs  At this point Ms. Andrade is expected to be discharge to: Home    The 21st Century  Cures Act makes medical notes like these available to patients in the interest of transparency. Please be advised this is a medical document. Medical documents are intended to carry relevant information, facts as evident, and the clinical opinion of the practitioner. The medical note is intended as peer to peer communication and may appear blunt or direct. It is written in medical language and may contain abbreviations or verbiage that are unfamiliar.

## 2024-10-11 NOTE — PLAN OF CARE
Patient is alert and orientated, VSS, RA, sickle cell pain relieved with PCA pump and PO morphine. Patient appetite improving. Patient still having diarrhea, GI consulted, stool sample needed. Call light and safety precautions in place.    Problem: RESPIRATORY - ADULT  Goal: Achieves optimal ventilation and oxygenation  Description: INTERVENTIONS:  - Assess for changes in respiratory status  - Assess for changes in mentation and behavior  - Position to facilitate oxygenation and minimize respiratory effort  - Oxygen supplementation based on oxygen saturation or ABGs  - Provide Smoking Cessation handout, if applicable  - Encourage broncho-pulmonary hygiene including cough, deep breathe, Incentive Spirometry  - Assess the need for suctioning and perform as needed  - Assess and instruct to report SOB or any respiratory difficulty  - Respiratory Therapy support as indicated  - Manage/alleviate anxiety  - Monitor for signs/symptoms of CO2 retention  Outcome: Progressing     Problem: HEMATOLOGIC - ADULT  Goal: Maintains hematologic stability  Description: INTERVENTIONS  - Assess for signs and symptoms of bleeding or hemorrhage  - Monitor labs and vital signs for trends  - Administer supportive blood products/factors, fluids and medications as ordered and appropriate  - Administer supportive blood products/factors as ordered and appropriate  Outcome: Progressing     Problem: Patient/Family Goals  Goal: Patient/Family Long Term Goal  Description: Patient's Long Term Goal:     Interventions:  - See additional Care Plan goals for specific interventions  Outcome: Progressing  Goal: Patient/Family Short Term Goal  Description: Patient's Short Term Goal:     Interventions:   - See additional Care Plan goals for specific interventions  Outcome: Progressing

## 2024-10-12 PROBLEM — R19.7 DIARRHEA: Status: ACTIVE | Noted: 2024-10-12

## 2024-10-12 LAB — POTASSIUM SERPL-SCNC: 3.6 MMOL/L (ref 3.5–5.1)

## 2024-10-12 PROCEDURE — 99232 SBSQ HOSP IP/OBS MODERATE 35: CPT | Performed by: INTERNAL MEDICINE

## 2024-10-12 PROCEDURE — 99233 SBSQ HOSP IP/OBS HIGH 50: CPT | Performed by: SPECIALIST

## 2024-10-12 RX ORDER — HYDROMORPHONE HYDROCHLORIDE 1 MG/ML
1 INJECTION, SOLUTION INTRAMUSCULAR; INTRAVENOUS; SUBCUTANEOUS EVERY 2 HOUR PRN
Status: DISCONTINUED | OUTPATIENT
Start: 2024-10-12 | End: 2024-10-14

## 2024-10-12 RX ORDER — OXYCODONE HYDROCHLORIDE 15 MG/1
15 TABLET ORAL EVERY 4 HOURS PRN
Status: CANCELLED | OUTPATIENT
Start: 2024-10-12

## 2024-10-12 RX ORDER — METRONIDAZOLE 500 MG/1
500 TABLET ORAL 3 TIMES DAILY
Qty: 9 TABLET | Refills: 0 | Status: SHIPPED | OUTPATIENT
Start: 2024-10-12 | End: 2024-10-14

## 2024-10-12 RX ORDER — NALOXONE HYDROCHLORIDE 0.4 MG/ML
0.4 INJECTION, SOLUTION INTRAMUSCULAR; INTRAVENOUS; SUBCUTANEOUS AS NEEDED
Status: DISCONTINUED | OUTPATIENT
Start: 2024-10-12 | End: 2024-10-14

## 2024-10-12 RX ORDER — OXYCODONE HYDROCHLORIDE 15 MG/1
30 TABLET ORAL EVERY 4 HOURS PRN
Status: CANCELLED | OUTPATIENT
Start: 2024-10-12

## 2024-10-12 RX ORDER — MORPHINE 10 MG/ML
0.6 TINCTURE ORAL 3 TIMES DAILY PRN
Qty: 1 EACH | Refills: 0 | Status: SHIPPED | OUTPATIENT
Start: 2024-10-12

## 2024-10-12 RX ORDER — OXYCODONE HYDROCHLORIDE 15 MG/1
30 TABLET ORAL
Status: DISCONTINUED | OUTPATIENT
Start: 2024-10-12 | End: 2024-10-14

## 2024-10-12 RX ORDER — LOPERAMIDE HYDROCHLORIDE 2 MG/1
2 CAPSULE ORAL
Qty: 30 CAPSULE | Refills: 0 | Status: SHIPPED | OUTPATIENT
Start: 2024-10-12

## 2024-10-12 RX ORDER — DICYCLOMINE HYDROCHLORIDE 10 MG/1
10 CAPSULE ORAL 3 TIMES DAILY PRN
Qty: 30 CAPSULE | Refills: 0 | Status: SHIPPED | OUTPATIENT
Start: 2024-10-12

## 2024-10-12 RX ORDER — OXYCODONE HYDROCHLORIDE 10 MG/1
20 TABLET ORAL
Status: DISCONTINUED | OUTPATIENT
Start: 2024-10-12 | End: 2024-10-12

## 2024-10-12 RX ORDER — CEFADROXIL 500 MG/1
500 CAPSULE ORAL 2 TIMES DAILY
Qty: 6 CAPSULE | Refills: 0 | Status: SHIPPED | OUTPATIENT
Start: 2024-10-12 | End: 2024-10-14

## 2024-10-12 RX ORDER — HYDROMORPHONE HYDROCHLORIDE 1 MG/ML
2 INJECTION, SOLUTION INTRAMUSCULAR; INTRAVENOUS; SUBCUTANEOUS EVERY 2 HOUR PRN
Status: DISCONTINUED | OUTPATIENT
Start: 2024-10-12 | End: 2024-10-14

## 2024-10-12 NOTE — PROGRESS NOTES
Hematology/Oncology Progress Note    Patient Name: Oluwaseun Oluwadamilola Ogunyemi  Medical Record Number: [id number]    YOB: 1985     Subjective Patient reports only one loose stool since yesterday evening. She is eating and drinking. She states that her pain is better and she wants to switch to oral pain medication.     Current Inpatient Medications:  Inpatient Meds:   cholestyramine light  4 g Oral Daily with breakfast    pantoprazole  40 mg Oral QAM AC    metRONIDAZOLE  500 mg Oral Q8H PRAVIN    Glutamine (Sickle Cell)  15 g Oral BID    morphINE ER  60 mg Oral Q8H PRAVIN    enoxaparin  40 mg Subcutaneous Daily    aspirin  81 mg Oral Daily    folic acid  1 mg Oral Daily    hydroxyurea  2,000 mg Oral Daily    metoprolol succinate ER  25 mg Oral Daily    cefepime  1 g Intravenous Q8H      sodium chloride 100 mL/hr at 10/11/24 2125     PRN Meds:    HYDROmorphone **OR** HYDROmorphone    oxyCODONE    naloxone    opium    loperamide    dicyclomine    acetaminophen    melatonin    polyethylene glycol (PEG 3350)    sennosides    bisacodyl    fleet enema    ondansetron    prochlorperazine    benzonatate    guaiFENesin    glycerin-hypromellose-    sodium chloride    influenza virus vaccine PF    diphenhydrAMINE    Allergies:   Allergies   Allergen Reactions    Piperacillin Sod-Tazobactam So ITCHING     Vital Signs:  Height: --  Weight: --  BSA (Calculated - sq m): --  Pulse: 55 (10/12 0415)  BP: 131/91 (10/12 0415)  Temp: 98 °F (36.7 °C) (10/12 0415)  Do Not Use - Resp Rate: --  SpO2: 99 % (10/11 1907)    Physical Examination:  General: NAD.  CV: Regular rate and rhythm.  Respiratory: Normal effort.   GI/Abd: Not distended.  Neurological: Grossly intact   Psych: Mood and affect appropriate.     Laboratory:  Recent Results (from the past 24 hours)   Immunoglobulin A, Quant    Collection Time: 10/11/24  2:54 PM   Result Value Ref Range    Immunoglobulin A 278.50 40.00 - 350.00 mg/dL     Impression & Plan:    *Diarrhea  -Clinically improved with only one loose stool since yesterday evening.   -GI has ordered stool workup..    *Hgb SS/Sickle cell disease with acute pain crisis  -Pain is better.   -Continue home MS Contin 60 mg q8hrs.   -Discontinue dilaudid PCA and start Oxy IR prn with IV dilaudid PRN if needed.   -Continue hydroxyurea to 2000 mg daily.  Hold endari and crizanlizumab while hospitalized.   -continue Folvite 1 mg daily.    -supp O2  -IVF     Patient OK for discharge from hematology point of view if her pain is well managed with oral pain medication, diarrhea continues to improve, and patient able to hydrate sufficiently.     Electronically signed by:    Reece Marvin M.D.

## 2024-10-12 NOTE — PLAN OF CARE
Patient is AOX4, VSS, RA, PCA pump and PO morphine for sickle cell pain; stool sample to be collected. Call light and safety precautions in place.

## 2024-10-12 NOTE — PROGRESS NOTES
[facility]  GASTROENTEROLOGY PROGRESS NOTE   [facility]     [facility] Keoradhakamran Andrade Patient Status:  Inpatient    8/3/1985 MRN [id number]   Location [facility] 4NW-A Attending Lisha Mustafa, DO   Hosp Day # 5 PCP Clive [address] MD       Reason for Consultation:   Acute diarrhea   Nausea, vomiting  Abdominal pain    Subjective:   Feels better today, diarrhea seemingly resolved  No emesis or nausea  Abd pain nearly resolved.     Patient Active Problem List   Diagnosis    Sickle cell crisis (HCC)    Sickle cell anemia (HCC)    Hypokalemia    Sickle cell pain crisis (HCC)    At risk for negative response to nurse controlled analgesia    Constipation due to opioid therapy    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)    Nausea    Chronic, continuous use of opioids    Elevated troponin    Macrocytic anemia    Chest pain of uncertain etiology    Dehydration    Anemia, unspecified type    Multifocal pneumonia    Gastroenteritis    Calculus of gallbladder with acute cholecystitis without obstruction    Diarrhea       PMH, PSH, Fhx, Shx as per initial consult note    Review of Systems    12 point Review of Systems negative unless otherwise mentioned in Subjective.     Physical Exam  /81 (BP Location: Right arm)   Pulse 60   Temp 98.3 °F (36.8 °C) (Oral)   Resp 16   Wt 160 lb (72.6 kg)   LMP 10/01/2024 (Approximate)   SpO2 98%   BMI 25.53 kg/m²   Body mass index is 25.53 kg/m².      Gen: No acute distress  Resp: no respiratory distress  Abd: Soft, non-tender, non-distended. No rebound tenderness, no guarding.   Neuro: Aox3.     Diagnostic Data:      Labs:  Recent Labs   Lab 10/08/24  0649 10/09/24  0636 10/11/24  0718   WBC 8.2 9.0 5.0   HGB 10.2* 9.4* 7.8*   .9* 112.4* 111.4*   .0 308.0 162.0       Recent Labs   Lab 10/08/24  0649 10/09/24  0636 10/11/24  0718 10/12/24  0823   * 110* 99  --    BUN 6* 6* 6*  --     CREATSERUM 0.56 0.60 0.48*  --    CA 9.5 8.9 8.1*  --    ALB 4.1 4.1 3.5  --     139 142  --    K 3.4* 3.6  3.6 3.3* 3.6    108 110  --    CO2 24.0 26.0 25.0  --    ALKPHO 49 43 35*  --    AST 19 13 13  --    ALT 18 12 9*  --    BILT 0.7 0.7 0.4  --    TP 8.8* 8.1 6.7  --        No results for input(s): \"PTP\", \"INR\" in the last 168 hours.    No data recorded        Imaging: Imaging data reviewed in Epic.    Medications:    cholestyramine light  4 g Oral Daily with breakfast    pantoprazole  40 mg Oral QAM AC    metRONIDAZOLE  500 mg Oral Q8H PRAVIN    Glutamine (Sickle Cell)  15 g Oral BID    morphINE ER  60 mg Oral Q8H PRAVIN    enoxaparin  40 mg Subcutaneous Daily    aspirin  81 mg Oral Daily    folic acid  1 mg Oral Daily    hydroxyurea  2,000 mg Oral Daily    metoprolol succinate ER  25 mg Oral Daily    cefepime  1 g Intravenous Q8H       Allergies:  Allergies[1]    Imaging:  I have personally reviewed all pertinent available imaging.       ASSESSMENT / PLAN:     Ms Andrade is a 39 year old female with sickle cell disease who presents with nausea, vomiting, abdominal pain, and diarrhea and a sickle cell crisis.     Diarrhea resolved- unable to collect additional stool studies so far  Clinically improving, may be viral gastroenteritis not identified on GI stool PCR  C diff, GI stool PCR neg.   Anemia- no overt GI bleeding. Hx of sickle cell.     Recommendations:   Supportive care   IVF, analgesia, anti-emetics per primary team  Appreciate hematology care  Stool studies, as previously ordered if pt creates sample  Favor  OP EGD, colonoscopy after discussion in GI clinic; OP f/u in 3-4 weeks w/ Dr Edwige PHILIP will signoff, please page with questions     Gerard Guzman MD  [facility]               [1]   Allergies  Allergen Reactions    Piperacillin Sod-Tazobactam So ITCHING

## 2024-10-12 NOTE — PROGRESS NOTES
[facility]   part of [facility]     Hospitalist Progress Note     Oluwaseun Oluwadamilola Ogunyemi Patient Status:  Inpatient    8/3/1985 MRN [id number]   Location [facility] 4NW-A Attending Pete Murrieta, DO   Hosp Day # 5 PCP Clive St MD     Chief Complaint: Sickle cell crisis    Subjective:     Loose stools have significantly improved. Pain improving. Off PCA and trial PO pain meds    Objective:    Review of Systems:   A comprehensive review of systems was completed; pertinent positive and negatives stated in subjective.    Vital signs:  Temp:  [97.8 °F (36.6 °C)-98.2 °F (36.8 °C)] 98 °F (36.7 °C)  Pulse:  [55-84] 55  Resp:  [15-18] 15  BP: ()/(43-91) 131/91  SpO2:  [98 %-99 %] 99 %    Physical Exam:    General: No acute distress, awake and alert  Respiratory: No rhonchi, no wheezes  Cardiovascular: S1, S2. Regular rate and rhythm  Abdomen: Soft, non-tender, non-distended, positive bowel sounds  Neuro: No new focal deficits  Extremities: No edema    Diagnostic Data:    Labs:  Recent Labs   Lab 10/07/24  1514 10/08/24  0649 10/09/24  0636 10/11/24  0718   WBC 9.4 8.2 9.0 5.0   HGB 9.7* 10.2* 9.4* 7.8*   .1* 111.9* 112.4* 111.4*   .0 374.0 308.0 162.0     Recent Labs   Lab 10/08/24  0649 10/09/24  0636 10/11/24  0718   * 110* 99   BUN 6* 6* 6*   CREATSERUM 0.56 0.60 0.48*   CA 9.5 8.9 8.1*   ALB 4.1 4.1 3.5    139 142   K 3.4* 3.6  3.6 3.3*    108 110   CO2 24.0 26.0 25.0   ALKPHO 49 43 35*   AST 19 13 13   ALT 18 12 9*   BILT 0.7 0.7 0.4   TP 8.8* 8.1 6.7     Estimated Creatinine Clearance: 148.1 mL/min (A) (based on SCr of 0.48 mg/dL (L)).    No results for input(s): \"TROP\", \"TROPHS\", \"CK\" in the last 168 hours.    No results for input(s): \"PTP\", \"INR\" in the last 168 hours.     Microbiology  Hospital Encounter on 10/07/24   1. Blood Culture     Status: None (Preliminary result)    Collection Time: 10/07/24  8:05 PM    Specimen: Blood,peripheral    Result Value Ref Range    Blood Culture Result No Growth 4 Days N/A       Imaging: Reviewed in Epic.    Medications:    cholestyramine light  4 g Oral Daily with breakfast    pantoprazole  40 mg Oral QAM AC    metRONIDAZOLE  500 mg Oral Q8H PRAVIN    Glutamine (Sickle Cell)  15 g Oral BID    morphINE ER  60 mg Oral Q8H PRAVIN    enoxaparin  40 mg Subcutaneous Daily    aspirin  81 mg Oral Daily    folic acid  1 mg Oral Daily    hydroxyurea  2,000 mg Oral Daily    metoprolol succinate ER  25 mg Oral Daily    cefepime  1 g Intravenous Q8H       Assessment & Plan:      #Abdominal discomfort with nausea, vomiting and diarrhea  -CT a/p with diffuse fluid-filled colon suggestive of diarrheal state  as well as cholelithiasis with distended gallbladder  -abdominal ultrasound reviewed > gallstones, but no obstruction or cholecystitis   -HIDA 10/10 negative   -C diff and GI PCR negative  -PRN imodium, anti-emetics, bentyl  -empiric Cefepime and add Flagyl  -blood cultures NGTD  -IVF, PPI, pain control (dilaudid PCA and PTA PO morphine), anti-emetics  -added tincture of opium and cholestyramine with improvement  -GI following  -stable for discharge to home once pain controlled on PO regimen     #Cholelithiasis  - abdominal ultrasound reviewed  - HIDA negative   - general surgery following > supportive care     #Sickle cell pain crisis likely precipitated by above  -Monitor Hb, retic, LDH  -Hematology following  -Folic acid, ASA  -Hydroxyurea  -Pain control    #Suspected PNA, ruled out  -CT chest reviewed, no evidence of consolidation and patient denies cough or SOB    #Hypokalemia  -Replace    Lisha Mustafa DO      Supplementary Documentation:     Quality:  DVT Mechanical Prophylaxis:   SCDs,    DVT Pharmacologic Prophylaxis   Medication    enoxaparin (Lovenox) 40 MG/0.4ML SUBQ injection 40 mg         DVT Pharmacologic prophylaxis: Aspirin 81 mg  Code Status: Full  [place]: No urinary catheter in place  ABHAY:  10/12/2024TBD    Discharge is dependent on: pain control on PO meds  At this point Ms. Andrade is expected to be discharge to: Home    The 21st Century Cures Act makes medical notes like these available to patients in the interest of transparency. Please be advised this is a medical document. Medical documents are intended to carry relevant information, facts as evident, and the clinical opinion of the practitioner. The medical note is intended as peer to peer communication and may appear blunt or direct. It is written in medical language and may contain abbreviations or verbiage that are unfamiliar.

## 2024-10-12 NOTE — PLAN OF CARE
Pt A&Ox4, VSS, on room air. C/o severe pain managed with IVP dilaudid and heat packs. Nausea managed with IVP Zofran. All other medications given per MAR. Pt tolerating diet and medications without complication. Call light within reach, fall and safety precautions in place.     Problem: RESPIRATORY - ADULT  Goal: Achieves optimal ventilation and oxygenation  Description: INTERVENTIONS:  - Assess for changes in respiratory status  - Assess for changes in mentation and behavior  - Position to facilitate oxygenation and minimize respiratory effort  - Oxygen supplementation based on oxygen saturation or ABGs  - Provide Smoking Cessation handout, if applicable  - Encourage broncho-pulmonary hygiene including cough, deep breathe, Incentive Spirometry  - Assess the need for suctioning and perform as needed  - Assess and instruct to report SOB or any respiratory difficulty  - Respiratory Therapy support as indicated  - Manage/alleviate anxiety  - Monitor for signs/symptoms of CO2 retention  Outcome: Progressing     Problem: HEMATOLOGIC - ADULT  Goal: Maintains hematologic stability  Description: INTERVENTIONS  - Assess for signs and symptoms of bleeding or hemorrhage  - Monitor labs and vital signs for trends  - Administer supportive blood products/factors, fluids and medications as ordered and appropriate  - Administer supportive blood products/factors as ordered and appropriate  Outcome: Progressing

## 2024-10-13 LAB
ALBUMIN SERPL-MCNC: 3.9 G/DL (ref 3.2–4.8)
ALBUMIN/GLOB SERPL: 1 {RATIO} (ref 1–2)
ALP LIVER SERPL-CCNC: 38 U/L
ALT SERPL-CCNC: 22 U/L
ANION GAP SERPL CALC-SCNC: 5 MMOL/L (ref 0–18)
AST SERPL-CCNC: 31 U/L (ref ?–34)
BILIRUB SERPL-MCNC: 0.5 MG/DL (ref 0.3–1.2)
BUN BLD-MCNC: 6 MG/DL (ref 9–23)
CALCIUM BLD-MCNC: 8.7 MG/DL (ref 8.7–10.4)
CHLORIDE SERPL-SCNC: 108 MMOL/L (ref 98–112)
CO2 SERPL-SCNC: 24 MMOL/L (ref 21–32)
CREAT BLD-MCNC: 0.52 MG/DL
CRYPTOSP AG STL QL IA: NEGATIVE
EGFRCR SERPLBLD CKD-EPI 2021: 120 ML/MIN/1.73M2 (ref 60–?)
G LAMBLIA AG STL QL IA: NEGATIVE
GLOBULIN PLAS-MCNC: 3.8 G/DL (ref 2–3.5)
GLUCOSE BLD-MCNC: 91 MG/DL (ref 70–99)
OSMOLALITY SERPL CALC.SUM OF ELEC: 281 MOSM/KG (ref 275–295)
POTASSIUM SERPL-SCNC: 3.6 MMOL/L (ref 3.5–5.1)
PROT SERPL-MCNC: 7.7 G/DL (ref 5.7–8.2)
SODIUM SERPL-SCNC: 137 MMOL/L (ref 136–145)

## 2024-10-13 PROCEDURE — 99233 SBSQ HOSP IP/OBS HIGH 50: CPT | Performed by: HOSPITALIST

## 2024-10-13 PROCEDURE — 99233 SBSQ HOSP IP/OBS HIGH 50: CPT | Performed by: SPECIALIST

## 2024-10-13 NOTE — PROGRESS NOTES
Hematology/Oncology Progress Note    Patient Name: Oluwaseun Oluwadamilola Ogunyemi  Medical Record Number: [id number]    YOB: 1985     Subjective Patient reports that she again had loose stools yesterday. She did use oral OxyIR but need quite a bit of IV dilaudid to manage pain. No shortness of breath or chest pain.     Current Inpatient Medications:  Inpatient Meds:   cholestyramine light  4 g Oral Daily with breakfast    pantoprazole  40 mg Oral QAM AC    metRONIDAZOLE  500 mg Oral Q8H PRAVIN    Glutamine (Sickle Cell)  15 g Oral BID    morphINE ER  60 mg Oral Q8H PRAVIN    enoxaparin  40 mg Subcutaneous Daily    aspirin  81 mg Oral Daily    folic acid  1 mg Oral Daily    hydroxyurea  2,000 mg Oral Daily    metoprolol succinate ER  25 mg Oral Daily    cefepime  1 g Intravenous Q8H      sodium chloride 100 mL/hr at 10/13/24 0200     PRN Meds:    HYDROmorphone **OR** HYDROmorphone    naloxone    oxyCODONE    opium    loperamide    dicyclomine    acetaminophen    melatonin    polyethylene glycol (PEG 3350)    sennosides    bisacodyl    fleet enema    ondansetron    prochlorperazine    benzonatate    guaiFENesin    glycerin-hypromellose-    sodium chloride    influenza virus vaccine PF    diphenhydrAMINE    Allergies:   Allergies   Allergen Reactions    Piperacillin Sod-Tazobactam So ITCHING     Vital Signs:  Height: --  Weight: --  BSA (Calculated - sq m): --  Pulse: 58 (10/13 0400)  BP: 128/92 (10/13 0400)  Temp: 97.9 °F (36.6 °C) (10/13 0400)  Do Not Use - Resp Rate: --  SpO2: 99 % (10/13 0400)    Physical Examination:  General: NAD.  CV: Regular rate and rhythm.  Respiratory: Normal effort.   GI/Abd: Not distended.  Neurological: Grossly intact   Psych: Mood and affect appropriate.     Laboratory:  Recent Results (from the past 24 hours)   Potassium    Collection Time: 10/12/24  8:23 AM   Result Value Ref Range    Potassium 3.6 3.5 - 5.1 mmol/L     Impression & Plan:   *Diarrhea  -Persists.  -GI has  ordered stool workup..    *Hgb SS/Sickle cell disease with acute pain crisis  -Continue home MS Contin 60 mg q8hrs.   -Oxy IR prn with IV dilaudid PRN if needed.   -Continue hydroxyurea to 2000 mg daily.  Hold endari and crizanlizumab while hospitalized.   -continue Folvite 1 mg daily.    -supp O2  -IVF     Electronically signed by:    Reece Marvin M.D.

## 2024-10-13 NOTE — PLAN OF CARE
Pt AOX4, VSS, RA, PO pain medications. Stool collected, waiting for results. Plan is to discharge to home once pain is controlled on oral meds. Fall precautions in place. Call light within reach.

## 2024-10-13 NOTE — PROGRESS NOTES
[facility]   part of [facility]     Hospitalist Progress Note     Oluwaseun Oluwadamilola Ogunyemi Patient Status:  Inpatient    8/3/1985 MRN [id number]   Location [facility] 4NW-A Attending Pete Murrieta, DO   Hosp Day # 6 PCP Clive St MD     Chief Complaint: Sickle cell crisis    Subjective:     Feeling better this morning, pain improved, but still with persistent loose stools     Objective:    Review of Systems:   A comprehensive review of systems was completed; pertinent positive and negatives stated in subjective.    Vital signs:  Temp:  [97.8 °F (36.6 °C)-98.1 °F (36.7 °C)] 98.1 °F (36.7 °C)  Pulse:  [57-74] 57  Resp:  [16-18] 18  BP: ()/(57-92) 124/73  SpO2:  [97 %-100 %] 97 %    Physical Exam:    General: No acute distress, awake and alert  Respiratory: No rhonchi, no wheezes  Cardiovascular: S1, S2. Regular rate and rhythm  Abdomen: Soft, non-tender, non-distended, positive bowel sounds  Neuro: No new focal deficits  Extremities: No edema    Diagnostic Data:    Labs:  Recent Labs   Lab 10/07/24  1514 10/08/24  0649 10/09/24  0636 10/11/24  0718   WBC 9.4 8.2 9.0 5.0   HGB 9.7* 10.2* 9.4* 7.8*   .1* 111.9* 112.4* 111.4*   .0 374.0 308.0 162.0     Recent Labs   Lab 10/08/24  0649 10/09/24  0636 10/11/24  0718 10/12/24  0823   * 110* 99  --    BUN 6* 6* 6*  --    CREATSERUM 0.56 0.60 0.48*  --    CA 9.5 8.9 8.1*  --    ALB 4.1 4.1 3.5  --     139 142  --    K 3.4* 3.6  3.6 3.3* 3.6    108 110  --    CO2 24.0 26.0 25.0  --    ALKPHO 49 43 35*  --    AST 19 13 13  --    ALT 18 12 9*  --    BILT 0.7 0.7 0.4  --    TP 8.8* 8.1 6.7  --      Estimated Creatinine Clearance: 148.1 mL/min (A) (based on SCr of 0.48 mg/dL (L)).    No results for input(s): \"TROP\", \"TROPHS\", \"CK\" in the last 168 hours.    No results for input(s): \"PTP\", \"INR\" in the last 168 hours.     Microbiology  Hospital Encounter on 10/07/24   1. Blood Culture     Status: None     Collection Time: 10/07/24  8:05 PM    Specimen: Blood,peripheral   Result Value Ref Range    Blood Culture Result No Growth 5 Days N/A       Imaging: Reviewed in Epic.    Medications:    cholestyramine light  4 g Oral Daily with breakfast    pantoprazole  40 mg Oral QAM AC    metRONIDAZOLE  500 mg Oral Q8H PRAVIN    Glutamine (Sickle Cell)  15 g Oral BID    morphINE ER  60 mg Oral Q8H PRAVIN    enoxaparin  40 mg Subcutaneous Daily    aspirin  81 mg Oral Daily    folic acid  1 mg Oral Daily    hydroxyurea  2,000 mg Oral Daily    metoprolol succinate ER  25 mg Oral Daily    cefepime  1 g Intravenous Q8H       Assessment & Plan:      #Abdominal discomfort resolved  nausea, vomiting better  diarrhea  -CT a/p with diffuse fluid-filled colon suggestive of diarrheal state  as well as cholelithiasis with distended gallbladder  -abdominal ultrasound reviewed > gallstones, but no obstruction or cholecystitis   -HIDA 10/10 negative   -C diff and GI PCR negative  -PRN imodium, anti-emetics, bentyl  -empiric Cefepime and add Flagyl  -blood cultures NGTD  -IVF, PPI, pain control (dilaudid PCA and PTA PO morphine), anti-emetics  -add tincture of opium and cholestyramine   -GI consulted     #Cholelithiasis  - abdominal ultrasound reviewed  - HIDA negative   - general surgery following > supportive care     #Sickle cell pain crisis likely precipitated by above  -Monitor Hb, retic, LDH  -Hematology following  -Folic acid, ASA  -Hydroxyurea  -Pain control    #Suspected PNA, ruled out  -CT chest reviewed, no evidence of consolidation and patient denies cough or SOB    #Hypokalemia  -Replace          Supplementary Documentation:     Quality:  DVT Mechanical Prophylaxis:   SCDs,    DVT Pharmacologic Prophylaxis   Medication    enoxaparin (Lovenox) 40 MG/0.4ML SUBQ injection 40 mg         DVT Pharmacologic prophylaxis: Aspirin 81 mg  Code Status: Full  [place]: No urinary catheter in place  ABHAY: 10/12/2024TBD    Discharge is dependent on:  Clinical state, consultant recs  At this point Ms. Andrade is expected to be discharge to: Home    The 21st Century Cures Act makes medical notes like these available to patients in the interest of transparency. Please be advised this is a medical document. Medical documents are intended to carry relevant information, facts as evident, and the clinical opinion of the practitioner. The medical note is intended as peer to peer communication and may appear blunt or direct. It is written in medical language and may contain abbreviations or verbiage that are unfamiliar.

## 2024-10-13 NOTE — PLAN OF CARE
Pt  A&Ox4, VSS, on room air. C/o severe pain managed with IVP dilaudid and heat packs. Nausea managed with IVP Zofran. All other medications given per MAR. Additional episodes of loose stools overnight; hospitalist informed. Pt updated on and in agreement with POC. Call light within reach, fall and safety precautions in place.    Problem: RESPIRATORY - ADULT  Goal: Achieves optimal ventilation and oxygenation  Description: INTERVENTIONS:  - Assess for changes in respiratory status  - Assess for changes in mentation and behavior  - Position to facilitate oxygenation and minimize respiratory effort  - Oxygen supplementation based on oxygen saturation or ABGs  - Provide Smoking Cessation handout, if applicable  - Encourage broncho-pulmonary hygiene including cough, deep breathe, Incentive Spirometry  - Assess the need for suctioning and perform as needed  - Assess and instruct to report SOB or any respiratory difficulty  - Respiratory Therapy support as indicated  - Manage/alleviate anxiety  - Monitor for signs/symptoms of CO2 retention  10/13/2024 1555 by Ramonita Bowie RN  Outcome: Progressing  10/13/2024 1555 by Ramonita Bowie RN  Outcome: Progressing     Problem: HEMATOLOGIC - ADULT  Goal: Maintains hematologic stability  Description: INTERVENTIONS  - Assess for signs and symptoms of bleeding or hemorrhage  - Monitor labs and vital signs for trends  - Administer supportive blood products/factors, fluids and medications as ordered and appropriate  - Administer supportive blood products/factors as ordered and appropriate  10/13/2024 1555 by Ramonita Bowie, RN  Outcome: Progressing  10/13/2024 1555 by Ramonita Bowie, RN  Outcome: Progressing     Problem: PAIN - ADULT  Goal: Verbalizes/displays adequate comfort level or patient's stated pain goal  Description: INTERVENTIONS:  - Encourage pt to monitor pain and request assistance  - Assess pain using appropriate pain scale  - Administer  analgesics based on type and severity of pain and evaluate response  - Implement non-pharmacological measures as appropriate and evaluate response  - Consider cultural and social influences on pain and pain management  - Manage/alleviate anxiety  - Utilize distraction and/or relaxation techniques  - Monitor for opioid side effects  - Notify MD/LIP if interventions unsuccessful or patient reports new pain  - Anticipate increased pain with activity and pre-medicate as appropriate  Outcome: Not Progressing

## 2024-10-14 VITALS
HEART RATE: 67 BPM | TEMPERATURE: 98 F | WEIGHT: 160 LBS | SYSTOLIC BLOOD PRESSURE: 107 MMHG | RESPIRATION RATE: 16 BRPM | OXYGEN SATURATION: 100 % | DIASTOLIC BLOOD PRESSURE: 77 MMHG | BODY MASS INDEX: 26 KG/M2

## 2024-10-14 LAB
ANION GAP SERPL CALC-SCNC: 7 MMOL/L (ref 0–18)
BASOPHILS # BLD AUTO: 0.02 X10(3) UL (ref 0–0.2)
BASOPHILS NFR BLD AUTO: 0.5 %
BUN BLD-MCNC: 6 MG/DL (ref 9–23)
CALCIUM BLD-MCNC: 8.8 MG/DL (ref 8.7–10.4)
CHLORIDE SERPL-SCNC: 108 MMOL/L (ref 98–112)
CO2 SERPL-SCNC: 25 MMOL/L (ref 21–32)
CREAT BLD-MCNC: 0.51 MG/DL
EGFRCR SERPLBLD CKD-EPI 2021: 121 ML/MIN/1.73M2 (ref 60–?)
EOSINOPHIL # BLD AUTO: 0.08 X10(3) UL (ref 0–0.7)
EOSINOPHIL NFR BLD AUTO: 2 %
ERYTHROCYTE [DISTWIDTH] IN BLOOD BY AUTOMATED COUNT: 13.7 %
GLUCOSE BLD-MCNC: 106 MG/DL (ref 70–99)
HCT VFR BLD AUTO: 22.5 %
HGB BLD-MCNC: 8.2 G/DL
IMM GRANULOCYTES # BLD AUTO: 0.04 X10(3) UL (ref 0–1)
IMM GRANULOCYTES NFR BLD: 1 %
LYMPHOCYTES # BLD AUTO: 1.81 X10(3) UL (ref 1–4)
LYMPHOCYTES NFR BLD AUTO: 45.7 %
MCH RBC QN AUTO: 40.6 PG (ref 26–34)
MCHC RBC AUTO-ENTMCNC: 36.4 G/DL (ref 31–37)
MCV RBC AUTO: 111.4 FL
MONOCYTES # BLD AUTO: 0.51 X10(3) UL (ref 0.1–1)
MONOCYTES NFR BLD AUTO: 12.9 %
NEUTROPHILS # BLD AUTO: 1.5 X10 (3) UL (ref 1.5–7.7)
NEUTROPHILS # BLD AUTO: 1.5 X10(3) UL (ref 1.5–7.7)
NEUTROPHILS NFR BLD AUTO: 37.9 %
OSMOLALITY SERPL CALC.SUM OF ELEC: 288 MOSM/KG (ref 275–295)
PLATELET # BLD AUTO: 200 10(3)UL (ref 150–450)
POTASSIUM SERPL-SCNC: 3.5 MMOL/L (ref 3.5–5.1)
RBC # BLD AUTO: 2.02 X10(6)UL
SODIUM SERPL-SCNC: 140 MMOL/L (ref 136–145)
TTG IGA SER-ACNC: 0.7 U/ML (ref ?–7)
WBC # BLD AUTO: 4 X10(3) UL (ref 4–11)

## 2024-10-14 PROCEDURE — 99239 HOSP IP/OBS DSCHRG MGMT >30: CPT | Performed by: INTERNAL MEDICINE

## 2024-10-14 PROCEDURE — 99232 SBSQ HOSP IP/OBS MODERATE 35: CPT | Performed by: INTERNAL MEDICINE

## 2024-10-14 NOTE — PLAN OF CARE
Problem: RESPIRATORY - ADULT  Goal: Achieves optimal ventilation and oxygenation  Description: INTERVENTIONS:  - Assess for changes in respiratory status  - Assess for changes in mentation and behavior  - Position to facilitate oxygenation and minimize respiratory effort  - Oxygen supplementation based on oxygen saturation or ABGs  - Provide Smoking Cessation handout, if applicable  - Encourage broncho-pulmonary hygiene including cough, deep breathe, Incentive Spirometry  - Assess the need for suctioning and perform as needed  - Assess and instruct to report SOB or any respiratory difficulty  - Respiratory Therapy support as indicated  - Manage/alleviate anxiety  - Monitor for signs/symptoms of CO2 retention  Outcome: Progressing   Received patient aox4, in bed in locked lowest position, on room air, no distress noted. Ivf, oral  and iv abt. Maintained. No diarrhea this shift. Fall precautions maintained. Hematology and GI on consult.  Pain managed with iv dilaudid and oxy.

## 2024-10-14 NOTE — PROGRESS NOTES
Hematology/Oncology Progress Note    Patient Name: Oluwaseun Oluwadamilola Ogunyemi  Medical Record Number: [id number]    YOB: 1985     Reason for Consultation:  Oluwaseun Oluwadamilola Ogunyemi was seen today for the diagnosis of sickle cell disease    Interval events: Doing better this morning.  Diarrhea seems to have stopped.  Pain is better controlled.      Current Inpatient Medications:  Inpatient Meds:   cholestyramine light  4 g Oral Daily with breakfast    pantoprazole  40 mg Oral QAM AC    metRONIDAZOLE  500 mg Oral Q8H PRAVIN    Glutamine (Sickle Cell)  15 g Oral BID    morphINE ER  60 mg Oral Q8H PRAVIN    enoxaparin  40 mg Subcutaneous Daily    aspirin  81 mg Oral Daily    folic acid  1 mg Oral Daily    hydroxyurea  2,000 mg Oral Daily    metoprolol succinate ER  25 mg Oral Daily    cefepime  1 g Intravenous Q8H      sodium chloride 100 mL/hr at 10/14/24 0854     PRN Meds:    HYDROmorphone **OR** HYDROmorphone    naloxone    oxyCODONE    opium    loperamide    dicyclomine    acetaminophen    melatonin    polyethylene glycol (PEG 3350)    sennosides    bisacodyl    fleet enema    ondansetron    prochlorperazine    benzonatate    guaiFENesin    glycerin-hypromellose-    sodium chloride    influenza virus vaccine PF    diphenhydrAMINE    Allergies:   Allergies   Allergen Reactions    Piperacillin Sod-Tazobactam So ITCHING     Vital Signs:  Height: --  Weight: --  BSA (Calculated - sq m): --  Pulse: 60 (10/14 0735)  BP: 129/85 (10/14 0735)  Temp: 97.7 °F (36.5 °C) (10/14 0735)  Do Not Use - Resp Rate: --  SpO2: 100 % (10/14 0735)    Wt Readings from Last 6 Encounters:   10/07/24 72.6 kg (160 lb)   09/19/24 72.6 kg (160 lb)   08/22/24 72.1 kg (159 lb)   07/25/24 70.4 kg (155 lb 3.2 oz)   07/01/24 71.5 kg (157 lb 9.6 oz)   06/28/24 69.8 kg (153 lb 12.8 oz)     Physical Examination:  General: Patient is alert and oriented  CV: Regular rate and rhythm, no murmurs, no LE edema  Respiratory: Lungs  clear to auscultation bilaterally  GI/Abd: Soft, non-tender   Neurological: Grossly intact   Lymphatics: No palpable lymphadenopathy  Skin: no rashes or petechiae    Laboratory:  Recent Labs   Lab 10/09/24  0636 10/11/24  0718 10/14/24  0655   WBC 9.0 5.0 4.0   HGB 9.4* 7.8* 8.2*   HCT 25.4* 21.5* 22.5*   .0 162.0 200.0   .4* 111.4* 111.4*   RDW 14.1 13.8 13.7   NEPRELIM 6.59 3.05 1.50     Recent Labs   Lab 10/09/24  0636 10/11/24  0718 10/12/24  0823 10/13/24  1136 10/14/24  0655    142  --  137 140   K 3.6  3.6 3.3* 3.6 3.6 3.5    110  --  108 108   CO2 26.0 25.0  --  24.0 25.0   BUN 6* 6*  --  6* 6*   CREATSERUM 0.60 0.48*  --  0.52* 0.51*   * 99  --  91 106*   CA 8.9 8.1*  --  8.7 8.8   TP 8.1 6.7  --  7.7  --    ALB 4.1 3.5  --  3.9  --    ALKPHO 43 35*  --  38  --    AST 13 13  --  31  --    ALT 12 9*  --  22  --    BILT 0.7 0.4  --  0.5  --      No results for input(s): \"PT\", \"INR\", \"PTT\", \"FIB\" in the last 168 hours.    Imaging:    HIDA 10/10/24: CONCLUSION:    1. No evidence for acute cholecystitis.     US abd 10/9/24: CONCLUSION:    Stat ultrasound examination of the gallbladder shows multiple mobile echogenic foci consistent with gallstones, largest measuring 7 x 5 x 10 mm.  Gallbladder wall upper limits of normal thickness 3 mm.  Common bile duct upper limits of   normal 4 mm.  No gallbladder dilation, surrounding fluid or Bhatti sign reported.  Correlate for any potential signs or symptoms of developing cholecystitis.  No sign of ascites     CT c/a/p 10/8/24: CONCLUSION:     1. Cholelithiasis and a mildly distended gallbladder.  If there is a clinical concern for acute cholecystitis, consider abdominal ultrasound for further assessment.    2. Diffuse fluid-filled colon suggestive of diarrheal state.  A superimposed mild colitis cannot be excluded.     3. Osseous changes from sickle cell disease noted.  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  Additional areas  of possible osteonecrosis would include the bilateral femoral heads and right acetabulum.     Impression & Plan:     *abd pain, n/v, diarrhea  -Suspect gastroenteritis.  No acute cholecystitis based on HIDA scan.  Abdominal pain improved but diarrhea lingered.  At this point the diarrhea also seems to have improved.    -GI stool PCR panel, c diff, Giardia/Cryptosporidium negative.  Fecal calprotectin and pancreatic elastase pending  -Continue imodium or tincture of opium prn  -continue IVF    *Hgb SS/Sickle cell disease with acute pain crisis  -Likely precipitated by acute GI illness with dehydration  -Pain is better controlled.  Is now back on her home oral oxycodone prn.  Taking less breakthrough IV Dilaudid.  Continue home MS Contin 60 mg q8hrs.   -Continue hydroxyurea to 2000 mg daily.  Hold endari and crizanlizumab while hospitalized.   -continue Folvite 1 mg daily.    -supp O2  -IVF     Okay to discharge home this afternoon if her diarrhea and pain remained controlled.    Esau Mccormick MD  Hematology/Medical Oncology  [facility]

## 2024-10-14 NOTE — DISCHARGE SUMMARY
[place] HOSPITALIST  DISCHARGE SUMMARY     Oluwaseun Oluwadamilola Ogunyemi Patient Status:  Inpatient    8/3/1985 MRN [id number]   Location [facility] 4NW-A Attending Lisha Mustafa, DO   Hosp Day # 7 PCP Cl[address] MD     Date of Admission: 10/7/2024  Date of Discharge:   10/14/24  Discharge Disposition: Home or Self Care    Discharge Diagnosis:  #Abdominal discomfort with nausea, vomiting and diarrhea  #Suspected gastroenteritis   #Cholelithiasis  #Sickle cell pain crisis likely precipitated by above  #Hypokalemia    History of Present Illness: (per Dr. Agosto), Oluwaseun Oluwadamilola Ogunyemi is a 39 year old female with  h/o sickle cell , h/o acute chest. Pt is presenting with intractable N, vo, diarrhea- multiple loose non bloody stools. She has abdominal cramps as well. She notes she feels unwell and today her sickle cell pain crisis begin to flare up too. No recent travel, no sick contacts. No recent abx     Brief Synopsis: admitted with n/v/d d/t suspected gastroenteritis and sickle cell pain crisis. GI PCR and Cdiff testing were negative for infection. She was treated supportively with IVF, pain control, anti-emetics, empiric abx, and anti-diarrheals. General surgery was consulted as imaging showed cholelithiasis. She was recommended non-surgical, supportive care. GI consulted for persistent diarrhea and further stool studies were sent. Her clinical condition improved, her IV pain medications were transitioned to PO and she was discharged to home with recommendation to f/u closely with PCP and hematology after discharge.     Lace+ Score: 46  59-90 High Risk  29-58 Medium Risk  0-28   Low Risk       TCM Follow-Up Recommendation:  LACE 29-58: Moderate Risk of readmission after discharge from the hospital.      Procedures during hospitalization:   none    Incidental or significant findings and recommendations (brief descriptions):  As above    Lab/Test results pending at Discharge:    none    Consultants:  Hematology  General surgery  GI    Discharge Medication List:     Discharge Medications        START taking these medications        Instructions Prescription details   dicyclomine 10 MG Caps  Commonly known as: Bentyl      Take 1 capsule (10 mg total) by mouth 3 (three) times daily as needed (Abdominal cramping).   Quantity: 30 capsule  Refills: 0     loperamide 2 MG Caps  Commonly known as: Imodium      Take 1 capsule (2 mg total) by mouth every 3 (three) hours as needed for Diarrhea.   Quantity: 30 capsule  Refills: 0     opium 10 MG/ML (1%) Tinc      Take 0.6 mL (6 mg total) by mouth 3 (three) times daily as needed.   Quantity: 1 each  Refills: 0            CONTINUE taking these medications        Instructions Prescription details   Aspirin Low Dose 81 MG Tbec  Generic drug: aspirin      TAKE 1 TABLET BY MOUTH EVERY DAY   Quantity: 90 tablet  Refills: 3     diphenhydrAMINE 25 MG Caps  Commonly known as: Benadryl      Take 1 capsule (25 mg total) by mouth every 6 (six) hours as needed for Itching.   Refills: 0     Endari 5 g Pack  Generic drug: Glutamine (Sickle Cell)      Take 15 g by mouth in the morning and 15 g before bedtime.   Quantity: 180 each  Refills: 11     folic acid 1 MG Tabs  Commonly known as: Folvite      Take 1 tablet (1 mg total) by mouth daily.   Quantity: 90 tablet  Refills: 3     hydroxyurea 500 MG Caps  Commonly known as: Hydrea      Take 4 capsules (2,000 mg total) by mouth daily.   Quantity: 360 capsule  Refills: 3     metoprolol succinate ER 25 MG Tb24  Commonly known as: Toprol XL      Take 1 tablet (25 mg total) by mouth daily.   Refills: 0     morphINE ER 60 MG Tbcr  Commonly known as: MS Contin      Take 1 tablet (60 mg total) by mouth every 8 (eight) hours.   Quantity: 90 tablet  Refills: 0     Naloxone HCl 4 MG/0.1ML Liqd      4 mg by Nasal route as needed. If patient remains unresponsive, repeat dose in other nostril 2-5 minutes after first dose.   Quantity:  1 kit  Refills: 0     ondansetron 8 MG tablet  Commonly known as: Zofran      Take 1 tablet (8 mg total) by mouth every 8 (eight) hours as needed for Nausea.   Quantity: 9 tablet  Refills: 13     OxyCODONE HCl IR 30 MG Tabs  Commonly known as: ROXICODONE      Take 1 tablet (30 mg total) by mouth every 3 (three) hours as needed for Pain.   Quantity: 180 tablet  Refills: 0     VITAMIN B-12 OR      Take 200 mcg by mouth daily.   Refills: 0               Where to Get Your Medications        These medications were sent to [facility]/pharmacy #[id number] - [place], [place] - [address] [phone], [phone]  [address] [facility] [address]      Phone: [phone]   dicyclomine 10 MG Caps  loperamide 2 MG Caps  opium 10 MG/ML (1%) Tinc         [place]     Follow-up appointment:   Mahin Denis DO  [address] [address]  [phone]    Schedule an appointment as soon as possible for a visit  Discuss elective laparoscopic cholecystectomy    Esau Mccormick MD  [address] [place] [address]  [phone]    Follow up on 10/17/2024  10am appointment as follow up for sickle cell.    Appointments for Next 30 Days 10/14/2024 - 11/13/2024        Date Arrival Time Visit Type Length Department Provider     10/17/2024 10:00 AM  ON TREATMENT VISIT FOLLOW-UP [2641] 15 min [facility] [place] Esau Mccormick MD    Patient Instructions:         Location Instructions:     **IF YOU NEED LABWORK OR AN INFUSION ALONG WITH YOUR APPOINTMENT, YOU MUST CALL TO SCHEDULE.**  Your appointment is on the [facility] campus in the Cancer [place]. The address is [address] [place] [address]. Please register at the [facility]  on the second floor.  Masks are optional for all patients and visitors, unless otherwise indicated.               10/17/2024 10:30 AM  INFUSION [1980] 60 min [facility] [place] RN4    Patient  Instructions:         Location Instructions:     Your appointment is on the [facility] campus in the Cancer Center. The address is [address] [place] [address]. Please register at the Cancer Center  on the second floor.  Masks are optional for all patients and visitors, unless otherwise indicated. No care partners/visitors under 18 years of age are allowed in the infusion room.               2024  3:15 PM  EXAM - NEW PATIENT [2845] 15 min [facility] Mahin Denis DO    Patient Instructions:         Location Instructions:     Masks are optional for all patients and visitors, unless otherwise indicated.                      Vital signs:  Temp:  [97.5 °F (36.4 °C)-98.6 °F (37 °C)] 97.5 °F (36.4 °C)  Pulse:  [58-67] 67  Resp:  [14-16] 16  BP: (100-129)/(59-85) 107/77  SpO2:  [98 %-100 %] 100 %    Physical Exam:    General: No acute distress   Lungs: clear to auscultation  Cardiovascular: S1, S2  Abdomen: Soft    -----------------------------------------------------------------------------------------------  PATIENT DISCHARGE INSTRUCTIONS: See electronic chart    Lisha Mustafa DO    Total time spent on discharge plannin minutes     The  [employer] Cures Act makes medical notes like these available to patients in the interest of transparency. Please be advised this is a medical document. Medical documents are intended to carry relevant information, facts as evident, and the clinical opinion of the practitioner. The medical note is intended as peer to peer communication and may appear blunt or direct. It is written in medical language and may contain abbreviations or verbiage that are unfamiliar.

## 2024-10-15 ENCOUNTER — PATIENT OUTREACH (OUTPATIENT)
Dept: CASE MANAGEMENT | Age: 39
End: 2024-10-15

## 2024-10-15 DIAGNOSIS — D57.00 SICKLE CELL CRISIS (HCC): Primary | ICD-10-CM

## 2024-10-15 DIAGNOSIS — R10.9 STOMACH ACHE: Primary | ICD-10-CM

## 2024-10-15 DIAGNOSIS — Z02.9 ENCOUNTERS FOR ADMINISTRATIVE PURPOSES: ICD-10-CM

## 2024-10-15 NOTE — PAYOR COMM NOTE
--------------  CONTINUED STAY REVIEW    Payor: JACINTO LOWERY  Subscriber #:  [id number]  Authorization Number: [id number]    Admit date: 10/7/24  Admit time:  6:24 PM    REVIEW DOCUMENTATION:    10/12 IM    Chief Complaint: Sickle cell crisis        Subjective:  Loose stools have significantly improved. Pain improving. Off PCA and trial PO pain meds        Objective:  Review of Systems:   A comprehensive review of systems was completed; pertinent positive and negatives stated in subjective.     Vital signs:  Temp:  [97.8 °F (36.6 °C)-98.2 °F (36.8 °C)] 98 °F (36.7 °C)  Pulse:  [55-84] 55  Resp:  [15-18] 15  BP: ()/(43-91) 131/91  SpO2:  [98 %-99 %] 99 %     Physical Exam:    General: No acute distress, awake and alert  Respiratory: No rhonchi, no wheezes  Cardiovascular: S1, S2. Regular rate and rhythm  Abdomen: Soft, non-tender, non-distended, positive bowel sounds  Neuro: No new focal deficits  Extremities: No edema     Diagnostic Data:    Labs:         Recent Labs   Lab 10/07/24  1514 10/08/24  0649 10/09/24  0636 10/11/24  0718   WBC 9.4 8.2 9.0 5.0   HGB 9.7* 10.2* 9.4* 7.8*   .1* 111.9* 112.4* 111.4*   .0 374.0 308.0 162.0            Recent Labs   Lab 10/08/24  0649 10/09/24  0636 10/11/24  0718   * 110* 99   BUN 6* 6* 6*   CREATSERUM 0.56 0.60 0.48*   CA 9.5 8.9 8.1*   ALB 4.1 4.1 3.5    139 142   K 3.4* 3.6  3.6 3.3*    108 110   CO2 24.0 26.0 25.0   ALKPHO 49 43 35*   AST 19 13 13   ALT 18 12 9*   BILT 0.7 0.7 0.4   TP 8.8* 8.1 6.7      Estimated Creatinine Clearance: 148.1 mL/min (A) (based on SCr of 0.48 mg/dL (L)).     No results for input(s): \"TROP\", \"TROPHS\", \"CK\" in the last 168 hours.     No results for input(s): \"PTP\", \"INR\" in the last 168 hours.      Microbiology        Hospital Encounter on 10/07/24   1. Blood Culture     Status: None (Preliminary result)     Collection Time: 10/07/24  8:05 PM     Specimen: Blood,peripheral   Result Value Ref Range     Blood  Culture Result No Growth 4 Days N/A         Imaging: Reviewed in Epic.     Medications:   Scheduled Medications    cholestyramine light  4 g Oral Daily with breakfast    pantoprazole  40 mg Oral QAM AC    metRONIDAZOLE  500 mg Oral Q8H PRAVIN    Glutamine (Sickle Cell)  15 g Oral BID    morphINE ER  60 mg Oral Q8H PRAVIN    enoxaparin  40 mg Subcutaneous Daily    aspirin  81 mg Oral Daily    folic acid  1 mg Oral Daily    hydroxyurea  2,000 mg Oral Daily    metoprolol succinate ER  25 mg Oral Daily    cefepime  1 g Intravenous Q8H               Assessment & Plan:  #Abdominal discomfort with nausea, vomiting and diarrhea  -CT a/p with diffuse fluid-filled colon suggestive of diarrheal state  as well as cholelithiasis with distended gallbladder  -abdominal ultrasound reviewed > gallstones, but no obstruction or cholecystitis   -HIDA 10/10 negative   -C diff and GI PCR negative  -PRN imodium, anti-emetics, bentyl  -empiric Cefepime and add Flagyl  -blood cultures NGTD  -IVF, PPI, pain control (dilaudid PCA and PTA PO morphine), anti-emetics  -added tincture of opium and cholestyramine with improvement  -GI following  -stable for discharge to home once pain controlled on PO regimen      #Cholelithiasis  - abdominal ultrasound reviewed  - HIDA negative   - general surgery following > supportive care      #Sickle cell pain crisis likely precipitated by above  -Monitor Hb, retic, LDH  -Hematology following  -Folic acid, ASA  -Hydroxyurea  -Pain control     #Suspected PNA, ruled out  -CT chest reviewed, no evidence of consolidation and patient denies cough or SOB     #Hypokalemia  -Replace     Lisha Mustafa, DO        MEDICATIONS ADMINISTERED IN LAST 1 DAY:  Administration History        ceFEPIme (Maxipime) 1 g in sodium chloride 0.9% 100 mL IVPB-MBP  Dose: 1 g  Freq: Every 8 hours Route: IV  Last Dose: 1 g (10/14/24 1203)  Start: 10/07/24 2000 End: 10/14/24 1210   Order specific questions:         8278 KB-New Bag      9750  KB-New Bag     1256 AD-New Bag     2139 KB-New Bag      0532 KB-New Bag     1156 JT-New Bag     2020 TA-New Bag      0420 TA-New Bag     1229 JT-New Bag     2118 AM-New Bag      0406 AM-New Bag     1147 JT-New Bag     2133 KASIA-New Bag      0455 KASIA-New Bag     1233 JS-New Bag     2012 KASIA-New Bag      0421 KASIA-New Bag     1129 JS-New Bag     2102 MM-New Bag      0328 MM-New Bag     1203 IA-New Bag     1210-D/                        morphINE ER (MS Contin) tab 60 mg  Dose: 60 mg  Freq: Every 8 hours scheduled Route: OR  Start: 10/08/24 1630 End: 10/14/24 1941   Admin Instructions:   Do not crush       (1630 AD)-Not Given     2139 KB-Given      0532 KB-Given     1557 JT-Given     2152 TA-Given      0556 TA-Given     1416 JT-Given     2118 AM-Given      0603 AM-Given     1426 JT-Given     2120 KASIA-Given      0453 KASIA-Given     1233 JS-Given     2012 KASIA-Given      0604 KASIA-Given     1246 JS-Given     2102 MM-Given      0619 MM-Given     1408 IA-Given     1941-D/C'd      ondansetron (Zofran) 4 MG/2ML injection 4 mg  Dose: 4 mg  Freq: Once Route: IV  Start: 10/07/24 1522 End: 10/07/24 1531      1531 AH-Given                   sodium chloride 0.9% infusion  Rate: 100 mL/hr  Freq: Continuous Route: IV  Start: 10/07/24 1845 End: 10/14/24 1941        HYDROmorphone (Dilaudid) 1 MG/ML injection 1 mg  Dose: 1 mg  Freq: Every 2 hour PRN Route: IV  PRN Reason: moderate pain  Start: 10/12/24 0659 End: 10/14/24 1941   Admin Instructions:   Use PRN reason as a guide and follow range order policy. If oral pain meds are ordered and patient can tolerate oral intake, start with PRN oral pain medications first.                                    2129 KASIA-Given     (2213 KASIA)-Not Given [C]                2103 MM-Given      0125 MM-Given          1941-D/C'd      Or   HYDROmorphone (Dilaudid) 1 MG/ML injection 2 mg  Dose: 2 mg  Freq: Every 2 hour PRN Route: IV  PRN Reason: severe pain  Start: 10/12/24 0659 End: 10/14/24 1941   Admin Instructions:    Use PRN reason as a guide and follow range order policy. If oral pain meds are ordered and patient can tolerate oral intake, start with PRN oral pain medications first.       ondansetron (Zofran) 4 MG/2ML injection 4 mg  Dose: 4 mg  Freq: Every 6 hours PRN Route: IV  PRN Reasons: Nausea,vomiting  Start: 10/07/24 1836 End: 10/14/24 1941   Admin Instructions:   Default antiemetic sequence (unless otherwise preferred by patient):  1. ondansetron (Zofran)  2. prochlorperazine (Compazine). Wait 15 minutes before proceeding to next medication in sequence.  Follow therapeutic duplication policy.      1913 MO-Given      1211 AD-Given     2139 KB-Given      0947 JT-Given     1455 JT-Given      0001 TA-Given     1228 JT-Given     1812 JT-Given      0047 AM-Given     0604 AM-Given     1148 JT-Given     2345 SM-Given      0932 JS-Given     1811 JS-Given     2023 KASIA-Given      0614 KASIA-Given     1553 JS-Given     2109 MM-Given      1941-D/C'd              0921 JS-Given     1246 JS-Given     1451 JS-Given     1810 JS-Given     (2123 KASIA)-Not Given [C]                1246 JS-Given     1553 JS-Given                0328 MM-Given     1941-D/C'd         1845 TK-New Bag     2114 KB-Rate/Dose Change      1056 AD-Rate/Dose Change     1615 AD-New Bag       1355 JT-Rate/Dose Change      2125 KASIA-New Bag      0935 JS-New Bag      0200 EC-New Bag     2113 MM-New Bag      0854 IA-New Bag     1941-D/C'd          oxyCODONE immediate release tab 30 mg  Dose: 30 mg  Freq: Every 3 hours PRN Route: OR  PRN Reason: moderate pain  Start: 10/12/24 1745 End: 10/14/24 1941   Admin Instructions:   Use PRN reason as a guide and follow range order policy            0006 KASIA-Given     0421 KSAIA-Given     1803 JS-Given      0219 MM-Given     1207 IA-Given     1547 IA-Give                   Vitals (last day) before discharge       Date/Time Temp Pulse Resp BP SpO2 Weight O2 Device O2 Flow Rate (L/min) Who    10/14/24 1223 97.5 °F (36.4 °C) 67 16 107/77 100 % --  None (Room air) -- EM    10/14/24 0735 97.7 °F (36.5 °C) 60 16 129/85 100 % -- None (Room air) -- EM    10/14/24 0344 98.1 °F (36.7 °C) -- 16 -- -- -- None (Room air) -- MM    10/14/24 0000 98.6 °F (37 °C) -- 14 -- 100 % -- None (Room air) -- KL    10/13/24 2040 98.1 °F (36.7 °C) -- -- -- 100 % -- None (Room air) -- MM    10/13/24 1604 98.1 °F (36.7 °C) 58 -- 100/59 98 % -- None (Room air) -- ES    10/13/24 1000 98.3 °F (36.8 °C) 72 -- 105/68 98 % -- None (Room air) -- ES    10/13/24 0730 98.1 °F (36.7 °C) 57 -- 124/73 97 % -- None (Room air) -- ES    10/13/24 0400 97.9 °F (36.6 °C) 58 18 128/92 99 % -- None (Room air) -- JT            10/12/24 2330 98.1 °F (36.7 °C) 66 17 126/86 -- -- None (Room air) -- JT   10/12/24 1958 -- 59 -- 112/73 100 % -- -- -- JT   10/12/24 1958 97.9 °F (36.6 °C) -- -- -- -- -- -- -- KASIA   10/12/24 1635 97.9 °F (36.6 °C) 74 17 99/57 97 % -- None (Room air) -- ES   10/12/24 1319 97.8 °F (36.6 °C) 70 16 104/69 97 % -- None (Room air) -- ES   10/12/24 0840 98.3 °F (36.8 °C) 60 16 131/81 98 % -- None (Room air) -- ES   10/12/24 0415 98 °F (36.7 °C) 55 15 131/91 Abnormal  -- -- None (Room air) -- JOSELITO

## 2024-10-15 NOTE — PROGRESS NOTES
On ivf at reg rates,Sched meds given and tolerated well,Sched and prn pain meds was given w/ releif noted,Antibiotic was dc/d  As order.Plan for dc this pm,Has fair appetite.

## 2024-10-15 NOTE — PROGRESS NOTES
NURSING DISCHARGE NOTE    Discharged Home via Wheelchair.  Accompanied by Spouse  Belongings Taken by patient/family   Dc patient in stable condition,Saline lock removed,Dc instruction  ,Priscription  and follow up appt given. Verbalized needs and understanding.

## 2024-10-15 NOTE — PAYOR COMM NOTE
--------------  CONTINUED STAY REVIEW    Payor: JACINTO LOWERY  Subscriber #:  [id number]  Authorization Number: [id number]    Admit date: 10/7/24  Admit time:  6:24 PM    REVIEW DOCUMENTATION:      10/13 IM     Chief Complaint: Sickle cell crisis        Subjective:  Feeling better this morning, pain improved, but still with persistent loose stools         Objective:  Review of Systems:   A comprehensive review of systems was completed; pertinent positive and negatives stated in subjective.     Vital signs:  Temp:  [97.8 °F (36.6 °C)-98.1 °F (36.7 °C)] 98.1 °F (36.7 °C)  Pulse:  [57-74] 57  Resp:  [16-18] 18  BP: ()/(57-92) 124/73  SpO2:  [97 %-100 %] 97 %     Physical Exam:    General: No acute distress, awake and alert  Respiratory: No rhonchi, no wheezes  Cardiovascular: S1, S2. Regular rate and rhythm  Abdomen: Soft, non-tender, non-distended, positive bowel sounds  Neuro: No new focal deficits  Extremities: No edema     Diagnostic Data:    Labs:         Recent Labs   Lab 10/07/24  1514 10/08/24  0649 10/09/24  0636 10/11/24  0718   WBC 9.4 8.2 9.0 5.0   HGB 9.7* 10.2* 9.4* 7.8*   .1* 111.9* 112.4* 111.4*   .0 374.0 308.0 162.0             Recent Labs   Lab 10/08/24  0649 10/09/24  0636 10/11/24  0718 10/12/24  0823   * 110* 99  --    BUN 6* 6* 6*  --    CREATSERUM 0.56 0.60 0.48*  --    CA 9.5 8.9 8.1*  --    ALB 4.1 4.1 3.5  --     139 142  --    K 3.4* 3.6  3.6 3.3* 3.6    108 110  --    CO2 24.0 26.0 25.0  --    ALKPHO 49 43 35*  --    AST 19 13 13  --    ALT 18 12 9*  --    BILT 0.7 0.7 0.4  --    TP 8.8* 8.1 6.7  --       Estimated Creatinine Clearance: 148.1 mL/min (A) (based on SCr of 0.48 mg/dL (L)).     No results for input(s): \"TROP\", \"TROPHS\", \"CK\" in the last 168 hours.     No results for input(s): \"PTP\", \"INR\" in the last 168 hours.      Microbiology        Hospital Encounter on 10/07/24   1. Blood Culture     Status: None     Collection Time: 10/07/24  8:05 PM      Specimen: Blood,peripheral   Result Value Ref Range     Blood Culture Result No Growth 5 Days N/A         Imaging: Reviewed in Epic.     Medications:   Scheduled Medications    cholestyramine light  4 g Oral Daily with breakfast    pantoprazole  40 mg Oral QAM AC    metRONIDAZOLE  500 mg Oral Q8H PRAVIN    Glutamine (Sickle Cell)  15 g Oral BID    morphINE ER  60 mg Oral Q8H PRAVIN    enoxaparin  40 mg Subcutaneous Daily    aspirin  81 mg Oral Daily    folic acid  1 mg Oral Daily    hydroxyurea  2,000 mg Oral Daily    metoprolol succinate ER  25 mg Oral Daily    cefepime  1 g Intravenous Q8H               Assessment & Plan:  #Abdominal discomfort resolved  nausea, vomiting better  diarrhea  -CT a/p with diffuse fluid-filled colon suggestive of diarrheal state  as well as cholelithiasis with distended gallbladder  -abdominal ultrasound reviewed > gallstones, but no obstruction or cholecystitis   -HIDA 10/10 negative   -C diff and GI PCR negative  -PRN imodium, anti-emetics, bentyl  -empiric Cefepime and add Flagyl  -blood cultures NGTD  -IVF, PPI, pain control (dilaudid PCA and PTA PO morphine), anti-emetics  -add tincture of opium and cholestyramine   -GI consulted      #Cholelithiasis  - abdominal ultrasound reviewed  - HIDA negative   - general surgery following > supportive care      #Sickle cell pain crisis likely precipitated by above  -Monitor Hb, retic, LDH  -Hematology following  -Folic acid, ASA  -Hydroxyurea  -Pain control     #Suspected PNA, ruled out  -CT chest reviewed, no evidence of consolidation and patient denies cough or SOB     #Hypokalemia  -Replace                     MEDICATIONS ADMINISTERED IN LAST 1 DAY:  Administration History        ceFEPIme (Maxipime) 1 g in sodium chloride 0.9% 100 mL IVPB-MBP  Dose: 1 g  Freq: Every 8 hours Route: IV  Last Dose: 1 g (10/14/24 1203)  Start: 10/07/24 2000 End: 10/14/24 1210   Order specific questions:         3898 KB-New Bag      0263 KB-New Bag     9129  AD-New Bag     2139 KB-New Bag      0532 KB-New Bag     1156 JT-New Bag     2020 TA-New Bag      0420 TA-New Bag     1229 JT-New Bag     2118 AM-New Bag      0406 AM-New Bag     1147 JT-New Bag     2133 KASIA-New Bag      0455 KASIA-New Bag     1233 JS-New Bag     2012 KASIA-New Bag      0421 KASIA-New Bag     1129 JS-New Bag     2102 MM-New Bag      0328 MM-New Bag     1203 IA-New Bag     1210-D/C'd          sodium chloride 0.9% infusion  Rate: 100 mL/hr  Freq: Continuous Route: IV  Start: 10/07/24 1845 End: 10/14/24 1941      1845 TK-New Bag     2114 KB-Rate/Dose Change      1056 AD-Rate/Dose Change     1615 AD-New Bag       1355 JT-Rate/Dose Change      2125 KASIA-New Bag      0935 JS-New Bag      0200 EC-New Bag     2113 MM-New Bag      0854 IA-New Bag     1941-D/C'd              HYDROmorphone (Dilaudid) 1 MG/ML injection 1 mg  Dose: 1 mg  Freq: Every 2 hour PRN Route: IV  PRN Reason: moderate pain  Start: 10/12/24 0659 End: 10/14/24 1941   Admin Instructions:   Use PRN reason as a guide and follow range order policy. If oral pain meds are ordered and patient can tolerate oral intake, start with PRN oral pain medications first.                                    2129 KASIA-Given     (2213 KASIA)-Not Given [C]                2103 MM-Given      0125 MM-Given          1941-D/C'd      Or   HYDROmorphone (Dilaudid) 1 MG/ML injection 2 mg  Dose: 2 mg  Freq: Every 2 hour PRN Route: IV  PRN Reason: severe pain  Start: 10/12/24 0659 End: 10/14/24 1941   Admin Instructions:   Use PRN reason as a guide and follow range order policy. If oral pain meds are ordered and patient can tolerate oral intake, start with PRN oral pain medications first.       ondansetron (Zofran) 4 MG/2ML injection 4 mg  Dose: 4 mg  Freq: Every 6 hours PRN Route: IV  PRN Reasons: Nausea,vomiting  Start: 10/07/24 1836 End: 10/14/24 1941   Admin Instructions:   Default antiemetic sequence (unless otherwise preferred by patient):  1. ondansetron (Zofran)  2.  prochlorperazine (Compazine). Wait 15 minutes before proceeding to next medication in sequence.  Follow therapeutic duplication policy.      1913 MO-Given      1211 AD-Given     2139 KB-Given      0947 JT-Given     1455 JT-Given      0001 TA-Given     1228 JT-Given     1812 JT-Given      0047 AM-Given     0604 AM-Given     1148 JT-Given     2345 SM-Given      0932 JS-Given     1811 JS-Given     2023 KASIA-Given      0614 KASIA-Given     1553 JS-Given     2109 MM-Given      1941-D/C'd              0921 JS-Given     1246 JS-Given     1451 JS-Given     1810 JS-Given     (2123 KASIA)-Not Given [C]                1246 JS-Given     1553 JS-Given                0328 MM-Given     1941-D/C'd          oxyCODONE immediate release tab 30 mg  Dose: 30 mg  Freq: Every 3 hours PRN Route: OR  PRN Reason: moderate pain  Start: 10/12/24 1745 End: 10/14/24 1941   Admin Instructions:   Use PRN reason as a guide and follow range order policy            0006 KASIA-Given     0421 KASIA-Given     1803 JS-Given      0219 MM-Given     1207 IA-Given     1547 IA-Giv        Vitals (last day) before discharge       Date/Time Temp Pulse Resp BP SpO2 Weight O2 Device O2 Flow Rate (L/min) [facility]    10/14/24 1223 97.5 °F (36.4 °C) 67 16 107/77 100 % -- None (Room air) -- EM    10/14/24 0735 97.7 °F (36.5 °C) 60 16 129/85 100 % -- None (Room air) -- EM    10/14/24 0344 98.1 °F (36.7 °C) -- 16 -- -- -- None (Room air) -- MM    10/14/24 0000 98.6 °F (37 °C) -- 14 -- 100 % -- None (Room air) -- KL    10/13/24 2040 98.1 °F (36.7 °C) -- -- -- 100 % -- None (Room air) -- MM    10/13/24 1604 98.1 °F (36.7 °C) 58 -- 100/59 98 % -- None (Room air) -- ES    10/13/24 1000 98.3 °F (36.8 °C) 72 -- 105/68 98 % -- None (Room air) -- ES    10/13/24 0730 98.1 °F (36.7 °C) 57 -- 124/73 97 % -- None (Room air) -- ES    10/13/24 0400 97.9 °F (36.6 °C) 58 18 128/92 99 % -- None (Room air) -- JT            --------------  DISCHARGE REVIEW    Payor: JACINTO LOWERY  Subscriber #:   [id number]  Authorization Number: [id number]    Admit date: 10/7/24  Admit time:   6:24 PM  Discharge Date: 10/14/2024  5:41 PM     Admitting Physician: Hanna Agosto MD  Attending Physician:  No att. providers found  Primary Care Physician: Clive St MD          Discharge Summary Notes        Discharge Summary signed by Lisha Mustafa DO at 10/14/2024  2:14 PM       Author: Lisha Mustafa DO Specialty: HOSPITALIST Author Type: Physician    Filed: 10/14/2024  2:14 PM Date of Service: 10/14/2024  6:44 AM Status: Signed    : Lisha Mustafa DO (Physician)           [facility]     Oluwaseun Oluwadamilola Ogunyemi Patient Status:  Inpatient    8/3/1985 MRN [id number]   [facility] ED 4N-A Attending Lisha Mustafa DO   Hosp Day # 7 PCP l[address] St MD     Date of Admission: 10/7/2024  Date of Discharge:   10/14/24  Discharge Disposition: Home or Self Care    Discharge Diagnosis:  #Abdominal discomfort with nausea, vomiting and diarrhea  #Suspected gastroenteritis   #Cholelithiasis  #Sickle cell pain crisis likely precipitated by above  #Hypokalemia    History of Present Illness: (per Dr. Agosto), Oluwaseun Oluwadamilola Ogunyemi is a 39 year old female with  h/o sickle cell , h/o acute chest. Pt is presenting with intractable N, vo, diarrhea- multiple loose non bloody stools. She has abdominal cramps as well. She notes she feels unwell and today her sickle cell pain crisis begin to flare up too. No recent travel, no sick contacts. No recent abx     Brief Synopsis: admitted with n/v/d d/t suspected gastroenteritis and sickle cell pain crisis. GI PCR and Cdiff testing were negative for infection. She was treated supportively with IVF, pain control, anti-emetics, empiric abx, and anti-diarrheals. General surgery was consulted as imaging showed cholelithiasis. She was recommended non-surgical, supportive care. GI consulted for persistent  diarrhea and further stool studies were sent. Her clinical condition improved, her IV pain medications were transitioned to PO and she was discharged to home with recommendation to f/u closely with PCP and hematology after discharge.     Lace+ Score: 46  59-90 High Risk  29-58 Medium Risk  0-28   Low Risk       TCM Follow-Up Recommendation:  LACE 29-58: Moderate Risk of readmission after discharge from the hospital.      Procedures during hospitalization:   none    Incidental or significant findings and recommendations (brief descriptions):  As above    Lab/Test results pending at Discharge:   none    Consultants:  Hematology  General surgery  GI    Discharge Medication List:     Discharge Medications        START taking these medications        Instructions Prescription details   dicyclomine 10 MG Caps  Commonly known as: Bentyl      Take 1 capsule (10 mg total) by mouth 3 (three) times daily as needed (Abdominal cramping).   Quantity: 30 capsule  Refills: 0     loperamide 2 MG Caps  Commonly known as: Imodium      Take 1 capsule (2 mg total) by mouth every 3 (three) hours as needed for Diarrhea.   Quantity: 30 capsule  Refills: 0     opium 10 MG/ML (1%) Tinc      Take 0.6 mL (6 mg total) by mouth 3 (three) times daily as needed.   Quantity: 1 each  Refills: 0            CONTINUE taking these medications        Instructions Prescription details   Aspirin Low Dose 81 MG Tbec  Generic drug: aspirin      TAKE 1 TABLET BY MOUTH EVERY DAY   Quantity: 90 tablet  Refills: 3     diphenhydrAMINE 25 MG Caps  Commonly known as: Benadryl      Take 1 capsule (25 mg total) by mouth every 6 (six) hours as needed for Itching.   Refills: 0     Endari 5 g Pack  Generic drug: Glutamine (Sickle Cell)      Take 15 g by mouth in the morning and 15 g before bedtime.   Quantity: 180 each  Refills: 11     folic acid 1 MG Tabs  Commonly known as: Folvite      Take 1 tablet (1 mg total) by mouth daily.   Quantity: 90 tablet  Refills: 3      hydroxyurea 500 MG Caps  Commonly known as: Hydrea      Take 4 capsules (2,000 mg total) by mouth daily.   Quantity: 360 capsule  Refills: 3     metoprolol succinate ER 25 MG Tb24  Commonly known as: Toprol XL      Take 1 tablet (25 mg total) by mouth daily.   Refills: 0     morphINE ER 60 MG Tbcr  Commonly known as: MS Contin      Take 1 tablet (60 mg total) by mouth every 8 (eight) hours.   Quantity: 90 tablet  Refills: 0     Naloxone HCl 4 MG/0.1ML Liqd      4 mg by Nasal route as needed. If patient remains unresponsive, repeat dose in other nostril 2-5 minutes after first dose.   Quantity: 1 kit  Refills: 0     ondansetron 8 MG tablet  Commonly known as: Zofran      Take 1 tablet (8 mg total) by mouth every 8 (eight) hours as needed for Nausea.   Quantity: 9 tablet  Refills: 13     OxyCODONE HCl IR 30 MG Tabs  Commonly known as: ROXICODONE      Take 1 tablet (30 mg total) by mouth every 3 (three) hours as needed for Pain.   Quantity: 180 tablet  Refills: 0     VITAMIN B-12 OR      Take 200 mcg by mouth daily.   Refills: 0               Where to Get Your Medications        These medications were sent to [facility]/pharmacy #[id number] - [place], [place] [address] [phone], [phone]  [address] [place] [place] [address] [address]      Phone: [phone]   dicyclomine 10 MG Caps  loperamide 2 MG Caps  opium 10 MG/ML (1%) Tinc         [place]     Follow-up appointment:   Mahin Denis DO  [address] [id number] [phone]    Schedule an appointment as soon as possible for a visit  Discuss elective laparoscopic cholecystectomy    Esau Mccormick MD  [address] [facility]  [address]  [place] [address]  [phone]    Follow up on 10/17/2024  10am appointment as follow up for sickle cell.    Appointments for Next 30 Days 10/14/2024 - 11/13/2024        Date Arrival Time Visit Type Length Department Provider     10/17/2024 10:00 AM  ON TREATMENT VISIT FOLLOW-UP [8681] 15 min [facility]  [facility] [place] Esau Mccormick MD    Patient Instructions:         Location Instructions:     **IF YOU NEED LABWORK OR AN INFUSION ALONG WITH YOUR APPOINTMENT, YOU MUST CALL TO SCHEDULE.**  Your appointment is on the [facility] in the [facility] [place]. The address is [address] [place] [address]. Please register at the [facility]  on the second floor.  Masks are optional for all patients and visitors, unless otherwise indicated.               10/17/2024 10:30 AM  INFUSION [1980] 60 min [facility] [place] RN4    Patient Instructions:         Location Instructions:     Your appointment is on the [facility] in the Cancer Center. The address is [address] [place] [address]. Please register at the [facility]  on the second floor.  Masks are optional for all patients and visitors, unless otherwise indicated. No care partners/visitors under 18 years of age are allowed in the infusion room.               2024  3:15 PM  EXAM - NEW PATIENT [2845] 15 min [facility] Mahin Denis DO    Patient Instructions:         Location Instructions:     Masks are optional for all patients and visitors, unless otherwise indicated.                      Vital signs:  Temp:  [97.5 °F (36.4 °C)-98.6 °F (37 °C)] 97.5 °F (36.4 °C)  Pulse:  [58-67] 67  Resp:  [14-16] 16  BP: (100-129)/(59-85) 107/77  SpO2:  [98 %-100 %] 100 %    Physical Exam:    General: No acute distress   Lungs: clear to auscultation  Cardiovascular: S1, S2  Abdomen: Soft    -----------------------------------------------------------------------------------------------  PATIENT DISCHARGE INSTRUCTIONS: See electronic chart    Lisha Mustafa DO    Total time spent on discharge plannin minutes     The  Cures Act makes medical notes like these available to patients in the interest of transparency. Please  be advised this is a medical document. Medical documents are intended to carry relevant information, facts as evident, and the clinical opinion of the practitioner. The medical note is intended as peer to peer communication and may appear blunt or direct. It is written in medical language and may contain abbreviations or verbiage that are unfamiliar.       Electronically signed by Lisha Mustafa DO on 10/14/2024  2:14 PM         REVIEWER COMMENTS

## 2024-10-16 LAB — PANCREATIC ELAST FECAL: 307 UG ELAST./G

## 2024-10-16 NOTE — PROGRESS NOTES
Transitional Care Management   Discharge Date: 10/14/24  Contact Date: 10/15/2024    Assessment:  TCM Initial Assessment    General:  Assessment completed with: Patient  Patient Subjective: I had the abdominal pain yesterday and it lasted about 2 hrs. I had about 2-3 episodes of diarrhea at that time. Later in the evening I went into crisis with pain in the lower back and all over. The pain medication stopped it. Right now I'm just laying down and waiting for the pain meds to kick in.  Chief Complaint: Sickel Cell Crisis  Verify patient name and  with patient/ caregiver: Yes    Hospital Stay/Discharge:  Tell me what you understand of why you were in the hospital or emergency department: sickel cell crisis  Prior to leaving the hospital were your Discharge Instructions reviewed with you?: Yes  Did you receive a copy of your written Discharge Instructions?: Yes  What questions do you have about your Discharge Instructions?: none  Do you feel better or worse since you left the hospital or emergency department?: Better    Follow - Up Appointment:  Do you have a follow-up appointment?: Yes  Date: 10/17/24  Physician: Hematology  Are there any barriers to getting to your follow-up appointment?: No    Home Health/DME:  Prior to leaving the hospital was Home Health (HH) arranged for you?: No     Prior to leaving the hospital or emergency department was Durable Medical Equipment (DME), medical supplies, or infusions arranged for you?: No  Are DME/medical supply/infusions needs identified by staff during this assessment?: No     Medications/Diet:  Did any of your medications change, during or after your hospital stay or ED visit?: Yes  Do you have your new or updated medications?: No (Pt states that she is waiting for the Opium to be ready for  and will  the Imodium and Bentyl at that time as well.)  Do you understand what your medications are for and possible side effects?: Yes  Are there any reasons that  keep you from taking your medication as prescribed?: No  Any concerns about medication refills?: No    Were you given a different diet per your Discharge Instructions?: No     Questions/Concerns:  Do you have any questions or concerns that have not been discussed?: No         Nursing Interventions: NCM reviewed discharge instructions and when to seek medical attention with the patient. She states that she she had abdominal pain yesterday that lasted for about 2 hours and had 2-3 episodes of diarrhea. She states that later that evening she went into sickel cell crisis but the pain medication relieved it. She states that she continues to have pain and rates at a 7/10 right now as she is waiting for her pain meds to kick in. She is waiting for the Opium to be ready for . She understands when to return to the ER but in the meantime she is keeping up with her pain meds and hydration. She did have nausea yesterday but so far none today and she has not had any diarrhea today either. She denied having any fever, sob, lightheadedness or any other new or worsening symptoms. Med review completed. She denied having any questions or concerns at this time and states that she is seeing her hematologist tomorrow.     Medication Review:   Current Outpatient Medications   Medication Sig Dispense Refill    dicyclomine 10 MG Oral Cap Take 1 capsule (10 mg total) by mouth 3 (three) times daily as needed (Abdominal cramping). 30 capsule 0    loperamide 2 MG Oral Cap Take 1 capsule (2 mg total) by mouth every 3 (three) hours as needed for Diarrhea. 30 capsule 0    opium 10 MG/ML (1%) Oral Tincture Take 0.6 mL (6 mg total) by mouth 3 (three) times daily as needed. 1 each 0    diphenhydrAMINE 25 MG Oral Cap Take 1 capsule (25 mg total) by mouth every 6 (six) hours as needed for Itching.      OxyCODONE HCl IR 30 MG Oral Tab Take 1 tablet (30 mg total) by mouth every 3 (three) hours as needed for Pain. 180 tablet 0    morphINE ER 60 MG  Oral Tab CR Take 1 tablet (60 mg total) by mouth every 8 (eight) hours. 90 tablet 0    ASPIRIN LOW DOSE 81 MG Oral Tab EC TAKE 1 TABLET BY MOUTH EVERY DAY 90 tablet 3    ondansetron (ZOFRAN) 8 MG tablet Take 1 tablet (8 mg total) by mouth every 8 (eight) hours as needed for Nausea. 9 tablet 13    hydroxyurea 500 MG Oral Cap Take 4 capsules (2,000 mg total) by mouth daily. 360 capsule 3    folic acid 1 MG Oral Tab Take 1 tablet (1 mg total) by mouth daily. 90 tablet 3    Glutamine, Sickle Cell, (ENDARI) 5 g Oral Powd Pack Take 15 g by mouth in the morning and 15 g before bedtime. 180 each 11    Naloxone HCl 4 MG/0.1ML Nasal Liquid 4 mg by Nasal route as needed. If patient remains unresponsive, repeat dose in other nostril 2-5 minutes after first dose. 1 kit 0    metoprolol succinate ER 25 MG Oral Tablet 24 Hr Take 1 tablet (25 mg total) by mouth daily.      Cyanocobalamin (VITAMIN B-12 OR) Take 200 mcg by mouth daily.       Did patient review medications using current pill bottles and not just a medication list?  No  Discharge medications reviewed/discussed/and reconciled against outpatient medications with patient.  Any changes or updates to medications sent to primary care provider.  Patient Acknowledged    SDOH:   Transportation Needs: No Transportation Needs (10/7/2024)    Transportation Needs     Lack of Transportation: No     Car Seat: Not on file     Financial Resource Strain: Low Risk  (4/28/2023)    Financial Resource Strain     Difficulty of Paying Living Expenses: Not very hard     Med Affordability: Not on file         Diagnosis specifics:  (Please enter following SmartPhrase as applicable)  AMI: .TCMAMI  CHF: .TCMCHF  COPD: .TCMCOPD  CVA: .TCMCVA  CV surgery: .TCMCVSURGERY  Pneumonia: .TCMPNEUMONIA  Re-admit: .TCMREADMIT  Warfarin/Coumadin: .TCMWARFARIN    Follow-up Appointments:  Your appointments       Date & Time Appointment Department ([place])    Oct 17, 2024 10:00 AM CDT Follow-Up Visit with Jace  Esau FERRIS MD [facility] in [place] ([facility])        Oct 17, 2024 10:30 AM CDT Infusion Visit with SANJEEV ELY RN4 [facility])        Nov 12, 2024 3:15 PM CST Exam - New Patient with Mahin Denis DO [facility] ([facility])              [facility]  [address]  [facility] [id number]  [phone] [facility]  [address]  [facility] [address]  [phone] [facility]  [address] [facility] [id number] [phone]            Transitional [facility] Clinic  Was TCC Ordered: No      Primary Care Provider (If no TCC appointment)  Does patient already have a PCP appointment scheduled? No  Nurse Care Manager Scheduled PCP office TCM appointment with patient       Specialist  Does the patient have any other follow-up appointment(s) that need to be scheduled? Yes   -If yes: Nurse Care Manager reviewed upcoming specialist appointments with patient: Yes   -Does the patient need assistance scheduling appointment(s): No, pt seeing hematologist tomorrow and surgeon on 11/12/24    CCM referral placed:  No    Book By Date: 10/28/24

## 2024-10-17 ENCOUNTER — TELEPHONE (OUTPATIENT)
Dept: HEMATOLOGY/ONCOLOGY | Facility: HOSPITAL | Age: 39
End: 2024-10-17

## 2024-10-17 ENCOUNTER — OFFICE VISIT (OUTPATIENT)
Dept: HEMATOLOGY/ONCOLOGY | Facility: HOSPITAL | Age: 39
End: 2024-10-17
Attending: INTERNAL MEDICINE
Payer: COMMERCIAL

## 2024-10-17 VITALS
HEART RATE: 89 BPM | HEIGHT: 65.98 IN | WEIGHT: 158 LBS | SYSTOLIC BLOOD PRESSURE: 138 MMHG | RESPIRATION RATE: 16 BRPM | BODY MASS INDEX: 25.39 KG/M2 | OXYGEN SATURATION: 100 % | DIASTOLIC BLOOD PRESSURE: 88 MMHG | TEMPERATURE: 97 F

## 2024-10-17 DIAGNOSIS — E86.0 DEHYDRATION: ICD-10-CM

## 2024-10-17 DIAGNOSIS — D57.00 SICKLE CELL PAIN CRISIS (HCC): Primary | ICD-10-CM

## 2024-10-17 DIAGNOSIS — D57.419 SICKLE CELL ANEMIA WITH COEXISTENT ALPHA-THALASSEMIA WITH CRISIS (HCC): ICD-10-CM

## 2024-10-17 DIAGNOSIS — D57.00 SICKLE CELL PAIN CRISIS (HCC): ICD-10-CM

## 2024-10-17 DIAGNOSIS — D53.9 MACROCYTIC ANEMIA: ICD-10-CM

## 2024-10-17 DIAGNOSIS — K52.9 GASTROENTERITIS: ICD-10-CM

## 2024-10-17 DIAGNOSIS — D57.00 SICKLE CELL DISEASE WITH CRISIS (HCC): Primary | ICD-10-CM

## 2024-10-17 LAB
ALBUMIN SERPL-MCNC: 4.3 G/DL (ref 3.2–4.8)
ALBUMIN/GLOB SERPL: 1.1 {RATIO} (ref 1–2)
ALP LIVER SERPL-CCNC: 55 U/L
ALT SERPL-CCNC: 24 U/L
ANION GAP SERPL CALC-SCNC: 8 MMOL/L (ref 0–18)
AST SERPL-CCNC: 18 U/L (ref ?–34)
BASOPHILS # BLD AUTO: 0.03 X10(3) UL (ref 0–0.2)
BASOPHILS NFR BLD AUTO: 0.6 %
BILIRUB SERPL-MCNC: 0.5 MG/DL (ref 0.3–1.2)
BUN BLD-MCNC: 8 MG/DL (ref 9–23)
CALCIUM BLD-MCNC: 9.2 MG/DL (ref 8.7–10.4)
CHLORIDE SERPL-SCNC: 106 MMOL/L (ref 98–112)
CO2 SERPL-SCNC: 26 MMOL/L (ref 21–32)
CREAT BLD-MCNC: 0.61 MG/DL
EGFRCR SERPLBLD CKD-EPI 2021: 116 ML/MIN/1.73M2 (ref 60–?)
EOSINOPHIL # BLD AUTO: 0.05 X10(3) UL (ref 0–0.7)
EOSINOPHIL NFR BLD AUTO: 1 %
ERYTHROCYTE [DISTWIDTH] IN BLOOD BY AUTOMATED COUNT: 14.4 %
FASTING STATUS PATIENT QL REPORTED: NO
GLOBULIN PLAS-MCNC: 4 G/DL (ref 2–3.5)
GLUCOSE BLD-MCNC: 97 MG/DL (ref 70–99)
HCT VFR BLD AUTO: 25.2 %
HGB BLD-MCNC: 9.6 G/DL
HGB RETIC QN AUTO: 41.8 PG (ref 28.2–36.6)
IMM GRANULOCYTES # BLD AUTO: 0.08 X10(3) UL (ref 0–1)
IMM GRANULOCYTES NFR BLD: 1.7 %
IMM RETICS NFR: 0.43 RATIO (ref 0.1–0.3)
LDH SERPL L TO P-CCNC: 230 U/L
LYMPHOCYTES # BLD AUTO: 2.2 X10(3) UL (ref 1–4)
LYMPHOCYTES NFR BLD AUTO: 45.7 %
MCH RBC QN AUTO: 41.4 PG (ref 26–34)
MCHC RBC AUTO-ENTMCNC: 38.1 G/DL (ref 31–37)
MCV RBC AUTO: 108.6 FL
MONOCYTES # BLD AUTO: 0.45 X10(3) UL (ref 0.1–1)
MONOCYTES NFR BLD AUTO: 9.4 %
NEUTROPHILS # BLD AUTO: 2 X10 (3) UL (ref 1.5–7.7)
NEUTROPHILS # BLD AUTO: 2 X10(3) UL (ref 1.5–7.7)
NEUTROPHILS NFR BLD AUTO: 41.6 %
OSMOLALITY SERPL CALC.SUM OF ELEC: 288 MOSM/KG (ref 275–295)
PLATELET # BLD AUTO: 243 10(3)UL (ref 150–450)
POTASSIUM SERPL-SCNC: 3.5 MMOL/L (ref 3.5–5.1)
PROT SERPL-MCNC: 8.3 G/DL (ref 5.7–8.2)
RBC # BLD AUTO: 2.32 X10(6)UL
RETICS # AUTO: 85.9 X10(3) UL (ref 22.5–147.5)
RETICS/RBC NFR AUTO: 4.1 %
SODIUM SERPL-SCNC: 140 MMOL/L (ref 136–145)
WBC # BLD AUTO: 4.8 X10(3) UL (ref 4–11)

## 2024-10-17 PROCEDURE — 96375 TX/PRO/DX INJ NEW DRUG ADDON: CPT

## 2024-10-17 PROCEDURE — 99215 OFFICE O/P EST HI 40 MIN: CPT | Performed by: INTERNAL MEDICINE

## 2024-10-17 PROCEDURE — 96365 THER/PROPH/DIAG IV INF INIT: CPT

## 2024-10-17 PROCEDURE — 96361 HYDRATE IV INFUSION ADD-ON: CPT

## 2024-10-17 PROCEDURE — 96376 TX/PRO/DX INJ SAME DRUG ADON: CPT

## 2024-10-17 RX ORDER — HYDROMORPHONE HYDROCHLORIDE 1 MG/ML
1-2 INJECTION, SOLUTION INTRAMUSCULAR; INTRAVENOUS; SUBCUTANEOUS ONCE
Status: COMPLETED | OUTPATIENT
Start: 2024-10-17 | End: 2024-10-17

## 2024-10-17 RX ORDER — DIPHENHYDRAMINE HCL 25 MG
CAPSULE ORAL ONCE
Start: 2024-11-14 | End: 2024-11-14

## 2024-10-17 RX ORDER — HYDROMORPHONE HYDROCHLORIDE 1 MG/ML
1-2 INJECTION, SOLUTION INTRAMUSCULAR; INTRAVENOUS; SUBCUTANEOUS AS NEEDED
Start: 2024-11-14

## 2024-10-17 RX ORDER — HYDROMORPHONE HYDROCHLORIDE 1 MG/ML
1 INJECTION, SOLUTION INTRAMUSCULAR; INTRAVENOUS; SUBCUTANEOUS ONCE
Status: COMPLETED | OUTPATIENT
Start: 2024-10-17 | End: 2024-10-17

## 2024-10-17 RX ORDER — DIPHENHYDRAMINE HCL 25 MG
CAPSULE ORAL ONCE
Status: COMPLETED | OUTPATIENT
Start: 2024-10-17 | End: 2024-10-17

## 2024-10-17 RX ADMIN — HYDROMORPHONE HYDROCHLORIDE 2 MG: 1 INJECTION, SOLUTION INTRAMUSCULAR; INTRAVENOUS; SUBCUTANEOUS at 11:07:00

## 2024-10-17 RX ADMIN — DIPHENHYDRAMINE HCL 25 MG: 25 MG CAPSULE ORAL at 11:07:00

## 2024-10-17 RX ADMIN — HYDROMORPHONE HYDROCHLORIDE 1 MG: 1 INJECTION, SOLUTION INTRAMUSCULAR; INTRAVENOUS; SUBCUTANEOUS at 12:18:00

## 2024-10-17 NOTE — PROGRESS NOTES
Hematology Clinic Follow Up Visit    Patient Name: Oluwaseun Oluwadamilola Ogunyemi  Medical Record Number: [id number]   YOB: 1985    PCP: Clive St MD     Reason for Consultation:  Oluwaseun Oluwadamilola Ogunyemi was seen today for the diagnosis of hgb SS/sickle cell disease    Hematologic History:  *Sickle cell disease, hgb SS  -early 2000's moved from [facility] to   -2016/2017- 1st pregnancy c/b worsening pain crises  -2018- 2nd pregnancy c/b worsening pain crises, including episode of acute chest requiring RBC exchange.  -early 2019- started hydroxyurea, titrated up to 2000mg daily  -7/2020- started Voxelotor, but dc'd shortly after starting d/t poor tolerability with diarrhea, fatigue, elevated LFTs, was not very helpful either.   -5/28/21- 3/2022- received crizanlizumab (Adakveo) however c/b infusion reaction 3/2022 and was only slightly helpful therefore not continued  -10/2021- moved from [facility] (followed with Dr. Walton of [facility]) to [place].   -4/2022- started L-glutamine (Endari) 15g PO BID, cont'd hydrea  -8/2022- moved from [place] (prev followed by Dr. Esau Blount) to [place], [place]  -3/27/23- resumed crizanlizumab d/t more freq acute pain episodes   -5/8/23- reduced hydrea to 1500mg daily (d/t hgb 7.4, abs retic 47K)   -7/2/23- reduced hydrea to 1000mg daily (d/t hgb 7.0, abs retic 59K)   -12/27/23- increased Hydrea to 1500mg daily  -4/4/24- increased Hydrea to 2000mg daily    ================================  Interval events: Presents for follow-up and for next crizanlizumab infusion.  She was hospitalized last week for gastroenteritis with diarrhea and acute pain crisis.  She was discharged on Monday evening.  Later on Monday and through Tuesday she had flares of increased pain.  She has also had some intermittent ongoing diarrhea though is much less than prior.  Has had about 4-5 bouts of diarrhea over the last 3 days.  She is drinking plenty of fluids.   Urine is clear.  No fevers or bleeding.  Her pain is 6 out of 10 currently, and is up more than usual.  She is wanting IV fluids and pain medication here in the infusion unit.    She has been taking Hydrea at 2000 mg daily.  She has not had any side effects with this.  She is also taking Endari 15 g twice daily and folic acid 1 mg daily.     At home she has continued to take MS Contin 60 mg q8hrs and oxycodone IR 30 mg q3 hrs prn.      She met with Dr. Aviles at [facility] in June to discuss gene therapy with Desiree.  There program will start offering this in the fall, it sounds like she is a good candidate for this.  She states that she was told that she would be hospitalized therefore 3 to 6 weeks for this.  She has decided that she wants to pursue Casgevy and is waiting to hear back from [facility].    Hospitalizations and ED visits for acute vaso-occlusive pain episodes since moving from [facility] in 8/2022:  -9/5/22- hospitalization  -9/19/22- ED  -9/20/22- hospitalization  10/8/22- hospitalization  -10/29/22- ED  -11/6/22- ED  -11/21/22- ED   -1/7/23- hospitalization  -4/22/23- hospitalization  -7/1/23- hospitalization  -10/7/24- hospitalization    Past Medical History:  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 30 capsule 0    loperamide 2 MG Oral Cap Take 1 capsule (2 mg total) by mouth every 3 (three) hours as needed for Diarrhea. 30 capsule 0    opium 10 MG/ML (1%) Oral Tincture Take 0.6 mL (6 mg total) by mouth 3 (three) times daily as needed. 1 each 0    diphenhydrAMINE 25 MG Oral Cap Take 1 capsule (25 mg total) by mouth every 6 (six) hours as needed for Itching.      OxyCODONE HCl IR 30 MG Oral Tab Take 1 tablet (30 mg total) by mouth every 3 (three) hours as needed for Pain. 180 tablet 0    morphINE ER 60 MG Oral Tab CR Take 1 tablet (60 mg total) by mouth every 8 (eight) hours. 90 tablet 0    ASPIRIN LOW DOSE 81 MG Oral Tab EC TAKE 1 TABLET BY MOUTH EVERY DAY 90 tablet 3    ondansetron (ZOFRAN) 8 MG tablet Take 1 tablet (8 mg total) by mouth every 8 (eight) hours as needed for Nausea. 9 tablet 13    hydroxyurea 500 MG Oral Cap Take 4 capsules (2,000 mg total) by mouth daily. 360 capsule 3    folic acid 1 MG Oral Tab Take 1 tablet (1 mg total) by mouth daily. 90 tablet 3    Glutamine, Sickle Cell, (ENDARI) 5 g Oral Powd Pack Take 15 g by mouth in the morning and 15 g before bedtime. 180 each 11    Naloxone HCl 4 MG/0.1ML Nasal Liquid 4 mg by Nasal route as needed. If patient remains unresponsive, repeat dose in other nostril 2-5 minutes after first dose. 1 kit 0    metoprolol succinate ER 25 MG Oral Tablet 24 Hr Take 1 tablet (25 mg total) by mouth daily.      Cyanocobalamin (VITAMIN B-12 OR) Take 200 mcg by mouth daily.       Allergies:   Allergies   Allergen Reactions    Piperacillin Sod-Tazobactam So ITCHING       Psychosocial History:  Social History     Social History Narrative    , has 2 children ages 4 and 5 as of 2022.  Not employed.     Social History     Socioeconomic History    Marital status:    Tobacco Use     Smoking status: Never    Smokeless tobacco: Never   Vaping Use    Vaping status: Never Used   Substance and Sexual Activity    Alcohol use: Never     Comment: No history of excessive use    Drug use: Never    Sexual activity: Yes     Partners: Male     Birth control/protection: Condom   Social History Narrative    , has 2 children ages 4 and 5 as of 9/2022.  Not employed.     Social Drivers of Health     Financial Resource Strain: Low Risk  (4/28/2023)    Financial Resource Strain     Difficulty of Paying Living Expenses: Not very hard   Food Insecurity: No Food Insecurity (10/7/2024)    Food Insecurity     Food Insecurity: Never true   Transportation Needs: No Transportation Needs (10/7/2024)    Transportation Needs     Lack of Transportation: No   Housing Stability: Low Risk  (10/7/2024)    Housing Stability     Housing Instability: No       Family Medical History:  Family History   Problem Relation Age of Onset    Hypertension Mother         unknown    Cataracts Mother     No Known Problems Father         was told cardiac arrest    No Known Problems Brother     Sickle Cell Maternal Aunt         passed from kidney failure    DVT/VTE Other     Breast Cancer Neg     Ovarian Cancer Neg     Colon Cancer Neg      Review of Systems:  A 10-point ROS was done with pertinent positives and negative per the HPI    Vital Signs:  Height: 167.6 cm (5' 5.98\") (10/17 1029)  Weight: 71.7 kg (158 lb) (10/17 1029)  BSA (Calculated - sq m): 1.81 sq meters (10/17 1029)  Pulse: 89 (10/17 1029)  BP: 138/88 (10/17 1029)  Temp: 97.2 °F (36.2 °C) (10/17 1029)  Do Not Use - Resp Rate: --  SpO2: 100 % (10/17 1029)    Wt Readings from Last 6 Encounters:   10/17/24 71.7 kg (158 lb)   10/07/24 72.6 kg (160 lb)   09/19/24 72.6 kg (160 lb)   08/22/24 72.1 kg (159 lb)   07/25/24 70.4 kg (155 lb 3.2 oz)   07/01/24 71.5 kg (157 lb 9.6 oz)     Physical Examination:  General: Patient is alert and oriented, not in acute distress  Psych: Mood and  affect are appropriate  Eyes: EOMI  ENT: Oropharynx is clear  CV: Regular rate and rhythm, no murmurs, no LE edema  Respiratory: Lungs clear to auscultation bilaterally  GI/Abd: Soft, non-tender with normoactive bowel sounds, no hepatosplenomegaly  Neurological: Grossly intact   Lymphatics: No palpable lymphadenopathy  Skin: no rashes or petechiae    Laboratory:  Lab Results   Component Value Date    WBC 4.8 10/17/2024    WBC 4.0 10/14/2024    WBC 5.0 10/11/2024    HGB 9.6 (L) 10/17/2024    HGB 8.2 (L) 10/14/2024    HGB 7.8 (L) 10/11/2024    HCT 25.2 (L) 10/17/2024    .6 (H) 10/17/2024    MCH 41.4 (H) 10/17/2024    MCHC 38.1 (H) 10/17/2024    RDW 14.4 10/17/2024    .0 10/17/2024    .0 10/14/2024    .0 10/11/2024     Lab Results   Component Value Date    GLU 97 10/17/2024    BUN 8 (L) 10/17/2024    CREATSERUM 0.61 10/17/2024    CREATSERUM 0.51 (L) 10/14/2024    CREATSERUM 0.52 (L) 10/13/2024    ANIONGAP 8 10/17/2024    CA 9.2 10/17/2024    OSMOCALC 288 10/17/2024    ALKPHO 55 10/17/2024    AST 18 10/17/2024    ALT 24 10/17/2024    BILT 0.5 10/17/2024    TP 8.3 (H) 10/17/2024    ALB 4.3 10/17/2024    GLOBULIN 4.0 (H) 10/17/2024     10/17/2024    K 3.5 10/17/2024     10/17/2024    CO2 26.0 10/17/2024     Lab Results   Component Value Date     10/17/2024     10/08/2024     07/25/2024     12/27/2023     07/12/2023     05/08/2023     03/27/2023     02/16/2023     12/22/2022     09/05/2022     No results found for: \"PTT\", \"PT\", \"INR\"    Lab Results   Component Value Date    RETICABSOL 85.9 10/17/2024    RETICABSOL 90.3 10/08/2024    RETICABSOL 70.8 10/07/2024    RETICABSOL 59.5 08/22/2024    RETICABSOL 55.2 07/25/2024    RETICABSOL 88.8 05/30/2024    RETICABSOL 116.1 04/04/2024    RETICABSOL 86.6 03/08/2024    RETICABSOL 116.0 02/21/2024    RETICABSOL 115.2 01/24/2024     Lab Results   Component Value Date    CORINNE  279.5 (H) 02/16/2023    CORINNE 395.5 (H) 09/05/2022     Lab Results   Component Value Date    SAT 28 02/16/2023    SAT 36 09/05/2022     Lab Results   Component Value Date    B12 1,792 (H) 07/12/2023    B12 182 (L) 05/08/2023     Lab Results   Component Value Date    MMA 95 05/08/2023     Component      Latest Ref Rng & Units 9/5/2022   HEMOGLOBIN A      95.5 - 100.0 % <1.0 (L)   HEMOGLOBIN A2      1.5 - 3.5 % 1.3 (L)   HEMOGLOBIN F (FETAL)      0.0 - 2.0 % 45.0 (H)   HEMOGLOBIN S      % 53.7   HGB Electophoresis Interp       Overall, 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 Although HgB F is often elevated in patients with sickle cell disease, such elevations do not commonly exceed 15% of the total hemoglobin. Possible considerations for the degree of HgB F elevation as seen in this patient may include therapy effect (Hydroxyurea) vs. hereditary persistence of fetal hemoglobin (HPFH).      Impression & Plan:     *Likely gastroenteritis, diarrhea  -Developed abdominal pain, and diarrhea since 10/7.  Overall is improving.  No acute cholecystitis based on HIDA scan.    -GI stool PCR panel, c diff, Giardia/Cryptosporidium and pancreatic elastase normal.  -Continue imodium prn  -To call if not improved next week  -IV fluids today    *Hgb SS/Sickle cell disease  -+hx of acute chest in 2018 requiring RBC exchange.  She reports having less than 10 transfusions in her lifetime, most of the transfusions she received were during prior pregnancies.   -She has had an excellent response with hydroxyurea with marked increase in hemoglobin F.   -Continue hydroxyurea to 2000 mg daily  -Started L-glutamine 15 g BID 4/2022, in effort to reduce frequency of acute pain episodes, so far she thinks this might be helpful, will continue this.    -restarted Crizanlizumab (adakveo) 3/16/23, tolerating well without complication.  Continue crizanlizumab 5 mg/kg q4 weeks- dose today.  The  frequency of her hospitalizations and pain crises have decreased since she started this.    -Previously intolerant to Voxelotor   -Recommend Folvite 1 mg daily to be continued indefinitely  -Recommend annual ophthalmology visits  -Advised to stay up-to-date with recommended vaccinations.    -She has met with  Dr. Bryce Aviles at [facility] to discuss pursuing Casgevy gene therapy.  She wants to pursue this and is now awaiting on [facility] to get back to her about the next steps.      *macrocytic anemia  -d/t sickle cell anemia + hydroxyurea effect + B12 def  -stable today  -Continue vitamin B-12 1000 mcg PO daily to be continued indefinitely  -continue folvite 1mg daily indefinitely  -Follow CBC    *Chronic pain management  -Continue MS Contin 60mg q8 hrs scheduled and oxycodone 30 mg IR PO q3hrs prn   -Duloxetine was not helpful    *Acute pain episode  -Will give IV fluid hydration and IV dilaudid 2mg x 1 then 1-2mg prn today as below while she is in the infusion unit today for acute pain flare.      *Management recommendations of acute pain episodes not controlled with home meds  -Hydration with IV fluids with 0.9 NS until determined to be euvolemic, then maintained with 0.5 NS as relative hypernatremia can increase RBC sickling.  -Recommend prompt management of painful symptoms with parenteral opioids- recommend starting IV Dilaudid 2mg prn up to 3 doses for additional pain relief.  If needed, a PCA can be initiated on admission.  For pruritus related to opiates consider PO Benadryl or cetirizine.  I favor avoiding IV Benadryl.   -For any acute pain episode evaluation for potential infection or development of acute chest should be considered.  -standing order remains in place for IV dilaudid 1-2mg up to 3 doses prn + 1 L of 0.9 NS IVF + PO benadryl 25-50mg prn for acute pain episodes that can be managed as an outpatient in the infusion center PRN in effort to avoid ED visits as able.     RTC in 4  weeks    Esau Mccormick MD  Hematology/Medical Oncology  [facility]    MDM- high risk related to sickle cell disease management- given IVF and IV opiates requiring intensive monitoring in infusion unit.  ongoing continuity of complex care related to sickle cell disease.

## 2024-10-17 NOTE — PROGRESS NOTES
Education Record    Learner:  Patient    Pt here for: sickle cell dx    Barriers / Limitations:  None    Method:  Brief focused, printed material and  reinforcement    General Topics:  Plan of care reviewed    Outcome: Pt tolerated infusion with no c/o.     Here for IVF, adakveo, IV dilaudid and PO benadryl. Pt with pain 8/10. Improved with two doses of dilaudid. Back in 4 wks.

## 2024-10-17 NOTE — PROGRESS NOTES
Education Record    Learner:  Patient    Disease / Diagnosis: Sickle cell anemia    Barriers / Limitations:  None   Comments:    Method:  Brief focused and Reinforcement   Comments:    General Topics:  Medication, Pain, Side effects and symptom management, and Plan of care reviewed   Comments:    Outcome:  Shows understanding   Comments:    Patient here for f/u and adekveo infusion. Still taking Hydrea 2000mg daily. Patient admitted 10/7 - 10/14 for N/V/D/Abdominal cramping/sickle cell crisis. Patient states her last BM was last night (about 12 hours ago). She still has sx's of nausea, abdominal cramping, and sickle cell pain.

## 2024-10-18 NOTE — TELEPHONE ENCOUNTER
Family Medical Leave Act forms received via email, no authorization attached, sending [employer] to obtain Release of Information completion,  logged for processing.

## 2024-10-20 ENCOUNTER — LAB ENCOUNTER (OUTPATIENT)
Dept: LAB | Facility: HOSPITAL | Age: 39
End: 2024-10-20
Attending: INTERNAL MEDICINE
Payer: COMMERCIAL

## 2024-10-21 NOTE — TELEPHONE ENCOUNTER
Patient called gathered the below;    Type of Leave: Cont. Family Medical Leave Act   Reason for Leave: Sickle Cell Flare  Start date of leave: 10/08/2024  End date of leave:10/21/2024 w/ Return to work 10/22/2024  How many flare ups per month/length?:  How many appts per month/length?:   Was Fee and Turnaround info Given?:    UPLOAD TO [employer] UPON COMPLETION    pulse oximetry

## 2024-10-22 NOTE — TELEPHONE ENCOUNTER
Dr. Mccormick,       Please sign off on form if you agree to: Family Medical Leave Act 10/08/2024-10/21/2024 W/ Return to work 10/22/2024  (place your signature on the first page only)    -From your Inbasket, Highlight the patient and click Chart   -Double click the 10/17/2024 Forms Completion telephone encounter  -Scroll down to the Media section   -Click the blue Hyperlink: Family Medical Leave Act Dr. Esau Mccormick 10/22/2024  -Click Acknowledge located in the top right ribbon/menu   -Drag the mouse into the blank space of the document and a + sign will appear. Left click to   electronically sign the document.     Thank you,  Daphne CANALES

## 2024-10-23 ENCOUNTER — TELEPHONE (OUTPATIENT)
Dept: HEMATOLOGY/ONCOLOGY | Facility: HOSPITAL | Age: 39
End: 2024-10-23

## 2024-11-04 ENCOUNTER — OFFICE VISIT (OUTPATIENT)
Dept: FAMILY MEDICINE CLINIC | Facility: CLINIC | Age: 39
End: 2024-11-04
Payer: COMMERCIAL

## 2024-11-04 VITALS
DIASTOLIC BLOOD PRESSURE: 70 MMHG | HEIGHT: 66 IN | WEIGHT: 161.5 LBS | HEART RATE: 87 BPM | RESPIRATION RATE: 16 BRPM | BODY MASS INDEX: 25.96 KG/M2 | OXYGEN SATURATION: 97 % | SYSTOLIC BLOOD PRESSURE: 100 MMHG

## 2024-11-04 DIAGNOSIS — D57.00 SICKLE CELL PAIN CRISIS (HCC): Primary | ICD-10-CM

## 2024-11-04 DIAGNOSIS — Z23 NEEDS FLU SHOT: ICD-10-CM

## 2024-11-04 DIAGNOSIS — K52.9 GASTROENTERITIS: ICD-10-CM

## 2024-11-04 DIAGNOSIS — Z12.31 ENCOUNTER FOR SCREENING MAMMOGRAM FOR MALIGNANT NEOPLASM OF BREAST: ICD-10-CM

## 2024-11-04 PROBLEM — R11.0 NAUSEA: Status: RESOLVED | Noted: 2023-03-17 | Resolved: 2024-11-04

## 2024-11-04 PROCEDURE — 90656 IIV3 VACC NO PRSV 0.5 ML IM: CPT | Performed by: FAMILY MEDICINE

## 2024-11-04 PROCEDURE — 99214 OFFICE O/P EST MOD 30 MIN: CPT | Performed by: FAMILY MEDICINE

## 2024-11-04 PROCEDURE — 90471 IMMUNIZATION ADMIN: CPT | Performed by: FAMILY MEDICINE

## 2024-11-04 NOTE — PROGRESS NOTES
Chief Complaint   Patient presents with    Hospital F/U     Patient here for hospital follow up  10/7-10/14  Lisha Mustafa,   Sickle cell crisis        HPI:   Oluwaseun Oluwadamilola Ogunyemi is a 39 year old female who presents to the office for hospital follow up.  Seen 10/7-10/14 for sickle cell crisis.  Seemed to be triggered alongside a GI bug that was causing N/V/D.  In the hospital, testing showed no clear GI cause.  Supportive care given.  With hydration, symptoms eventually improved.    Did not need transfusion.    Since leaving, feeling better.  Slowly improving.  Getting her wind back.  Almost back to normal.   Did touch base with Dr. Mccormick after the hospital.  Progressing as expected.    Back on her usual sickle cell protection/prevention medications.      GI - gallstones.  No active issues now.     Diarrhea now resolved.  Calprotectin negative.   No sick contacts she can recall.      Medications Ordered Prior to Encounter[1]    REVIEW OF SYSTEMS:   Pertinent items are noted in HPI.  EXAM:   /70   Pulse 87   Resp 16   Ht 5' 6\" (1.676 m)   Wt 161 lb 8 oz (73.3 kg)   LMP 10/31/2024 (Approximate)   SpO2 97%   BMI 26.07 kg/m²     General appearance - alert, well appearing, and in no distress  Chest - clear to auscultation, no wheezes, rales or rhonchi, symmetric air entry  Heart - normal rate, regular rhythm, normal S1, S2, no murmurs, rubs, clicks or gallops  Abdomen - soft, nontender, nondistended, no masses or organomegaly  Extremities - peripheral pulses normal, no pedal edema, no clubbing or cyanosis    CT abdomen: CONCLUSION:     1. Cholelithiasis and a mildly distended gallbladder.  If there is a clinical concern for acute cholecystitis, consider abdominal ultrasound for further assessment.    2. Diffuse fluid-filled colon suggestive of diarrheal state.  A superimposed mild colitis cannot be excluded.     3. Osseous changes from sickle cell disease noted.  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  Additional areas of possible osteonecrosis would include the bilateral femoral heads and right acetabulum.     US liver: CONCLUSION:    Stat ultrasound examination of the gallbladder shows multiple mobile echogenic foci consistent with gallstones, largest measuring 7 x 5 x 10 mm.  Gallbladder wall upper limits of normal thickness 3 mm.  Common bile duct upper limits of   normal 4 mm.  No gallbladder dilation, surrounding fluid or Bhatti sign reported.  Correlate for any potential signs or symptoms of developing cholecystitis.  No sign of ascites.       NM GB:   CONCLUSION:    1. No evidence for acute cholecystitis.      Hemoglobin   Latest Ref Rng 12.0 - 16.0 g/dL   5/30/2024 8.3 (L)    7/25/2024 8.2 (L)    8/22/2024 8.5 (L)    10/7/2024 9.7 (L)    10/8/2024 10.2 (L)    10/9/2024 9.4 (L)    10/11/2024 7.8 (L)    10/14/2024 8.2 (L)    10/17/2024 9.6 (L)       Legend:  (L) Low    Stool and viral testing all negative.    ASSESSMENT AND PLAN:     Oluwaseun Oluwadamilola Ogunyemi was seen in the office today:  had concerns including Hospital F/U (Patient here for hospital follow up/10/7-10/14  Lisha Mustafa, /Sickle cell crisis/).    1. Sickle cell pain crisis (HCC)  Triggered by the GI illness, dehydratoin  In the hospital for 7 days managing the pain and getting hydration  Doing well now transitioning back to normal.  Managed by the hematology team  She is back on usual medicines, back on her pain medicines.  Ends up taking it about every other day    2. Gastroenteritis  Unclear cause.  No sick contacts, all testing negative  Back to her normal self.  Will continue to look out for any changes  Did note gallstones on the imaging, but nuclear medicine scan showed no inflammation, no active issues.  We discussed to be mindful of fatty foods, but no limitations right now    3. Needs flu shot  Given  - Fluzone trivalent vaccine, PF 0.5mL, 6mo+ (13891)    4. Encounter for screening  mammogram for malignant neoplasm of breast  Now 39.  Routine mammogram screening ordered and discussed  - [facility] RADHA 2D+3D SCREENING BILAT (CPT=77067/89594); Future      Clive St M.D.   EMG 3  11/04/24           [1]   Current Outpatient Medications on File Prior to Visit   Medication Sig Dispense Refill    dicyclomine 10 MG Oral Cap Take 1 capsule (10 mg total) by mouth 3 (three) times daily as needed (Abdominal cramping). 30 capsule 0    loperamide 2 MG Oral Cap Take 1 capsule (2 mg total) by mouth every 3 (three) hours as needed for Diarrhea. 30 capsule 0    opium 10 MG/ML (1%) Oral Tincture Take 0.6 mL (6 mg total) by mouth 3 (three) times daily as needed. 1 each 0    diphenhydrAMINE 25 MG Oral Cap Take 1 capsule (25 mg total) by mouth every 6 (six) hours as needed for Itching.      OxyCODONE HCl IR 30 MG Oral Tab Take 1 tablet (30 mg total) by mouth every 3 (three) hours as needed for Pain. 180 tablet 0    morphINE ER 60 MG Oral Tab CR Take 1 tablet (60 mg total) by mouth every 8 (eight) hours. 90 tablet 0    ASPIRIN LOW DOSE 81 MG Oral Tab EC TAKE 1 TABLET BY MOUTH EVERY DAY 90 tablet 3    ondansetron (ZOFRAN) 8 MG tablet Take 1 tablet (8 mg total) by mouth every 8 (eight) hours as needed for Nausea. 9 tablet 13    hydroxyurea 500 MG Oral Cap Take 4 capsules (2,000 mg total) by mouth daily. 360 capsule 3    folic acid 1 MG Oral Tab Take 1 tablet (1 mg total) by mouth daily. 90 tablet 3    Glutamine, Sickle Cell, (ENDARI) 5 g Oral Powd Pack Take 15 g by mouth in the morning and 15 g before bedtime. 180 each 11    Naloxone HCl 4 MG/0.1ML Nasal Liquid 4 mg by Nasal route as needed. If patient remains unresponsive, repeat dose in other nostril 2-5 minutes after first dose. 1 kit 0    metoprolol succinate ER 25 MG Oral Tablet 24 Hr Take 1 tablet (25 mg total) by mouth daily.      Cyanocobalamin (VITAMIN B-12 OR) Take 200 mcg by mouth daily.       No current facility-administered medications on  file prior to visit.

## 2024-11-05 DIAGNOSIS — D57.00 SICKLE CELL PAIN CRISIS (HCC): ICD-10-CM

## 2024-11-05 RX ORDER — MORPHINE SULFATE 60 MG/1
60 TABLET, FILM COATED, EXTENDED RELEASE ORAL EVERY 8 HOURS SCHEDULED
Qty: 90 TABLET | Refills: 0 | Status: SHIPPED | OUTPATIENT
Start: 2024-11-05 | End: 2024-12-19

## 2024-11-05 RX ORDER — OXYCODONE HYDROCHLORIDE 30 MG/1
30 TABLET ORAL
Qty: 180 TABLET | Refills: 0 | Status: SHIPPED | OUTPATIENT
Start: 2024-11-05 | End: 2024-12-05

## 2024-11-11 RX ORDER — GLUTAMINE 5 G/1
POWDER, FOR SOLUTION ORAL
Qty: 180 EACH | Refills: 11 | Status: SHIPPED | OUTPATIENT
Start: 2024-11-11

## 2024-11-12 ENCOUNTER — OFFICE VISIT (OUTPATIENT)
Facility: LOCATION | Age: 39
End: 2024-11-12
Payer: COMMERCIAL

## 2024-11-12 VITALS
OXYGEN SATURATION: 100 % | RESPIRATION RATE: 18 BRPM | TEMPERATURE: 98 F | SYSTOLIC BLOOD PRESSURE: 98 MMHG | DIASTOLIC BLOOD PRESSURE: 64 MMHG | HEART RATE: 67 BPM

## 2024-11-12 DIAGNOSIS — K80.10 CALCULUS OF GALLBLADDER WITH CHOLECYSTITIS WITHOUT BILIARY OBSTRUCTION, UNSPECIFIED CHOLECYSTITIS ACUITY: Primary | ICD-10-CM

## 2024-11-12 PROCEDURE — 99214 OFFICE O/P EST MOD 30 MIN: CPT | Performed by: SURGERY

## 2024-11-12 NOTE — H&P
Oluwaseun Oluwadamilola Ogunyemi is a 39 year old female  Chief Complaint   Patient presents with    New Patient     NP- 10/7 ER f/u Gallbladder, HIDA on 10/10/24        REFERRED BY    Patient presents status post hospitalization for sickle cell crisis.  Patient had evidence of colitis and diarrhea with dehydration which started a sickle crisis and left-sided abdominal pain.  Patient never had right sided abdominal pain.  She did have some vomiting.  CT scan demonstrated evidence of a dilated gallbladder.  The patient currently denies any right upper quadrant symptoms.  Ultrasound demonstrated gallstones and HIDA scan is unremarkable       .           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)    Sickle cell crisis (HCC)    Frequent crises    Sickle cell crisis (HCC)    Sickle cell pain crisis (HCC)     Past Surgical History:   Procedure Laterality Date            2018     Medications Ordered Prior to Encounter[1]  @ALL@  Family History   Problem Relation Age of Onset    Hypertension Mother         unknown    Cataracts Mother     No Known Problems Father         was told cardiac arrest    No Known Problems Brother     Sickle Cell Maternal Aunt         passed from kidney failure    DVT/VTE Other     Breast Cancer Neg     Ovarian Cancer Neg     Colon Cancer Neg      Social History     Socioeconomic History    Marital status:    Tobacco Use    Smoking status: Never    Smokeless tobacco: Never   Vaping  Use    Vaping status: Never Used   Substance and Sexual Activity    Alcohol use: Never     Comment: No history of excessive use    Drug use: Never    Sexual activity: Yes     Partners: Male     Birth control/protection: Condom   Social History Narrative    , has 2 children ages 4 and 5 as of 9/2022.  Not employed.     Social Drivers of Health     Financial Resource Strain: Low Risk  (4/28/2023)    Financial Resource Strain     Difficulty of Paying Living Expenses: Not very hard   Food Insecurity: No Food Insecurity (10/7/2024)    Food Insecurity     Food Insecurity: Never true   Transportation Needs: No Transportation Needs (10/7/2024)    Transportation Needs     Lack of Transportation: No   Housing Stability: Low Risk  (10/7/2024)    Housing Stability     Housing Instability: No       ROS     GENERAL HEALTH: otherwise feels well, no weight loss, no fever or chills  SKIN: denies any unusual skin rashes or jaundice  HEENT: denies nasal congestion, sinus pain or sore throat; hearing loss negative,   EYES , no diplopia or vision changes  RESPIRATORY: denies shortness of breath, wheezing or cough   CARDIOVASCULAR: denies chest pain or IBARRA; no palpitations   GI: denies nausea, vomiting, constipation, diarrhea; no rectal bleeding; no heartburn  GENITAL/: no dysuria, urgency or frequency, no tea colored urine  MUSCULOSKELETAL: no joint complaints upper or lower extremities  HEMATOLOGY: denies hx anemia; denies bruising or excessive bleeding  Endocrine: no weight gain or loss no hot or cold intolerance    EXAM     Blood pressure 98/64, pulse 67, temperature 97.6 °F (36.4 °C), temperature source Temporal, resp. rate 18, last menstrual period 10/31/2024, SpO2 100%, not currently breastfeeding.  GENERAL: well developed, well nourished female, in no apparent distress.    MENTAL STATUS :Alert, oriented x 3  PSYCH: normal mood and affect  SKIN: anicteric, no rashes, no bruising  EYES: PERRLA, EOMI, sclera anicteric,   conjunctiva without pallor  HEENT: normocephalic, atraumatic, TMs clear, nares patent, mouth moist, pharynx w/o erythema  NECK: supple, no JVD, no adenopathy, no thyromegaly  RESPIRATORY: clear to auscultation, no rales , rhonchi or wheezing  CARDIOVASCULAR: RRR, murmur negative  ABDOMEN: normal active BS, soft, nondistended  no HSM, no masses or hernias  LYMPHATIC: no axillary , supraclavicular or inguinal lymphadenopathy  EXTREMITIES: no cyanosis, clubbing or edema, no atrophy, full ROM    STUDIES:     Orders Only on 10/20/2024   Component Date Value Ref Range Status    Calprotectin, Fecal 10/20/2024 41.0  <50 µg/g Final    Normal:  <50 µg/g  Borderline:   µg/g: Borderline elevated, test should be re-evaluated in 4-6 weeks  Elevated:  >120 µg/g         ASSESSMENT   Imp: No evidence of acute cholecystitis or chronic cholecystitis however patient does have sickle cell disease and she has a known history of cholelithiasis.  She should undergo cholecystectomy simply because of her sickle cell disease state and cholelithiasis.  Patient will consider this recommendation.        Meds & Refills for this Visit:  Requested Prescriptions      No prescriptions requested or ordered in this encounter       Imaging & Consults:  None       [1]   Current Outpatient Medications on File Prior to Visit   Medication Sig Dispense Refill    Glutamine, Sickle Cell, (ENDARI) 5 g Oral Powd Pack Mix 3 packets (15 grams) with 8 ounces (240 mL) of cold or room temperature liquid or 4 to 6 ounces of food and take by mouth twice daily as directed. 180 each 11    morphINE ER 60 MG Oral Tab CR Take 1 tablet (60 mg total) by mouth every 8 (eight) hours. 90 tablet 0    OxyCODONE HCl IR 30 MG Oral Tab Take 1 tablet (30 mg total) by mouth every 3 (three) hours as needed for Pain. 180 tablet 0    dicyclomine 10 MG Oral Cap Take 1 capsule (10 mg total) by mouth 3 (three) times daily as needed (Abdominal cramping). 30 capsule 0    loperamide 2 MG  Oral Cap Take 1 capsule (2 mg total) by mouth every 3 (three) hours as needed for Diarrhea. 30 capsule 0    opium 10 MG/ML (1%) Oral Tincture Take 0.6 mL (6 mg total) by mouth 3 (three) times daily as needed. 1 each 0    diphenhydrAMINE 25 MG Oral Cap Take 1 capsule (25 mg total) by mouth every 6 (six) hours as needed for Itching.      ASPIRIN LOW DOSE 81 MG Oral Tab EC TAKE 1 TABLET BY MOUTH EVERY DAY 90 tablet 3    ondansetron (ZOFRAN) 8 MG tablet Take 1 tablet (8 mg total) by mouth every 8 (eight) hours as needed for Nausea. 9 tablet 13    hydroxyurea 500 MG Oral Cap Take 4 capsules (2,000 mg total) by mouth daily. 360 capsule 3    folic acid 1 MG Oral Tab Take 1 tablet (1 mg total) by mouth daily. 90 tablet 3    Naloxone HCl 4 MG/0.1ML Nasal Liquid 4 mg by Nasal route as needed. If patient remains unresponsive, repeat dose in other nostril 2-5 minutes after first dose. 1 kit 0    metoprolol succinate ER 25 MG Oral Tablet 24 Hr Take 1 tablet (25 mg total) by mouth daily.      Cyanocobalamin (VITAMIN B-12 OR) Take 200 mcg by mouth daily.       No current facility-administered medications on file prior to visit.

## 2024-11-14 ENCOUNTER — TELEPHONE (OUTPATIENT)
Facility: LOCATION | Age: 39
End: 2024-11-14

## 2024-11-14 ENCOUNTER — OFFICE VISIT (OUTPATIENT)
Dept: HEMATOLOGY/ONCOLOGY | Facility: HOSPITAL | Age: 39
End: 2024-11-14
Attending: INTERNAL MEDICINE
Payer: COMMERCIAL

## 2024-11-14 VITALS
BODY MASS INDEX: 25.68 KG/M2 | WEIGHT: 159.81 LBS | RESPIRATION RATE: 18 BRPM | SYSTOLIC BLOOD PRESSURE: 116 MMHG | HEIGHT: 65.98 IN | TEMPERATURE: 98 F | HEART RATE: 91 BPM | DIASTOLIC BLOOD PRESSURE: 80 MMHG | OXYGEN SATURATION: 99 %

## 2024-11-14 DIAGNOSIS — R11.0 NAUSEA: ICD-10-CM

## 2024-11-14 DIAGNOSIS — D57.419 SICKLE CELL ANEMIA WITH COEXISTENT ALPHA-THALASSEMIA WITH CRISIS (HCC): ICD-10-CM

## 2024-11-14 DIAGNOSIS — D53.9 MACROCYTIC ANEMIA: ICD-10-CM

## 2024-11-14 DIAGNOSIS — D57.00 SICKLE CELL DISEASE WITH CRISIS (HCC): Primary | ICD-10-CM

## 2024-11-14 DIAGNOSIS — D57.00 SICKLE CELL PAIN CRISIS (HCC): ICD-10-CM

## 2024-11-14 PROCEDURE — 96376 TX/PRO/DX INJ SAME DRUG ADON: CPT

## 2024-11-14 PROCEDURE — 96375 TX/PRO/DX INJ NEW DRUG ADDON: CPT

## 2024-11-14 PROCEDURE — 96365 THER/PROPH/DIAG IV INF INIT: CPT

## 2024-11-14 PROCEDURE — 99215 OFFICE O/P EST HI 40 MIN: CPT | Performed by: INTERNAL MEDICINE

## 2024-11-14 RX ORDER — ONDANSETRON 2 MG/ML
8 INJECTION INTRAMUSCULAR; INTRAVENOUS AS NEEDED
Status: CANCELLED
Start: 2024-11-14

## 2024-11-14 RX ORDER — DIPHENHYDRAMINE HCL 25 MG
CAPSULE ORAL ONCE
Status: COMPLETED | OUTPATIENT
Start: 2024-11-14 | End: 2024-11-14

## 2024-11-14 RX ORDER — HYDROMORPHONE HYDROCHLORIDE 1 MG/ML
1-2 INJECTION, SOLUTION INTRAMUSCULAR; INTRAVENOUS; SUBCUTANEOUS AS NEEDED
Start: 2024-12-12

## 2024-11-14 RX ORDER — ONDANSETRON 2 MG/ML
8 INJECTION INTRAMUSCULAR; INTRAVENOUS AS NEEDED
Status: DISCONTINUED | OUTPATIENT
Start: 2024-11-14 | End: 2024-11-14

## 2024-11-14 RX ORDER — DIPHENHYDRAMINE HCL 25 MG
CAPSULE ORAL ONCE
Start: 2024-12-12 | End: 2024-12-12

## 2024-11-14 RX ORDER — ONDANSETRON 2 MG/ML
8 INJECTION INTRAMUSCULAR; INTRAVENOUS AS NEEDED
Start: 2024-12-12

## 2024-11-14 RX ORDER — HYDROMORPHONE HYDROCHLORIDE 1 MG/ML
2 INJECTION, SOLUTION INTRAMUSCULAR; INTRAVENOUS; SUBCUTANEOUS EVERY 30 MIN PRN
Status: DISPENSED | OUTPATIENT
Start: 2024-11-14 | End: 2024-11-14

## 2024-11-14 RX ADMIN — DIPHENHYDRAMINE HCL 25 MG: 25 MG CAPSULE ORAL at 11:46:00

## 2024-11-14 RX ADMIN — HYDROMORPHONE HYDROCHLORIDE 2 MG: 1 INJECTION, SOLUTION INTRAMUSCULAR; INTRAVENOUS; SUBCUTANEOUS at 13:18:00

## 2024-11-14 RX ADMIN — HYDROMORPHONE HYDROCHLORIDE 2 MG: 1 INJECTION, SOLUTION INTRAMUSCULAR; INTRAVENOUS; SUBCUTANEOUS at 12:23:00

## 2024-11-14 RX ADMIN — ONDANSETRON 8 MG: 2 INJECTION INTRAMUSCULAR; INTRAVENOUS at 12:41:00

## 2024-11-14 NOTE — PROGRESS NOTES
Hematology Clinic Follow Up Visit    Patient Name: Oluwaseun Oluwadamilola Ogunyemi  Medical Record Number: [id number]   YOB: 1985    PCP: Clive St MD     Reason for Consultation:  Oluwaseun Oluwadamilola Ogunyemi was seen today for the diagnosis of hgb SS/sickle cell disease    Hematologic History:  *Sickle cell disease, hgb SS  -early 2000's moved from [facility] to   -2016/2017- 1st pregnancy c/b worsening pain crises  -2018- 2nd pregnancy c/b worsening pain crises, including episode of acute chest requiring RBC exchange.  -early 2019- started hydroxyurea, titrated up to 2000mg daily  -7/2020- started Voxelotor, but dc'd shortly after starting d/t poor tolerability with diarrhea, fatigue, elevated LFTs, was not very helpful either.   -5/28/21- 3/2022- received crizanlizumab (Adakveo) however c/b infusion reaction 3/2022 and was only slightly helpful therefore not continued  -10/2021- moved from [facility] (followed with Dr. Walton of [facility]) to [place].   -4/2022- started L-glutamine (Endari) 15g PO BID, cont'd hydrea  -8/2022- moved from [place] (prev followed by Dr. Esau Blount) to [place], [place]  -3/27/23- resumed crizanlizumab d/t more freq acute pain episodes   -5/8/23- reduced hydrea to 1500mg daily (d/t hgb 7.4, abs retic 47K)   -7/2/23- reduced hydrea to 1000mg daily (d/t hgb 7.0, abs retic 59K)   -12/27/23- increased Hydrea to 1500mg daily  -4/4/24- increased Hydrea to 2000mg daily    ================================  Interval events: Presents for follow-up and for next crizanlizumab infusion.  Overall was doing pretty well over the last month but started to have some stomach pains again this morning as well as some lower back and thigh pains.  Abdominal pains improved after BMs.  Has had a couple this morning already but no loose stools.  This is not quite like it was when she was hospitalized last month.    Before this morning her pain has been \"so-so\", at least is  not adequately controlled with her current regimen of  MS Contin 60 mg q8hrs and oxycodone IR 30 mg q3 hrs prn.  This morning pain seems to be flared, she is wanting IV fluids and pain medication here in the infusion unit.    No fevers or bleeding.     She has been taking Hydrea at 2000 mg daily.  She has not had any side effects with this.  She is also taking Endari 15 g twice daily and folic acid 1 mg daily.     She met with Dr. Aviles at [facility] in  to discuss gene therapy with Desiree.  There program will start offering this in the fall, it sounds like she is a good candidate for this.  She states that she was told that she would be hospitalized therefore 3 to 6 weeks for this.  She has decided that she wants to pursue Casgevy and is waiting to hear back from [facility].    Hospitalizations and ED visits for acute vaso-occlusive pain episodes since moving from [facility] in 2022:  -22- hospitalization  -22- ED  -22- hospitalization  10/8/22- hospitalization  -10/29/22- ED  -22- ED  -22- ED   -23- hospitalization  -23- hospitalization  -23- hospitalization  -10/7/24- hospitalization    Past Medical History:  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 180 each 11    morphINE ER 60 MG Oral Tab CR Take 1 tablet (60 mg total) by mouth every 8 (eight) hours. 90 tablet 0    OxyCODONE HCl IR 30 MG Oral Tab Take 1 tablet (30 mg total) by mouth every 3 (three) hours as needed for Pain. 180 tablet 0    dicyclomine 10 MG Oral Cap Take 1 capsule (10 mg total) by mouth 3 (three) times daily as needed (Abdominal cramping). 30 capsule 0    loperamide 2 MG Oral Cap Take 1 capsule (2 mg total) by mouth every 3 (three) hours as needed for Diarrhea. 30 capsule 0    opium 10 MG/ML (1%) Oral Tincture Take 0.6 mL (6 mg total) by mouth 3 (three) times daily as needed. 1 each 0    diphenhydrAMINE 25 MG Oral Cap Take 1 capsule (25 mg total) by mouth every 6 (six) hours as needed for Itching.      ASPIRIN LOW DOSE 81 MG Oral Tab EC TAKE 1 TABLET BY MOUTH EVERY DAY 90 tablet 3    ondansetron (ZOFRAN) 8 MG tablet Take 1 tablet (8 mg total) by mouth every 8 (eight) hours as needed for Nausea. 9 tablet 13    hydroxyurea 500 MG Oral Cap Take 4 capsules (2,000 mg total) by mouth daily. 360 capsule 3    folic acid 1 MG Oral Tab Take 1 tablet (1 mg total) by mouth daily. 90 tablet 3    Naloxone HCl 4 MG/0.1ML Nasal Liquid 4 mg by Nasal route as needed. If patient remains unresponsive, repeat dose in other nostril 2-5 minutes after first dose. 1 kit 0    metoprolol succinate ER 25 MG Oral Tablet 24 Hr Take 1 tablet (25 mg total) by mouth daily.      Cyanocobalamin (VITAMIN B-12 OR) Take 200 mcg by mouth daily.       Allergies:   Allergies   Allergen Reactions    Piperacillin Sod-Tazobactam So ITCHING       Psychosocial History:  Social History     Social History Narrative    , has 2 children ages 4 and 5 as of 9/2022.  Not employed.     Social History      Socioeconomic History    Marital status:    Tobacco Use    Smoking status: Never    Smokeless tobacco: Never   Vaping Use    Vaping status: Never Used   Substance and Sexual Activity    Alcohol use: Never     Comment: No history of excessive use    Drug use: Never    Sexual activity: Yes     Partners: Male     Birth control/protection: Condom   Social History Narrative    , has 2 children ages 4 and 5 as of 9/2022.  Not employed.     Social Drivers of Health     Financial Resource Strain: Low Risk  (4/28/2023)    Financial Resource Strain     Difficulty of Paying Living Expenses: Not very hard   Food Insecurity: No Food Insecurity (10/7/2024)    Food Insecurity     Food Insecurity: Never true   Transportation Needs: No Transportation Needs (10/7/2024)    Transportation Needs     Lack of Transportation: No   Housing Stability: Low Risk  (10/7/2024)    Housing Stability     Housing Instability: No       Family Medical History:  Family History   Problem Relation Age of Onset    Hypertension Mother         unknown    Cataracts Mother     No Known Problems Father         was told cardiac arrest    No Known Problems Brother     Sickle Cell Maternal Aunt         passed from kidney failure    DVT/VTE Other     Breast Cancer Neg     Ovarian Cancer Neg     Colon Cancer Neg      Review of Systems:  A 10-point ROS was done with pertinent positives and negative per the HPI    Vital Signs:  Height: 167.6 cm (5' 5.98\") (11/14 1025)  Weight: 72.5 kg (159 lb 12.8 oz) (11/14 1025)  BSA (Calculated - sq m): 1.82 sq meters (11/14 1025)  Pulse: 91 (11/14 1025)  BP: 116/80 (11/14 1025)  Temp: 98 °F (36.7 °C) (11/14 1025)  Do Not Use - Resp Rate: --  SpO2: 99 % (11/14 1025)    Wt Readings from Last 6 Encounters:   11/14/24 72.5 kg (159 lb 12.8 oz)   11/04/24 73.3 kg (161 lb 8 oz)   11/04/24 73.4 kg (161 lb 14.4 oz)   10/17/24 71.7 kg (158 lb)   10/07/24 72.6 kg (160 lb)   09/19/24 72.6 kg (160 lb)     Physical  Examination:  General: Patient is alert and oriented, not in acute distress  Psych: Mood and affect are appropriate  Eyes: EOMI  ENT: Oropharynx is clear  CV: Regular rate and rhythm, no murmurs, no LE edema  Respiratory: Lungs clear to auscultation bilaterally  GI/Abd: Soft, non-tender with normoactive bowel sounds, no hepatosplenomegaly  Neurological: Grossly intact   Lymphatics: No palpable lymphadenopathy  Skin: no rashes or petechiae    Laboratory:  Lab Results   Component Value Date    WBC 4.8 10/17/2024    WBC 4.0 10/14/2024    WBC 5.0 10/11/2024    HGB 9.6 (L) 10/17/2024    HGB 8.2 (L) 10/14/2024    HGB 7.8 (L) 10/11/2024    HCT 25.2 (L) 10/17/2024    .6 (H) 10/17/2024    MCH 41.4 (H) 10/17/2024    MCHC 38.1 (H) 10/17/2024    RDW 14.4 10/17/2024    .0 10/17/2024    .0 10/14/2024    .0 10/11/2024     Lab Results   Component Value Date    GLU 97 10/17/2024    BUN 8 (L) 10/17/2024    CREATSERUM 0.61 10/17/2024    CREATSERUM 0.51 (L) 10/14/2024    CREATSERUM 0.52 (L) 10/13/2024    ANIONGAP 8 10/17/2024    CA 9.2 10/17/2024    OSMOCALC 288 10/17/2024    ALKPHO 55 10/17/2024    AST 18 10/17/2024    ALT 24 10/17/2024    BILT 0.5 10/17/2024    TP 8.3 (H) 10/17/2024    ALB 4.3 10/17/2024    GLOBULIN 4.0 (H) 10/17/2024     10/17/2024    K 3.5 10/17/2024     10/17/2024    CO2 26.0 10/17/2024     Lab Results   Component Value Date     10/17/2024     10/08/2024     07/25/2024     12/27/2023     07/12/2023     05/08/2023     03/27/2023     02/16/2023     12/22/2022     09/05/2022     No results found for: \"PTT\", \"PT\", \"INR\"    Lab Results   Component Value Date    RETICABSOL 85.9 10/17/2024    RETICABSOL 90.3 10/08/2024    RETICABSOL 70.8 10/07/2024    RETICABSOL 59.5 08/22/2024    RETICABSOL 55.2 07/25/2024    RETICABSOL 88.8 05/30/2024    RETICABSOL 116.1 04/04/2024    RETICABSOL 86.6 03/08/2024    RETICABSOL  116.0 02/21/2024    RETICABSOL 115.2 01/24/2024     Lab Results   Component Value Date    CORINNE 279.5 (H) 02/16/2023    CORINNE 395.5 (H) 09/05/2022     Lab Results   Component Value Date    SAT 28 02/16/2023    SAT 36 09/05/2022     Lab Results   Component Value Date    B12 1,792 (H) 07/12/2023    B12 182 (L) 05/08/2023     Lab Results   Component Value Date    MMA 95 05/08/2023     Component      Latest Ref Rng & Units 9/5/2022   HEMOGLOBIN A      95.5 - 100.0 % <1.0 (L)   HEMOGLOBIN A2      1.5 - 3.5 % 1.3 (L)   HEMOGLOBIN F (FETAL)      0.0 - 2.0 % 45.0 (H)   HEMOGLOBIN S      % 53.7   HGB Electophoresis Interp       Overall, 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 Although HgB F is often elevated in patients with sickle cell disease, such elevations do not commonly exceed 15% of the total hemoglobin. Possible considerations for the degree of HgB F elevation as seen in this patient may include therapy effect (Hydroxyurea) vs. hereditary persistence of fetal hemoglobin (HPFH).      Impression & Plan:     *Hgb SS/Sickle cell disease  -+hx of acute chest in 2018 requiring RBC exchange.  She reports having less than 10 transfusions in her lifetime, most of the transfusions she received were during prior pregnancies.   -She has had an excellent response with hydroxyurea with marked increase in hemoglobin F.   -Continue hydroxyurea to 2000 mg daily  -Started L-glutamine 15 g BID 4/2022, in effort to reduce frequency of acute pain episodes, so far she thinks this might be helpful, will continue this.    -restarted Crizanlizumab (adakveo) 3/16/23, tolerating well without complication.  Continue crizanlizumab 5 mg/kg q4 weeks- dose today.  The frequency of her hospitalizations and pain crises have decreased since she started this.    -Previously intolerant to Voxelotor   -Recommend Folvite 1 mg daily to be continued indefinitely  -Recommend annual ophthalmology  visits  -Advised to stay up-to-date with recommended vaccinations.    -She has met with  Dr. Bryce Aviles at [facility] to discuss pursuing Casgevy gene therapy.  She wants to pursue this and is now awaiting on [facility] to get back to her about the next steps.      *macrocytic anemia  -d/t sickle cell anemia + hydroxyurea effect + B12 def  -Continue vitamin B-12 1000 mcg PO daily to be continued indefinitely  -continue folvite 1mg daily indefinitely  -Follow CBC    *Cholelithiasis, possible intermittent biliary colic   -No cholecystitis.  Surgery recommends eventual cholecystectomy due to underlying sickle cell disease with stones.  It is unclear if her intermittent abdominal symptoms and bowel changes could be related.    *Chronic pain management  -Continue MS Contin 60mg q8 hrs scheduled and oxycodone 30 mg IR PO q3hrs prn   -Duloxetine was not helpful    *Acute pain episode, nausea  -Will give IV fluid hydration and IV dilaudid 2mg x 1 then 1-2mg prn today as below while she is in the infusion unit today for acute pain flare.    -Zofran for nausea    *Management recommendations of acute pain episodes not controlled with home meds  -Hydration with IV fluids with 0.9 NS until determined to be euvolemic, then maintained with 0.5 NS as relative hypernatremia can increase RBC sickling.  -Recommend prompt management of painful symptoms with parenteral opioids- recommend starting IV Dilaudid 2mg prn up to 3 doses for additional pain relief.  If needed, a PCA can be initiated on admission.  For pruritus related to opiates consider PO Benadryl or cetirizine.  I favor avoiding IV Benadryl.   -For any acute pain episode evaluation for potential infection or development of acute chest should be considered.  -standing order remains in place for IV dilaudid 1-2mg up to 3 doses prn + 1 L of 0.9 NS IVF + PO benadryl 25-50mg prn for acute pain episodes that can be managed as an outpatient in the infusion center PRN in  effort to avoid ED visits as able.     RTC in 4 weeks    Esau Mccormick MD  Hematology/Medical Oncology  [facility]    MDM- high risk related to sickle cell disease management- given IVF and IV opiates requiring intensive monitoring in infusion unit.  ongoing continuity of complex care related to sickle cell disease.

## 2024-11-14 NOTE — TELEPHONE ENCOUNTER
Patient states she received call from Dr. Denis 111/13/2024 and is returning call.   Patient advised Doctor will call later today.  Patient verbalized understanding.

## 2024-11-14 NOTE — PROGRESS NOTES
Education Record    Learner:  Patient    Disease / Diagnosis: Sickle cell    Barriers / Limitations:  None   Comments:    Method:  Brief focused   Comments:    General Topics:  Plan of care reviewed   Comments:    Outcome:  Shows understanding   Comments:    Pt received 1L .9NS (500cc over 30min before adakveo) and remaining (500cc .9NS after adakveo). Pt also given 2mg Dilaudid x2 (at least 30 min apart).  Pt c/o nausea-MD ordered zofran-given IVP with relief noted.  Pt discharged in stable condition.

## 2024-11-14 NOTE — PROGRESS NOTES
Education Record     Learner:  Patient     Disease / Diagnosis: sickle cell anemia     Barriers / Limitations:  None                Comments:     Method:  Discussion                Comments:     General Topics:  Plan of care reviewed                Comments:     Outcome:  Shows understanding                Comments: MD f/up and crizanlizumab infusion. states she has not been feeling well the past few hours. States today she is having back pain and leg pain. Reports upset stomach. Pt taking her hydrea and endari as directed.

## 2024-11-19 ENCOUNTER — TELEPHONE (OUTPATIENT)
Facility: LOCATION | Age: 39
End: 2024-11-19

## 2024-11-19 NOTE — TELEPHONE ENCOUNTER
NATHAN BLAND Patient  Member ID  [id number]    Date of Birth  1985-08-03    Gender  Female    Relationship to Subscriber  Spouse    Subscriber Name  IMANI BLAND    Eligibility Status  Active Coverage    Group Number  [id number]    Plan / Coverage Date  2024-01-01    Transaction Type  Outpatient Authorization    Organization  [facility]    Payer  [employer]ET[employer] (COMMERCIAL & MEDICARE)    [employer]na logo  Transaction ID: Not FoundCustomer ID: [id number] Date: NA  No Authorization Required  Place of Service  22 - On [facility]    Service From - To Date  NA    Admission Type  9    Diagnosis Code 1   - Calculus of gallbladder w chronic cholecyst w/o obstruction    Procedure Code 1  84117    Quantity  1 Units    Procedure From - To Date  2024-12-03    Status  NO AUTH REQUIRED    Message  PRECERT IS NOT REQUIRED OR ONLY REQUIRED IN UNIQUE CIRCUMSTANCES FOR MEDICAID REFER TO PRIOR AUTH TOOL ON [place] [address] [employer] FOR ALL OTHERS REFER TO CODE SEARCH TOOL ON [address] COVERAGE OF SERVICES ARE SUBJECT TO BENEFITS AND ELIGIBILITY

## 2024-11-20 ENCOUNTER — VIRTUAL PHONE E/M (OUTPATIENT)
Facility: LOCATION | Age: 39
End: 2024-11-20
Payer: COMMERCIAL

## 2024-11-20 DIAGNOSIS — K80.20 CALCULUS OF GALLBLADDER WITHOUT CHOLECYSTITIS WITHOUT OBSTRUCTION: Primary | ICD-10-CM

## 2024-11-20 PROCEDURE — 99443 PHONE E/M BY PHYS 21-30 MIN: CPT | Performed by: SURGERY

## 2024-11-26 ENCOUNTER — TELEPHONE (OUTPATIENT)
Facility: LOCATION | Age: 39
End: 2024-11-26

## 2024-11-26 ENCOUNTER — LABORATORY ENCOUNTER (OUTPATIENT)
Dept: LAB | Facility: HOSPITAL | Age: 39
End: 2024-11-26
Payer: COMMERCIAL

## 2024-11-26 DIAGNOSIS — Z01.818 PRE-OP TESTING: ICD-10-CM

## 2024-11-26 LAB
ERYTHROCYTE [DISTWIDTH] IN BLOOD BY AUTOMATED COUNT: 15.6 %
HCT VFR BLD AUTO: 21.9 %
HGB BLD-MCNC: 7.8 G/DL
MCH RBC QN AUTO: 41.3 PG (ref 26–34)
MCHC RBC AUTO-ENTMCNC: 35.6 G/DL (ref 31–37)
MCV RBC AUTO: 115.9 FL
PLATELET # BLD AUTO: 401 10(3)UL (ref 150–450)
RBC # BLD AUTO: 1.89 X10(6)UL
WBC # BLD AUTO: 3.5 X10(3) UL (ref 4–11)

## 2024-11-26 PROCEDURE — 85027 COMPLETE CBC AUTOMATED: CPT

## 2024-11-26 PROCEDURE — 36415 COLL VENOUS BLD VENIPUNCTURE: CPT

## 2024-11-26 NOTE — TELEPHONE ENCOUNTER
Cardiac Clearance received from Shelton Garcia MD. Per note \" Patient is okay to proceed with upcoming surgery.  Okay to hold aspirin as directed. \"  Faxed to preadmission testing , placed in binder and patient notified.

## 2024-11-26 NOTE — TELEPHONE ENCOUNTER
Spoke to patient to let her know that cardiac clearance has been received and that she needs to stop taking her aspirin from now until after surgery.  Patient verbalized understanding.

## 2024-11-27 NOTE — PROGRESS NOTES
I discussed with Skye the fact that she has history of sickle cell disease and cholelithiasis.  Due to her sickle cell history there would be a recommendation for cholecystectomy even in the absence of symptoms.  I have recommended cholecystectomy for this patient.  Have advised her of the risks of surgery to include bleeding infection pneumonia DVT and injury to the common bile duct as well as possible open cholecystectomy I have also advised her that she should not proceed with surgery while she is in the midst of a sickle crisis.  Patient understands and agrees.  Her postoperative pain management will be managed by the medical service as the patient already is on medications which exceed her usual Norco dose for outpatient surgery.

## 2024-11-30 ENCOUNTER — TELEPHONE (OUTPATIENT)
Dept: FAMILY MEDICINE CLINIC | Facility: CLINIC | Age: 39
End: 2024-11-30

## 2024-11-30 NOTE — TELEPHONE ENCOUNTER
Pt is having a   LAPAROSCOPIC CHOLECYSTECTOMY POSSIBLE OPEN WITH CHOLANGIOGRAM     On 12/3 and needs an EKG    Placed in triage in front of printer

## 2024-12-02 ENCOUNTER — ANESTHESIA EVENT (OUTPATIENT)
Dept: SURGERY | Facility: HOSPITAL | Age: 39
End: 2024-12-02
Payer: COMMERCIAL

## 2024-12-02 ENCOUNTER — OFFICE VISIT (OUTPATIENT)
Dept: FAMILY MEDICINE CLINIC | Facility: CLINIC | Age: 39
End: 2024-12-02
Payer: COMMERCIAL

## 2024-12-02 ENCOUNTER — DOCUMENTATION ONLY (OUTPATIENT)
Dept: HEMATOLOGY/ONCOLOGY | Facility: HOSPITAL | Age: 39
End: 2024-12-02

## 2024-12-02 ENCOUNTER — TELEPHONE (OUTPATIENT)
Dept: HEMATOLOGY/ONCOLOGY | Facility: HOSPITAL | Age: 39
End: 2024-12-02

## 2024-12-02 VITALS
HEART RATE: 60 BPM | BODY MASS INDEX: 27.45 KG/M2 | HEIGHT: 64.25 IN | SYSTOLIC BLOOD PRESSURE: 94 MMHG | DIASTOLIC BLOOD PRESSURE: 58 MMHG | TEMPERATURE: 99 F | WEIGHT: 160.81 LBS

## 2024-12-02 DIAGNOSIS — Z01.818 PREOPERATIVE EXAMINATION: Primary | ICD-10-CM

## 2024-12-02 NOTE — PAT NURSING NOTE
Left a message for Dr. Mccormick's nurse to call back to relay information below.    Chart reviewed by anesthesiologist, Bal Geronimo DO for low hemoglobin and hematocrit.  Received an order for last office visit note with hematology recommendations prior to surgery.  Faxed this request to the surgeon and hematology. Received fax confirmation. Telephoned Dr. Mccormick's office and spoke front office staff, was advised to discuss with nurse Mena. Left message and informed of above and requested that the clearance be faxed to the PAT department ASAP.    UPDATE [id number] - Spoke with RYLAND Mena; Dr. Mccormick reviewed and addressed, please see telephone encounter dated today.

## 2024-12-02 NOTE — PROGRESS NOTES
Hematology clearance note    Patient is scheduled to undergo a laparoscopic cholecystectomy, possible open with cholangiogram on 12/3/24.  Her preoperative labs as below    Lab Results   Component Value Date    WBC 3.5 (L) 11/26/2024    RBC 1.89 (L) 11/26/2024    HGB 7.8 (L) 11/26/2024    HCT 21.9 (L) 11/26/2024    .9 (H) 11/26/2024    MCH 41.3 (H) 11/26/2024    MCHC 35.6 11/26/2024    RDW 15.6 11/26/2024    .0 11/26/2024     These labs including her hemoglobin are at her approximate baseline.    She is cleared from a hematology perspective to undergo the surgery as scheduled.    Esau Mccormick MD  Hematology/Medical Oncology  [facility]

## 2024-12-02 NOTE — PROGRESS NOTES
Oluwaseun Oluwadamilola Ogunyemi is a 39 year old female who presents for a pre-operative physical exam. Patient is to have laparoscopic cholecystectomy possible open with cholangiogram, to be done by Dr. Mahin Denis at [facility] on 12/3/2024.    Pt has had previous anesthesia:  no general in the past.   Hx of DVT/PE:  never    Patient presents at the request of their surgeon and the notes/results will be faxed after the visit.    HPI:   Patient's history includes:    11/25/24 note from [facility] Dr Radha Tong to proceed with gallbladder surgery from cardiac perspective. She will be at low to moderate risk given history of NICM. Have requested EKG from this visit and it came through electronically NSR.    Sickle cell disease - follows with hematology.  She had a hgb of 7.8 when last checked on 11/26/2024. She reports historically transfused <7.  Will ask that hematology signs off on recommendations ahead of surgery with Hgb this low.    1) High Risk Cardiac Conditions   1) Recent MI - no   2) Decompensated Heart Failure - no, not currently - she has had episode of acute chest syndrome in the past. 4/2023 last echo available in [facility], with normal EF and see cardiology recs above.   3) Unstable angina - no   4) Symptomatic arrythmia - no   5) Sx Valvular Disease - no    2) Intermediate Risk Factors - sickle cell    2) Functional Status - > 4 mets. She walks regularly. Can walk stairs, can do housework, cares for self.    3) Surgery Specific Risk - Intermediate or high.    4) Further Noninvasive evaluation    1) EKG - reviewed from last week   2) Echo -not ordered, no Worsening dyspnea    3) Stress Testing -not ordered, no active cardiac disease      Current Outpatient Medications   Medication Sig Dispense Refill    Glutamine, Sickle Cell, (ENDARI) 5 g Oral Powd Pack Mix 3 packets (15 grams) with 8 ounces (240 mL) of cold or room temperature liquid or 4 to 6 ounces of food and take by  mouth twice daily as directed. 180 each 11    morphINE ER 60 MG Oral Tab CR Take 1 tablet (60 mg total) by mouth every 8 (eight) hours. 90 tablet 0    OxyCODONE HCl IR 30 MG Oral Tab Take 1 tablet (30 mg total) by mouth every 3 (three) hours as needed for Pain. 180 tablet 0    loperamide 2 MG Oral Cap Take 1 capsule (2 mg total) by mouth every 3 (three) hours as needed for Diarrhea. 30 capsule 0    diphenhydrAMINE 25 MG Oral Cap Take 1 capsule (25 mg total) by mouth every 6 (six) hours as needed for Itching.      ASPIRIN LOW DOSE 81 MG Oral Tab EC TAKE 1 TABLET BY MOUTH EVERY DAY 90 tablet 3    ondansetron (ZOFRAN) 8 MG tablet Take 1 tablet (8 mg total) by mouth every 8 (eight) hours as needed for Nausea. 9 tablet 13    hydroxyurea 500 MG Oral Cap Take 4 capsules (2,000 mg total) by mouth daily. 360 capsule 3    folic acid 1 MG Oral Tab Take 1 tablet (1 mg total) by mouth daily. 90 tablet 3    Naloxone HCl 4 MG/0.1ML Nasal Liquid 4 mg by Nasal route as needed. If patient remains unresponsive, repeat dose in other nostril 2-5 minutes after first dose. 1 kit 0    metoprolol succinate ER 25 MG Oral Tablet 24 Hr Take 1 tablet (25 mg total) by mouth daily.      Cyanocobalamin (VITAMIN B-12 OR) Take 200 mcg by mouth daily.        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)    Sickle cell crisis (HCC)    Frequent crises     Sickle cell crisis (HCC)    Sickle cell pain crisis (HCC)      Past Surgical History:   Procedure Laterality Date      2017      2018      Family History   Problem Relation Age of Onset    Hypertension Mother         unknown    Cataracts Mother     No Known Problems Father         was told cardiac arrest    No Known Problems Brother     Sickle Cell Maternal Aunt         passed from kidney failure    DVT/VTE Other     Breast Cancer Neg     Ovarian Cancer Neg     Colon Cancer Neg       Social History:   Social History     Socioeconomic History    Marital status:    Tobacco Use    Smoking status: Never    Smokeless tobacco: Never   Vaping Use    Vaping status: Never Used   Substance and Sexual Activity    Alcohol use: Never     Comment: No history of excessive use    Drug use: Never    Sexual activity: Yes     Partners: Male     Birth control/protection: Condom   Other Topics Concern    Caffeine Concern Yes     Comment: 5-10 a month    Exercise Yes     Comment: 1-2 times a week    Seat Belt Yes    Self-Exams Yes     Comment: self breast exam with showering   Social History Narrative    , has 2 children ages 4 and 5 as of 2022.  Not employed.     Social Drivers of Health     Financial Resource Strain: Low Risk  (2023)    Financial Resource Strain     Difficulty of Paying Living Expenses: Not very hard   Food Insecurity: No Food Insecurity (10/7/2024)    Food Insecurity     Food Insecurity: Never true   Transportation Needs: No Transportation Needs (10/7/2024)    Transportation Needs     Lack of Transportation: No   Housing Stability: Low Risk  (10/7/2024)    Housing Stability     Housing Instability: No         REVIEW OF SYSTEMS:   GENERAL: feels well otherwise  LUNGS: denies shortness of breath  CARDIOVASCULAR: denies chest pain  GI: denies abdominal pain  : denies dysuria  NEURO: denies headaches    EXAM:   BP 94/58   Pulse 60   Temp 98.5 °F (36.9 °C) (Oral)   Ht 5' 4.25\"  (1.632 m)   Wt 160 lb 12.8 oz (72.9 kg)   LMP 10/31/2024 (Approximate)   BMI 27.39 kg/m²   GENERAL: well developed, well nourished,in no apparent distress  HEENT: atraumatic, normocephalic, O/P clear  NECK: supple,no adenopathy  LUNGS: clear to auscultation  CARDIO: RRR without murmur  GI: good BS's,no masses, HSM or tenderness  EXTREMITIES: no edema  NEURO: Alert    Labs:  Hgb: note low hemoglobin in setting of HgbSS disease  WBC: 3.5, done on 11/26/2024.  HGB: 7.8, done on 11/26/2024.  PLT: 401, done on 11/26/2024.      BMP reviewed  Glucose: 97, done on 10/17/2024.  Cr: 0.61, done on 10/17/2024.  Last eGFR was 116 on 10/17/2024.  CA: 9.2, done on 10/17/2024.  Na: 140, done on 10/17/2024.  K: 3.5, done on 10/17/2024.  Cl: 106, done on 10/17/2024.  CO2: 26, done on 10/17/2024.  Last ALB was 4.3% done on 10/17/2024.        ASSESSMENT AND PLAN:   Oluwaseun Oluwadamilola Ogunyemi is a 39 year old female who presents for a pre-operative physical exam.     1. Preoperative examination  Preop Evaluation. I have independently evaluated patient.  Patient is a 39yoF  who is intermediate risk for a intermediate or high risk surgery.  I am concerned about her Hgb low at 7.8 and would ask for hematology to weigh in on any recs prior to surgery. From medical standpoint (BP etc) there are no other barriers to surgery identified.       I spent a total of 40 minutes in both face-to-face and non-face-to-face activities for this visit on the date of this encounter.         [1]   Allergies  Allergen Reactions    Piperacillin Sod-Tazobactam So ITCHING     Generalized

## 2024-12-02 NOTE — TELEPHONE ENCOUNTER
Skye calling to let DR know her hemoglobin is 7.8 and she is having her gallbladder removed tomorrow. Thank you Carlene

## 2024-12-03 ENCOUNTER — ANESTHESIA (OUTPATIENT)
Dept: SURGERY | Facility: HOSPITAL | Age: 39
End: 2024-12-03
Payer: COMMERCIAL

## 2024-12-03 ENCOUNTER — HOSPITAL ENCOUNTER (OUTPATIENT)
Facility: HOSPITAL | Age: 39
Setting detail: HOSPITAL OUTPATIENT SURGERY
Discharge: HOME | End: 2024-12-03
Attending: SURGERY | Admitting: SURGERY
Payer: COMMERCIAL

## 2024-12-03 ENCOUNTER — APPOINTMENT (OUTPATIENT)
Dept: GENERAL RADIOLOGY | Facility: HOSPITAL | Age: 39
End: 2024-12-03
Attending: SURGERY
Payer: COMMERCIAL

## 2024-12-03 VITALS
HEART RATE: 88 BPM | OXYGEN SATURATION: 100 % | TEMPERATURE: 99 F | BODY MASS INDEX: 25.71 KG/M2 | SYSTOLIC BLOOD PRESSURE: 140 MMHG | WEIGHT: 160 LBS | DIASTOLIC BLOOD PRESSURE: 96 MMHG | RESPIRATION RATE: 17 BRPM | HEIGHT: 66 IN

## 2024-12-03 DIAGNOSIS — K80.00 CALCULUS OF GALLBLADDER WITH ACUTE CHOLECYSTITIS WITHOUT OBSTRUCTION: ICD-10-CM

## 2024-12-03 DIAGNOSIS — Z01.818 PRE-OP TESTING: Primary | ICD-10-CM

## 2024-12-03 DIAGNOSIS — K80.10 CALCULUS OF GALLBLADDER WITH CHOLECYSTITIS WITHOUT BILIARY OBSTRUCTION, UNSPECIFIED CHOLECYSTITIS ACUITY: ICD-10-CM

## 2024-12-03 LAB — B-HCG UR QL: NEGATIVE

## 2024-12-03 PROCEDURE — 74300 X-RAY BILE DUCTS/PANCREAS: CPT | Performed by: SURGERY

## 2024-12-03 PROCEDURE — BF101ZZ FLUOROSCOPY OF BILE DUCTS USING LOW OSMOLAR CONTRAST: ICD-10-PCS | Performed by: SURGERY

## 2024-12-03 PROCEDURE — 81025 URINE PREGNANCY TEST: CPT

## 2024-12-03 PROCEDURE — 0FT44ZZ RESECTION OF GALLBLADDER, PERCUTANEOUS ENDOSCOPIC APPROACH: ICD-10-PCS | Performed by: SURGERY

## 2024-12-03 PROCEDURE — 88304 TISSUE EXAM BY PATHOLOGIST: CPT | Performed by: SURGERY

## 2024-12-03 RX ORDER — HYDROMORPHONE HYDROCHLORIDE 1 MG/ML
0.6 INJECTION, SOLUTION INTRAMUSCULAR; INTRAVENOUS; SUBCUTANEOUS EVERY 5 MIN PRN
Status: DISCONTINUED | OUTPATIENT
Start: 2024-12-03 | End: 2024-12-03

## 2024-12-03 RX ORDER — ACETAMINOPHEN 500 MG
1000 TABLET ORAL ONCE AS NEEDED
Status: COMPLETED | OUTPATIENT
Start: 2024-12-03 | End: 2024-12-03

## 2024-12-03 RX ORDER — METOPROLOL TARTRATE 1 MG/ML
2.5 INJECTION, SOLUTION INTRAVENOUS ONCE
Status: DISCONTINUED | OUTPATIENT
Start: 2024-12-03 | End: 2024-12-03

## 2024-12-03 RX ORDER — MEPERIDINE HYDROCHLORIDE 25 MG/ML
12.5 INJECTION INTRAMUSCULAR; INTRAVENOUS; SUBCUTANEOUS AS NEEDED
Status: DISCONTINUED | OUTPATIENT
Start: 2024-12-03 | End: 2024-12-03

## 2024-12-03 RX ORDER — PROCHLORPERAZINE EDISYLATE 5 MG/ML
5 INJECTION INTRAMUSCULAR; INTRAVENOUS EVERY 8 HOURS PRN
Status: DISCONTINUED | OUTPATIENT
Start: 2024-12-03 | End: 2024-12-03

## 2024-12-03 RX ORDER — HYDROCODONE BITARTRATE AND ACETAMINOPHEN 5; 325 MG/1; MG/1
2 TABLET ORAL ONCE AS NEEDED
Status: COMPLETED | OUTPATIENT
Start: 2024-12-03 | End: 2024-12-03

## 2024-12-03 RX ORDER — HYDROCODONE BITARTRATE AND ACETAMINOPHEN 5; 325 MG/1; MG/1
1 TABLET ORAL ONCE AS NEEDED
Status: COMPLETED | OUTPATIENT
Start: 2024-12-03 | End: 2024-12-03

## 2024-12-03 RX ORDER — LABETALOL HYDROCHLORIDE 5 MG/ML
5 INJECTION, SOLUTION INTRAVENOUS EVERY 5 MIN PRN
Status: DISCONTINUED | OUTPATIENT
Start: 2024-12-03 | End: 2024-12-03

## 2024-12-03 RX ORDER — ROCURONIUM BROMIDE 10 MG/ML
INJECTION, SOLUTION INTRAVENOUS AS NEEDED
Status: DISCONTINUED | OUTPATIENT
Start: 2024-12-03 | End: 2024-12-03 | Stop reason: SURG

## 2024-12-03 RX ORDER — LIDOCAINE HYDROCHLORIDE 10 MG/ML
INJECTION, SOLUTION EPIDURAL; INFILTRATION; INTRACAUDAL; PERINEURAL AS NEEDED
Status: DISCONTINUED | OUTPATIENT
Start: 2024-12-03 | End: 2024-12-03 | Stop reason: SURG

## 2024-12-03 RX ORDER — BUPIVACAINE HYDROCHLORIDE 5 MG/ML
INJECTION, SOLUTION EPIDURAL; INTRACAUDAL AS NEEDED
Status: DISCONTINUED | OUTPATIENT
Start: 2024-12-03 | End: 2024-12-03 | Stop reason: HOSPADM

## 2024-12-03 RX ORDER — MIDAZOLAM HYDROCHLORIDE 1 MG/ML
1 INJECTION INTRAMUSCULAR; INTRAVENOUS EVERY 5 MIN PRN
Status: DISCONTINUED | OUTPATIENT
Start: 2024-12-03 | End: 2024-12-03

## 2024-12-03 RX ORDER — ONDANSETRON 2 MG/ML
INJECTION INTRAMUSCULAR; INTRAVENOUS AS NEEDED
Status: DISCONTINUED | OUTPATIENT
Start: 2024-12-03 | End: 2024-12-03 | Stop reason: SURG

## 2024-12-03 RX ORDER — SCOLOPAMINE TRANSDERMAL SYSTEM 1 MG/1
1 PATCH, EXTENDED RELEASE TRANSDERMAL ONCE
Status: DISCONTINUED | OUTPATIENT
Start: 2024-12-03 | End: 2024-12-03

## 2024-12-03 RX ORDER — MIDAZOLAM HYDROCHLORIDE 1 MG/ML
INJECTION INTRAMUSCULAR; INTRAVENOUS AS NEEDED
Status: DISCONTINUED | OUTPATIENT
Start: 2024-12-03 | End: 2024-12-03 | Stop reason: SURG

## 2024-12-03 RX ORDER — HYDROMORPHONE HYDROCHLORIDE 1 MG/ML
0.4 INJECTION, SOLUTION INTRAMUSCULAR; INTRAVENOUS; SUBCUTANEOUS EVERY 5 MIN PRN
Status: DISCONTINUED | OUTPATIENT
Start: 2024-12-03 | End: 2024-12-03

## 2024-12-03 RX ORDER — KETOROLAC TROMETHAMINE 30 MG/ML
INJECTION, SOLUTION INTRAMUSCULAR; INTRAVENOUS AS NEEDED
Status: DISCONTINUED | OUTPATIENT
Start: 2024-12-03 | End: 2024-12-03 | Stop reason: SURG

## 2024-12-03 RX ORDER — ACETAMINOPHEN 500 MG
1000 TABLET ORAL ONCE
Status: DISCONTINUED | OUTPATIENT
Start: 2024-12-03 | End: 2024-12-03 | Stop reason: HOSPADM

## 2024-12-03 RX ORDER — SODIUM CHLORIDE, SODIUM LACTATE, POTASSIUM CHLORIDE, CALCIUM CHLORIDE 600; 310; 30; 20 MG/100ML; MG/100ML; MG/100ML; MG/100ML
INJECTION, SOLUTION INTRAVENOUS CONTINUOUS
Status: DISCONTINUED | OUTPATIENT
Start: 2024-12-03 | End: 2024-12-03

## 2024-12-03 RX ORDER — HEPARIN SODIUM 5000 [USP'U]/ML
5000 INJECTION, SOLUTION INTRAVENOUS; SUBCUTANEOUS ONCE
Status: COMPLETED | OUTPATIENT
Start: 2024-12-03 | End: 2024-12-03

## 2024-12-03 RX ORDER — DEXAMETHASONE SODIUM PHOSPHATE 4 MG/ML
VIAL (ML) INJECTION AS NEEDED
Status: DISCONTINUED | OUTPATIENT
Start: 2024-12-03 | End: 2024-12-03 | Stop reason: SURG

## 2024-12-03 RX ORDER — NALOXONE HYDROCHLORIDE 0.4 MG/ML
80 INJECTION, SOLUTION INTRAMUSCULAR; INTRAVENOUS; SUBCUTANEOUS AS NEEDED
Status: DISCONTINUED | OUTPATIENT
Start: 2024-12-03 | End: 2024-12-03

## 2024-12-03 RX ORDER — ONDANSETRON 2 MG/ML
4 INJECTION INTRAMUSCULAR; INTRAVENOUS EVERY 6 HOURS PRN
Status: DISCONTINUED | OUTPATIENT
Start: 2024-12-03 | End: 2024-12-03

## 2024-12-03 RX ORDER — HYDROMORPHONE HYDROCHLORIDE 1 MG/ML
INJECTION, SOLUTION INTRAMUSCULAR; INTRAVENOUS; SUBCUTANEOUS
Status: COMPLETED
Start: 2024-12-03 | End: 2024-12-03

## 2024-12-03 RX ORDER — HYDROMORPHONE HYDROCHLORIDE 1 MG/ML
0.2 INJECTION, SOLUTION INTRAMUSCULAR; INTRAVENOUS; SUBCUTANEOUS EVERY 5 MIN PRN
Status: DISCONTINUED | OUTPATIENT
Start: 2024-12-03 | End: 2024-12-03

## 2024-12-03 RX ORDER — METOCLOPRAMIDE HYDROCHLORIDE 5 MG/ML
INJECTION INTRAMUSCULAR; INTRAVENOUS AS NEEDED
Status: DISCONTINUED | OUTPATIENT
Start: 2024-12-03 | End: 2024-12-03 | Stop reason: SURG

## 2024-12-03 RX ADMIN — SODIUM CHLORIDE, SODIUM LACTATE, POTASSIUM CHLORIDE, CALCIUM CHLORIDE: 600; 310; 30; 20 INJECTION, SOLUTION INTRAVENOUS at 17:05:00

## 2024-12-03 RX ADMIN — KETOROLAC TROMETHAMINE 30 MG: 30 INJECTION, SOLUTION INTRAMUSCULAR; INTRAVENOUS at 16:47:00

## 2024-12-03 RX ADMIN — ONDANSETRON 4 MG: 2 INJECTION INTRAMUSCULAR; INTRAVENOUS at 16:47:00

## 2024-12-03 RX ADMIN — DEXAMETHASONE SODIUM PHOSPHATE 8 MG: 4 MG/ML VIAL (ML) INJECTION at 15:27:00

## 2024-12-03 RX ADMIN — ROCURONIUM BROMIDE 10 MG: 10 INJECTION, SOLUTION INTRAVENOUS at 16:29:00

## 2024-12-03 RX ADMIN — ROCURONIUM BROMIDE 40 MG: 10 INJECTION, SOLUTION INTRAVENOUS at 15:25:00

## 2024-12-03 RX ADMIN — ROCURONIUM BROMIDE 10 MG: 10 INJECTION, SOLUTION INTRAVENOUS at 15:43:00

## 2024-12-03 RX ADMIN — METOCLOPRAMIDE HYDROCHLORIDE 10 MG: 5 INJECTION INTRAMUSCULAR; INTRAVENOUS at 16:47:00

## 2024-12-03 RX ADMIN — SODIUM CHLORIDE, SODIUM LACTATE, POTASSIUM CHLORIDE, CALCIUM CHLORIDE: 600; 310; 30; 20 INJECTION, SOLUTION INTRAVENOUS at 16:46:00

## 2024-12-03 RX ADMIN — MIDAZOLAM HYDROCHLORIDE 2 MG: 1 INJECTION INTRAMUSCULAR; INTRAVENOUS at 15:14:00

## 2024-12-03 RX ADMIN — LIDOCAINE HYDROCHLORIDE 25 MG: 10 INJECTION, SOLUTION EPIDURAL; INFILTRATION; INTRACAUDAL; PERINEURAL at 15:25:00

## 2024-12-03 NOTE — OPERATIVE REPORT
HPI:   Concerns: presents today for pap  Last pap approx 5 years ago  Hx colposcopy with biopsy, was going yearly  Menses regular, lasts 3-5 days, not heavy. Mild cramping. Had mammo at 21 yo and 34 baseline, no hx biopsy.  Mom had breast cancer at 46 [facility]  Operative Note     Oluwaseun Oluwadamilola Ogunyemi Location: OR   [facility] [id number] MRN [id number]   Admission Date 12/3/2024 Operation Date 12/3/2024   Attending Physician Mahin Denis DO Operating Physician Mahin Denis DO      Preoperative Diagnosis: Cholelithiasis in patient with sickle cell disease     Postoperative Diagnosis: Same with evidence of chronic cholecystitis     Procedure Performed:   LAPAROSCOPIC CHOLECYSTECTOMY WITH CHOLANGIOGRAM lysis of intra-abdominal adhesions     Primary Surgeon: Mahin Denis DO      Assistant: Franklin FRAGOSO     Surgical Findings: Adhesions of omentum to surface of gallbladder, normal IOC     Anesthesia: General     Complications: None     Implants: * No implants in log *     Specimen: Gallbladder     Drains: None     Condition: Good     Estimated Blood Loss: 10 cc     Summary of Case: To recovery room stable     Operative note  Informed consent was previously obtained the patient.  Patient was taken to the operative suite and placed in supine position.  General inhalational anesthetic was administered by the anesthesia department.  The anterior abdomen was prepped and draped in usual sterile fashion.  The abdomen was entered  through a subumbilical incision using standard has Paul technique.  Laparoscopy was carried out at  the abdominal entry site demonstrating no evidence of injury to intra-abdominal contents.  #5 trochars were placed at the subxiphoid position,  right subcostal midclavicular and axillary line under laparoscopic visualization.  The subxiphoid #5 trocar site was changed out for a 10 mm trocar site due to the enlarged cystic duct.  Right and left lobes of the liver were found to be of normal color contour size and shape.  The gallbladder was found to be adhesed to the omentum.  These adhesions were taken down sequentially using blunt dissection sharp dissection and electrocautery dissection.  The gallbladder was grasped at its fundus and  Psychiatric/Behavioral: Negative for confusion and agitation. The patient is not nervous/anxious.       /76   Pulse 75   Temp 98.6 °F (37 °C)   Resp 18   Wt 240 lb (108.9 kg)   LMP 07/11/2020   SpO2 98%   BMI 43.20 kg/m²   Physical Exam   Constituti retracted cephalad and medially. The  infundibulum was grasped and retracted laterally.  Blunt and sharp dissection was carried out within the [place] until the cystic duct and cystic artery were isolated.  A single clip was placed on the proximal cystic duct a small opening made in the cystic duct.  C-arm cholangiography was carried out using full-strength Hypaque solution.  Initially a guidewire was passed into the cystic duct followed by a ureteral catheter.  This was followed by C-arm cholangiography demonstrating normal intra-and extrahepatic biliary ductal anatomy with easy flow of contrast into the duodenum.  Cholangiography was terminated and the cholangiocatheter was removed.  3 clips were placed on the proximal cystic duct and it was divided. The cystic artery was clipped twice proximally once distally and it was divided.  The gallbladder was then bluntly and sharply dissected off the gallbladder fossa using hand held electrocautery.  The gallbladder was placed in an Endo Catch bag and brought out through the umbilical port intact.  Insufflation was resumed and irrigation was carried out of the right upper quadrant.  The gallbladder fossa was copiously irrigated and the clips were found to be in good position.  All fluid was suctioned and trochars were removed under reduced pneumoperitoneum demonstrated no evidence of bleeding in the right upper quadrant or the gallbladder fossa.  The umbilicus was approximated at  the fascial level using running 2-0 Vicryl suture.  Skin edges were approximated using 4-0 Monocryl in a running subdermal fashion.  20 cc of 0.25% Marcaine plain was injected into all incisions at the completion of the procedure.  Sterile dressings were applied.  The patient was transported from the operative suite to recovery room in stable condition all sponge needle counts were correct.    Mahin Denis DO  12/3/2024  4:48 PM

## 2024-12-03 NOTE — ANESTHESIA PREPROCEDURE EVALUATION
PRE-OP EVALUATION    Patient Name: Oluwaseun Oluwadamilola Ogunyemi    Admit Diagnosis: Calculus of gallbladder with cholecystitis without biliary obstruction, unspecified cholecystitis acuity [K80.10]    Pre-op Diagnosis: Calculus of gallbladder with cholecystitis without biliary obstruction, unspecified cholecystitis acuity [K80.10]    LAPAROSCOPIC CHOLECYSTECTOMY POSSIBLE OPEN WITH CHOLANGIOGRAM    Anesthesia Procedure: LAPAROSCOPIC CHOLECYSTECTOMY POSSIBLE OPEN WITH CHOLANGIOGRAM    Surgeons and Role:     * Mahin Denis, DO - Primary    Pre-op vitals reviewed.  Temp: 99 °F (37.2 °C)  Pulse: 70  Resp: 16  BP: 118/52  SpO2: 99 %  Body mass index is 25.82 kg/m².    Current medications reviewed.  Hospital Medications:   acetaminophen (Tylenol Extra Strength) tab 1,000 mg  1,000 mg Oral Once    scopolamine (Transderm-Scop) 1 MG/3DAYS patch 1 patch  1 patch Transdermal Once    lactated ringers infusion   Intravenous Continuous    [COMPLETED] heparin (Porcine) 5000 UNIT/ML injection 5,000 Units  5,000 Units Subcutaneous Once    ceFAZolin (Ancef) 2g in 10mL IV syringe premix  2 g Intravenous Once    [COMPLETED] sodium chloride 0.9 % IV bolus 1,000 mL  1,000 mL Intravenous Once    [COMPLETED] crizanlizumab-tmca (Adakveo) 360 mg in sodium chloride 0.9% 100 mL infusion  5 mg/kg Intravenous Once    [COMPLETED] diphenhydrAMINE (Benadryl) cap/tab 25-50 mg  25-50 mg Oral Once    [] HYDROmorphone (Dilaudid) 1 MG/ML injection 2 mg  2 mg Intravenous Q30 Min PRN       Outpatient Medications:   Prescriptions Prior to Admission[1]    Allergies: Piperacillin sod-tazobactam so      Anesthesia Evaluation    Patient summary reviewed.    Anesthetic Complications  (-) history of anesthetic complications         GI/Hepatic/Renal                                 Cardiovascular      ECG reviewed.  Exercise tolerance: good     MET: >4      (+) hypertension                                     Endo/Other               (+) anemia  (+)  sickle cell disease  (+) thalassemia               Pulmonary                 (-) recent URI          Neuro/Psych                              Patient Active Problem List:     Sickle cell crisis (HCC)     Sickle cell anemia (HCC)     Hypokalemia     Sickle cell pain crisis (HCC)     At risk for negative response to nurse controlled analgesia     Constipation due to opioid therapy     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)     Nausea     Chronic, continuous use of opioids     Elevated troponin     Macrocytic anemia     Chest pain of uncertain etiology     Dehydration     Anemia, unspecified type     Multifocal pneumonia     Calculus of gallbladder with acute cholecystitis without obstruction     Diarrhea            Past Surgical History:   Procedure Laterality Date      2017      2018     Social History     Socioeconomic History    Marital status:    Tobacco Use    Smoking status: Never    Smokeless tobacco: Never   Vaping Use    Vaping status: Never Used   Substance and Sexual Activity    Alcohol use: Never     Comment: No history of excessive use    Drug use: Never    Sexual activity: Yes     Partners: Male     Birth control/protection: Condom   Other Topics Concern    Caffeine Concern Yes     Comment: 5-10 a month    Exercise Yes     Comment: 1-2 times a week    Seat Belt Yes    Self-Exams Yes     Comment: self breast exam with showering     History   Drug Use Unknown     Available pre-op labs reviewed.  Lab Results   Component Value Date    WBC 3.5 (L) 2024    RBC 1.89 (L) 2024    HGB 7.8 (L) 2024    HCT 21.9 (L) 2024    .9 (H) 2024    MCH 41.3 (H) 2024    MCHC 35.6 2024    RDW 15.6 2024    .0 2024     Lab Results   Component Value Date     10/17/2024    K 3.5 10/17/2024     10/17/2024    CO2 26.0 10/17/2024    BUN 8 (L) 10/17/2024    CREATSERUM 0.61 10/17/2024    GLU 97  10/17/2024    CA 9.2 10/17/2024            Airway      Mallampati: II  Mouth opening: >3 FB  TM distance: > 6 cm  Neck ROM: full Cardiovascular      Rhythm: regular  Rate: normal     Dental             Pulmonary      Breath sounds clear to auscultation bilaterally.               Other findings              ASA: 3   Plan: general  NPO status verified and patient meets guidelines.  Patient has taken beta blockers in last 24 hours.  Post-procedure pain management plan discussed with surgeon and patient.    Comment: D/w the patient the risks and benefits of general anesthesia including sore throat, nausea, and intraoperative awareness.    Plan/risks discussed with: patient                Present on Admission:  **None**             [1]   Medications Prior to Admission   Medication Sig Dispense Refill Last Dose/Taking    Glutamine, Sickle Cell, (ENDARI) 5 g Oral Powd Pack Mix 3 packets (15 grams) with 8 ounces (240 mL) of cold or room temperature liquid or 4 to 6 ounces of food and take by mouth twice daily as directed. 180 each 11 12/2/2024 at 11:00 AM    morphINE ER 60 MG Oral Tab CR Take 1 tablet (60 mg total) by mouth every 8 (eight) hours. 90 tablet 0 12/3/2024 at  9:00 AM    OxyCODONE HCl IR 30 MG Oral Tab Take 1 tablet (30 mg total) by mouth every 3 (three) hours as needed for Pain. 180 tablet 0 12/3/2024 at 11:45 AM    diphenhydrAMINE 25 MG Oral Cap Take 1 capsule (25 mg total) by mouth every 6 (six) hours as needed for Itching.   Past Month    ASPIRIN LOW DOSE 81 MG Oral Tab EC TAKE 1 TABLET BY MOUTH EVERY DAY 90 tablet 3 11/22/2024    hydroxyurea 500 MG Oral Cap Take 4 capsules (2,000 mg total) by mouth daily. 360 capsule 3 12/3/2024 at 11:45 AM    folic acid 1 MG Oral Tab Take 1 tablet (1 mg total) by mouth daily. 90 tablet 3 12/3/2024 at 11:45 AM    metoprolol succinate ER 25 MG Oral Tablet 24 Hr Take 1 tablet (25 mg total) by mouth daily.   12/3/2024 at 11:45 AM    Cyanocobalamin (VITAMIN B-12 OR) Take 200  mcg by mouth daily.   12/3/2024 at 11:45 AM    loperamide 2 MG Oral Cap Take 1 capsule (2 mg total) by mouth every 3 (three) hours as needed for Diarrhea. 30 capsule 0 More than a month    ondansetron (ZOFRAN) 8 MG tablet Take 1 tablet (8 mg total) by mouth every 8 (eight) hours as needed for Nausea. 9 tablet 13 More than a month    Naloxone HCl 4 MG/0.1ML Nasal Liquid 4 mg by Nasal route as needed. If patient remains unresponsive, repeat dose in other nostril 2-5 minutes after first dose. 1 kit 0 Unknown

## 2024-12-03 NOTE — ANESTHESIA POSTPROCEDURE EVALUATION
[facility] Oluwaseun Oluwadamilola Ogunyemi Patient Status:  Hospital Outpatient Surgery   Age/Gender 39 year old female MRN [id number]   Location [facility] POST ANESTHESIA CARE UNIT Attending Mahin Denis DO   Hosp Day # 0 PCP Clive [address] MD       Anesthesia Post-op Note    LAPAROSCOPIC CHOLECYSTECTOMY WITH CHOLANGIOGRAM    Procedure Summary       Date: 12/03/24 Room / Location:  MAIN OR 12 /  MAIN OR    Anesthesia Start: 1514 Anesthesia Stop: 1706    Procedure: LAPAROSCOPIC CHOLECYSTECTOMY WITH CHOLANGIOGRAM (Abdomen) Diagnosis:       Calculus of gallbladder with cholecystitis without biliary obstruction, unspecified cholecystitis acuity      (Calculus of gallbladder with cholecystitis without biliary obstruction, unspecified cholecystitis acuity [K80.10])    Surgeons: Mahin Denis DO Anesthesiologist: Magno Young MD    Anesthesia Type: general ASA Status: 3            Anesthesia Type: general    Vitals Value Taken Time   /63 12/03/24 1702   Temp 98.5 °F (36.9 °C) 12/03/24 1702   Pulse 103 12/03/24 1706   Resp 22 12/03/24 1706   SpO2 98 % 12/03/24 1706   Vitals shown include unfiled device data.    Patient Location: PACU    Anesthesia Type: general    Airway Patency: patent    Postop Pain Control: adequate    Mental Status: mildly sedated but able to meaningfully participate in the post-anesthesia evaluation    Nausea/Vomiting: none    Cardiopulmonary/Hydration status: stable euvolemic    Complications: no apparent anesthesia related complications    Postop vital signs: stable    Dental Exam: Unchanged from Preop    Patient to be discharged from PACU when criteria met.

## 2024-12-03 NOTE — H&P
Patient presents status post hospitalization for sickle cell crisis.  Patient had evidence of colitis and diarrhea with dehydration which started a sickle crisis and left-sided abdominal pain.  Patient never had right sided abdominal pain.  She did have some vomiting.  CT scan demonstrated evidence of a dilated gallbladder.  The patient currently denies any right upper quadrant symptoms.  Ultrasound demonstrated gallstones and HIDA scan is unremarkable         .               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)    Sickle cell crisis (HCC)     Frequent crises    Sickle cell crisis (HCC)    Sickle cell pain crisis (HCC)         Past Surgical History         Past Surgical History:   Procedure Laterality Date                       [Medications Ordered Prior to Encounter]    [Medications Ordered Prior to Encounter]         Current Outpatient Medications on File Prior to Visit   Medication Sig Dispense Refill    Glutamine, Sickle Cell, (ENDARI) 5 g Oral Powd Pack Mix 3 packets (15 grams) with 8 ounces (240 mL) of cold or room temperature liquid or 4 to 6 ounces of food and take by mouth twice daily as directed. 180 each 11    morphINE ER 60 MG Oral Tab CR Take 1 tablet (60 mg total) by mouth every 8 (eight) hours. 90 tablet 0    OxyCODONE HCl IR 30 MG Oral Tab Take 1 tablet (30 mg total) by mouth every 3 (three) hours as needed for Pain. 180 tablet 0    dicyclomine 10 MG  Oral Cap Take 1 capsule (10 mg total) by mouth 3 (three) times daily as needed (Abdominal cramping). 30 capsule 0    loperamide 2 MG Oral Cap Take 1 capsule (2 mg total) by mouth every 3 (three) hours as needed for Diarrhea. 30 capsule 0    opium 10 MG/ML (1%) Oral Tincture Take 0.6 mL (6 mg total) by mouth 3 (three) times daily as needed. 1 each 0    diphenhydrAMINE 25 MG Oral Cap Take 1 capsule (25 mg total) by mouth every 6 (six) hours as needed for Itching.        ASPIRIN LOW DOSE 81 MG Oral Tab EC TAKE 1 TABLET BY MOUTH EVERY DAY 90 tablet 3    ondansetron (ZOFRAN) 8 MG tablet Take 1 tablet (8 mg total) by mouth every 8 (eight) hours as needed for Nausea. 9 tablet 13    hydroxyurea 500 MG Oral Cap Take 4 capsules (2,000 mg total) by mouth daily. 360 capsule 3    folic acid 1 MG Oral Tab Take 1 tablet (1 mg total) by mouth daily. 90 tablet 3    Naloxone HCl 4 MG/0.1ML Nasal Liquid 4 mg by Nasal route as needed. If patient remains unresponsive, repeat dose in other nostril 2-5 minutes after first dose. 1 kit 0    metoprolol succinate ER 25 MG Oral Tablet 24 Hr Take 1 tablet (25 mg total) by mouth daily.        Cyanocobalamin (VITAMIN B-12 OR) Take 200 mcg by mouth daily.          No current facility-administered medications on file prior to visit.     @ALL@  Family History         Family History   Problem Relation Age of Onset    Hypertension Mother           unknown    Cataracts Mother      No Known Problems Father           was told cardiac arrest    No Known Problems Brother      Sickle Cell Maternal Aunt           passed from kidney failure    DVT/VTE Other      Breast Cancer Neg      Ovarian Cancer Neg      Colon Cancer Neg           Short Social Hx on File   Social History            Socioeconomic History    Marital status:    Tobacco Use    Smoking status: Never    Smokeless tobacco: Never   Vaping Use    Vaping status: Never Used   Substance and Sexual Activity    Alcohol use: Never       Comment:  No history of excessive use    Drug use: Never    Sexual activity: Yes       Partners: Male       Birth control/protection: Condom   Social History Narrative     , has 2 children ages 4 and 5 as of 9/2022.  Not employed.      Social Drivers of Health           Financial Resource Strain: Low Risk  (4/28/2023)     Financial Resource Strain      Difficulty of Paying Living Expenses: Not very hard   Food Insecurity: No Food Insecurity (10/7/2024)     Food Insecurity      Food Insecurity: Never true   Transportation Needs: No Transportation Needs (10/7/2024)     Transportation Needs      Lack of Transportation: No   Housing Stability: Low Risk  (10/7/2024)     Housing Stability      Housing Instability: No            ROS      GENERAL HEALTH: otherwise feels well, no weight loss, no fever or chills  SKIN: denies any unusual skin rashes or jaundice  HEENT: denies nasal congestion, sinus pain or sore throat; hearing loss negative,   EYES , no diplopia or vision changes  RESPIRATORY: denies shortness of breath, wheezing or cough   CARDIOVASCULAR: denies chest pain or IBARRA; no palpitations   GI: denies nausea, vomiting, constipation, diarrhea; no rectal bleeding; no heartburn  GENITAL/: no dysuria, urgency or frequency, no tea colored urine  MUSCULOSKELETAL: no joint complaints upper or lower extremities  HEMATOLOGY: denies hx anemia; denies bruising or excessive bleeding  Endocrine: no weight gain or loss no hot or cold intolerance     EXAM      Blood pressure 98/64, pulse 67, temperature 97.6 °F (36.4 °C), temperature source Temporal, resp. rate 18, last menstrual period 10/31/2024, SpO2 100%, not currently breastfeeding.  GENERAL: well developed, well nourished female, in no apparent distress.    MENTAL STATUS :Alert, oriented x 3  PSYCH: normal mood and affect  SKIN: anicteric, no rashes, no bruising  EYES: PERRLA, EOMI, sclera anicteric,  conjunctiva without pallor  HEENT: normocephalic, atraumatic, TMs clear,  nares patent, mouth moist, pharynx w/o erythema  NECK: supple, no JVD, no adenopathy, no thyromegaly  RESPIRATORY: clear to auscultation, no rales , rhonchi or wheezing  CARDIOVASCULAR: RRR, murmur negative  ABDOMEN: normal active BS, soft, nondistended  no HSM, no masses or hernias  LYMPHATIC: no axillary , supraclavicular or inguinal lymphadenopathy  EXTREMITIES: no cyanosis, clubbing or edema, no atrophy, full ROM     STUDIES:              Orders Only on 10/20/2024   Component Date Value Ref Range Status    Calprotectin, Fecal 10/20/2024 41.0  <50 µg/g Final     Normal:  <50 µg/g  Borderline:   µg/g: Borderline elevated, test should be re-evaluated in 4-6 weeks  Elevated:  >120 µg/g            ASSESSMENT   Imp: No evidence of acute cholecystitis or chronic cholecystitis however patient does have sickle cell disease and she has a known history of cholelithiasis.  Due to her sickle cell disease pt should undergo cholecystectomy I disc with pt risks of cholecystectomy, Plan laparoscopic possible open cholecystectomy  disc with pt the risks of bleeding infection pneumonia dvt/pe and common bile duct injury with a second major operation to repair

## 2024-12-03 NOTE — ANESTHESIA PROCEDURE NOTES
Airway  Date/Time: 12/3/2024 3:26 PM  Urgency: elective    Airway not difficult    General Information and Staff    Patient location during procedure: OR  Anesthesiologist: Magno Young MD  Performed: anesthesiologist   Performed by: Magno Young MD  Authorized by: Magno Young MD      Indications and Patient Condition  Indications for airway management: anesthesia  Sedation level: deep  Preoxygenated: yes  Patient position: sniffing  MILS maintained throughout  Mask difficulty assessment: 0 - not attempted    Final Airway Details  Final airway type: endotracheal airway      Successful airway: ETT  Cuffed: yes   Successful intubation technique: direct laryngoscopy  Facilitating devices/methods: rapid sequence intubation  Endotracheal tube insertion site: oral  Blade: Nubia  Blade size: #3  ETT size (mm): 7.5    Cormack-Lehane Classification: grade I - full view of glottis  Placement verified by: capnometry   Cuff volume (mL): 6  Measured from: lips  ETT to lips (cm): 22  Number of attempts at approach: 1

## 2024-12-03 NOTE — DISCHARGE INSTRUCTIONS
Home Care Instructions  Cholecystectomy  Dr. Mahin Denis    MEDICATIONS  For post-operative pain control the medications are usually Norco or Tylenol #3.  These are narcotics and are best taken by starting with one tablet and repeating every four to six hours as needed.  If the patient does not feel they need the narcotics they shouldn’t take them.  If the pain is severe the patient may take up to two pills every four hours.  If a minor supplement is necessary in addition to the narcotics do not overlap with Tylenol (any product with acetaminophen) as both Norco and Tylenol #3 contain Tylenol.  Please supplement with Advil (ibuprofen) or Motrin.  Please ask your surgeon before resuming blood thinners such as aspirin, Plavix or Coumadin.  All other home medications may be resumed as scheduled.  With severe cholecystitis, antibiotics will also be prescribed.  With antibiotics, please watch for rash, facial swelling or severe diarrhea.    DIET  The patient may resume a general diet immediately.  This is not a good time to eat excessively.  The patient should eat in moderation and stick with foods the patient feels are easy to digest.  Avoid high fat foods in the immediate post-operative time period as this may still cause some problems.  The patient may eat anything in small amounts but foods rich in dairy products, fatty foods or fried foods may cause an upset stomach for up to six weeks after surgery.  There should be no alcohol consumption in the immediate recovery time period or within six hours of taking narcotics.    WOUND CARE  The top dressing may be removed the day after surgery.  This includes the gauze, tape and band-aids if they are present.  Do not remove the steri-strips or butterfly tapes that are white and adherent to the skin.  The steri-strips will eventually peel up at the ends and at this point they may be removed.  This is usually seven to ten days after surgery.  The patient may shower the day  after surgery.  There is no need to cover the incisions, and all top gauze type dressing should be removed prior to showering.  Soap can get on the wounds but do not scrub over the wounds.  No hair dye or chemicals of any kind should get in the wounds.  Avoid tub baths, swimming or sitting in a hot tub for two weeks.  If visible sutures or staples are present they will be removed in the office by the surgeon or nurse.  Most wounds will be closed with dissolving suture underneath the skin.  These sutures will dissolve on their own.  If a drain is present make sure the patient receives drain care instructions from the nurse prior to discharge.  Most patients will not have a drain.    ACTIVITY  Every day the patient should be up, showered and dressed.  Each day the patient should be up and around the house.  The patient should not lie in bed and should not stay in pajamas.  We count on the patient being up, coughing, walking and deep breathing to avoid pneumonia and blood clots in the legs.  Once a day the patient should get out of the house and go shopping, go to the mall, the [employer] store, the [employer] or a restaurant.  The patient may ride in a car but should not drive the car for at least one week.  Patients should be off narcotics for at least 8 hours prior to being a .  The average time off work is 10 to 14 days; most adults will be seeing the surgeon prior to returning to work.  Students will return to school within 1-5 days after discharge from the hospital but will be off gym, sports, and indoors for recess for one month.  Patients may go up and down stairs and lift up to five pounds but no bending, pushing or pulling.  Nothing called work or exercise until the follow up visit.  No ‘stair-master’, power walking, jogging or workouts until the follow up visit.  Patients should seek further activity limits at the time of their appointment.    APPOINTMENT  Please call our office for an appointment within  five to ten days of discharge.  Any fever greater than 100.5, chills, nausea, vomiting, or severe diarrhea please call our office.  If the wound turns red, hot, swollen, becomes increasingly painful, or drains pus call us immediately at [phone].  For life threatening emergencies call 911.  For non-emergent care please call our office after 8:30 a.m. Monday through Friday.  The number listed above is our office number; our phone automatically switches to our answering service if we are not there.  Please do not use the emergency room for non-urgent care.    Thank you for entrusting us with your care.  [facility]General Surgery     You received a drug called Toradol which is an Anti Inflammatory at: 4:45 pm     Do not take any Anti Inflammatory like Motrin, Advil, Aleve or Ibuprofen until after: 10:45 pm   Please report any suspected allergic reactions or bleeding issues to your doctor

## 2024-12-05 DIAGNOSIS — D57.00 SICKLE CELL PAIN CRISIS (HCC): ICD-10-CM

## 2024-12-05 RX ORDER — OXYCODONE HYDROCHLORIDE 30 MG/1
30 TABLET ORAL
Qty: 180 TABLET | Refills: 0 | Status: SHIPPED | OUTPATIENT
Start: 2024-12-05 | End: 2025-01-02

## 2024-12-06 ENCOUNTER — TELEPHONE (OUTPATIENT)
Dept: HEMATOLOGY/ONCOLOGY | Facility: HOSPITAL | Age: 39
End: 2024-12-06

## 2024-12-06 NOTE — TELEPHONE ENCOUNTER
Disability forms received and logged for processing. [employer] sent for Release of Information.   Statement Selected

## 2024-12-09 ENCOUNTER — TELEPHONE (OUTPATIENT)
Dept: HEMATOLOGY/ONCOLOGY | Facility: HOSPITAL | Age: 39
End: 2024-12-09

## 2024-12-09 NOTE — TELEPHONE ENCOUNTER
Revision processed, scanned into this encounter,  uploaded to [employer], no authorization for [facility], closing this encounter.

## 2024-12-09 NOTE — TELEPHONE ENCOUNTER
Patient called requesting for revision on forms needs more information added on the forms - Patient is requesting a call back once forms are faxed to [facility] for she can call them and let them know forms were faxed/revised

## 2024-12-12 ENCOUNTER — OFFICE VISIT (OUTPATIENT)
Dept: HEMATOLOGY/ONCOLOGY | Facility: HOSPITAL | Age: 39
End: 2024-12-12
Attending: INTERNAL MEDICINE
Payer: COMMERCIAL

## 2024-12-12 VITALS
HEART RATE: 89 BPM | RESPIRATION RATE: 18 BRPM | BODY MASS INDEX: 25.76 KG/M2 | SYSTOLIC BLOOD PRESSURE: 148 MMHG | TEMPERATURE: 98 F | OXYGEN SATURATION: 97 % | HEIGHT: 66.38 IN | DIASTOLIC BLOOD PRESSURE: 86 MMHG | WEIGHT: 162.19 LBS

## 2024-12-12 DIAGNOSIS — D57.00 SICKLE CELL PAIN CRISIS (HCC): ICD-10-CM

## 2024-12-12 DIAGNOSIS — D57.00 SICKLE CELL DISEASE WITH CRISIS (HCC): Primary | ICD-10-CM

## 2024-12-12 DIAGNOSIS — D57.419 SICKLE CELL ANEMIA WITH COEXISTENT ALPHA-THALASSEMIA WITH CRISIS (HCC): ICD-10-CM

## 2024-12-12 DIAGNOSIS — D53.9 MACROCYTIC ANEMIA: ICD-10-CM

## 2024-12-12 LAB
ALBUMIN SERPL-MCNC: 3.9 G/DL (ref 3.2–4.8)
ALBUMIN/GLOB SERPL: 0.9 {RATIO} (ref 1–2)
ALP LIVER SERPL-CCNC: 48 U/L
ALT SERPL-CCNC: 22 U/L
ANION GAP SERPL CALC-SCNC: 7 MMOL/L (ref 0–18)
AST SERPL-CCNC: 27 U/L (ref ?–34)
BASOPHILS # BLD AUTO: 0.01 X10(3) UL (ref 0–0.2)
BASOPHILS NFR BLD AUTO: 0.3 %
BILIRUB SERPL-MCNC: 0.4 MG/DL (ref 0.3–1.2)
BUN BLD-MCNC: <5 MG/DL (ref 9–23)
CALCIUM BLD-MCNC: 9 MG/DL (ref 8.7–10.4)
CHLORIDE SERPL-SCNC: 105 MMOL/L (ref 98–112)
CO2 SERPL-SCNC: 27 MMOL/L (ref 21–32)
CREAT BLD-MCNC: 0.58 MG/DL
EGFRCR SERPLBLD CKD-EPI 2021: 117 ML/MIN/1.73M2 (ref 60–?)
EOSINOPHIL # BLD AUTO: 0.02 X10(3) UL (ref 0–0.7)
EOSINOPHIL NFR BLD AUTO: 0.6 %
ERYTHROCYTE [DISTWIDTH] IN BLOOD BY AUTOMATED COUNT: 14.6 %
GLOBULIN PLAS-MCNC: 4.5 G/DL (ref 2–3.5)
GLUCOSE BLD-MCNC: 106 MG/DL (ref 70–99)
HCT VFR BLD AUTO: 22.8 %
HGB BLD-MCNC: 8.4 G/DL
HGB RETIC QN AUTO: 38.9 PG (ref 28.2–36.6)
IMM GRANULOCYTES # BLD AUTO: 0.01 X10(3) UL (ref 0–1)
IMM GRANULOCYTES NFR BLD: 0.3 %
IMM RETICS NFR: 0.32 RATIO (ref 0.1–0.3)
LDH SERPL L TO P-CCNC: 227 U/L
LYMPHOCYTES # BLD AUTO: 1.64 X10(3) UL (ref 1–4)
LYMPHOCYTES NFR BLD AUTO: 49.5 %
MCH RBC QN AUTO: 41.8 PG (ref 26–34)
MCHC RBC AUTO-ENTMCNC: 36.8 G/DL (ref 31–37)
MCV RBC AUTO: 113.4 FL
MONOCYTES # BLD AUTO: 0.46 X10(3) UL (ref 0.1–1)
MONOCYTES NFR BLD AUTO: 13.9 %
NEUTROPHILS # BLD AUTO: 1.17 X10 (3) UL (ref 1.5–7.7)
NEUTROPHILS # BLD AUTO: 1.17 X10(3) UL (ref 1.5–7.7)
NEUTROPHILS NFR BLD AUTO: 35.4 %
PLATELET # BLD AUTO: 314 10(3)UL (ref 150–450)
POTASSIUM SERPL-SCNC: 3.6 MMOL/L (ref 3.5–5.1)
PROT SERPL-MCNC: 8.4 G/DL (ref 5.7–8.2)
RBC # BLD AUTO: 2.01 X10(6)UL
RETICS # AUTO: 61.1 X10(3) UL (ref 22.5–147.5)
RETICS/RBC NFR AUTO: 3 %
SODIUM SERPL-SCNC: 139 MMOL/L (ref 136–145)
WBC # BLD AUTO: 3.3 X10(3) UL (ref 4–11)

## 2024-12-12 PROCEDURE — 96365 THER/PROPH/DIAG IV INF INIT: CPT

## 2024-12-12 PROCEDURE — 96376 TX/PRO/DX INJ SAME DRUG ADON: CPT

## 2024-12-12 PROCEDURE — 96361 HYDRATE IV INFUSION ADD-ON: CPT

## 2024-12-12 PROCEDURE — 36415 COLL VENOUS BLD VENIPUNCTURE: CPT

## 2024-12-12 PROCEDURE — 99215 OFFICE O/P EST HI 40 MIN: CPT | Performed by: INTERNAL MEDICINE

## 2024-12-12 PROCEDURE — 96375 TX/PRO/DX INJ NEW DRUG ADDON: CPT

## 2024-12-12 RX ORDER — DIPHENHYDRAMINE HCL 25 MG
CAPSULE ORAL ONCE
Start: 2025-01-09 | End: 2025-01-09

## 2024-12-12 RX ORDER — DIPHENHYDRAMINE HCL 25 MG
CAPSULE ORAL ONCE
Status: COMPLETED | OUTPATIENT
Start: 2024-12-12 | End: 2024-12-12

## 2024-12-12 RX ORDER — HYDROMORPHONE HYDROCHLORIDE 1 MG/ML
1-2 INJECTION, SOLUTION INTRAMUSCULAR; INTRAVENOUS; SUBCUTANEOUS ONCE
Status: COMPLETED | OUTPATIENT
Start: 2024-12-12 | End: 2024-12-12

## 2024-12-12 RX ORDER — HYDROMORPHONE HYDROCHLORIDE 1 MG/ML
2 INJECTION, SOLUTION INTRAMUSCULAR; INTRAVENOUS; SUBCUTANEOUS ONCE
Status: COMPLETED | OUTPATIENT
Start: 2024-12-12 | End: 2024-12-12

## 2024-12-12 RX ORDER — ONDANSETRON 2 MG/ML
8 INJECTION INTRAMUSCULAR; INTRAVENOUS AS NEEDED
Start: 2025-01-09

## 2024-12-12 RX ORDER — HYDROMORPHONE HYDROCHLORIDE 1 MG/ML
1-2 INJECTION, SOLUTION INTRAMUSCULAR; INTRAVENOUS; SUBCUTANEOUS AS NEEDED
Start: 2025-01-09

## 2024-12-12 RX ADMIN — HYDROMORPHONE HYDROCHLORIDE 2 MG: 1 INJECTION, SOLUTION INTRAMUSCULAR; INTRAVENOUS; SUBCUTANEOUS at 13:00:00

## 2024-12-12 RX ADMIN — HYDROMORPHONE HYDROCHLORIDE 2 MG: 1 INJECTION, SOLUTION INTRAMUSCULAR; INTRAVENOUS; SUBCUTANEOUS at 13:43:00

## 2024-12-12 RX ADMIN — HYDROMORPHONE HYDROCHLORIDE 2 MG: 1 INJECTION, SOLUTION INTRAMUSCULAR; INTRAVENOUS; SUBCUTANEOUS at 11:57:00

## 2024-12-12 RX ADMIN — DIPHENHYDRAMINE HCL 25 MG: 25 MG CAPSULE ORAL at 11:57:00

## 2024-12-12 NOTE — PROGRESS NOTES
Hematology Clinic Follow Up Visit    Patient Name: Oluwaseun Oluwadamilola Ogunyemi  Medical Record Number: [id number]   YOB: 1985    PCP: Clive St MD     Reason for Consultation:  Oluwaseun Oluwadamilola Ogunyemi was seen today for the diagnosis of hgb SS/sickle cell disease    Hematologic History:  *Sickle cell disease, hgb SS  -early 2000's moved from [facility] to   -2016/2017- 1st pregnancy c/b worsening pain crises  -2018- 2nd pregnancy c/b worsening pain crises, including episode of acute chest requiring RBC exchange.  -early 2019- started hydroxyurea, titrated up to 2000mg daily  -7/2020- started Voxelotor, but dc'd shortly after starting d/t poor tolerability with diarrhea, fatigue, elevated LFTs, was not very helpful either.   -5/28/21- 3/2022- received crizanlizumab (Adakveo) however c/b infusion reaction 3/2022 and was only slightly helpful therefore not continued  -10/2021- moved from [facility] (followed with Dr. Walton of [facility]) to [place].   -4/2022- started L-glutamine (Endari) 15g PO BID, cont'd hydrea  -8/2022- moved from [place] (prev followed by Dr. Esau Blount) to [place], [place]  -3/27/23- resumed crizanlizumab d/t more freq acute pain episodes   -5/8/23- reduced hydrea to 1500mg daily (d/t hgb 7.4, abs retic 47K)   -7/2/23- reduced hydrea to 1000mg daily (d/t hgb 7.0, abs retic 59K)   -12/27/23- increased Hydrea to 1500mg daily  -4/4/24- increased Hydrea to 2000mg daily    ================================  Interval events: Presents for follow-up and for next crizanlizumab infusion.  She underwent laparoscopic cholecystectomy on 12/3/24.  She thinks her abdominal pain might be somewhat better since this time but it is hard to tell because she has had some postoperative pain which is improving.  She does feel like a sickle cell pain crisis has started over the last couple days between the surgery and the colder weather.  Pain is mostly in her legs and  back.  No recurrent diarrhea.  No fevers or known infection.    She continues to take MS Contin 60 mg q8hrs and oxycodone IR 30 mg q3 hrs prn.      She has been taking Hydrea at 2000 mg daily.  She is also taking Endari 15 g twice daily and folic acid 1 mg daily.     She is following with Dr. Aviles at [facility] in pursuit of gene therapy with Casgevy.      Hospitalizations and ED visits for acute vaso-occlusive pain episodes since moving from [place] to [place] in 2022:  -22- hospitalization  -22- ED  -22- hospitalization  10/8/22- hospitalization  -10/29/22- ED  -22- ED  -22- ED   -23- hospitalization  -23- hospitalization  -23- hospitalization  -10/7/24- hospitalization    Past Medical History:  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 180 tablet 0    Glutamine, Sickle Cell, (ENDARI) 5 g Oral Powd Pack Mix 3 packets (15 grams) with 8 ounces (240 mL) of cold or room temperature liquid or 4 to 6 ounces of food and take by mouth twice daily as directed.  180 each 11    morphINE ER 60 MG Oral Tab CR Take 1 tablet (60 mg total) by mouth every 8 (eight) hours. 90 tablet 0    loperamide 2 MG Oral Cap Take 1 capsule (2 mg total) by mouth every 3 (three) hours as needed for Diarrhea. 30 capsule 0    diphenhydrAMINE 25 MG Oral Cap Take 1 capsule (25 mg total) by mouth every 6 (six) hours as needed for Itching.      ASPIRIN LOW DOSE 81 MG Oral Tab EC TAKE 1 TABLET BY MOUTH EVERY DAY 90 tablet 3    ondansetron (ZOFRAN) 8 MG tablet Take 1 tablet (8 mg total) by mouth every 8 (eight) hours as needed for Nausea. 9 tablet 13    hydroxyurea 500 MG Oral Cap Take 4 capsules (2,000 mg total) by mouth daily. 360 capsule 3    folic acid 1 MG Oral Tab Take 1 tablet (1 mg total) by mouth daily. 90 tablet 3    Naloxone HCl 4 MG/0.1ML Nasal Liquid 4 mg by Nasal route as needed. If patient remains unresponsive, repeat dose in other nostril 2-5 minutes after first dose. 1 kit 0    metoprolol succinate ER 25 MG Oral Tablet 24 Hr Take 1 tablet (25 mg total) by mouth daily.      Cyanocobalamin (VITAMIN B-12 OR) Take 200 mcg by mouth daily.       Allergies:   Allergies   Allergen Reactions    Piperacillin Sod-Tazobactam So ITCHING     Generalized  Tolerated cephalosporins in the past       Psychosocial History:  Social History     Social History Narrative    , has 2 children ages 4 and 5 as of 9/2022.  Not employed.     Social History     Socioeconomic History    Marital status:    Tobacco Use    Smoking status: Never    Smokeless tobacco: Never   Vaping Use    Vaping status: Never Used   Substance and Sexual Activity    Alcohol use: Never     Comment: No history of excessive use    Drug use: Never    Sexual activity: Yes     Partners: Male     Birth control/protection: Condom   Other Topics Concern    Caffeine Concern Yes     Comment: 5-10 a month    Exercise Yes     Comment: 1-2 times a week    Seat Belt Yes    Self-Exams Yes     Comment: self breast exam with showering   Social  History Narrative    , has 2 children ages 4 and 5 as of 9/2022.  Not employed.     Social Drivers of Health     Financial Resource Strain: Low Risk  (4/28/2023)    Financial Resource Strain     Difficulty of Paying Living Expenses: Not very hard   Food Insecurity: No Food Insecurity (10/7/2024)    Food Insecurity     Food Insecurity: Never true   Transportation Needs: No Transportation Needs (10/7/2024)    Transportation Needs     Lack of Transportation: No   Housing Stability: Low Risk  (10/7/2024)    Housing Stability     Housing Instability: No       Family Medical History:  Family History   Problem Relation Age of Onset    Hypertension Mother         unknown    Cataracts Mother     No Known Problems Father         was told cardiac arrest    No Known Problems Brother     Sickle Cell Maternal Aunt         passed from kidney failure    DVT/VTE Other     Breast Cancer Neg     Ovarian Cancer Neg     Colon Cancer Neg      Review of Systems:  A 10-point ROS was done with pertinent positives and negative per the HPI    Vital Signs:  Height: 168.6 cm (5' 6.38\") (12/12 1110)  Weight: 73.6 kg (162 lb 3.2 oz) (12/12 1110)  BSA (Calculated - sq m): 1.84 sq meters (12/12 1110)  Pulse: 89 (12/12 1110)  BP: 148/86 (12/12 1110)  Temp: 97.5 °F (36.4 °C) (12/12 1110)  Do Not Use - Resp Rate: --  SpO2: 97 % (12/12 1110)    Wt Readings from Last 6 Encounters:   12/12/24 73.6 kg (162 lb 3.2 oz)   12/03/24 72.6 kg (160 lb)   12/02/24 72.9 kg (160 lb 12.8 oz)   11/14/24 72.5 kg (159 lb 12.8 oz)   11/04/24 73.3 kg (161 lb 8 oz)   11/04/24 73.4 kg (161 lb 14.4 oz)     Physical Examination:  General: Patient is alert and oriented, not in acute distress  Psych: Mood and affect are appropriate  Eyes: EOMI  ENT: Oropharynx is clear  CV: Regular rate and rhythm, no murmurs, no LE edema  Respiratory: Lungs clear to auscultation bilaterally  GI/Abd: Soft, non-tender with normoactive bowel sounds, no hepatosplenomegaly  Neurological:  Grossly intact   Lymphatics: No palpable lymphadenopathy  Skin: no rashes or petechiae    Laboratory:  Lab Results   Component Value Date    WBC 3.5 (L) 11/26/2024    WBC 4.8 10/17/2024    WBC 4.0 10/14/2024    HGB 7.8 (L) 11/26/2024    HGB 9.6 (L) 10/17/2024    HGB 8.2 (L) 10/14/2024    HCT 21.9 (L) 11/26/2024    .9 (H) 11/26/2024    MCH 41.3 (H) 11/26/2024    MCHC 35.6 11/26/2024    RDW 15.6 11/26/2024    .0 11/26/2024    .0 10/17/2024    .0 10/14/2024     Lab Results   Component Value Date    GLU 97 10/17/2024    BUN 8 (L) 10/17/2024    CREATSERUM 0.61 10/17/2024    CREATSERUM 0.51 (L) 10/14/2024    CREATSERUM 0.52 (L) 10/13/2024    ANIONGAP 8 10/17/2024    CA 9.2 10/17/2024    OSMOCALC 288 10/17/2024    ALKPHO 55 10/17/2024    AST 18 10/17/2024    ALT 24 10/17/2024    BILT 0.5 10/17/2024    TP 8.3 (H) 10/17/2024    ALB 4.3 10/17/2024    GLOBULIN 4.0 (H) 10/17/2024     10/17/2024    K 3.5 10/17/2024     10/17/2024    CO2 26.0 10/17/2024     Lab Results   Component Value Date     10/17/2024     10/08/2024     07/25/2024     12/27/2023     07/12/2023     05/08/2023     03/27/2023     02/16/2023     12/22/2022     09/05/2022     No results found for: \"PTT\", \"PT\", \"INR\"    Lab Results   Component Value Date    RETICABSOL 85.9 10/17/2024    RETICABSOL 90.3 10/08/2024    RETICABSOL 70.8 10/07/2024    RETICABSOL 59.5 08/22/2024    RETICABSOL 55.2 07/25/2024    RETICABSOL 88.8 05/30/2024    RETICABSOL 116.1 04/04/2024    RETICABSOL 86.6 03/08/2024    RETICABSOL 116.0 02/21/2024    RETICABSOL 115.2 01/24/2024     Lab Results   Component Value Date    CORINNE 279.5 (H) 02/16/2023    CORINNE 395.5 (H) 09/05/2022     Lab Results   Component Value Date    SAT 28 02/16/2023    SAT 36 09/05/2022     Lab Results   Component Value Date    B12 1,792 (H) 07/12/2023    B12 182 (L) 05/08/2023     Lab Results   Component Value Date     MMA 95 05/08/2023     Component      Latest Ref Rng & Units 9/5/2022   HEMOGLOBIN A      95.5 - 100.0 % <1.0 (L)   HEMOGLOBIN A2      1.5 - 3.5 % 1.3 (L)   HEMOGLOBIN F (FETAL)      0.0 - 2.0 % 45.0 (H)   HEMOGLOBIN S      % 53.7   HGB Electophoresis Interp       Overall, 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 Although HgB F is often elevated in patients with sickle cell disease, such elevations do not commonly exceed 15% of the total hemoglobin. Possible considerations for the degree of HgB F elevation as seen in this patient may include therapy effect (Hydroxyurea) vs. hereditary persistence of fetal hemoglobin (HPFH).      Impression & Plan:     *Hgb SS/Sickle cell disease  -+hx of acute chest in 2018 requiring RBC exchange.  She reports having less than 10 transfusions in her lifetime, most of the transfusions she received were during prior pregnancies.   -She has had an excellent response with hydroxyurea with marked increase in hemoglobin F.   -Continue hydroxyurea to 2000 mg daily  -Started L-glutamine 15 g BID 4/2022, in effort to reduce frequency of acute pain episodes, so far she thinks this might be helpful, will continue this.    -restarted Crizanlizumab (adakveo) 3/16/23, tolerating well without complication.  Continue crizanlizumab 5 mg/kg q4 weeks- dose today.  The frequency of her hospitalizations have decreased since she started this, though it is unclear if her pain is actually better, she is managing her pain better at home in attempt to avoid hospitalizations.    -Previously intolerant to Voxelotor   -Recommend Folvite 1 mg daily to be continued indefinitely  -Recommend annual ophthalmology visits  -Advised to stay up-to-date with recommended vaccinations.    -She has met with  Dr. Bryce Aviles at [facility] to discuss pursuing Casgevy gene therapy.     *macrocytic anemia  -d/t sickle cell anemia + hydroxyurea effect + B12  def  -Continue vitamin B-12 1000 mcg PO daily to be continued indefinitely  -continue folvite 1mg daily indefinitely  -Follow CBC    *Chronic pain management  -Continue MS Contin 60mg q8 hrs scheduled and oxycodone 30 mg IR PO q3hrs prn   -Duloxetine was not helpful    *Acute pain episode, nausea  -Will give IV fluid hydration and IV dilaudid 2mg x 1 then 1-2mg prn today as below while she is in the infusion unit today for acute pain flare.    -Zofran for nausea    *Management recommendations of acute pain episodes not controlled with home meds  -Hydration with IV fluids with 0.9 NS until determined to be euvolemic, then maintained with 0.5 NS as relative hypernatremia can increase RBC sickling.  -Recommend prompt management of painful symptoms with parenteral opioids- recommend starting IV Dilaudid 2mg prn up to 3 doses for additional pain relief.  If needed, a PCA can be initiated on admission.  For pruritus related to opiates consider PO Benadryl or cetirizine.  I favor avoiding IV Benadryl.   -For any acute pain episode evaluation for potential infection or development of acute chest should be considered.  -standing order remains in place for IV dilaudid 1-2mg up to 3 doses prn + 1 L of 0.9 NS IVF + PO benadryl 25-50mg prn for acute pain episodes that can be managed as an outpatient in the infusion center PRN in effort to avoid ED visits as able.     RTC in 8 weeks    Esau Mccormick MD  Hematology/Medical Oncology  [facility]    MDM- high risk related to sickle cell disease management- given IVF and IV opiates requiring intensive monitoring in infusion unit.  ongoing continuity of complex care related to sickle cell disease.

## 2024-12-12 NOTE — PROGRESS NOTES
Education Record    Learner:  Patient    Disease / Diagnosis: Sickle cell anemia    Barriers / Limitations:  None   Comments:    Method:  Discussion   Comments:    General Topics:  Diet, Medication, Pain, Side effects and symptom management, and Plan of care reviewed   Comments:    Outcome:  Shows understanding   Comments:    Patient here for follow up, IVF, and infusion. She c/o fatigue, generalized pain - cold weather makes it worse. Denies CP. Abdominal pain is resolving since cholecystectomy on 12/3. Diarrhea is also resolved. She also has insomnia. Nausea is better today.

## 2024-12-12 NOTE — PROGRESS NOTES
Education Record    Learner:  Patient    Pt here for: sickle cell dx    Barriers / Limitations:  None    Method:  Brief focused, printed material and  reinforcement    General Topics:  Plan of care reviewed    Outcome: Pt tolerated infusion with no c/o.     Here for IVF, adakveo, IV dilaudid and PO benadryl with MD visit. Pt with pain 10/10. Improved with three doses of dilaudid 2mg per MD orderr. Back in 4 wks for next infusion. 8 weeks for MD, labs, and infusion.

## 2024-12-17 ENCOUNTER — OFFICE VISIT (OUTPATIENT)
Facility: LOCATION | Age: 39
End: 2024-12-17
Payer: COMMERCIAL

## 2024-12-17 VITALS
RESPIRATION RATE: 18 BRPM | SYSTOLIC BLOOD PRESSURE: 100 MMHG | HEART RATE: 79 BPM | OXYGEN SATURATION: 99 % | TEMPERATURE: 98 F | DIASTOLIC BLOOD PRESSURE: 67 MMHG

## 2024-12-17 DIAGNOSIS — Z90.49 HISTORY OF CHOLECYSTECTOMY: ICD-10-CM

## 2024-12-17 DIAGNOSIS — Z98.890 POSTOPERATIVE STATE: Primary | ICD-10-CM

## 2024-12-17 PROCEDURE — 99024 POSTOP FOLLOW-UP VISIT: CPT

## 2024-12-17 NOTE — PROGRESS NOTES
Follow Up Visit Note       Active Problems      1. Postoperative state    2. History of cholecystectomy          Chief Complaint   Chief Complaint   Patient presents with    Post-Op     PO 12/3 LAPAROSCOPIC CHOLECYSTECTOMY WITH CHOLANGIOGRAM oscar/Adeel          History of Present Illness  Skye is a 39 year old female who underwent laparoscopic cholecystectomy with Dr. Denis on 12/3/2024. She presents to clinic today for follow up evaluation.    She reports she is overall doing well following surgery. She does report episode of sickle cell crisis following surgery. She reports she is taking oxycodone and morphine as needed for this. She reports nausea for 3 days after surgery, but states this has since resolved. She denies diarrhea or constipation. She denies urinary symptoms. She denies fever or chills.    Specimen pathology as below:  Gallbladder, cholecystectomy:  -Chronic cholecystitis with cholelithiasis.    Allergies  Elbert is allergic to piperacillin sod-tazobactam so.    Past Medical / Surgical / Social / Family History    The past medical and past surgical history have been reviewed by me today.    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)    Sickle cell crisis (HCC)    Frequent crises    Sickle cell crisis (HCC)    Sickle cell pain crisis (HCC)     Past Surgical History:   Procedure Laterality Date             2018    Cholecystectomy  12/03/2024    LAPAROSCOPIC CHOLECYSTECTOMY WITH CHOLANGIOGRAM       The family history and social history have been reviewed by me today.    Family History   Problem Relation Age of Onset    Hypertension Mother         unknown    Cataracts Mother     No Known Problems Father         was told cardiac arrest    No Known Problems Brother     Sickle Cell Maternal Aunt         passed from kidney failure    DVT/VTE Other     Breast Cancer Neg     Ovarian Cancer Neg     Colon Cancer Neg      Social History     Socioeconomic History    Marital status:    Tobacco Use    Smoking status: Never    Smokeless tobacco: Never   Vaping Use    Vaping status: Never Used   Substance and Sexual Activity    Alcohol use: Never     Comment: No history of excessive use    Drug use: Never    Sexual activity: Yes     Partners: Male     Birth control/protection: Condom   Other Topics Concern    Caffeine Concern Yes     Comment: 5-10 a month    Exercise Yes     Comment: 1-2 times a week    Seat Belt Yes    Self-Exams Yes     Comment: self breast exam with showering        Current Outpatient Medications:     OxyCODONE HCl IR 30 MG Oral Tab, Take 1 tablet (30 mg total) by mouth every 3 (three) hours as needed for Pain., Disp: 180 tablet, Rfl: 0    Glutamine, Sickle Cell, (ENDARI) 5 g Oral Powd Pack, Mix 3 packets (15 grams) with 8 ounces (240 mL) of cold or room temperature liquid or 4 to 6 ounces of food and take by mouth twice daily as directed., Disp: 180 each, Rfl: 11    morphINE ER 60 MG Oral Tab CR, Take 1 tablet (60 mg total) by mouth every 8 (eight) hours., Disp: 90 tablet, Rfl: 0    loperamide 2 MG Oral Cap, Take 1 capsule (2 mg total) by mouth every 3 (three) hours as needed for Diarrhea., Disp: 30 capsule, Rfl: 0    diphenhydrAMINE 25 MG Oral Cap, Take 1 capsule (25 mg total) by mouth every 6 (six) hours as needed for Itching., Disp: , Rfl:     ASPIRIN LOW DOSE 81 MG Oral Tab EC, TAKE 1 TABLET BY MOUTH  EVERY DAY, Disp: 90 tablet, Rfl: 3    ondansetron (ZOFRAN) 8 MG tablet, Take 1 tablet (8 mg total) by mouth every 8 (eight) hours as needed for Nausea., Disp: 9 tablet, Rfl: 13    hydroxyurea 500 MG Oral Cap, Take 4 capsules (2,000 mg total) by mouth daily., Disp: 360 capsule, Rfl: 3    folic acid 1 MG Oral Tab, Take 1 tablet (1 mg total) by mouth daily., Disp: 90 tablet, Rfl: 3    Naloxone HCl 4 MG/0.1ML Nasal Liquid, 4 mg by Nasal route as needed. If patient remains unresponsive, repeat dose in other nostril 2-5 minutes after first dose., Disp: 1 kit, Rfl: 0    metoprolol succinate ER 25 MG Oral Tablet 24 Hr, Take 1 tablet (25 mg total) by mouth daily., Disp: , Rfl:     Cyanocobalamin (VITAMIN B-12 OR), Take 200 mcg by mouth daily., Disp: , Rfl:      Review of Systems  The Review of Systems has been reviewed by me during today.  Review of Systems   Constitutional:  Negative for activity change, appetite change, fatigue and fever.   Gastrointestinal:  Negative for abdominal distention, abdominal pain, constipation, diarrhea, nausea and vomiting.   Genitourinary:  Negative for difficulty urinating, dysuria and urgency.   Skin:  Negative for rash and wound.        Physical Findings   /67   Pulse 79   Temp 97.5 °F (36.4 °C) (Temporal)   Resp 18   LMP 12/10/2024 (Exact Date)   SpO2 99%   Physical Exam  Vitals reviewed.   Constitutional:       General: She is not in acute distress.     Appearance: Normal appearance. She is not ill-appearing.   HENT:      Head: Normocephalic and atraumatic.   Eyes:      General: No scleral icterus.     Conjunctiva/sclera: Conjunctivae normal.   Pulmonary:      Effort: Pulmonary effort is normal. No respiratory distress.   Abdominal:      General: There is no distension.      Palpations: Abdomen is soft.      Tenderness: There is no abdominal tenderness. There is no guarding or rebound.      Comments: Abdomen soft, non-distended, non-tender to palaption. Laparoscopic incision x  4 are clean, dry and healing appropriately. No surrounding erythema or drainage. No fluctuance to palpation. No signs of infection.Steri strips in place.       Skin:     General: Skin is warm and dry.      Coloration: Skin is not jaundiced.      Findings: No bruising or erythema.   Neurological:      Mental Status: She is alert.   Psychiatric:         Mood and Affect: Mood normal.         Behavior: Behavior normal.          Assessment   1. Postoperative state    2. History of cholecystectomy        Plan   The patient is doing well postoperatively.  Continue Diet as tolerated.  Tylenol and Ibuprofen as needed for pain control.   Continue local wound care, soap and water to the incisions. Steri strips will fall off on its own with time.   Pathology discussed with the patient.   Recommend Lifting restrictions of no greater than 15-20 lbs for 6 weeks postoperatively  A return to work note with the above restrictions was provided. I discussed with the patient that  I do not recommend she drive if she is taking narcotic medication.   Patient instructed to contact the office if she has worsening abdominal pain, nausea, vomiting, fever, chills, or any other new or worsening symptoms.   All the patient's questions and concerns were addressed. They voiced understanding and are in agreement with the plan     Follow Up  They may follow up with [facility] [place] on an as-needed basis.      Naida Hassan PA-C

## 2024-12-19 DIAGNOSIS — D57.00 SICKLE CELL PAIN CRISIS (HCC): ICD-10-CM

## 2024-12-20 RX ORDER — MORPHINE SULFATE 60 MG/1
60 TABLET, FILM COATED, EXTENDED RELEASE ORAL EVERY 8 HOURS SCHEDULED
Qty: 90 TABLET | Refills: 0 | Status: SHIPPED | OUTPATIENT
Start: 2024-12-20 | End: 2025-01-21

## 2024-12-26 ENCOUNTER — TELEPHONE (OUTPATIENT)
Age: 39
End: 2024-12-26

## 2024-12-26 ENCOUNTER — OFFICE VISIT (OUTPATIENT)
Age: 39
End: 2024-12-26
Attending: INTERNAL MEDICINE
Payer: COMMERCIAL

## 2024-12-26 VITALS
TEMPERATURE: 97 F | OXYGEN SATURATION: 98 % | HEART RATE: 103 BPM | RESPIRATION RATE: 18 BRPM | SYSTOLIC BLOOD PRESSURE: 109 MMHG | DIASTOLIC BLOOD PRESSURE: 74 MMHG

## 2024-12-26 DIAGNOSIS — D57.00 SICKLE CELL PAIN CRISIS (HCC): ICD-10-CM

## 2024-12-26 DIAGNOSIS — D57.00 SICKLE CELL DISEASE WITH CRISIS (HCC): Primary | ICD-10-CM

## 2024-12-26 DIAGNOSIS — D57.419 SICKLE CELL ANEMIA WITH COEXISTENT ALPHA-THALASSEMIA WITH CRISIS (HCC): ICD-10-CM

## 2024-12-26 RX ORDER — HYDROMORPHONE HYDROCHLORIDE 1 MG/ML
1-2 INJECTION, SOLUTION INTRAMUSCULAR; INTRAVENOUS; SUBCUTANEOUS AS NEEDED
Start: 2025-01-09

## 2024-12-26 RX ORDER — ONDANSETRON 2 MG/ML
8 INJECTION INTRAMUSCULAR; INTRAVENOUS AS NEEDED
Start: 2025-01-09

## 2024-12-26 RX ORDER — HYDROMORPHONE HYDROCHLORIDE 1 MG/ML
2 INJECTION, SOLUTION INTRAMUSCULAR; INTRAVENOUS; SUBCUTANEOUS EVERY 30 MIN PRN
Status: COMPLETED | OUTPATIENT
Start: 2024-12-26 | End: 2024-12-26

## 2024-12-26 RX ORDER — DIPHENHYDRAMINE HCL 25 MG
CAPSULE ORAL ONCE
Start: 2025-01-09 | End: 2025-01-09

## 2024-12-26 RX ADMIN — HYDROMORPHONE HYDROCHLORIDE 2 MG: 1 INJECTION, SOLUTION INTRAMUSCULAR; INTRAVENOUS; SUBCUTANEOUS at 12:21:00

## 2024-12-26 RX ADMIN — HYDROMORPHONE HYDROCHLORIDE 2 MG: 1 INJECTION, SOLUTION INTRAMUSCULAR; INTRAVENOUS; SUBCUTANEOUS at 13:05:00

## 2024-12-26 NOTE — PROGRESS NOTES
Patient with sickle cell crisis here for IVF and pain medications. Complains of pain to her lower back, both thighs, shoulders, and shoulder blades. Also complains of headache. Rates her pain about 9/10. IVF infused over 1 hour. Dilaudid 2 mg IVP given x 2 doses. Patient discharged in good condition.

## 2024-12-26 NOTE — TELEPHONE ENCOUNTER
Pt called stated she is having symptoms of sickle cell crisis and would like to know if she can come in for hydration and pain management

## 2024-12-26 NOTE — TELEPHONE ENCOUNTER
12/12/24 - Adakve    Patient called having  Crisis symptoms of lower back pain, waist and leg pain, requesting fluids and pain management.    .  Had some chest pain yesterday but after taking MS it resolved.  Denies fevers, occasional cold. No other complaints at this time.

## 2024-12-31 ENCOUNTER — TELEPHONE (OUTPATIENT)
Age: 39
End: 2024-12-31

## 2025-01-02 DIAGNOSIS — D57.00 SICKLE CELL PAIN CRISIS (HCC): ICD-10-CM

## 2025-01-02 RX ORDER — OXYCODONE HYDROCHLORIDE 30 MG/1
30 TABLET ORAL
Qty: 180 TABLET | Refills: 0 | Status: SHIPPED | OUTPATIENT
Start: 2025-01-02 | End: 2025-01-07

## 2025-01-02 NOTE — TELEPHONE ENCOUNTER
Americans with Disabilities Act forms received via Email from office for [facility]/ Logged for processing

## 2025-01-03 NOTE — TELEPHONE ENCOUNTER
Patient called back gathered the below;    Type of Leave: Americans with Disabilities Act   Reason for Leave: Infusion for Sickle Cell  Start date of leave:01/01/2025  End date of leave:12/31/2025  How many flare ups per month/length?:1-2  Flare ups a month lasting 1 day in duration/ flare up can lead to admission   How many appts per month/length?: 1-2 appointments a month lasting 1 day in duration (infusion)   Was Fee and Turnaround info Given?: Yes

## 2025-01-03 NOTE — TELEPHONE ENCOUNTER
Cld patient to gather details needed for Americans with Disabilities Act, Left message to call back

## 2025-01-04 NOTE — TELEPHONE ENCOUNTER
Dr. Mccormick,       Please sign off on form if you agree to: Americans with Disabilities Act 01/01/2024-12/31/2025  (place your signature on the first page only)    -From your Inbasket, Highlight the patient and click Chart   -Double click the 12/31/2024 Forms Completion telephone encounter  -Scroll down to the Media section   -Click the blue Hyperlink: Americans with Disabilities Act Dr. Esau Mccormick 01/04/2025  -Click Acknowledge located in the top right ribbon/menu   -Drag the mouse into the blank space of the document and a + sign will appear. Left click to   electronically sign the document.     Thank you,  Daphne CANALES

## 2025-01-06 ENCOUNTER — TELEPHONE (OUTPATIENT)
Age: 40
End: 2025-01-06

## 2025-01-06 NOTE — TELEPHONE ENCOUNTER
Pt called and would like to know if the doctor sent her medication to the pharmacy bc she hasn't heard anything

## 2025-01-07 DIAGNOSIS — D57.00 SICKLE CELL PAIN CRISIS (HCC): ICD-10-CM

## 2025-01-07 RX ORDER — OXYCODONE HYDROCHLORIDE 30 MG/1
30 TABLET ORAL
Qty: 180 TABLET | Refills: 0 | Status: CANCELLED | OUTPATIENT
Start: 2025-01-07

## 2025-01-07 RX ORDER — OXYCODONE HYDROCHLORIDE 30 MG/1
30 TABLET ORAL
Qty: 180 TABLET | Refills: 0 | Status: SHIPPED | OUTPATIENT
Start: 2025-01-07 | End: 2025-02-06

## 2025-01-07 NOTE — TELEPHONE ENCOUNTER
Sabrina Celaya      Please sign off on form if you agree to: Americans with Disabilities Act 01/01/2025-12/31/2025  (place your signature on the first page only)    -From your Inbasket, Highlight the patient and click Chart   -Double click the 12/31/2024 Forms Completion telephone encounter  -Scroll down to the Media section   -Click the blue Hyperlink: Americans with Disabilities Act Dr. Esau Mccormick/ Sabrina Leggett 01/07/2025  -Click Acknowledge located in the top right ribbon/menu   -Drag the mouse into the blank space of the document and a + sign will appear. Left click to   electronically sign the document.     Thank you,  Daphne CANALES

## 2025-01-07 NOTE — TELEPHONE ENCOUNTER
Pt's spouse called and advised they have been calling to get pt pain medication filled. I asked if he contacted the pharmacy and he said yes and was advised the pharmacy did not receive a request.    I called and spoke with nurse she advised she will have the NP send again    I advised the pt spouse of this and he said okay he's heading to the pharmacy

## 2025-01-09 ENCOUNTER — APPOINTMENT (OUTPATIENT)
Age: 40
End: 2025-01-09
Attending: INTERNAL MEDICINE
Payer: COMMERCIAL

## 2025-01-09 ENCOUNTER — HOSPITAL ENCOUNTER (INPATIENT)
Facility: HOSPITAL | Age: 40
LOS: 2 days | Discharge: HOME | End: 2025-01-11
Attending: STUDENT IN AN ORGANIZED HEALTH CARE EDUCATION/TRAINING PROGRAM | Admitting: INTERNAL MEDICINE
Payer: COMMERCIAL

## 2025-01-09 ENCOUNTER — APPOINTMENT (OUTPATIENT)
Dept: GENERAL RADIOLOGY | Facility: HOSPITAL | Age: 40
End: 2025-01-09
Attending: STUDENT IN AN ORGANIZED HEALTH CARE EDUCATION/TRAINING PROGRAM
Payer: COMMERCIAL

## 2025-01-09 ENCOUNTER — OFFICE VISIT (OUTPATIENT)
Age: 40
End: 2025-01-09
Attending: INTERNAL MEDICINE
Payer: COMMERCIAL

## 2025-01-09 VITALS
OXYGEN SATURATION: 99 % | RESPIRATION RATE: 18 BRPM | HEART RATE: 85 BPM | SYSTOLIC BLOOD PRESSURE: 138 MMHG | TEMPERATURE: 98 F | DIASTOLIC BLOOD PRESSURE: 85 MMHG

## 2025-01-09 DIAGNOSIS — D57.00 SICKLE CELL DISEASE WITH CRISIS (HCC): Primary | ICD-10-CM

## 2025-01-09 DIAGNOSIS — D57.419 SICKLE CELL ANEMIA WITH COEXISTENT ALPHA-THALASSEMIA WITH CRISIS (HCC): ICD-10-CM

## 2025-01-09 DIAGNOSIS — D57.00 SICKLE CELL PAIN CRISIS (HCC): Primary | ICD-10-CM

## 2025-01-09 DIAGNOSIS — R07.9 CHEST PAIN OF UNCERTAIN ETIOLOGY: ICD-10-CM

## 2025-01-09 DIAGNOSIS — D57.00 SICKLE CELL PAIN CRISIS (HCC): ICD-10-CM

## 2025-01-09 LAB
ALBUMIN SERPL-MCNC: 4.2 G/DL (ref 3.2–4.8)
ALBUMIN/GLOB SERPL: 1.1 {RATIO} (ref 1–2)
ALP LIVER SERPL-CCNC: 54 U/L
ALT SERPL-CCNC: 26 U/L
ANION GAP SERPL CALC-SCNC: 8 MMOL/L (ref 0–18)
AST SERPL-CCNC: 30 U/L (ref ?–34)
ATRIAL RATE: 71 BPM
BASOPHILS # BLD AUTO: 0 X10(3) UL (ref 0–0.2)
BASOPHILS NFR BLD AUTO: 0 %
BILIRUB SERPL-MCNC: 0.5 MG/DL (ref 0.3–1.2)
BUN BLD-MCNC: <5 MG/DL (ref 9–23)
CALCIUM BLD-MCNC: 9.1 MG/DL (ref 8.7–10.4)
CHLORIDE SERPL-SCNC: 107 MMOL/L (ref 98–112)
CHOLEST SERPL-MCNC: 126 MG/DL (ref ?–200)
CO2 SERPL-SCNC: 26 MMOL/L (ref 21–32)
CREAT BLD-MCNC: 0.5 MG/DL
EGFRCR SERPLBLD CKD-EPI 2021: 122 ML/MIN/1.73M2 (ref 60–?)
EOSINOPHIL # BLD AUTO: 0.01 X10(3) UL (ref 0–0.7)
EOSINOPHIL NFR BLD AUTO: 0.2 %
ERYTHROCYTE [DISTWIDTH] IN BLOOD BY AUTOMATED COUNT: 14.3 %
GLOBULIN PLAS-MCNC: 4 G/DL (ref 2–3.5)
GLUCOSE BLD-MCNC: 99 MG/DL (ref 70–99)
HCT VFR BLD AUTO: 23.9 %
HDLC SERPL-MCNC: 32 MG/DL (ref 40–59)
HGB BLD-MCNC: 8.6 G/DL
HGB RETIC QN AUTO: 37.1 PG (ref 28.2–36.6)
IMM GRANULOCYTES # BLD AUTO: 0.02 X10(3) UL (ref 0–1)
IMM GRANULOCYTES NFR BLD: 0.5 %
IMM RETICS NFR: 0.33 RATIO (ref 0.1–0.3)
LDLC SERPL CALC-MCNC: 78 MG/DL (ref ?–100)
LYMPHOCYTES # BLD AUTO: 1.84 X10(3) UL (ref 1–4)
LYMPHOCYTES NFR BLD AUTO: 43.5 %
MCH RBC QN AUTO: 40.8 PG (ref 26–34)
MCHC RBC AUTO-ENTMCNC: 36 G/DL (ref 31–37)
MCV RBC AUTO: 113.3 FL
MONOCYTES # BLD AUTO: 0.33 X10(3) UL (ref 0.1–1)
MONOCYTES NFR BLD AUTO: 7.8 %
NEUTROPHILS # BLD AUTO: 2.03 X10 (3) UL (ref 1.5–7.7)
NEUTROPHILS # BLD AUTO: 2.03 X10(3) UL (ref 1.5–7.7)
NEUTROPHILS NFR BLD AUTO: 48 %
NONHDLC SERPL-MCNC: 94 MG/DL (ref ?–130)
P AXIS: 15 DEGREES
P-R INTERVAL: 206 MS
PLATELET # BLD AUTO: 408 10(3)UL (ref 150–450)
PLATELET MORPHOLOGY: NORMAL
POTASSIUM SERPL-SCNC: 3.9 MMOL/L (ref 3.5–5.1)
PROT SERPL-MCNC: 8.2 G/DL (ref 5.7–8.2)
Q-T INTERVAL: 416 MS
QRS DURATION: 88 MS
QTC CALCULATION (BEZET): 452 MS
R AXIS: 13 DEGREES
RBC # BLD AUTO: 2.11 X10(6)UL
RETICS # AUTO: 70.7 X10(3) UL (ref 22.5–147.5)
RETICS/RBC NFR AUTO: 3.4 %
SODIUM SERPL-SCNC: 141 MMOL/L (ref 136–145)
T AXIS: -14 DEGREES
TRIGL SERPL-MCNC: 82 MG/DL (ref 30–149)
TROPONIN I SERPL HS-MCNC: 62 NG/L
TROPONIN I SERPL HS-MCNC: 64 NG/L
VENTRICULAR RATE: 71 BPM
VLDLC SERPL CALC-MCNC: 13 MG/DL (ref 0–30)
WBC # BLD AUTO: 4.2 X10(3) UL (ref 4–11)

## 2025-01-09 PROCEDURE — 71045 X-RAY EXAM CHEST 1 VIEW: CPT | Performed by: STUDENT IN AN ORGANIZED HEALTH CARE EDUCATION/TRAINING PROGRAM

## 2025-01-09 PROCEDURE — 99223 1ST HOSP IP/OBS HIGH 75: CPT | Performed by: INTERNAL MEDICINE

## 2025-01-09 RX ORDER — PROCHLORPERAZINE EDISYLATE 5 MG/ML
5 INJECTION INTRAMUSCULAR; INTRAVENOUS EVERY 8 HOURS PRN
Status: DISCONTINUED | OUTPATIENT
Start: 2025-01-09 | End: 2025-01-11

## 2025-01-09 RX ORDER — KETOROLAC TROMETHAMINE 15 MG/ML
15 INJECTION, SOLUTION INTRAMUSCULAR; INTRAVENOUS ONCE
Status: COMPLETED | OUTPATIENT
Start: 2025-01-09 | End: 2025-01-09

## 2025-01-09 RX ORDER — DIPHENHYDRAMINE HCL 25 MG
CAPSULE ORAL ONCE
Start: 2025-02-06 | End: 2025-02-06

## 2025-01-09 RX ORDER — SODIUM CHLORIDE 9 MG/ML
INJECTION, SOLUTION INTRAVENOUS CONTINUOUS
Status: DISCONTINUED | OUTPATIENT
Start: 2025-01-09 | End: 2025-01-11

## 2025-01-09 RX ORDER — ASPIRIN 81 MG/1
81 TABLET ORAL DAILY
Status: DISCONTINUED | OUTPATIENT
Start: 2025-01-10 | End: 2025-01-11

## 2025-01-09 RX ORDER — DIPHENHYDRAMINE HCL 25 MG
25 CAPSULE ORAL EVERY 8 HOURS PRN
Status: DISCONTINUED | OUTPATIENT
Start: 2025-01-09 | End: 2025-01-11

## 2025-01-09 RX ORDER — HYDROMORPHONE HYDROCHLORIDE 1 MG/ML
2 INJECTION, SOLUTION INTRAMUSCULAR; INTRAVENOUS; SUBCUTANEOUS EVERY 2 HOUR PRN
Status: DISCONTINUED | OUTPATIENT
Start: 2025-01-09 | End: 2025-01-11

## 2025-01-09 RX ORDER — METOPROLOL SUCCINATE 25 MG/1
25 TABLET, EXTENDED RELEASE ORAL DAILY
Status: DISCONTINUED | OUTPATIENT
Start: 2025-01-10 | End: 2025-01-11

## 2025-01-09 RX ORDER — ONDANSETRON 2 MG/ML
4 INJECTION INTRAMUSCULAR; INTRAVENOUS EVERY 6 HOURS PRN
Status: DISCONTINUED | OUTPATIENT
Start: 2025-01-09 | End: 2025-01-11

## 2025-01-09 RX ORDER — ECHINACEA PURPUREA EXTRACT 125 MG
1 TABLET ORAL
Status: DISCONTINUED | OUTPATIENT
Start: 2025-01-09 | End: 2025-01-11

## 2025-01-09 RX ORDER — ONDANSETRON 2 MG/ML
8 INJECTION INTRAMUSCULAR; INTRAVENOUS AS NEEDED
Start: 2025-02-06

## 2025-01-09 RX ORDER — DIPHENHYDRAMINE HCL 25 MG
CAPSULE ORAL EVERY 8 HOURS PRN
Status: DISCONTINUED | OUTPATIENT
Start: 2025-01-09 | End: 2025-01-09

## 2025-01-09 RX ORDER — HYDROMORPHONE HYDROCHLORIDE 1 MG/ML
1-2 INJECTION, SOLUTION INTRAMUSCULAR; INTRAVENOUS; SUBCUTANEOUS AS NEEDED
Status: DISCONTINUED | OUTPATIENT
Start: 2025-01-09 | End: 2025-01-09

## 2025-01-09 RX ORDER — HYDROMORPHONE HYDROCHLORIDE 1 MG/ML
1 INJECTION, SOLUTION INTRAMUSCULAR; INTRAVENOUS; SUBCUTANEOUS EVERY 30 MIN PRN
Status: DISCONTINUED | OUTPATIENT
Start: 2025-01-09 | End: 2025-01-11

## 2025-01-09 RX ORDER — SODIUM CHLORIDE 9 MG/ML
125 INJECTION, SOLUTION INTRAVENOUS CONTINUOUS
Status: DISCONTINUED | OUTPATIENT
Start: 2025-01-09 | End: 2025-01-11

## 2025-01-09 RX ORDER — HYDROXYUREA 500 MG/1
2000 CAPSULE ORAL DAILY
Status: DISCONTINUED | OUTPATIENT
Start: 2025-01-10 | End: 2025-01-11

## 2025-01-09 RX ORDER — FOLIC ACID 1 MG/1
1 TABLET ORAL DAILY
Status: DISCONTINUED | OUTPATIENT
Start: 2025-01-10 | End: 2025-01-11

## 2025-01-09 RX ORDER — HYDROMORPHONE HYDROCHLORIDE 1 MG/ML
1 INJECTION, SOLUTION INTRAMUSCULAR; INTRAVENOUS; SUBCUTANEOUS EVERY 2 HOUR PRN
Status: DISCONTINUED | OUTPATIENT
Start: 2025-01-09 | End: 2025-01-11

## 2025-01-09 RX ORDER — ONDANSETRON 2 MG/ML
4 INJECTION INTRAMUSCULAR; INTRAVENOUS EVERY 4 HOURS PRN
Status: DISCONTINUED | OUTPATIENT
Start: 2025-01-09 | End: 2025-01-09 | Stop reason: ALTCHOICE

## 2025-01-09 RX ORDER — HYDROMORPHONE HYDROCHLORIDE 1 MG/ML
1-2 INJECTION, SOLUTION INTRAMUSCULAR; INTRAVENOUS; SUBCUTANEOUS AS NEEDED
Start: 2025-02-06

## 2025-01-09 RX ORDER — HYDROMORPHONE HYDROCHLORIDE 1 MG/ML
0.5 INJECTION, SOLUTION INTRAMUSCULAR; INTRAVENOUS; SUBCUTANEOUS EVERY 2 HOUR PRN
Status: DISCONTINUED | OUTPATIENT
Start: 2025-01-09 | End: 2025-01-11

## 2025-01-09 RX ORDER — MORPHINE SULFATE 30 MG/1
60 TABLET, FILM COATED, EXTENDED RELEASE ORAL EVERY 8 HOURS SCHEDULED
Status: DISCONTINUED | OUTPATIENT
Start: 2025-01-09 | End: 2025-01-11

## 2025-01-09 RX ORDER — ENOXAPARIN SODIUM 100 MG/ML
40 INJECTION SUBCUTANEOUS DAILY
Status: DISCONTINUED | OUTPATIENT
Start: 2025-01-09 | End: 2025-01-11

## 2025-01-09 RX ORDER — ACETAMINOPHEN 500 MG
1000 TABLET ORAL EVERY 8 HOURS PRN
Status: DISCONTINUED | OUTPATIENT
Start: 2025-01-09 | End: 2025-01-11

## 2025-01-09 RX ORDER — BENZONATATE 100 MG/1
200 CAPSULE ORAL 3 TIMES DAILY PRN
Status: DISCONTINUED | OUTPATIENT
Start: 2025-01-09 | End: 2025-01-11

## 2025-01-09 RX ORDER — HYDROMORPHONE HYDROCHLORIDE 1 MG/ML
1 INJECTION, SOLUTION INTRAMUSCULAR; INTRAVENOUS; SUBCUTANEOUS
Status: DISCONTINUED | OUTPATIENT
Start: 2025-01-09 | End: 2025-01-09 | Stop reason: ALTCHOICE

## 2025-01-09 RX ORDER — LOPERAMIDE HYDROCHLORIDE 2 MG/1
2 CAPSULE ORAL
Status: DISCONTINUED | OUTPATIENT
Start: 2025-01-09 | End: 2025-01-11

## 2025-01-09 RX ORDER — OXYCODONE HYDROCHLORIDE 10 MG/1
30 TABLET ORAL
Status: DISCONTINUED | OUTPATIENT
Start: 2025-01-09 | End: 2025-01-11

## 2025-01-09 RX ORDER — DIPHENHYDRAMINE HCL 25 MG
50 CAPSULE ORAL EVERY 8 HOURS PRN
Status: DISCONTINUED | OUTPATIENT
Start: 2025-01-09 | End: 2025-01-11

## 2025-01-09 RX ADMIN — HYDROMORPHONE HYDROCHLORIDE 1 MG: 1 INJECTION, SOLUTION INTRAMUSCULAR; INTRAVENOUS; SUBCUTANEOUS at 12:01:00

## 2025-01-09 RX ADMIN — HYDROMORPHONE HYDROCHLORIDE 1 MG: 1 INJECTION, SOLUTION INTRAMUSCULAR; INTRAVENOUS; SUBCUTANEOUS at 12:05:00

## 2025-01-09 NOTE — PROGRESS NOTES
Education Record    Learner:  Patient    Disease / Diagnosis:Sikle cell crisis    Barriers / Limitations:  None   Comments:    Method:  Brief focused   Comments:    General Topics:  Diet, Infection, Medication, Pain, Precautions, Procedure, Side effects and symptom management, Plan of care reviewed, and Fall risk and prevention   Comments:    Outcome:  Shows understanding   Comments:    Couldn't infuse Adekvio due to change in insurance. Patient states pain all over 10/10 ..not managed at home. Given 1 liter of fluids and 2 mg of Dilaudid IV . Evaluated pain level in 1 hr . Still holding at 10/10  Spoke with Sarah GARCÍA for pain management here in [facility] - after reviewing patient history decided to send patient for further pain control and Iv fluids to ED  .  Called hand off to [facility] in ED. Patient transported via whhlchair with her PIV flushed and capped

## 2025-01-09 NOTE — ED QUICK NOTES
Orders for admission, patient is aware of plan and ready to go upstairs. Any questions, please call ED RN Lydia at extension [phone].     Patient Covid vaccination status: Fully vaccinated     COVID Test Ordered in ED: None    COVID Suspicion at Admission: N/A    Running Infusions:  None    Mental Status/LOC at time of transport: AOx4    Other pertinent information:   CIWA score: N/A   NIH score:  N/A

## 2025-01-09 NOTE — H&P
[facility]     Oluwaseun Oluwadamilola Ogunyemi Patient Status:  Emergency    8/3/1985 MRN [id number]   [facility] EMERGENCY DEPARTMENT Attending Shanita Mckeon MD   Hosp Day # 0 PCP Clive [address] MD     Chief Complaint: Pain crisis     Subjective:    History of Present Illness:     Oluwaseun Oluwadamilola Ogunyemi is a 39 year old female with past medical history of sickle cell disease presents with pain  Patient denies any recent illness, she is chronically on MS Contin with oxycodone for breakthrough but has not been able to fill her oxycodone due to pharmacy barriers.  She is presenting with pain typical of her crisis which is pain in her back shoulders and extremities.  This is also associated with a chest discomfort but this is not related to exertion.  No fevers or cough.      History/Other:    Past Medical History:  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)    Sickle cell crisis (HCC)    Frequent crises    Sickle cell crisis (HCC)    Sickle cell pain crisis (HCC)     Past Surgical History:   Past Surgical History:   Procedure Laterality Date            2018    Cholecystectomy  2024    LAPAROSCOPIC CHOLECYSTECTOMY WITH CHOLANGIOGRAM      Family History:   Family History   Problem Relation Age of Onset    Hypertension Mother         unknown    Cataracts  Mother     No Known Problems Father         was told cardiac arrest    No Known Problems Brother     Sickle Cell Maternal Aunt         passed from kidney failure    DVT/VTE Other     Breast Cancer Neg     Ovarian Cancer Neg     Colon Cancer Neg      Social History:    reports that she has never smoked. She has never used smokeless tobacco. She reports that she does not drink alcohol and does not use drugs.     Allergies: Allergies[1]    Medications:  Medications Ordered Prior to Encounter[2]    Review of Systems:   A comprehensive review of systems was completed.    Pertinent positives and negatives noted in the HPI.    Objective:   Physical Exam:    /75   Pulse 69   Temp 98.2 °F (36.8 °C) (Oral)   Resp 18   Ht 5' 5\" (1.651 m)   Wt 160 lb (72.6 kg)   LMP 01/04/2025 (Exact Date)   SpO2 100%   BMI 26.63 kg/m²   General: No acute distress, Alert  Respiratory: No rhonchi, no wheezes  Cardiovascular: S1, S2. Regular rate and rhythm  Abdomen: Soft, Non-tender, non-distended, positive bowel sounds  Neuro: No new focal deficits  Extremities: No edema      Results:    Labs:      Labs Last 24 Hours:    Recent Labs   Lab 01/09/25  1405   RBC 2.11*   HGB 8.6*   HCT 23.9*   .3*   MCH 40.8*   MCHC 36.0   RDW 14.3   NEPRELIM 2.03   WBC 4.2   .0       Recent Labs   Lab 01/09/25  1405   GLU 99   BUN <5*   CREATSERUM 0.50*   EGFRCR 122   CA 9.1   ALB 4.2      K 3.9      CO2 26.0   ALKPHO 54   AST 30   ALT 26   BILT 0.5   TP 8.2       Estimated Glomerular Filtration Rate: 121.8 mL/min/1.73m2 (A) (by CKD-EPI based on SCr of 0.5 mg/dL (L)).    No results found for: \"PT\", \"INR\"    Recent Labs   Lab 01/09/25  1405   TROPHS 62*       No results for input(s): \"TROP\", \"PBNP\" in the last 168 hours.    No results for input(s): \"PCT\" in the last 168 hours.    Imaging: Imaging data reviewed in Epic.    Assessment & Plan:       #Sickle cell pain crisis   -PTA Folic acid, Hydroxyurea  -IV Pain control and  continue PTA regimen of MS contin and IR Oxycodone      #Chronically elevated troponin  -continue PTA ASA and Beta blocker     #Chronic Macrocytic anemia likely due to hydrea   -monitor Hb               Plan of care discussed with patient     Lacho Stahl DO    Supplementary Documentation:     The 21st Century Cures Act makes medical notes like these available to patients in the interest of transparency. Please be advised this is a medical document. Medical documents are intended to carry relevant information, facts as evident, and the clinical opinion of the practitioner. The medical note is intended as peer to peer communication and may appear blunt or direct. It is written in medical language and may contain abbreviations or verbiage that are unfamiliar.                                       [1]   Allergies  Allergen Reactions    Piperacillin Sod-Tazobactam So ITCHING     Generalized  Tolerated cephalosporins in the past   [2]   Current Facility-Administered Medications on File Prior to Encounter   Medication Dose Route Frequency Provider Last Rate Last Admin    [COMPLETED] sodium chloride 0.9 % IV bolus 1,000 mL  1,000 mL Intravenous Once Esau Mccormick MD   Stopped at 01/09/25 1230    [COMPLETED] sodium chloride 0.9 % IV bolus 1,000 mL  1,000 mL Intravenous Once Esau Mccormick MD   Stopped at 12/26/24 1322    [COMPLETED] HYDROmorphone (Dilaudid) 1 MG/ML injection 2 mg  2 mg Intravenous Q30 Min PRN Esau Mccormick MD   2 mg at 12/26/24 1305    [COMPLETED] diphenhydrAMINE (Benadryl) cap/tab 25-50 mg  25-50 mg Oral Once Esau Mccormick MD   25 mg at 12/12/24 1157    [COMPLETED] sodium chloride 0.9 % IV bolus 1,000 mL  1,000 mL Intravenous Once Esau Mccormick MD   Stopped at 12/12/24 1335    [COMPLETED] crizanlizumab-tmca (Adakveo) 370 mg in sodium chloride 0.9% 100 mL infusion  5 mg/kg Intravenous Once Esau Mccormick MD   Stopped at 12/12/24 1254    [COMPLETED] HYDROmorphone (Dilaudid) 1  MG/ML injection 1-2 mg  1-2 mg Intravenous Once Esau Mccormick MD   2 mg at 24 1157    [COMPLETED] HYDROmorphone (Dilaudid) 1 MG/ML injection 2 mg  2 mg Intravenous Once Esau Mccormick MD   2 mg at 24 1300    [COMPLETED] HYDROmorphone (Dilaudid) 1 MG/ML injection 2 mg  2 mg Intravenous Once Esau Mccormick MD   2 mg at 24 1343    [COMPLETED] heparin (Porcine) 5000 UNIT/ML injection 5,000 Units  5,000 Units Subcutaneous Once Mahin Denis DO   5,000 Units at 24 1346    [COMPLETED] ceFAZolin (Ancef) 2g in 10mL IV syringe premix  2 g Intravenous Once Mahin Denis DO   2 g at 24 1530    [COMPLETED] acetaminophen (Tylenol Extra Strength) tab 1,000 mg  1,000 mg Oral Once PRN Magno Young MD        Or    [COMPLETED] HYDROcodone-acetaminophen (Norco) 5-325 MG per tab 1 tablet  1 tablet Oral Once PRN Magno Young MD        Or    [COMPLETED] HYDROcodone-acetaminophen (Norco) 5-325 MG per tab 2 tablet  2 tablet Oral Once PRN Magno Young MD   2 tablet at 24    [COMPLETED] sodium chloride 0.9 % IV bolus 1,000 mL  1,000 mL Intravenous Once Esau Mccormick MD   Stopped at 24 1340    [COMPLETED] crizanlizumab-tmca (Adakveo) 360 mg in sodium chloride 0.9% 100 mL infusion  5 mg/kg Intravenous Once Esau Mccormick MD   Stopped at 24 1303    [COMPLETED] diphenhydrAMINE (Benadryl) cap/tab 25-50 mg  25-50 mg Oral Once Esau Mccormick MD   25 mg at 24 1146    [] HYDROmorphone (Dilaudid) 1 MG/ML injection 2 mg  2 mg Intravenous Q30 Min PRN Esau Mccormick MD   2 mg at 24 1318    [COMPLETED] diphenhydrAMINE (Benadryl) cap/tab 25-50 mg  25-50 mg Oral Once Esau Mccormick MD   25 mg at 10/17/24 1107    [COMPLETED] sodium chloride 0.9 % IV bolus 1,000 mL  1,000 mL Intravenous Once Esau Mccormick MD   Stopped at 10/17/24 1248    [COMPLETED] crizanlizumab-tmca (Adakveo) 360 mg in sodium chloride 0.9% 100 mL infusion  5 mg/kg  Intravenous Once Esau Mccormick MD   Stopped at 10/17/24 1212    [COMPLETED] HYDROmorphone (Dilaudid) 1 MG/ML injection 1-2 mg  1-2 mg Intravenous Once Esau Mccormick MD   2 mg at 10/17/24 1107    [COMPLETED] HYDROmorphone (Dilaudid) 1 MG/ML injection 1 mg  1 mg Intravenous Once Esau Mccormick MD   1 mg at 10/17/24 1218     Current Outpatient Medications on File Prior to Encounter   Medication Sig Dispense Refill    OxyCODONE HCl IR 30 MG Oral Tab Take 1 tablet (30 mg total) by mouth every 3 (three) hours as needed for Pain. 180 tablet 0    morphINE ER 60 MG Oral Tab CR Take 1 tablet (60 mg total) by mouth every 8 (eight) hours. 90 tablet 0    Glutamine, Sickle Cell, (ENDARI) 5 g Oral Powd Pack Mix 3 packets (15 grams) with 8 ounces (240 mL) of cold or room temperature liquid or 4 to 6 ounces of food and take by mouth twice daily as directed. 180 each 11    loperamide 2 MG Oral Cap Take 1 capsule (2 mg total) by mouth every 3 (three) hours as needed for Diarrhea. 30 capsule 0    diphenhydrAMINE 25 MG Oral Cap Take 1 capsule (25 mg total) by mouth every 6 (six) hours as needed for Itching.      ASPIRIN LOW DOSE 81 MG Oral Tab EC TAKE 1 TABLET BY MOUTH EVERY DAY 90 tablet 3    ondansetron (ZOFRAN) 8 MG tablet Take 1 tablet (8 mg total) by mouth every 8 (eight) hours as needed for Nausea. 9 tablet 13    hydroxyurea 500 MG Oral Cap Take 4 capsules (2,000 mg total) by mouth daily. 360 capsule 3    folic acid 1 MG Oral Tab Take 1 tablet (1 mg total) by mouth daily. 90 tablet 3    Naloxone HCl 4 MG/0.1ML Nasal Liquid 4 mg by Nasal route as needed. If patient remains unresponsive, repeat dose in other nostril 2-5 minutes after first dose. 1 kit 0    metoprolol succinate ER 25 MG Oral Tablet 24 Hr Take 1 tablet (25 mg total) by mouth daily.      Cyanocobalamin (VITAMIN B-12 OR) Take 200 mcg by mouth daily.

## 2025-01-09 NOTE — ED INITIAL ASSESSMENT (HPI)
Worsening sickle cell pain for 1 week, generalized pain. Patient is AOX4; received dilaudid and IVF today without relief

## 2025-01-09 NOTE — ED PROVIDER NOTES
Patient Seen in: [facility] Emergency Department      History     Chief Complaint   Patient presents with    Sickle Cell     Stated Complaint: sent here from cancer center for pain management r/t sickle cell    Subjective:   HPI    Patient is a 39-year-old female presenting to the emergency department at the recommendation of her hematologist, Dr. Mccormick due to sickle cell pain crisis.  Patient has been experiencing severe pain for about 10 days.  She has been taking her home oral morphine but the pharmacy did not have her oxycodone which she therefore could not refill.  Today the patient was due for her infusion therapy but due to insurance change was unable to receive that either.  After being assessed at the infusion center the patient was treated with 2 mg of Dilaudid IV and received 1 L normal saline with no improvement in pain with the recommendation to come to the ER for further evaluation with the recommendation for inpatient admission for further care.  - Experiencing severe pain with a pain level of 11, affecting the entire body.  - History of sickle cell disease, requiring regular monthly infusions.  - Received pain medication and hydration instead of the infusion.  - No cough or fever reported.    Objective:   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)    Sickle cell crisis (HCC)    Frequent crises    Sickle cell crisis  (HCC)    Sickle cell pain crisis (HCC)              Past Surgical History:   Procedure Laterality Date      2017      2018    Cholecystectomy  2024    LAPAROSCOPIC CHOLECYSTECTOMY WITH CHOLANGIOGRAM                Social History     Socioeconomic History    Marital status:    Tobacco Use    Smoking status: Never    Smokeless tobacco: Never   Vaping Use    Vaping status: Never Used   Substance and Sexual Activity    Alcohol use: Never     Comment: No history of excessive use    Drug use: Never    Sexual activity: Yes     Partners: Male     Birth control/protection: Condom   Other Topics Concern    Caffeine Concern Yes     Comment: 5-10 a month    Exercise Yes     Comment: 1-2 times a week    Seat Belt Yes    Self-Exams Yes     Comment: self breast exam with showering   Social History Narrative    , has 2 children ages 4 and 5 as of 2022.  Not employed.     Social Drivers of Health     Financial Resource Strain: Low Risk  (2023)    Financial Resource Strain     Difficulty of Paying Living Expenses: Not very hard   Food Insecurity: No Food Insecurity (10/7/2024)    Food Insecurity     Food Insecurity: Never true   Transportation Needs: No Transportation Needs (10/7/2024)    Transportation Needs     Lack of Transportation: No   Housing Stability: Low Risk  (10/7/2024)    Housing Stability     Housing Instability: No              Review of Systems    Positive for stated complaint: sent here from cancer center for pain management r/t sickle cell  Other systems are as noted in HPI.  Constitutional and vital signs reviewed.      All other systems reviewed and negative except as noted above.    Physical Exam     ED Triage Vitals [25 1333]   /85   Pulse 73   Resp 16   Temp 98.2 °F (36.8 °C)   Temp src Oral   SpO2 100 %   O2 Device None (Room air)       Current:/75   Pulse 69   Temp 98.2 °F (36.8 °C) (Oral)   Resp 18   Ht 165.1 cm (5' 5\")   Wt 72.6 kg   LMP  01/04/2025 (Exact Date)   SpO2 100%   BMI 26.63 kg/m²         Physical Exam    Constitutional: No apparent distress  Eyes: No scleral icterus  Heart: regular rate rhythm, no murmurs  Lungs: Clear to auscultation bilaterally  Chest wall: Tender to palpation  Extremities: No redness or warmth appreciated, no signs of cellulitis.  Diffuse areas of pain are present.  No significant pain with range of motion.  Distal neurovascularly intact.  No calf tenderness, no lower extremity edema.  Skin: No rash  Neuro: Alert and oriented ×3          ED Course/ My interpretations:     Labs Reviewed   COMP METABOLIC PANEL (14) - Abnormal; Notable for the following components:       Result Value    BUN <5 (*)     Creatinine 0.50 (*)     Globulin  4.0 (*)     All other components within normal limits   CBC WITH DIFFERENTIAL WITH PLATELET - Abnormal; Notable for the following components:    RBC 2.11 (*)     HGB 8.6 (*)     HCT 23.9 (*)     .3 (*)     MCH 40.8 (*)     All other components within normal limits   RETICULOCYTE COUNT - Abnormal; Notable for the following components:    Retic% 3.4 (*)     Retic IRF 0.326 (*)     Reticulocyte Hemoglobin Equivalent 37.1 (*)     All other components within normal limits   TROPONIN I HIGH SENSITIVITY - Abnormal; Notable for the following components:    Troponin I (High Sensitivity) 62 (*)     All other components within normal limits   LIPID PANEL - Abnormal; Notable for the following components:    HDL Cholesterol 32 (*)     All other components within normal limits   RBC MORPHOLOGY SCAN - Abnormal; Notable for the following components:    RBC Morphology See morphology below (*)     Macrocytosis 2+ (*)     All other components within normal limits   SCAN SLIDE     EKG    Rate, intervals and axes as noted on EKG Report.  Rate: 71  Rhythm: Sinus Rhythm  Reading: Normal sinus rhythm, normal intervals, normal axis, nonspecific T wave abnormality is noted without significant change from  July 1, 2023.             My independent imaging interpretation:      Procedures    Comp Metabolic Panel (14)    CBC With Differential With Platelet    Reticulocyte Count    Troponin I (High Sensitivity)    Lipid Panel    Scan slide    RBC Morphology Scan    XR CHEST AP PORTABLE  (CPT=71045)      No consolidation.  All available radiology reports for this visit reviewed.      Medications given:  Orders Placed This Encounter    Comp Metabolic Panel (14)    CBC With Differential With Platelet    Reticulocyte Count    Troponin I (High Sensitivity)    Lipid Panel    Scan slide    RBC Morphology Scan    HYDROmorphone (Dilaudid) 1 MG/ML injection 1 mg    ketorolac (Toradol) 15 MG/ML injection 15 mg    sodium chloride 0.9% infusion                         MDM      Extensive differential diagnosis was considered for the patient including sickle cell pain crisis, pneumonia, pneumothorax, acute coronary syndrome, and other pathology.    Patient's pain difficult to control despite IV narcotics.  She was unable to receive her monthly infusion today.      Mild elevation in troponin is present.  Less significant than past.  Symptoms addressed in the emergency department with IV narcotics, IV Toradol and IV fluids.  Patient admitted for further care.    Admission disposition: 1/9/2025  3:39 PM         Hospitalist consulted.    Disposition and Plan     Clinical Impression:  1. Sickle cell pain crisis (HCC)    2. Chest pain of uncertain etiology         Disposition:  Admit  1/9/2025  3:39 pm    Follow-up:  No follow-up provider specified.        Medications Prescribed:  Current Discharge Medication List              Supplementary Documentation:         Hospital Problems       Present on Admission  Date Reviewed: 12/17/2024            ICD-10-CM Noted POA    * (Principal) Sickle cell pain crisis (HCC) D57.00 9/20/2022 Unknown                                               Hospital Problems       Present on Admission  Date Reviewed:  12/17/2024            ICD-10-CM Noted POA    * (Principal) Sickle cell pain crisis (HCC) D57.00 9/20/2022 Unknown           Documentation created with the aid of Dragon voice recognition software.  Although efforts were made to ensure the accuracy of the note, some inaccuracies may persist.

## 2025-01-10 LAB
ANION GAP SERPL CALC-SCNC: 8 MMOL/L (ref 0–18)
BASOPHILS # BLD AUTO: 0.01 X10(3) UL (ref 0–0.2)
BASOPHILS NFR BLD AUTO: 0.3 %
BUN BLD-MCNC: <5 MG/DL (ref 9–23)
CALCIUM BLD-MCNC: 8.8 MG/DL (ref 8.7–10.4)
CHLORIDE SERPL-SCNC: 109 MMOL/L (ref 98–112)
CO2 SERPL-SCNC: 25 MMOL/L (ref 21–32)
CREAT BLD-MCNC: 0.49 MG/DL
EGFRCR SERPLBLD CKD-EPI 2021: 122 ML/MIN/1.73M2 (ref 60–?)
EOSINOPHIL # BLD AUTO: 0.03 X10(3) UL (ref 0–0.7)
EOSINOPHIL NFR BLD AUTO: 0.8 %
ERYTHROCYTE [DISTWIDTH] IN BLOOD BY AUTOMATED COUNT: 14.4 %
GLUCOSE BLD-MCNC: 113 MG/DL (ref 70–99)
HCT VFR BLD AUTO: 21.7 %
HGB BLD-MCNC: 7.9 G/DL
IMM GRANULOCYTES # BLD AUTO: 0.01 X10(3) UL (ref 0–1)
IMM GRANULOCYTES NFR BLD: 0.3 %
LYMPHOCYTES # BLD AUTO: 1.76 X10(3) UL (ref 1–4)
LYMPHOCYTES NFR BLD AUTO: 45.4 %
MAGNESIUM SERPL-MCNC: 1.8 MG/DL (ref 1.6–2.6)
MCH RBC QN AUTO: 40.7 PG (ref 26–34)
MCHC RBC AUTO-ENTMCNC: 36.4 G/DL (ref 31–37)
MCV RBC AUTO: 111.9 FL
MONOCYTES # BLD AUTO: 0.34 X10(3) UL (ref 0.1–1)
MONOCYTES NFR BLD AUTO: 8.8 %
NEUTROPHILS # BLD AUTO: 1.73 X10 (3) UL (ref 1.5–7.7)
NEUTROPHILS # BLD AUTO: 1.73 X10(3) UL (ref 1.5–7.7)
NEUTROPHILS NFR BLD AUTO: 44.4 %
PLATELET # BLD AUTO: 402 10(3)UL (ref 150–450)
POTASSIUM SERPL-SCNC: 3.5 MMOL/L (ref 3.5–5.1)
POTASSIUM SERPL-SCNC: 3.8 MMOL/L (ref 3.5–5.1)
RBC # BLD AUTO: 1.94 X10(6)UL
SODIUM SERPL-SCNC: 142 MMOL/L (ref 136–145)
WBC # BLD AUTO: 3.9 X10(3) UL (ref 4–11)

## 2025-01-10 PROCEDURE — 99232 SBSQ HOSP IP/OBS MODERATE 35: CPT | Performed by: INTERNAL MEDICINE

## 2025-01-10 RX ORDER — MAGNESIUM OXIDE 400 MG/1
400 TABLET ORAL ONCE
Status: DISCONTINUED | OUTPATIENT
Start: 2025-01-10 | End: 2025-01-11

## 2025-01-10 RX ORDER — POTASSIUM CHLORIDE 1500 MG/1
40 TABLET, EXTENDED RELEASE ORAL EVERY 4 HOURS
Status: DISPENSED | OUTPATIENT
Start: 2025-01-10 | End: 2025-01-10

## 2025-01-10 NOTE — PLAN OF CARE
Assumed care at 1900,   Patient is alert and oriented X 4  On RA, SR on tele  VSS, c/o pain generalized pain of 10/10  Pain control with dilaudid, morphine, and oxycodone.  Mildly effective. Call light within reach.  POC, discussed with patient,  Questions answered.

## 2025-01-10 NOTE — PROGRESS NOTES
[facility]   part of [facility]     Hospitalist Progress Note     Oluwaseun Oluwadamilola Ogunyemi Patient Status:  Inpatient    8/3/1985 MRN [id number]   [facility] 7NE-A Attending Jen Renae MD   Hosp Day # 1 PCP Clive St MD     Chief Complaint: Sickle cell pain crisis    Subjective:     Patient sickle cell pain 7-8 out of 10, oral oxycodone helping according to patient.  Patient states she had run out of her pain pills recently, patient states the pharmacy did not have the stock of her pain medications until today morning, patient states she would like to go home if possible on the oral pain pills which she takes at home.  Patient states she follows up with Dr. Mccormick as outpatient    Objective:    Review of Systems:   A comprehensive review of systems was completed; pertinent positive and negatives stated in subjective.    Vital signs:  Temp:  [97.4 °F (36.3 °C)-98.2 °F (36.8 °C)] 97.4 °F (36.3 °C)  Pulse:  [64-85] 67  Resp:  [13-20] 13  BP: (111-138)/(72-91) 122/91  SpO2:  [94 %-100 %] 98 %    Physical Exam:    BP (!) 122/91 (BP Location: Right arm)   Pulse 67   Temp 97.4 °F (36.3 °C) (Oral)   Resp 13   Ht 5' 5\" (1.651 m)   Wt 160 lb 0.9 oz (72.6 kg)   LMP 2025 (Exact Date)   SpO2 98%   BMI 26.63 kg/m²     General: No acute distress  Respiratory: No wheezes, no rhonchi  Cardiovascular: S1, S2, regular rate and rhythm  Abdomen: Soft, Non-tender, non-distended, positive bowel sounds  Neuro: No new focal deficits.   Extremities: No edema      Diagnostic Data:    Labs:  Recent Labs   Lab 25  1405 01/10/25  0529   WBC 4.2 3.9*   HGB 8.6* 7.9*   .3* 111.9*   .0 402.0       Recent Labs   Lab 25  1405 01/10/25  0529   GLU 99 113*   BUN <5* <5*   CREATSERUM 0.50* 0.49*   CA 9.1 8.8   ALB 4.2  --     142   K 3.9 3.5    109   CO2 26.0 25.0   ALKPHO 54  --    AST 30  --    ALT 26  --    BILT 0.5  --    TP 8.2  --        Estimated  Glomerular Filtration Rate: 122.4 mL/min/1.73m2 (A) (by CKD-EPI based on SCr of 0.49 mg/dL (L)).    Recent Labs   Lab 01/09/25  1405 01/09/25  1916   TROPHS 62* 64*       No results for input(s): \"PTP\", \"INR\" in the last 168 hours.               Microbiology    No results found for this visit on 01/09/25.      Imaging: Reviewed in Epic.    Medications:    potassium chloride  40 mEq Oral Q4H    magnesium oxide  400 mg Oral Once    enoxaparin  40 mg Subcutaneous Daily    aspirin  81 mg Oral Daily    folic acid  1 mg Oral Daily    hydroxyurea  2,000 mg Oral Daily    metoprolol succinate ER  25 mg Oral Daily    morphINE ER  60 mg Oral Q8H PRAVIN       Assessment & Plan:        #Sickle cell pain crisis   -PTA Folic acid, Hydroxyurea  -IV Pain control and continue PTA regimen of MS contin and IR Oxycodone      #Chronically elevated troponin  -continue PTA ASA and Beta blocker      #Chronic Macrocytic anemia likely due to hydrea   -monitor Hb     Discharge planning when pain controlled with oral pain medications, follow-up with regular outpatient hematologist Dr. Mccormick as outpatient.  Follow-up with regular outpatient primary care physician Clive St MD within 1 week in office.    Jen Renae MD    Supplementary Documentation:     Quality:  DVT Mechanical Prophylaxis:     Early ambuation  DVT Pharmacologic Prophylaxis   Medication    enoxaparin (Lovenox) 40 MG/0.4ML SUBQ injection 40 mg         DVT Pharmacologic prophylaxis: Aspirin 81 mg      Code Status: Not on file  Gilmore: No urinary catheter in place  Gilmore Duration (in days):   Central line:    ABHAY:     Discharge is dependent on: Pain control  At this point Ms. Andrade is expected to be discharge to: Home    The 21st Century Cures Act makes medical notes like these available to patients in the interest of transparency. Please be advised this is a medical document. Medical documents are intended to carry relevant information, facts as evident, and the clinical  opinion of the practitioner complains of. The medical note is intended as peer to peer communication and may appear blunt or direct. It is written in medical language and may contain abbreviations or verbiage that are unfamiliar.

## 2025-01-10 NOTE — PLAN OF CARE
Assumed care at 0730  Orientated x4, NSR, RA   Complaints of severe pain; tolerating pain plan     IV fluids and dilaudid   Denies chest pain   Needs met     Plan for continued pain management

## 2025-01-10 NOTE — PLAN OF CARE
Assumed care at 1830  Orientated x4, NSR, RA   Complaints of severe pain; tolerating pain plan     Navigator completed   IV fluids and dilaudid   Denies chest pain   Needs met     Plan for continued pain management

## 2025-01-10 NOTE — PAYOR COMM NOTE
--------------  ADMISSION REVIEW     Payor: [employer] P[employer]  Subscriber #:  [id number]  Authorization Number: [id number]    Admit date: 1/9/25  Admit time:  6:12 PM       Patient Seen in: [facility] Emergency Department    History     Stated Complaint: sent here from [facility] for pain management r/t sickle cell    Patient is a 39-year-old female presenting to the emergency department at the recommendation of her hematologist, Dr. Mccormick due to sickle cell pain crisis.  Patient has been experiencing severe pain for about 10 days.  She has been taking her home oral morphine but the pharmacy did not have her oxycodone which she therefore could not refill.  Today the patient was due for her infusion therapy but due to insurance change was unable to receive that either.  After being assessed at the infusion center the patient was treated with 2 mg of Dilaudid IV and received 1 L normal saline with no improvement in pain with the recommendation to come to the ER for further evaluation with the recommendation for inpatient admission for further care.  - Experiencing severe pain with a pain level of 11, affecting the entire body.  - History of sickle cell disease, requiring regular monthly infusions.  - Received pain medication and hydration instead of the infusion.    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)    Sickle cell crisis  (HCC)    Frequent crises    Sickle cell crisis (HCC)    Sickle cell pain crisis (HCC)     Physical Exam     ED Triage Vitals [01/09/25 1333]   /85   Pulse 73   Resp 16   Temp 98.2 °F (36.8 °C)   Temp src Oral   SpO2 100 %   O2 Device None (Room air)     Current:/75   Pulse 69   Temp 98.2 °F (36.8 °C) (Oral)   Resp 18   Ht 165.1 cm (5' 5\")   Wt 72.6 kg   LMP 01/04/2025 (Exact Date)   SpO2 100%   BMI 26.63 kg/m²     Physical Exam    Constitutional: No apparent distress  Eyes: No scleral icterus  Heart: regular rate rhythm, no murmurs  Lungs: Clear to auscultation bilaterally  Chest wall: Tender to palpation  Extremities: No redness or warmth appreciated, no signs of cellulitis.  Diffuse areas of pain are present.  No significant pain with range of motion.  Distal neurovascularly intact.  No calf tenderness, no lower extremity edema.  Skin: No rash  Neuro: Alert and oriented ×3    Labs Reviewed   COMP METABOLIC PANEL (14) - Abnormal; Notable for the following components:       Result Value    BUN <5 (*)     Creatinine 0.50 (*)     Globulin  4.0 (*)     All other components within normal limits   CBC WITH DIFFERENTIAL WITH PLATELET - Abnormal; Notable for the following components:    RBC 2.11 (*)     HGB 8.6 (*)     HCT 23.9 (*)     .3 (*)     MCH 40.8 (*)     All other components within normal limits   RETICULOCYTE COUNT - Abnormal; Notable for the following components:    Retic% 3.4 (*)     Retic IRF 0.326 (*)     Reticulocyte Hemoglobin Equivalent 37.1 (*)     All other components within normal limits   TROPONIN I HIGH SENSITIVITY - Abnormal; Notable for the following components:    Troponin I (High Sensitivity) 62 (*)     All other components within normal limits   LIPID PANEL - Abnormal; Notable for the following components:    HDL Cholesterol 32 (*)     All other components within normal limits     EKG 71 Normal sinus rhythm, normal intervals, normal axis, nonspecific T wave abnormality  is noted without significant change from July 1, 2023.    Medications given:   HYDROmorphone (Dilaudid) 1 MG/ML injection 1 mg    ketorolac (Toradol) 15 MG/ML injection 15 mg    sodium chloride 0.9% infusion      MDM      Patient's pain difficult to control despite IV narcotics.  She was unable to receive her monthly infusion today.      Mild elevation in troponin is present.  Less significant than past.  Symptoms addressed in the emergency department with IV narcotics, IV Toradol and IV fluids.  Patient admitted for further care.    Disposition and Plan     Clinical Impression:  1. Sickle cell pain crisis (HCC)    2. Chest pain of uncertain etiology         HOSPITALIST:    History and Physical     Elbertaseun Oluwadamilola Ogunyemi is a 39 year old female with past medical history of sickle cell disease presents with pain  Patient denies any recent illness, she is chronically on MS Contin with oxycodone for breakthrough but has not been able to fill her oxycodone due to pharmacy barriers.  She is presenting with pain typical of her crisis which is pain in her back shoulders and extremities.  This is also associated with a chest discomfort but this is not related to exertion.  No fevers or cough.    Physical Exam:    /75   Pulse 69   Temp 98.2 °F (36.8 °C) (Oral)   Resp 18   Ht 5' 5\" (1.651 m)   Wt 160 lb (72.6 kg)   LMP 01/04/2025 (Exact Date)   SpO2 100%   BMI 26.63 kg/m²   General: No acute distress, Alert  Respiratory: No rhonchi, no wheezes  Cardiovascular: S1, S2. Regular rate and rhythm  Abdomen: Soft, Non-tender, non-distended, positive bowel sounds  Neuro: No new focal deficits  Extremities: No edema       Assessment & Plan:     #Sickle cell pain crisis   -PTA Folic acid, Hydroxyurea  -IV Pain control and continue PTA regimen of MS contin and IR Oxycodone      #Chronically elevated troponin  -continue PTA ASA and Beta blocker      #Chronic Macrocytic anemia likely due to hydrea   -monitor  Hb      1/10:     NURSING:    VSS, c/o pain generalized pain of 10/10  Pain control with dilaudid, morphine, and oxycodone.  Mildly effective      HOSPITALIST:    Chief Complaint: Sickle cell pain crisis     Patient sickle cell pain 7-8 out of 10, oral oxycodone helping according to patient.  Patient states she had run out of her pain pills recently, patient states the pharmacy did not have the stock of her pain medications until today morning, patient states she would like to go home if possible on the oral pain pills which she takes at home.  Patient states she follows up with Dr. Mccormick as outpatient      Vital signs:  Temp:  [97.4 °F (36.3 °C)-98.2 °F (36.8 °C)] 97.4 °F (36.3 °C)  Pulse:  [64-85] 67  Resp:  [13-20] 13  BP: (111-138)/(72-91) 122/91  SpO2:  [94 %-100 %] 98 %     Physical Exam:    BP (!) 122/91 (BP Location: Right arm)   Pulse 67   Temp 97.4 °F (36.3 °C) (Oral)   Resp 13   Ht 5' 5\" (1.651 m)   Wt 160 lb 0.9 oz (72.6 kg)   LMP 01/04/2025 (Exact Date)   SpO2 98%   BMI 26.63 kg/m²      General: No acute distress  Respiratory: No wheezes, no rhonchi  Cardiovascular: S1, S2, regular rate and rhythm  Abdomen: Soft, Non-tender, non-distended, positive bowel sounds  Neuro: No new focal deficits.   Extremities: No edema     Lab 01/09/25  1405 01/10/25  0529   WBC 4.2 3.9*   HGB 8.6* 7.9*   .3* 111.9*   .0 402.0      GLU 99 113*   BUN <5* <5*   CREATSERUM 0.50* 0.49*   CA 9.1 8.8   ALB 4.2  --     142   K 3.9 3.5    109   CO2 26.0 25.0   ALKPHO 54  --    AST 30  --    ALT 26  --    BILT 0.5  --    TP 8.2  --       Lab 01/09/25  1405 01/09/25  1916   TROPHS 62* 64*          Medications:   Scheduled Medications    potassium chloride  40 mEq Oral Q4H    magnesium oxide  400 mg Oral Once    enoxaparin  40 mg Subcutaneous Daily    aspirin  81 mg Oral Daily    folic acid  1 mg Oral Daily    hydroxyurea  2,000 mg Oral Daily    metoprolol succinate ER  25 mg Oral Daily    morphINE  ER  60 mg Oral Q8H [facility]          Assessment & Plan:    #Sickle cell pain crisis   -PTA Folic acid, Hydroxyurea  -IV Pain control and continue PTA regimen of MS contin and IR Oxycodone      #Chronically elevated troponin  -continue PTA ASA and Beta blocker      #Chronic Macrocytic anemia likely due to hydrea   -monitor Hb     Discharge planning when pain controlled with oral pain medications      MEDICATIONS ADMINISTERED IN LAST 1 DAY:    HYDROmorphone (Dilaudid) 1 MG/ML injection 1-2 mg       Date Action Dose Route User    Admitted on 1/9/2025 1/9/2025 1205 Given 1 mg Intravenous Nara Srinivasan RN    1/9/2025 1201 Given 1 mg Intravenous Nara Srinivasan RN          HYDROmorphone (Dilaudid) 1 MG/ML injection 1 mg       Date Action Dose Route User    1/9/2025 1602 Given 1 mg Intravenous Lydia Callahan RN    1/9/2025 1446 Given 1 mg Intravenous Kimberlee Brunner RN          HYDROmorphone (Dilaudid) 1 MG/ML injection 1 mg       Date Action Dose Route User    1/10/2025 0032 Given 1 mg Intravenous Gifty Frankel RN HYDROmorphone (Dilaudid) 1 MG/ML injection 2 mg       Date Action Dose Route User    1/10/2025 1049 Given 2 mg Intravenous Sapna Briones RN    1/10/2025 0735 Given 2 mg Intravenous Sapna Briones RN    1/10/2025 0349 Given 2 mg Intravenous Gifty Frankel RN    1/9/2025 2229 Given 2 mg Intravenous Gifty Frankel RN    1/9/2025 2032 Given 2 mg Intravenous Gifty Frankel RN    1/9/2025 1822 Given 2 mg Intravenous Sapna Briones RN          hydroxyurea (Hydrea) cap 2,000 mg       Date Action Dose Route User    1/10/2025 0745 Given 2,000 mg Oral Sapna Briones RN          ketorolac (Toradol) 15 MG/ML injection 15 mg       Date Action Dose Route User    1/9/2025 1446 Given 15 mg Intravenous Kimberlee Brunner RN     morphINE ER (MS Contin) tab 60 mg       Date Action Dose Route User    1/10/2025 0559 Given 60 mg Oral Gifty Frankel RN    1/9/2025 2223 Given 60 mg Oral Gifty Frankel,  [place]          ondansetron (Zofran) 4 MG/2ML injection 4 mg       Date Action Dose Route User    1/10/2025 1049 Given 4 mg Intravenous Sapna Briones RN          oxyCODONE immediate release tab 30 mg       Date Action Dose Route User    1/10/2025 1050 Given 30 mg Oral Sapna Briones RN    1/10/2025 0700 Given 30 mg Oral Gifty Frankel RN    1/10/2025 0207 Given 30 mg Oral Gifty Frankel RN          sodium chloride 0.9% infusion       Date Action Dose Route User    1/9/2025 1602 New Bag 125 mL/hr Intravenous Lyida Callahan RN          sodium chloride 0.9% infusion       Date Action Dose Route User    1/9/2025 1835 New Bag (none) Intravenous Sapna Briones RN          sodium chloride 0.9 % IV bolus 1,000 mL       Date Action Dose Route User    Admitted on 1/9/2025 1/9/2025 1130 New Bag 1,000 mL Intravenous Nara Srinivasan, RN

## 2025-01-10 NOTE — TELEPHONE ENCOUNTER
Americans with Disabilities Act Signed, faxed to [facility] Department pending confirmation

## 2025-01-11 VITALS
WEIGHT: 160.06 LBS | HEIGHT: 65 IN | RESPIRATION RATE: 12 BRPM | SYSTOLIC BLOOD PRESSURE: 120 MMHG | BODY MASS INDEX: 26.67 KG/M2 | OXYGEN SATURATION: 99 % | DIASTOLIC BLOOD PRESSURE: 85 MMHG | HEART RATE: 67 BPM | TEMPERATURE: 98 F

## 2025-01-11 LAB
ANION GAP SERPL CALC-SCNC: 3 MMOL/L (ref 0–18)
BASOPHILS # BLD AUTO: 0.01 X10(3) UL (ref 0–0.2)
BASOPHILS NFR BLD AUTO: 0.3 %
BUN BLD-MCNC: 5 MG/DL (ref 9–23)
CALCIUM BLD-MCNC: 9.1 MG/DL (ref 8.7–10.4)
CHLORIDE SERPL-SCNC: 108 MMOL/L (ref 98–112)
CO2 SERPL-SCNC: 28 MMOL/L (ref 21–32)
CREAT BLD-MCNC: 0.62 MG/DL
EGFRCR SERPLBLD CKD-EPI 2021: 115 ML/MIN/1.73M2 (ref 60–?)
EOSINOPHIL # BLD AUTO: 0.05 X10(3) UL (ref 0–0.7)
EOSINOPHIL NFR BLD AUTO: 1.3 %
ERYTHROCYTE [DISTWIDTH] IN BLOOD BY AUTOMATED COUNT: 14.1 %
GLUCOSE BLD-MCNC: 105 MG/DL (ref 70–99)
HCT VFR BLD AUTO: 22.4 %
HGB BLD-MCNC: 8.2 G/DL
IMM GRANULOCYTES # BLD AUTO: 0.02 X10(3) UL (ref 0–1)
IMM GRANULOCYTES NFR BLD: 0.5 %
LYMPHOCYTES # BLD AUTO: 1.84 X10(3) UL (ref 1–4)
LYMPHOCYTES NFR BLD AUTO: 46.7 %
MAGNESIUM SERPL-MCNC: 1.8 MG/DL (ref 1.6–2.6)
MCH RBC QN AUTO: 40.4 PG (ref 26–34)
MCHC RBC AUTO-ENTMCNC: 36.6 G/DL (ref 31–37)
MCV RBC AUTO: 110.3 FL
MONOCYTES # BLD AUTO: 0.42 X10(3) UL (ref 0.1–1)
MONOCYTES NFR BLD AUTO: 10.7 %
NEUTROPHILS # BLD AUTO: 1.6 X10 (3) UL (ref 1.5–7.7)
NEUTROPHILS # BLD AUTO: 1.6 X10(3) UL (ref 1.5–7.7)
NEUTROPHILS NFR BLD AUTO: 40.5 %
OSMOLALITY SERPL CALC.SUM OF ELEC: 286 MOSM/KG (ref 275–295)
PLATELET # BLD AUTO: 340 10(3)UL (ref 150–450)
PLATELETS.RETICULATED NFR BLD AUTO: 4.8 % (ref 0–7)
POTASSIUM SERPL-SCNC: 3.4 MMOL/L (ref 3.5–5.1)
RBC # BLD AUTO: 2.03 X10(6)UL
SODIUM SERPL-SCNC: 139 MMOL/L (ref 136–145)
WBC # BLD AUTO: 3.9 X10(3) UL (ref 4–11)

## 2025-01-11 PROCEDURE — 99238 HOSP IP/OBS DSCHRG MGMT 30/<: CPT | Performed by: INTERNAL MEDICINE

## 2025-01-11 RX ORDER — POTASSIUM CHLORIDE 1500 MG/1
40 TABLET, EXTENDED RELEASE ORAL ONCE
Status: COMPLETED | OUTPATIENT
Start: 2025-01-11 | End: 2025-01-11

## 2025-01-11 NOTE — PROGRESS NOTES
[facility]   part of [facility]     Hospitalist Progress Note     Oluwaseun Oluwadamilola Ogunyemi Patient Status:  Inpatient    8/3/1985 MRN [id number]   Location [facility] 7NE-A Attending Jen Renae MD   Hosp Day # 2 PCP Clive St MD     Chief Complaint: Sickle cell pain crisis    Subjective:     Patient sickle cell pain 7-8 out of 10, had episode of nausea vomiting and stayed overnight.  Feeling better today according to patient.  Had Zofran x 1 today.  Needing IV pain medications today morning and received 2 doses so far.    Objective:    Review of Systems:   A comprehensive review of systems was completed; pertinent positive and negatives stated in subjective.    Vital signs:  Temp:  [97.8 °F (36.6 °C)-98.1 °F (36.7 °C)] 98 °F (36.7 °C)  Pulse:  [64-87] 67  Resp:  [12-23] 12  BP: (100-124)/(69-90) 120/85  SpO2:  [97 %-99 %] 99 %    Physical Exam:    /85 (BP Location: Right arm)   Pulse 67   Temp 98 °F (36.7 °C) (Oral)   Resp 12   Ht 5' 5\" (1.651 m)   Wt 160 lb 0.9 oz (72.6 kg)   LMP 2025 (Exact Date)   SpO2 99%   BMI 26.63 kg/m²     General: No acute distress  Respiratory: No wheezes, no rhonchi  Cardiovascular: S1, S2, regular rate and rhythm  Abdomen: Soft, Non-tender, non-distended, positive bowel sounds  Neuro: No new focal deficits.   Extremities: No edema      Diagnostic Data:    Labs:  Recent Labs   Lab 25  1405 01/10/25  0529 25  0629   WBC 4.2 3.9* 3.9*   HGB 8.6* 7.9* 8.2*   .3* 111.9* 110.3*   .0 402.0 340.0       Recent Labs   Lab 25  1405 01/10/25  0529 01/10/25  1702 25  0629   GLU 99 113*  --  105*   BUN <5* <5*  --  5*   CREATSERUM 0.50* 0.49*  --  0.62   CA 9.1 8.8  --  9.1   ALB 4.2  --   --   --     142  --  139   K 3.9 3.5 3.8 3.4*    109  --  108   CO2 26.0 25.0  --  28.0   ALKPHO 54  --   --   --    AST 30  --   --   --    ALT 26  --   --   --    BILT 0.5  --   --   --    TP 8.2  --   --   --         Estimated Glomerular Filtration Rate: 115.6 mL/min/1.73m2 (by CKD-EPI based on SCr of 0.62 mg/dL).    Recent Labs   Lab 01/09/25  1405 01/09/25  1916   TROPHS 62* 64*       No results for input(s): \"PTP\", \"INR\" in the last 168 hours.               Microbiology    No results found for this visit on 01/09/25.      Imaging: Reviewed in Epic.    Medications:    magnesium oxide  400 mg Oral Once    enoxaparin  40 mg Subcutaneous Daily    aspirin  81 mg Oral Daily    folic acid  1 mg Oral Daily    hydroxyurea  2,000 mg Oral Daily    metoprolol succinate ER  25 mg Oral Daily    morphINE ER  60 mg Oral Q8H PRAVIN       Assessment & Plan:        #Sickle cell pain crisis   -PTA Folic acid, Hydroxyurea  -IV Pain control and continue PTA regimen of MS contin and IR Oxycodone      #Chronically elevated troponin  -continue PTA ASA and Beta blocker      #Chronic Macrocytic anemia likely due to hydrea   -monitor Hb     Discharge planning when pain controlled with oral pain medications, follow-up with regular outpatient hematologist Dr. Mccormick as outpatient.  Follow-up with regular outpatient primary care physician Clive St MD within 1 week in office.    Jen Renae MD    Supplementary Documentation:     Quality:  DVT Mechanical Prophylaxis:     Early ambuation  DVT Pharmacologic Prophylaxis   Medication    enoxaparin (Lovenox) 40 MG/0.4ML SUBQ injection 40 mg         DVT Pharmacologic prophylaxis: Aspirin 81 mg      Code Status: Not on file  [place]: No urinary catheter in place  [place]more Duration (in days):   Central line:    ABHAY: 1/10/2025    Discharge is dependent on: Pain control  At this point Ms. Andrade is expected to be discharge to: Home    The 21st Century Cures Act makes medical notes like these available to patients in the interest of transparency. Please be advised this is a medical document. Medical documents are intended to carry relevant information, facts as evident, and the clinical opinion of the  practitioner complains of. The medical note is intended as peer to peer communication and may appear blunt or direct. It is written in medical language and may contain abbreviations or verbiage that are unfamiliar.

## 2025-01-11 NOTE — PLAN OF CARE
NURSING DISCHARGE NOTE    Discharged Home via Wheelchair.  Accompanied by Family member and Support staff  Belongings Taken by patient/family.    Assumed care @0730  VSS,   A&Ox4, RA  NSR ,   8/10 Pain, Reduced to a 5 at end of day.  Jeremy ms contin and PRN dilaudid given with relief.  Up as tolerated.    Tolerating Regular diet.  Intake and outputs w/d/l.    IVF infusing 0.9@100    Discharge navigator complete.  Discussed medications and upcoming appointments. All questions answered.    Problem: PAIN - ADULT  Goal: Verbalizes/displays adequate comfort level or patient's stated pain goal  Description: INTERVENTIONS:  - Encourage pt to monitor pain and request assistance  - Assess pain using appropriate pain scale  - Administer analgesics based on type and severity of pain and evaluate response  - Implement non-pharmacological measures as appropriate and evaluate response  - Consider cultural and social influences on pain and pain management  - Manage/alleviate anxiety  - Utilize distraction and/or relaxation techniques  - Monitor for opioid side effects  - Notify MD/LIP if interventions unsuccessful or patient reports new pain  - Anticipate increased pain with activity and pre-medicate as appropriate  Outcome: Adequate for Discharge     Problem: RISK FOR INFECTION - ADULT  Goal: Absence of fever/infection during anticipated neutropenic period  Description: INTERVENTIONS  - Monitor WBC  - Administer growth factors as ordered  - Implement neutropenic guidelines  Outcome: Adequate for Discharge

## 2025-01-11 NOTE — PLAN OF CARE
[facility] care 1900, 1/10, NOC. VSS, pain control, patient reports medications available at pharmacy, zofran x1, tolerated diet, slept intermittently

## 2025-01-11 NOTE — DISCHARGE SUMMARY
[facility]  Discharge Summary    Oluwaseun Oluwadamilola Ogunyemi Patient Status:  Inpatient    8/3/1985 MRN [id number]   [address] No att. providers found   Hosp Day # 2 PCP Clive St MD     Date of Admission: 2025    Date of Discharge: 2025      Disposition: Home or Self Care    Discharge Diagnosis:     #Sickle cell pain crisis     #Chronically elevated troponin    #Chronic Macrocytic anemia likely due to hydrea     Chief Complaint:   Chief Complaint   Patient presents with    Sickle Cell       History of Present Illness:   Oluwaseun Oluwadamilola Ogunyemi is a 39 year old female with past medical history of sickle cell disease presents with pain  Patient denies any recent illness, she is chronically on MS Contin with oxycodone for breakthrough but has not been able to fill her oxycodone due to pharmacy barriers.  She is presenting with pain typical of her crisis which is pain in her back shoulders and extremities.  This is also associated with a chest discomfort but this is not related to exertion.  No fevers or cough.     Please refer to history and physical done by Dr. Stahl for details on admission    Brief Synopsis:     #Sickle cell pain crisis   -Treated conservatively with IV fluids, IV and oral pain medications as needed, IV Zofran as needed nausea vomiting  -Continued home folic acid, Hydroxyurea  -IV Pain control and continue PTA regimen of MS contin and IR Oxycodone      #Chronically elevated troponin  -continue PTA ASA and Beta blocker      #Chronic Macrocytic anemia likely due to hydrea   -monitored Hb, stable and started to improve 8.2 at the time of discharge.     Discharge planning today as pain controlled with oral pain medications, follow-up with regular outpatient hematologist Dr. Mccormick as outpatient.  Follow-up with regular outpatient primary care physician Clive St MD within 1 week in office.      TCM Diagnosis at discharge from  Hospital: Other: See above; still recommend for TCM follow-up    Lace+ Score: 51  59-90 High Risk  29-58 Medium Risk  0-28   Low Risk.     TCM Follow-Up Recommendation:Oluwaseun Oluwadamilola Ogunyemi   LACE 29-58: Moderate Risk of readmission after discharge from the hospital.      PCP: Clive St MD      Procedures during hospitalization:   None    Incidental or significant findings and recommendations (brief descriptions):  None    Lab/Test results pending at Discharge:   None      Discharge Medications:        Discharge Medications        CONTINUE taking these medications        Instructions Prescription details   Aspirin Low Dose 81 MG Tbec  Generic drug: aspirin      TAKE 1 TABLET BY MOUTH EVERY DAY   Quantity: 90 tablet  Refills: 3     diphenhydrAMINE 25 MG Caps  Commonly known as: Benadryl      Take 1-2 capsules (25-50 mg total) by mouth every 8 (eight) hours as needed for Itching.   Refills: 0     folic acid 1 MG Tabs  Commonly known as: Folvite      Take 1 tablet (1 mg total) by mouth daily.   Quantity: 90 tablet  Refills: 3     hydroxyurea 500 MG Caps  Commonly known as: Hydrea      Take 4 capsules (2,000 mg total) by mouth daily.   Quantity: 360 capsule  Refills: 3     loperamide 2 MG Caps  Commonly known as: Imodium      Take 1 capsule (2 mg total) by mouth every 3 (three) hours as needed for Diarrhea.   Quantity: 30 capsule  Refills: 0     metoprolol succinate ER 25 MG Tb24  Commonly known as: Toprol XL      Take 1 tablet (25 mg total) by mouth daily.   Refills: 0     morphINE ER 60 MG Tbcr  Commonly known as: MS Contin      Take 1 tablet (60 mg total) by mouth every 8 (eight) hours.   Quantity: 90 tablet  Refills: 0     Naloxone HCl 4 MG/0.1ML Liqd      4 mg by Nasal route as needed. If patient remains unresponsive, repeat dose in other nostril 2-5 minutes after first dose.   Quantity: 1 kit  Refills: 0     ondansetron 8 MG tablet  Commonly known as: Zofran      Take 1 tablet (8 mg total) by  mouth every 8 (eight) hours as needed for Nausea.   Quantity: 9 tablet  Refills: 13     OxyCODONE HCl IR 30 MG Tabs  Commonly known as: ROXICODONE      Take 1 tablet (30 mg total) by mouth every 3 (three) hours as needed for Pain.   Quantity: 180 tablet  Refills: 0     VITAMIN B-12 OR      Take 1 tablet by mouth daily.   Refills: 0            ASK your doctor about these medications        Instructions Prescription details   Endari 5 g Pack  Generic drug: Glutamine (Sickle Cell)      Mix 3 packets (15 grams) with 8 ounces (240 mL) of cold or room temperature liquid or 4 to 6 ounces of food and take by mouth twice daily as directed.   Quantity: 180 each  Refills: 11               [place] monitoring program data reviewed in epic before prescribing narcotics/controlled substances: Yes    Follow up Visits: Follow-up with Clive St MD in 1 week    Clive St MD  [address] [facility]   [facility] [address]  [place] [address]  [phone]    Schedule an appointment as soon as possible for a visit in 1 week(s)      Esau Mccormick MD  [address] [place] [address]  [address]  [place] [address]  [phone]    Schedule an appointment as soon as possible for a visit in 1 week(s)      Appointments for Next 30 Days 1/12/2025 - 2/11/2025        Date Arrival Time Visit Type Length Department Provider     2/6/2025  9:45 AM  ON TREATMENT VISIT FOLLOW-UP [8472] 15 min [facility] Hematology Oncology [place] Esau Mccormick MD    Patient Instructions:         Location Instructions:     **IF YOU NEED LABWORK OR AN INFUSION ALONG WITH YOUR APPOINTMENT, YOU MUST CALL TO SCHEDULE.**  Your appointment is on the [facility] campus in the Cancer Center. The address is [address] [place] [address]. Please register at the Cancer Center  on the second floor.  Masks are optional for all patients and visitors, unless otherwise indicated.               2/6/2025 10:00 AM  INFUSION [7378] 60 min  [facility] [place] NP [place] RN7    Patient Instructions:         Location Instructions:     Your appointment is on the [facility] campus in the Cancer Center. The address is [address] [place] [address]. Please register at the Cancer Center  on the second floor.  Masks are optional for all patients and visitors, unless otherwise indicated. No care partners/visitors under 18 years of age are allowed in the infusion room.                          Physical Exam:  /85 (BP Location: Right arm)   Pulse 67   Temp 98 °F (36.7 °C) (Oral)   Resp 12   Ht 5' 5\" (1.651 m)   Wt 160 lb 0.9 oz (72.6 kg)   LMP 01/04/2025 (Exact Date)   SpO2 99%   BMI 26.63 kg/m²     General: No acute distress  Respiratory: No wheezes, no rhonchi  Cardiovascular: S1, S2, regular rate and rhythm  Abdomen: Soft, Non-tender, non-distended, positive bowel sounds  Neuro: No new focal deficits.   Extremities: No edema       Discharge Condition: stable    Patient Discharge Instructions: See electronic chart     30 minutes on discharge    Jen Renae MD

## 2025-01-13 NOTE — PAYOR COMM NOTE
--------------  DISCHARGE REVIEW    Payor: [facility] OUT OF STATE PPO  Subscriber #:  [id number]  Authorization Number: [id number]    Admit date: 25  Admit time:   6:12 PM  Discharge Date: 2025  5:07 PM     Admitting Physician: Lacho Stahl DO  Attending Physician:  No att. providers found  Primary Care Physician: Clive St MD          Discharge Summary Notes        Discharge Summary signed by Jen Renae MD at 2025 12:22 AM       Author: Jen Renae MD Specialty: HOSPITALIST, Internal Medicine Author Type: Physician    Filed: 2025 12:22 AM Date of Service: 2025  2:59 PM Status: Addendum    : Jen Renae MD (Physician)    Related Notes: Original Note by Jen Renae MD (Physician) filed at 2025 12:21 AM         [facility]  Discharge Summary    Oluwaseun Oluwadamilola Ogunyemi Patient Status:  Inpatient    8/3/1985 MRN [id number]   Location [facility] 7NE-A Attending No att. providers found   Hosp Day # 2 PCP Clive St MD     Date of Admission: 2025    Date of Discharge: 2025      Disposition: Home or Self Care    Discharge Diagnosis:     #Sickle cell pain crisis     #Chronically elevated troponin    #Chronic Macrocytic anemia likely due to hydrea     Chief Complaint:   Chief Complaint   Patient presents with    Sickle Cell       History of Present Illness:   Oluwaseun Oluwadamilola Ogunyemi is a 39 year old female with past medical history of sickle cell disease presents with pain  Patient denies any recent illness, she is chronically on MS Contin with oxycodone for breakthrough but has not been able to fill her oxycodone due to pharmacy barriers.  She is presenting with pain typical of her crisis which is pain in her back shoulders and extremities.  This is also associated with a chest discomfort but this is not related to exertion.  No fevers or cough.     Please refer to history and physical done by Dr. Stahl for details on  admission    Brief Synopsis:     #Sickle cell pain crisis   -Treated conservatively with IV fluids, IV and oral pain medications as needed, IV Zofran as needed nausea vomiting  -Continued home folic acid, Hydroxyurea  -IV Pain control and continue PTA regimen of MS contin and IR Oxycodone      #Chronically elevated troponin  -continue PTA ASA and Beta blocker      #Chronic Macrocytic anemia likely due to hydrea   -monitored Hb, stable and started to improve 8.2 at the time of discharge.     Discharge planning today as pain controlled with oral pain medications, follow-up with regular outpatient hematologist Dr. Mccormick as outpatient.  Follow-up with regular outpatient primary care physician Clive St MD within 1 week in office.      TCM Diagnosis at discharge from Hospital: Other: See above; still recommend for TCM follow-up    Lace+ Score: 51  59-90 High Risk  29-58 Medium Risk  0-28   Low Risk.     TCM Follow-Up Recommendation:Oluwaseun Oluwadamilola Ogunyemi   LACE 29-58: Moderate Risk of readmission after discharge from the hospital.      PCP: Clive St MD      Procedures during hospitalization:   None    Incidental or significant findings and recommendations (brief descriptions):  None    Lab/Test results pending at Discharge:   None      Discharge Medications:        Discharge Medications        CONTINUE taking these medications        Instructions Prescription details   Aspirin Low Dose 81 MG Tbec  Generic drug: aspirin      TAKE 1 TABLET BY MOUTH EVERY DAY   Quantity: 90 tablet  Refills: 3     diphenhydrAMINE 25 MG Caps  Commonly known as: Benadryl      Take 1-2 capsules (25-50 mg total) by mouth every 8 (eight) hours as needed for Itching.   Refills: 0     folic acid 1 MG Tabs  Commonly known as: Folvite      Take 1 tablet (1 mg total) by mouth daily.   Quantity: 90 tablet  Refills: 3     hydroxyurea 500 MG Caps  Commonly known as: Hydrea      Take 4 capsules (2,000 mg total) by mouth daily.    Quantity: 360 capsule  Refills: 3     loperamide 2 MG Caps  Commonly known as: Imodium      Take 1 capsule (2 mg total) by mouth every 3 (three) hours as needed for Diarrhea.   Quantity: 30 capsule  Refills: 0     metoprolol succinate ER 25 MG Tb24  Commonly known as: Toprol XL      Take 1 tablet (25 mg total) by mouth daily.   Refills: 0     morphINE ER 60 MG Tbcr  Commonly known as: MS Contin      Take 1 tablet (60 mg total) by mouth every 8 (eight) hours.   Quantity: 90 tablet  Refills: 0     Naloxone HCl 4 MG/0.1ML Liqd      4 mg by Nasal route as needed. If patient remains unresponsive, repeat dose in other nostril 2-5 minutes after first dose.   Quantity: 1 kit  Refills: 0     ondansetron 8 MG tablet  Commonly known as: Zofran      Take 1 tablet (8 mg total) by mouth every 8 (eight) hours as needed for Nausea.   Quantity: 9 tablet  Refills: 13     OxyCODONE HCl IR 30 MG Tabs  Commonly known as: ROXICODONE      Take 1 tablet (30 mg total) by mouth every 3 (three) hours as needed for Pain.   Quantity: 180 tablet  Refills: 0     VITAMIN B-12 OR      Take 1 tablet by mouth daily.   Refills: 0            ASK your doctor about these medications        Instructions Prescription details   Endari 5 g Pack  Generic drug: Glutamine (Sickle Cell)      Mix 3 packets (15 grams) with 8 ounces (240 mL) of cold or room temperature liquid or 4 to 6 ounces of food and take by mouth twice daily as directed.   Quantity: 180 each  Refills: 11               [place] monitoring program data reviewed in epic before prescribing narcotics/controlled substances: Yes    Follow up Visits: Follow-up with Clive St MD in 1 week    Clive St MD  [address] [place] [address]   [facility] [address]  [facility] [address] [phone]    Schedule an appointment as soon as possible for a visit in 1 week(s)      Esau Mccormick MD  [address] [facility]  [address]  [facility] [address] [phone]    Schedule an appointment as soon as possible  for a visit in 1 week(s)      Appointments for Next 30 Days 1/12/2025 - 2/11/2025        Date Arrival Time Visit Type Length Department Provider     2/6/2025  9:45 AM  ON TREATMENT VISIT FOLLOW-UP [2641] 15 min [facility] Hematology Oncology [place] Esau Mccormick MD    Patient Instructions:         Location Instructions:     **IF YOU NEED LABWORK OR AN INFUSION ALONG WITH YOUR APPOINTMENT, YOU MUST CALL TO SCHEDULE.**  Your appointment is on the [facility] in the Cancer [place]. The address is [address] [place] [address]. Please register at the [facility]  on the second floor.  Masks are optional for all patients and visitors, unless otherwise indicated.               2/6/2025 10:00 AM  INFUSION [1980] 60 min [facility] Infusion Center [place] NP [place] RN7    Patient Instructions:         Location Instructions:     Your appointment is on the [facility] in the Cancer Center. The address is [address] [place] [address]. Please register at the [facility]  on the second floor.  Masks are optional for all patients and visitors, unless otherwise indicated. No care partners/visitors under 18 years of age are allowed in the infusion room.                          Physical Exam:  /85 (BP Location: Right arm)   Pulse 67   Temp 98 °F (36.7 °C) (Oral)   Resp 12   Ht 5' 5\" (1.651 m)   Wt 160 lb 0.9 oz (72.6 kg)   LMP 01/04/2025 (Exact Date)   SpO2 99%   BMI 26.63 kg/m²     General: No acute distress  Respiratory: No wheezes, no rhonchi  Cardiovascular: S1, S2, regular rate and rhythm  Abdomen: Soft, Non-tender, non-distended, positive bowel sounds  Neuro: No new focal deficits.   Extremities: No edema       Discharge Condition: stable    Patient Discharge Instructions: See electronic chart     30 minutes on discharge    Jen Renae MD        Electronically signed by Jen Renae MD on 1/12/2025 12:22 AM          REVIEWER COMMENTS

## 2025-01-14 NOTE — TELEPHONE ENCOUNTER
Americans with Disabilities Act faxed to [facility] Department sending via [employer] to adv patient. Archiving forms.

## 2025-01-21 ENCOUNTER — TELEPHONE (OUTPATIENT)
Dept: HEMATOLOGY/ONCOLOGY | Facility: HOSPITAL | Age: 40
End: 2025-01-21

## 2025-01-21 DIAGNOSIS — D57.00 SICKLE CELL PAIN CRISIS (HCC): ICD-10-CM

## 2025-01-21 RX ORDER — MORPHINE SULFATE 60 MG/1
60 TABLET, FILM COATED, EXTENDED RELEASE ORAL EVERY 8 HOURS SCHEDULED
Qty: 90 TABLET | Refills: 0 | Status: SHIPPED | OUTPATIENT
Start: 2025-01-21 | End: 2025-02-21

## 2025-01-21 NOTE — TELEPHONE ENCOUNTER
Patient need a refill on her Morphine 60 mg oral tablet, she is completely out of medication.   Please send to   [facility]/pharmacy #[id number] - [place], [place] - [address] [phone], [phone]

## 2025-01-21 NOTE — TELEPHONE ENCOUNTER
Elisabeth smith from [facility] she's the  for pt. Her number is [phone], teetee

## 2025-02-06 ENCOUNTER — OFFICE VISIT (OUTPATIENT)
Age: 40
End: 2025-02-06
Attending: INTERNAL MEDICINE
Payer: COMMERCIAL

## 2025-02-06 VITALS
SYSTOLIC BLOOD PRESSURE: 122 MMHG | OXYGEN SATURATION: 100 % | HEIGHT: 66.38 IN | RESPIRATION RATE: 16 BRPM | HEART RATE: 101 BPM | WEIGHT: 165 LBS | TEMPERATURE: 98 F | BODY MASS INDEX: 26.2 KG/M2 | DIASTOLIC BLOOD PRESSURE: 73 MMHG

## 2025-02-06 DIAGNOSIS — D57.00 SICKLE CELL DISEASE WITH CRISIS (HCC): Primary | ICD-10-CM

## 2025-02-06 DIAGNOSIS — D57.00 SICKLE CELL PAIN CRISIS (HCC): ICD-10-CM

## 2025-02-06 DIAGNOSIS — D53.9 MACROCYTIC ANEMIA: ICD-10-CM

## 2025-02-06 DIAGNOSIS — D57.419 SICKLE CELL ANEMIA WITH COEXISTENT ALPHA-THALASSEMIA WITH CRISIS (HCC): ICD-10-CM

## 2025-02-06 LAB
ALBUMIN SERPL-MCNC: 4.3 G/DL (ref 3.2–4.8)
ALBUMIN/GLOB SERPL: 1.1 {RATIO} (ref 1–2)
ALP LIVER SERPL-CCNC: 63 U/L
ALT SERPL-CCNC: 25 U/L
ANION GAP SERPL CALC-SCNC: 7 MMOL/L (ref 0–18)
AST SERPL-CCNC: 25 U/L (ref ?–34)
BASOPHILS # BLD AUTO: 0.02 X10(3) UL (ref 0–0.2)
BASOPHILS NFR BLD AUTO: 0.5 %
BILIRUB SERPL-MCNC: 0.6 MG/DL (ref 0.3–1.2)
BUN BLD-MCNC: 7 MG/DL (ref 9–23)
CALCIUM BLD-MCNC: 9.2 MG/DL (ref 8.7–10.6)
CHLORIDE SERPL-SCNC: 106 MMOL/L (ref 98–112)
CO2 SERPL-SCNC: 28 MMOL/L (ref 21–32)
CREAT BLD-MCNC: 0.6 MG/DL
EGFRCR SERPLBLD CKD-EPI 2021: 116 ML/MIN/1.73M2 (ref 60–?)
EOSINOPHIL # BLD AUTO: 0.08 X10(3) UL (ref 0–0.7)
EOSINOPHIL NFR BLD AUTO: 2.1 %
ERYTHROCYTE [DISTWIDTH] IN BLOOD BY AUTOMATED COUNT: 15.1 %
GLOBULIN PLAS-MCNC: 4 G/DL (ref 2–3.5)
GLUCOSE BLD-MCNC: 96 MG/DL (ref 70–99)
HCT VFR BLD AUTO: 24.4 %
HGB BLD-MCNC: 9 G/DL
HGB RETIC QN AUTO: 37.3 PG (ref 28.2–36.6)
IMM GRANULOCYTES # BLD AUTO: 0.01 X10(3) UL (ref 0–1)
IMM GRANULOCYTES NFR BLD: 0.3 %
IMM RETICS NFR: 0.32 RATIO (ref 0.1–0.3)
LDH SERPL L TO P-CCNC: 218 U/L
LYMPHOCYTES # BLD AUTO: 1.45 X10(3) UL (ref 1–4)
LYMPHOCYTES NFR BLD AUTO: 37.2 %
MCH RBC QN AUTO: 41.9 PG (ref 26–34)
MCHC RBC AUTO-ENTMCNC: 36.9 G/DL (ref 31–37)
MCV RBC AUTO: 113.5 FL
MONOCYTES # BLD AUTO: 0.42 X10(3) UL (ref 0.1–1)
MONOCYTES NFR BLD AUTO: 10.8 %
NEUTROPHILS # BLD AUTO: 1.92 X10 (3) UL (ref 1.5–7.7)
NEUTROPHILS # BLD AUTO: 1.92 X10(3) UL (ref 1.5–7.7)
NEUTROPHILS NFR BLD AUTO: 49.1 %
OSMOLALITY SERPL CALC.SUM OF ELEC: 290 MOSM/KG (ref 275–295)
PLATELET # BLD AUTO: 368 10(3)UL (ref 150–450)
POTASSIUM SERPL-SCNC: 3.7 MMOL/L (ref 3.5–5.1)
PROT SERPL-MCNC: 8.3 G/DL (ref 5.7–8.2)
RBC # BLD AUTO: 2.15 X10(6)UL
RETICS # AUTO: 61.7 X10(3) UL (ref 22.5–147.5)
RETICS/RBC NFR AUTO: 2.9 %
SODIUM SERPL-SCNC: 141 MMOL/L (ref 136–145)
WBC # BLD AUTO: 3.9 X10(3) UL (ref 4–11)

## 2025-02-06 RX ORDER — ONDANSETRON 2 MG/ML
8 INJECTION INTRAMUSCULAR; INTRAVENOUS AS NEEDED
Start: 2025-03-06

## 2025-02-06 RX ORDER — HYDROMORPHONE HYDROCHLORIDE 1 MG/ML
1-2 INJECTION, SOLUTION INTRAMUSCULAR; INTRAVENOUS; SUBCUTANEOUS AS NEEDED
Start: 2025-03-06

## 2025-02-06 RX ORDER — OXYCODONE HYDROCHLORIDE 30 MG/1
30 TABLET ORAL
Qty: 180 TABLET | Refills: 0 | Status: SHIPPED | OUTPATIENT
Start: 2025-02-06

## 2025-02-06 RX ORDER — DIPHENHYDRAMINE HCL 25 MG
CAPSULE ORAL ONCE
Start: 2025-03-06 | End: 2025-03-06

## 2025-02-06 RX ORDER — DIPHENHYDRAMINE HCL 25 MG
25 CAPSULE ORAL ONCE
Status: COMPLETED | OUTPATIENT
Start: 2025-02-06 | End: 2025-02-06

## 2025-02-06 RX ORDER — HYDROMORPHONE HYDROCHLORIDE 1 MG/ML
2 INJECTION, SOLUTION INTRAMUSCULAR; INTRAVENOUS; SUBCUTANEOUS EVERY 30 MIN PRN
Status: DISCONTINUED | OUTPATIENT
Start: 2025-02-06 | End: 2025-02-06

## 2025-02-06 RX ADMIN — DIPHENHYDRAMINE HCL 25 MG: 25 MG CAPSULE ORAL at 11:10:00

## 2025-02-06 RX ADMIN — HYDROMORPHONE HYDROCHLORIDE 2 MG: 1 INJECTION, SOLUTION INTRAMUSCULAR; INTRAVENOUS; SUBCUTANEOUS at 11:10:00

## 2025-02-06 RX ADMIN — HYDROMORPHONE HYDROCHLORIDE 2 MG: 1 INJECTION, SOLUTION INTRAMUSCULAR; INTRAVENOUS; SUBCUTANEOUS at 12:09:00

## 2025-02-06 NOTE — PROGRESS NOTES
Hematology Clinic Follow Up Visit    Patient Name: Oluwaseun Oluwadamilola Ogunyemi  Medical Record Number: [id number]   YOB: 1985    PCP: Clive St MD     Reason for Consultation:  Oluwaseun Oluwadamilola Ogunyemi was seen today for the diagnosis of hgb SS/sickle cell disease    Hematologic History:  *Sickle cell disease, hgb SS  -early 2000's moved from [facility] to   -2016/2017- 1st pregnancy c/b worsening pain crises  -2018- 2nd pregnancy c/b worsening pain crises, including episode of acute chest requiring RBC exchange.  -early 2019- started hydroxyurea, titrated up to 2000mg daily  -7/2020- started Voxelotor, but dc'd shortly after starting d/t poor tolerability with diarrhea, fatigue, elevated LFTs, was not very helpful either.   -5/28/21- 3/2022- received crizanlizumab (Adakveo) however c/b infusion reaction 3/2022 and was only slightly helpful therefore not continued  -10/2021- moved from [facility] (followed with Dr. Walton of [facility]) to [place].   -4/2022- started L-glutamine (Endari) 15g PO BID, cont'd hydrea  -8/2022- moved from [place] (prev followed by Dr. Esau Blount) to [place], [place]  -3/27/23- resumed crizanlizumab d/t more freq acute pain episodes   -5/8/23- reduced hydrea to 1500mg daily (d/t hgb 7.4, abs retic 47K)   -7/2/23- reduced hydrea to 1000mg daily (d/t hgb 7.0, abs retic 59K)   -12/27/23- increased Hydrea to 1500mg daily  -4/4/24- increased Hydrea to 2000mg daily    ================================  Interval events: Presents for follow-up and for next crizanlizumab infusion.  Did not get crizanlizumab last month due to insurance issues.  She was hospitalized for an acute pain episode for 2 days.    She continues to have chronic pain frequent intermittent acute episodes lately.  Much of these she manages at home herself.  Feeling worse again today with pain involving her lower back, waist, legs, gait.  She denies any increased dyspnea.    No recent  fevers or infections.    She continues to take MS Contin 60 mg q8hrs and oxycodone IR 30 mg q3 hrs prn.      She has been taking Hydrea at 2000 mg daily.  She is also taking Endari 15 g twice daily and folic acid 1 mg daily.     She is following with Dr. Aviles at [facility] in pursuit of gene therapy with Casgevy.      Hospitalizations and ED visits for acute vaso-occlusive pain episodes since moving from [facility] in 2022:  -22- hospitalization  -22- ED  -22- hospitalization  10/8/22- hospitalization  -10/29/22- ED  -22- ED  -22- ED   -23- hospitalization  -23- hospitalization  -23- hospitalization  -10/7/24- hospitalization  -25- hospitalization    Past Medical History:  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 90 tablet 0    OxyCODONE HCl IR 30 MG Oral Tab Take 1 tablet (30 mg total) by mouth every 3 (three) hours as needed for Pain. 180 tablet 0    loperamide 2 MG Oral Cap  Take 1 capsule (2 mg total) by mouth every 3 (three) hours as needed for Diarrhea. 30 capsule 0    diphenhydrAMINE 25 MG Oral Cap Take 1-2 capsules (25-50 mg total) by mouth every 8 (eight) hours as needed for Itching.      ASPIRIN LOW DOSE 81 MG Oral Tab EC TAKE 1 TABLET BY MOUTH EVERY DAY 90 tablet 3    ondansetron (ZOFRAN) 8 MG tablet Take 1 tablet (8 mg total) by mouth every 8 (eight) hours as needed for Nausea. 9 tablet 13    hydroxyurea 500 MG Oral Cap Take 4 capsules (2,000 mg total) by mouth daily. 360 capsule 3    folic acid 1 MG Oral Tab Take 1 tablet (1 mg total) by mouth daily. 90 tablet 3    Naloxone HCl 4 MG/0.1ML Nasal Liquid 4 mg by Nasal route as needed. If patient remains unresponsive, repeat dose in other nostril 2-5 minutes after first dose. 1 kit 0    metoprolol succinate ER 25 MG Oral Tablet 24 Hr Take 1 tablet (25 mg total) by mouth daily.      Cyanocobalamin (VITAMIN B-12 OR) Take 1 tablet by mouth daily.       Allergies:   Allergies   Allergen Reactions    Piperacillin Sod-Tazobactam So ITCHING     Generalized  Tolerated cephalosporins in the past       Psychosocial History:  Social History     Social History Narrative    , has 2 children ages 4 and 5 as of 9/2022.  Not employed.     Social History     Socioeconomic History    Marital status:    Tobacco Use    Smoking status: Never    Smokeless tobacco: Never   Vaping Use    Vaping status: Never Used   Substance and Sexual Activity    Alcohol use: Never     Comment: No history of excessive use    Drug use: Never    Sexual activity: Yes     Partners: Male     Birth control/protection: Condom   Other Topics Concern    Caffeine Concern Yes     Comment: 5-10 a month    Exercise Yes     Comment: 1-2 times a week    Seat Belt Yes    Self-Exams Yes     Comment: self breast exam with showering   Social History Narrative    , has 2 children ages 4 and 5 as of 9/2022.  Not employed.     Social Drivers of Health     Food  Insecurity: No Food Insecurity (1/9/2025)    Food Insecurity     Food Insecurity: Never true   Transportation Needs: No Transportation Needs (1/9/2025)    Transportation Needs     Lack of Transportation: No   Housing Stability: Low Risk  (1/9/2025)    Housing Stability     Housing Instability: No       Family Medical History:  Family History   Problem Relation Age of Onset    Hypertension Mother         unknown    Cataracts Mother     No Known Problems Father         was told cardiac arrest    No Known Problems Brother     Sickle Cell Maternal Aunt         passed from kidney failure    DVT/VTE Other     Breast Cancer Neg     Ovarian Cancer Neg     Colon Cancer Neg      Review of Systems:  A 10-point ROS was done with pertinent positives and negative per the HPI    Vital Signs:  Height: 168.6 cm (5' 6.38\") (02/06 1007)  Weight: 74.8 kg (165 lb) (02/06 1007)  BSA (Calculated - sq m): 1.85 sq meters (02/06 1007)  Pulse: 101 (02/06 1007)  BP: 122/73 (02/06 1007)  Temp: 97.7 °F (36.5 °C) (02/06 1007)  Do Not Use - Resp Rate: --  SpO2: 100 % (02/06 1007)    Wt Readings from Last 6 Encounters:   02/06/25 74.8 kg (165 lb)   01/09/25 72.6 kg (160 lb 0.9 oz)   12/12/24 73.6 kg (162 lb 3.2 oz)   12/03/24 72.6 kg (160 lb)   12/02/24 72.9 kg (160 lb 12.8 oz)   11/14/24 72.5 kg (159 lb 12.8 oz)     Physical Examination:  General: Patient is alert and oriented, not in acute distress  Psych: Mood and affect are appropriate  Eyes: EOMI  ENT: Oropharynx is clear  CV: Regular rate and rhythm, no murmurs, no LE edema  Respiratory: Lungs clear to auscultation bilaterally  GI/Abd: Soft, non-tender with normoactive bowel sounds, no hepatosplenomegaly  Neurological: Grossly intact   Lymphatics: No palpable lymphadenopathy  Skin: no rashes or petechiae    Laboratory:  Lab Results   Component Value Date    WBC 3.9 (L) 02/06/2025    WBC 3.9 (L) 01/11/2025    WBC 3.9 (L) 01/10/2025    HGB 9.0 (L) 02/06/2025    HGB 8.2 (L) 01/11/2025    HGB 7.9  (L) 01/10/2025    HCT 24.4 (L) 02/06/2025    .5 (H) 02/06/2025    MCH 41.9 (H) 02/06/2025    MCHC 36.9 02/06/2025    RDW 15.1 02/06/2025    .0 02/06/2025    .0 01/11/2025    .0 01/10/2025     Lab Results   Component Value Date    GLU 96 02/06/2025    BUN 7 (L) 02/06/2025    CREATSERUM 0.60 02/06/2025    CREATSERUM 0.62 01/11/2025    CREATSERUM 0.49 (L) 01/10/2025    ANIONGAP 7 02/06/2025    CA 9.2 02/06/2025    OSMOCALC 290 02/06/2025    ALKPHO 63 02/06/2025    AST 25 02/06/2025    ALT 25 02/06/2025    BILT 0.6 02/06/2025    TP 8.3 (H) 02/06/2025    ALB 4.3 02/06/2025    GLOBULIN 4.0 (H) 02/06/2025     02/06/2025    K 3.7 02/06/2025     02/06/2025    CO2 28.0 02/06/2025     Lab Results   Component Value Date     02/06/2025     12/12/2024     10/17/2024     10/08/2024     07/25/2024     12/27/2023     07/12/2023     05/08/2023     03/27/2023     02/16/2023     12/22/2022     09/05/2022     No results found for: \"PTT\", \"PT\", \"INR\"    Lab Results   Component Value Date    RETICABSOL 61.7 02/06/2025    RETICABSOL 70.7 01/09/2025    RETICABSOL 61.1 12/12/2024    RETICABSOL 85.9 10/17/2024    RETICABSOL 90.3 10/08/2024    RETICABSOL 70.8 10/07/2024    RETICABSOL 59.5 08/22/2024    RETICABSOL 55.2 07/25/2024    RETICABSOL 88.8 05/30/2024    RETICABSOL 116.1 04/04/2024     Lab Results   Component Value Date    CORINNE 279.5 (H) 02/16/2023    CORINNE 395.5 (H) 09/05/2022     Lab Results   Component Value Date    SAT 28 02/16/2023    SAT 36 09/05/2022     Lab Results   Component Value Date    B12 1,792 (H) 07/12/2023    B12 182 (L) 05/08/2023     Lab Results   Component Value Date    MMA 95 05/08/2023     Component      Latest Ref Rng & Units 9/5/2022   HEMOGLOBIN A      95.5 - 100.0 % <1.0 (L)   HEMOGLOBIN A2      1.5 - 3.5 % 1.3 (L)   HEMOGLOBIN F (FETAL)      0.0 - 2.0 % 45.0 (H)   HEMOGLOBIN S      % 53.7    HGB Electophoresis Interp       Overall, 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 Although HgB F is often elevated in patients with sickle cell disease, such elevations do not commonly exceed 15% of the total hemoglobin. Possible considerations for the degree of HgB F elevation as seen in this patient may include therapy effect (Hydroxyurea) vs. hereditary persistence of fetal hemoglobin (HPFH).      Impression & Plan:     *Hgb SS/Sickle cell disease  -+hx of acute chest in 2018 requiring RBC exchange.  She reports having less than 10 transfusions in her lifetime, most of the transfusions she received were during prior pregnancies.   -She has had an excellent response with hydroxyurea with marked increase in hemoglobin F.   -Continue hydroxyurea to 2000 mg daily  -Started L-glutamine 15 g BID 4/2022, in effort to reduce frequency of acute pain episodes, so far she thinks this might be helpful, will continue this.    -restarted Crizanlizumab (adakveo) 3/16/23, tolerating well without complication.  Continue crizanlizumab 5 mg/kg q4 weeks- dose today.  The frequency of her hospitalizations have decreased since she started this, though it is unclear if her pain is actually better, she is managing her pain better at home in attempt to avoid hospitalizations.    -Previously intolerant to Voxelotor   -Recommend Folvite 1 mg daily to be continued indefinitely  -Recommend annual ophthalmology visits  -Advised to stay up-to-date with recommended vaccinations.    -She has met with  Dr. Bryce Aviles at [facility] to discuss pursuing Casgevy gene therapy.     *macrocytic anemia  -d/t sickle cell anemia + hydroxyurea effect + B12 def  -Continue vitamin B-12 1000 mcg PO daily to be continued indefinitely  -continue folvite 1mg daily indefinitely  -Follow CBC    *Chronic pain management  -Continue MS Contin 60mg q8 hrs scheduled and oxycodone 30 mg IR PO q3hrs prn    -Duloxetine was not helpful    *Acute pain episode  -Will give IV fluid hydration and IV dilaudid 2mg x 1 then 1-2mg prn today as below while she is in the infusion unit today for acute pain flare.      *Management recommendations of acute pain episodes not controlled with home meds  -Hydration with IV fluids with 0.9 NS until determined to be euvolemic, then maintained with 0.5 NS as relative hypernatremia can increase RBC sickling.  -Recommend prompt management of painful symptoms with parenteral opioids- recommend starting IV Dilaudid 2mg prn up to 3 doses for additional pain relief.  If needed, a PCA can be initiated on admission.  For pruritus related to opiates consider PO Benadryl or cetirizine.  I favor avoiding IV Benadryl.   -For any acute pain episode evaluation for potential infection or development of acute chest should be considered.  -standing order remains in place for IV dilaudid 1-2mg up to 3 doses prn + 1 L of 0.9 NS IVF + PO benadryl 25-50mg prn for acute pain episodes that can be managed as an outpatient in the infusion center PRN in effort to avoid ED visits as able.     RTC in 12 weeks    Esau Mccormick MD  Hematology/Medical Oncology  [facility]    MDM- high risk related to sickle cell disease management- given IVF and IV opiates requiring intensive monitoring in infusion unit.  ongoing continuity of complex care related to sickle cell disease.

## 2025-02-06 NOTE — PROGRESS NOTES
Education Record    Learner:  Patient    Disease / Diagnosis: Sickle cell anemia    Barriers / Limitations:  None   Comments:    Method:  Brief focused and Reinforcement   Comments:    General Topics:  Medication, Pain, Side effects and symptom management, and Plan of care reviewed   Comments:    Outcome:  Shows understanding   Comments:    Patient here for follow up and adakveo infusion. She present to the ED 1/9 - 1/11 for sickle cell pain crisis. Headaches have resolved. Patient still has generalized pain all over - shoulders, chest, ribcage, back, and legs. She also needs a refill on oxycodone today.

## 2025-02-06 NOTE — PROGRESS NOTES
Education Record    Learner:  Patient    Disease / Diagnosis: Sickle cell disease    Barriers / Limitations:  None   Comments:    Method:  Discussion   Comments:    General Topics:  Medication, Pain, Side effects and symptom management, and Plan of care reviewed   Comments:    Outcome:  Shows understanding   Comments:    Pt is here for Adakveo infusion. Seen by . 1L IVF bolus and pain meds given as needed. Denies nausea .C/o itching, Benadryl PO given.rBolus Fluids given in 30 minute interval per pt request. Adakveo infusion completed. Pain slightly improved. Pt feels Slightly better after getting hydration. Pt discharged in stable condition

## 2025-02-21 ENCOUNTER — TELEPHONE (OUTPATIENT)
Age: 40
End: 2025-02-21

## 2025-02-21 ENCOUNTER — OFFICE VISIT (OUTPATIENT)
Age: 40
End: 2025-02-21
Attending: INTERNAL MEDICINE
Payer: COMMERCIAL

## 2025-02-21 VITALS
OXYGEN SATURATION: 97 % | DIASTOLIC BLOOD PRESSURE: 82 MMHG | SYSTOLIC BLOOD PRESSURE: 132 MMHG | RESPIRATION RATE: 16 BRPM | HEART RATE: 101 BPM | TEMPERATURE: 97 F

## 2025-02-21 DIAGNOSIS — D57.00 SICKLE CELL PAIN CRISIS (HCC): ICD-10-CM

## 2025-02-21 DIAGNOSIS — D57.419 SICKLE CELL ANEMIA WITH COEXISTENT ALPHA-THALASSEMIA WITH CRISIS (HCC): ICD-10-CM

## 2025-02-21 DIAGNOSIS — D57.00 SICKLE CELL DISEASE WITH CRISIS (HCC): Primary | ICD-10-CM

## 2025-02-21 RX ORDER — MORPHINE SULFATE 60 MG/1
60 TABLET, FILM COATED, EXTENDED RELEASE ORAL EVERY 8 HOURS SCHEDULED
Qty: 90 TABLET | Refills: 0 | Status: SHIPPED | OUTPATIENT
Start: 2025-02-21

## 2025-02-21 RX ORDER — HYDROMORPHONE HYDROCHLORIDE 1 MG/ML
1-2 INJECTION, SOLUTION INTRAMUSCULAR; INTRAVENOUS; SUBCUTANEOUS AS NEEDED
Status: DISCONTINUED | OUTPATIENT
Start: 2025-02-21 | End: 2025-02-21

## 2025-02-21 RX ORDER — ONDANSETRON 2 MG/ML
8 INJECTION INTRAMUSCULAR; INTRAVENOUS AS NEEDED
Start: 2025-03-06

## 2025-02-21 RX ORDER — DIPHENHYDRAMINE HCL 25 MG
CAPSULE ORAL ONCE
Start: 2025-03-06 | End: 2025-03-06

## 2025-02-21 RX ORDER — HYDROMORPHONE HYDROCHLORIDE 1 MG/ML
1-2 INJECTION, SOLUTION INTRAMUSCULAR; INTRAVENOUS; SUBCUTANEOUS AS NEEDED
Start: 2025-03-06

## 2025-02-21 RX ADMIN — HYDROMORPHONE HYDROCHLORIDE 1 MG: 1 INJECTION, SOLUTION INTRAMUSCULAR; INTRAVENOUS; SUBCUTANEOUS at 12:43:00

## 2025-02-21 RX ADMIN — HYDROMORPHONE HYDROCHLORIDE 2 MG: 1 INJECTION, SOLUTION INTRAMUSCULAR; INTRAVENOUS; SUBCUTANEOUS at 11:44:00

## 2025-02-21 NOTE — PROGRESS NOTES
Education Record    Learner:  Patient     Disease / Diagnosis: sickle cell anemia     Barriers / Limitations:  None   Comments:    Method:  Discussion   Comments:    General Topics:  Plan of care reviewed   Comments:    Outcome:  Shows understanding   Comments:    Patient here for IVF and pain medication. Patient received 2mg dilaudid x2 per MD orders and patients pain scale. Future appointments in place and patient left in stable condition.

## 2025-02-21 NOTE — TELEPHONE ENCOUNTER
Pt is calling to speak with Nurse regarding sickle cell pain lower waist, rib cage.      Please contact patient.

## 2025-02-21 NOTE — TELEPHONE ENCOUNTER
Patient called back.  Having SS pain in shoulder, lower back, rib area and legs.  Requesting hydration and pain management today.

## 2025-03-06 ENCOUNTER — OFFICE VISIT (OUTPATIENT)
Age: 40
End: 2025-03-06
Attending: INTERNAL MEDICINE
Payer: COMMERCIAL

## 2025-03-06 VITALS
RESPIRATION RATE: 16 BRPM | OXYGEN SATURATION: 98 % | TEMPERATURE: 97 F | BODY MASS INDEX: 26.84 KG/M2 | SYSTOLIC BLOOD PRESSURE: 103 MMHG | HEIGHT: 66.38 IN | HEART RATE: 85 BPM | WEIGHT: 169 LBS | DIASTOLIC BLOOD PRESSURE: 66 MMHG

## 2025-03-06 DIAGNOSIS — D57.00 SICKLE CELL PAIN CRISIS (HCC): ICD-10-CM

## 2025-03-06 DIAGNOSIS — D57.00 SICKLE CELL DISEASE WITH CRISIS (HCC): Primary | ICD-10-CM

## 2025-03-06 DIAGNOSIS — D57.419 SICKLE CELL ANEMIA WITH COEXISTENT ALPHA-THALASSEMIA WITH CRISIS (HCC): ICD-10-CM

## 2025-03-06 RX ORDER — DIPHENHYDRAMINE HCL 25 MG
CAPSULE ORAL ONCE
Start: 2025-04-03 | End: 2025-04-03

## 2025-03-06 RX ORDER — HYDROMORPHONE HYDROCHLORIDE 1 MG/ML
1-2 INJECTION, SOLUTION INTRAMUSCULAR; INTRAVENOUS; SUBCUTANEOUS AS NEEDED
Status: DISCONTINUED | OUTPATIENT
Start: 2025-03-06 | End: 2025-03-06

## 2025-03-06 RX ORDER — HYDROMORPHONE HYDROCHLORIDE 1 MG/ML
1-2 INJECTION, SOLUTION INTRAMUSCULAR; INTRAVENOUS; SUBCUTANEOUS AS NEEDED
Start: 2025-04-03

## 2025-03-06 RX ORDER — ONDANSETRON 2 MG/ML
8 INJECTION INTRAMUSCULAR; INTRAVENOUS AS NEEDED
Start: 2025-04-03

## 2025-03-06 RX ORDER — OXYCODONE HYDROCHLORIDE 30 MG/1
30 TABLET ORAL
Qty: 180 TABLET | Refills: 0 | Status: SHIPPED | OUTPATIENT
Start: 2025-03-06

## 2025-03-06 RX ADMIN — HYDROMORPHONE HYDROCHLORIDE 2 MG: 1 INJECTION, SOLUTION INTRAMUSCULAR; INTRAVENOUS; SUBCUTANEOUS at 10:20:00

## 2025-03-06 RX ADMIN — HYDROMORPHONE HYDROCHLORIDE 2 MG: 1 INJECTION, SOLUTION INTRAMUSCULAR; INTRAVENOUS; SUBCUTANEOUS at 10:54:00

## 2025-03-06 NOTE — PROGRESS NOTES
Education Record    Learner:  Patient    Disease / Diagnosis: sickle cell     Barriers / Limitations:  None   Comments:    Method:  Brief focused   Comments:    General Topics:  Plan of care reviewed   Comments:    Outcome:  Shows understanding   Comments:    Here for IVF, adakveo, and pain medication. Received all without issue; dilaudid 2mg given x 2 doses. Next appt made for 4 weeks from today. Discharged in stable condition.

## 2025-03-25 DIAGNOSIS — D57.00 SICKLE CELL PAIN CRISIS (HCC): ICD-10-CM

## 2025-03-25 RX ORDER — OXYCODONE HYDROCHLORIDE 30 MG/1
30 TABLET ORAL
Qty: 180 TABLET | Refills: 0 | Status: SHIPPED | OUTPATIENT
Start: 2025-03-25 | End: 2025-04-28

## 2025-03-25 RX ORDER — MORPHINE SULFATE 60 MG/1
60 TABLET, FILM COATED, EXTENDED RELEASE ORAL EVERY 8 HOURS SCHEDULED
Qty: 90 TABLET | Refills: 0 | Status: SHIPPED | OUTPATIENT
Start: 2025-03-25 | End: 2025-04-28

## 2025-04-03 ENCOUNTER — OFFICE VISIT (OUTPATIENT)
Age: 40
End: 2025-04-03
Attending: INTERNAL MEDICINE
Payer: COMMERCIAL

## 2025-04-03 VITALS
HEART RATE: 81 BPM | SYSTOLIC BLOOD PRESSURE: 130 MMHG | BODY MASS INDEX: 26.74 KG/M2 | DIASTOLIC BLOOD PRESSURE: 87 MMHG | TEMPERATURE: 98 F | HEIGHT: 66.38 IN | WEIGHT: 168.38 LBS | RESPIRATION RATE: 16 BRPM | OXYGEN SATURATION: 100 %

## 2025-04-03 DIAGNOSIS — D57.00 SICKLE CELL DISEASE WITH CRISIS (HCC): Primary | ICD-10-CM

## 2025-04-03 DIAGNOSIS — D57.00 SICKLE CELL PAIN CRISIS (HCC): ICD-10-CM

## 2025-04-03 DIAGNOSIS — D57.419 SICKLE CELL ANEMIA WITH COEXISTENT ALPHA-THALASSEMIA WITH CRISIS (HCC): ICD-10-CM

## 2025-04-03 RX ORDER — ONDANSETRON 2 MG/ML
8 INJECTION INTRAMUSCULAR; INTRAVENOUS AS NEEDED
Start: 2025-05-01

## 2025-04-03 RX ORDER — DIPHENHYDRAMINE HCL 25 MG
CAPSULE ORAL ONCE
Start: 2025-05-01 | End: 2025-05-01

## 2025-04-03 RX ORDER — HYDROMORPHONE HYDROCHLORIDE 1 MG/ML
1-2 INJECTION, SOLUTION INTRAMUSCULAR; INTRAVENOUS; SUBCUTANEOUS AS NEEDED
Start: 2025-05-01

## 2025-04-03 RX ORDER — HYDROMORPHONE HYDROCHLORIDE 1 MG/ML
1-2 INJECTION, SOLUTION INTRAMUSCULAR; INTRAVENOUS; SUBCUTANEOUS AS NEEDED
Status: DISCONTINUED | OUTPATIENT
Start: 2025-04-03 | End: 2025-04-03

## 2025-04-03 RX ORDER — DIPHENHYDRAMINE HCL 25 MG
CAPSULE ORAL ONCE
Status: COMPLETED | OUTPATIENT
Start: 2025-04-03 | End: 2025-04-03

## 2025-04-03 RX ADMIN — DIPHENHYDRAMINE HCL 25 MG: 25 MG CAPSULE ORAL at 10:01:00

## 2025-04-03 RX ADMIN — HYDROMORPHONE HYDROCHLORIDE 1 MG: 1 INJECTION, SOLUTION INTRAMUSCULAR; INTRAVENOUS; SUBCUTANEOUS at 10:03:00

## 2025-04-03 RX ADMIN — HYDROMORPHONE HYDROCHLORIDE 1 MG: 1 INJECTION, SOLUTION INTRAMUSCULAR; INTRAVENOUS; SUBCUTANEOUS at 11:00:00

## 2025-04-03 NOTE — PROGRESS NOTES
Pt here for adakveo/hydration/pain meds . Pt denies any issues or concerns.      Ordering Provider: dr arizmendi  Order Exp: ongoing     Pt tolerated infusion without difficulty or complaint. Reviewed next apt date/time: next month      Education Record  Learner:  Patient  Disease / Diagnosis: sickle cell anemia/pain  Barriers / Limitations:  None  Method:  Brief focused  General Topics:  Plan of care reviewed  Outcome:  Shows understanding

## 2025-04-28 DIAGNOSIS — D57.00 SICKLE CELL PAIN CRISIS (HCC): ICD-10-CM

## 2025-04-28 RX ORDER — OXYCODONE HYDROCHLORIDE 30 MG/1
30 TABLET ORAL
Qty: 180 TABLET | Refills: 0 | Status: SHIPPED | OUTPATIENT
Start: 2025-04-28 | End: 2025-05-30

## 2025-04-28 RX ORDER — MORPHINE SULFATE 60 MG/1
60 TABLET, FILM COATED, EXTENDED RELEASE ORAL EVERY 8 HOURS SCHEDULED
Qty: 90 TABLET | Refills: 0 | Status: SHIPPED | OUTPATIENT
Start: 2025-04-28 | End: 2025-05-30

## 2025-05-01 ENCOUNTER — OFFICE VISIT (OUTPATIENT)
Age: 40
End: 2025-05-01
Attending: INTERNAL MEDICINE
Payer: COMMERCIAL

## 2025-05-01 VITALS
TEMPERATURE: 97 F | DIASTOLIC BLOOD PRESSURE: 74 MMHG | OXYGEN SATURATION: 99 % | RESPIRATION RATE: 18 BRPM | HEIGHT: 66.38 IN | HEART RATE: 93 BPM | WEIGHT: 170.38 LBS | SYSTOLIC BLOOD PRESSURE: 120 MMHG | BODY MASS INDEX: 27.06 KG/M2

## 2025-05-01 DIAGNOSIS — D57.00 SICKLE CELL DISEASE WITH CRISIS (HCC): Primary | ICD-10-CM

## 2025-05-01 DIAGNOSIS — D57.00 SICKLE CELL PAIN CRISIS (HCC): ICD-10-CM

## 2025-05-01 DIAGNOSIS — D57.419 SICKLE CELL ANEMIA WITH COEXISTENT ALPHA-THALASSEMIA WITH CRISIS (HCC): ICD-10-CM

## 2025-05-01 DIAGNOSIS — D53.9 MACROCYTIC ANEMIA: ICD-10-CM

## 2025-05-01 LAB
ALBUMIN SERPL-MCNC: 4.7 G/DL (ref 3.2–4.8)
ALBUMIN/GLOB SERPL: 1.1 {RATIO} (ref 1–2)
ALP LIVER SERPL-CCNC: 62 U/L (ref 37–98)
ALT SERPL-CCNC: 19 U/L (ref 10–49)
ANION GAP SERPL CALC-SCNC: 6 MMOL/L (ref 0–18)
AST SERPL-CCNC: 27 U/L (ref ?–34)
BASOPHILS # BLD AUTO: 0.01 X10(3) UL (ref 0–0.2)
BASOPHILS NFR BLD AUTO: 0.2 %
BILIRUB SERPL-MCNC: 0.6 MG/DL (ref 0.3–1.2)
BUN BLD-MCNC: 7 MG/DL (ref 9–23)
CALCIUM BLD-MCNC: 9.2 MG/DL (ref 8.7–10.6)
CHLORIDE SERPL-SCNC: 107 MMOL/L (ref 98–112)
CO2 SERPL-SCNC: 27 MMOL/L (ref 21–32)
CREAT BLD-MCNC: 0.69 MG/DL (ref 0.55–1.02)
EGFRCR SERPLBLD CKD-EPI 2021: 112 ML/MIN/1.73M2 (ref 60–?)
EOSINOPHIL # BLD AUTO: 0.05 X10(3) UL (ref 0–0.7)
EOSINOPHIL NFR BLD AUTO: 1.2 %
ERYTHROCYTE [DISTWIDTH] IN BLOOD BY AUTOMATED COUNT: 15.1 %
FASTING STATUS PATIENT QL REPORTED: NO
GLOBULIN PLAS-MCNC: 4.3 G/DL (ref 2–3.5)
GLUCOSE BLD-MCNC: 108 MG/DL (ref 70–99)
HCT VFR BLD AUTO: 26.3 % (ref 35–48)
HGB BLD-MCNC: 9.3 G/DL (ref 12–16)
HGB RETIC QN AUTO: 36 PG (ref 28.2–36.6)
IMM GRANULOCYTES # BLD AUTO: 0.02 X10(3) UL (ref 0–1)
IMM GRANULOCYTES NFR BLD: 0.5 %
IMM RETICS NFR: 0.41 RATIO (ref 0.1–0.3)
LDH SERPL L TO P-CCNC: 248 U/L (ref 120–246)
LYMPHOCYTES # BLD AUTO: 1.46 X10(3) UL (ref 1–4)
LYMPHOCYTES NFR BLD AUTO: 34 %
MCH RBC QN AUTO: 39.2 PG (ref 26–34)
MCHC RBC AUTO-ENTMCNC: 35.4 G/DL (ref 31–37)
MCV RBC AUTO: 111 FL (ref 80–100)
MONOCYTES # BLD AUTO: 0.42 X10(3) UL (ref 0.1–1)
MONOCYTES NFR BLD AUTO: 9.8 %
NEUTROPHILS # BLD AUTO: 2.34 X10 (3) UL (ref 1.5–7.7)
NEUTROPHILS # BLD AUTO: 2.34 X10(3) UL (ref 1.5–7.7)
NEUTROPHILS NFR BLD AUTO: 54.3 %
OSMOLALITY SERPL CALC.SUM OF ELEC: 289 MOSM/KG (ref 275–295)
PLATELET # BLD AUTO: 402 10(3)UL (ref 150–450)
POTASSIUM SERPL-SCNC: 3.9 MMOL/L (ref 3.5–5.1)
PROT SERPL-MCNC: 9 G/DL (ref 5.7–8.2)
RBC # BLD AUTO: 2.37 X10(6)UL (ref 3.8–5.3)
RETICS # AUTO: 85.6 X10(3) UL (ref 22.5–147.5)
RETICS/RBC NFR AUTO: 3.6 % (ref 0.5–2.5)
SODIUM SERPL-SCNC: 140 MMOL/L (ref 136–145)
WBC # BLD AUTO: 4.3 X10(3) UL (ref 4–11)

## 2025-05-01 RX ORDER — HYDROMORPHONE HYDROCHLORIDE 1 MG/ML
1-2 INJECTION, SOLUTION INTRAMUSCULAR; INTRAVENOUS; SUBCUTANEOUS AS NEEDED
Status: DISCONTINUED | OUTPATIENT
Start: 2025-05-01 | End: 2025-05-01

## 2025-05-01 RX ORDER — HYDROMORPHONE HYDROCHLORIDE 1 MG/ML
1-2 INJECTION, SOLUTION INTRAMUSCULAR; INTRAVENOUS; SUBCUTANEOUS AS NEEDED
Start: 2025-05-29

## 2025-05-01 RX ORDER — ONDANSETRON 2 MG/ML
8 INJECTION INTRAMUSCULAR; INTRAVENOUS AS NEEDED
Start: 2025-05-29

## 2025-05-01 RX ADMIN — HYDROMORPHONE HYDROCHLORIDE 2 MG: 1 INJECTION, SOLUTION INTRAMUSCULAR; INTRAVENOUS; SUBCUTANEOUS at 11:11:00

## 2025-05-01 RX ADMIN — HYDROMORPHONE HYDROCHLORIDE 2 MG: 1 INJECTION, SOLUTION INTRAMUSCULAR; INTRAVENOUS; SUBCUTANEOUS at 12:33:00

## 2025-05-01 NOTE — PROGRESS NOTES
Education Record    Learner:  Patient    Disease / Diagnosis: Sickle cell anemia    Barriers / Limitations:  None   Comments:    Method:  Brief focused and Reinforcement   Comments:    General Topics:  Diet, Medication, Side effects and symptom management, and Plan of care reviewed   Comments:    Outcome:  Shows understanding   Comments:    Patient here for follow up and Adakveo infusion. She's taking Hydrea 2000mg daily. She reports she is not taking Endari. Overall, feeling the sickle cell crisis pain today including her chest, shoulders, arms, legs, and lower back.

## 2025-05-01 NOTE — PROGRESS NOTES
Pt here for Adakveo.IVF/dilaudid . Pt denies any issues or concerns.      Ordering Provider: dr arizmendi  Order Exp: ongoing     Pt tolerated infusion without difficulty or complaint. Reviewed next apt date/time: 2 weeks      Education Record  Learner:  Patient  Disease / Diagnosis: sickle cell  Barriers / Limitations:  None  Method:  Brief focused  General Topics:  Plan of care reviewed  Outcome:  Shows understanding

## 2025-05-01 NOTE — PROGRESS NOTES
Hematology Clinic Follow Up Visit    Patient Name: Oluwaseun Oluwadamilola Ogunyemi  Medical Record Number: [id number]   YOB: 1985    PCP: Clive St MD     Reason for Consultation:  Oluwaseun Oluwadamilola Ogunyemi was seen today for the diagnosis of hgb SS/sickle cell disease    Hematologic History:  *Sickle cell disease, hgb SS  -early 2000's moved from [facility] to   -2016/2017- 1st pregnancy c/b worsening pain crises  -2018- 2nd pregnancy c/b worsening pain crises, including episode of acute chest requiring RBC exchange.  -early 2019- started hydroxyurea, titrated up to 2000mg daily  -7/2020- started Voxelotor, but dc'd shortly after starting d/t poor tolerability with diarrhea, fatigue, elevated LFTs, was not very helpful either.   -5/28/21- 3/2022- received crizanlizumab (Adakveo) however c/b infusion reaction 3/2022 and was only slightly helpful therefore not continued  -10/2021- moved from [facility] (followed with Dr. Walton of [facility]) to [place].   -4/2022- started L-glutamine (Endari) 15g PO BID, cont'd hydrea  -8/2022- moved from [place] (prev followed by Dr. Esau Blount) to [place], [place]  -3/27/23- resumed crizanlizumab d/t more freq acute pain episodes   -5/8/23- reduced hydrea to 1500mg daily (d/t hgb 7.4, abs retic 47K)   -7/2/23- reduced hydrea to 1000mg daily (d/t hgb 7.0, abs retic 59K)   -12/27/23- increased Hydrea to 1500mg daily  -4/4/24- increased Hydrea to 2000mg daily    ================================  Interval events: Presents for follow-up and for next crizanlizumab infusion.      She has not had any hospitalizations since her last visit with me, although she has had 3 hospitalizations within the last 7 months.    She continues to have chronic pains and frequent episodes of acute pain flares.  This is about the same as it has been over the last several months.  Much of these pain issues she manages at home herself.  Feeling worse again today with  more pain in her shoulders lately but also pain in her lower back, waist, and legs.      No increased dyspnea, recent fevers, or infections.      She continues to take MS Contin 60 mg q8hrs and oxycodone IR 30 mg q3 hrs prn.      She has been taking Hydrea at 2000 mg daily.  She is also taking Endari 15 g twice daily and folic acid 1 mg daily.     She is following with Dr. Aviles at [facility] in pursuit of gene therapy with Casgevy.     Hospitalizations and ED visits for acute vaso-occlusive pain episodes since moving from [facility] in 2022:  -22- hospitalization  -22- ED  -22- hospitalization  10/8/22- hospitalization  -10/29/22- ED  -22- ED  -22- ED   -23- hospitalization  -23- hospitalization  -23- hospitalization  -10/7/24- hospitalization  -12/3/24- hospitalization  -25- hospitalization    Past Medical History:  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 90 tablet 0     OxyCODONE HCl IR 30 MG Oral Tab Take 1 tablet (30 mg total) by mouth every 3 (three) hours as needed for Pain. 180 tablet 0    loperamide 2 MG Oral Cap Take 1 capsule (2 mg total) by mouth every 3 (three) hours as needed for Diarrhea. 30 capsule 0    diphenhydrAMINE 25 MG Oral Cap Take 1-2 capsules (25-50 mg total) by mouth every 8 (eight) hours as needed for Itching.      ASPIRIN LOW DOSE 81 MG Oral Tab EC TAKE 1 TABLET BY MOUTH EVERY DAY 90 tablet 3    ondansetron (ZOFRAN) 8 MG tablet Take 1 tablet (8 mg total) by mouth every 8 (eight) hours as needed for Nausea. 9 tablet 13    hydroxyurea 500 MG Oral Cap Take 4 capsules (2,000 mg total) by mouth daily. 360 capsule 3    folic acid 1 MG Oral Tab Take 1 tablet (1 mg total) by mouth daily. 90 tablet 3    Naloxone HCl 4 MG/0.1ML Nasal Liquid 4 mg by Nasal route as needed. If patient remains unresponsive, repeat dose in other nostril 2-5 minutes after first dose. 1 kit 0    metoprolol succinate ER 25 MG Oral Tablet 24 Hr Take 1 tablet (25 mg total) by mouth in the morning.      Cyanocobalamin (VITAMIN B-12 OR) Take 1 tablet by mouth in the morning.       Allergies:   Allergies   Allergen Reactions    Piperacillin Sod-Tazobactam So ITCHING     Generalized  Tolerated cephalosporins in the past       Psychosocial History:  Social History     Social History Narrative    , has 2 children ages 4 and 5 as of 9/2022.  Not employed.     Social History     Socioeconomic History    Marital status:    Tobacco Use    Smoking status: Never    Smokeless tobacco: Never   Vaping Use    Vaping status: Never Used   Substance and Sexual Activity    Alcohol use: Never     Comment: No history of excessive use    Drug use: Never    Sexual activity: Yes     Partners: Male     Birth control/protection: Condom   Other Topics Concern    Caffeine Concern Yes     Comment: 5-10 a month    Exercise Yes     Comment: 1-2 times a week    Seat Belt Yes    Self-Exams Yes     Comment: self  breast exam with showering   Social History Narrative    , has 2 children ages 4 and 5 as of 9/2022.  Not employed.     Social Drivers of Health     Food Insecurity: No Food Insecurity (1/9/2025)    Food Insecurity     Food Insecurity: Never true   Transportation Needs: No Transportation Needs (1/9/2025)    Transportation Needs     Lack of Transportation: No   Housing Stability: Low Risk  (1/9/2025)    Housing Stability     Housing Instability: No       Family Medical History:  Family History   Problem Relation Age of Onset    Hypertension Mother         unknown    Cataracts Mother     No Known Problems Father         was told cardiac arrest    No Known Problems Brother     Sickle Cell Maternal Aunt         passed from kidney failure    DVT/VTE Other     Breast Cancer Neg     Ovarian Cancer Neg     Colon Cancer Neg      Review of Systems:  A 10-point ROS was done with pertinent positives and negative per the HPI    Vital Signs:  Height: 168.6 cm (5' 6.38\") (05/01 0952)  Weight: 77.3 kg (170 lb 6.4 oz) (05/01 0952)  BSA (Calculated - sq m): 1.88 sq meters (05/01 0952)  Pulse: 93 (05/01 0952)  BP: 120/74 (05/01 0952)  Temp: 97.1 °F (36.2 °C) (05/01 0952)  Do Not Use - Resp Rate: --  SpO2: 99 % (05/01 0952)    Wt Readings from Last 6 Encounters:   05/01/25 77.3 kg (170 lb 6.4 oz)   04/03/25 76.4 kg (168 lb 6.4 oz)   03/06/25 76.7 kg (169 lb)   02/06/25 74.8 kg (165 lb)   01/09/25 72.6 kg (160 lb 0.9 oz)   12/12/24 73.6 kg (162 lb 3.2 oz)     Physical Examination:  General: Patient is alert and oriented, not in acute distress  Psych: Mood and affect are appropriate  Eyes: EOMI  ENT: Oropharynx is clear  CV: Regular rate and rhythm, no murmurs, no LE edema  Respiratory: Lungs clear to auscultation bilaterally  GI/Abd: Soft, non-tender with normoactive bowel sounds, no hepatosplenomegaly  Neurological: Grossly intact   Lymphatics: No palpable lymphadenopathy  Skin: no rashes or petechiae    Laboratory:  Lab Results    Component Value Date    WBC 4.3 05/01/2025    WBC 3.9 (L) 02/06/2025    WBC 3.9 (L) 01/11/2025    HGB 9.3 (L) 05/01/2025    HGB 9.0 (L) 02/06/2025    HGB 8.2 (L) 01/11/2025    HCT 26.3 (L) 05/01/2025    .0 (H) 05/01/2025    MCH 39.2 (H) 05/01/2025    MCHC 35.4 05/01/2025    RDW 15.1 05/01/2025    .0 05/01/2025    .0 02/06/2025    .0 01/11/2025     Lab Results   Component Value Date     (H) 05/01/2025    BUN 7 (L) 05/01/2025    CREATSERUM 0.69 05/01/2025    CREATSERUM 0.60 02/06/2025    CREATSERUM 0.62 01/11/2025    ANIONGAP 6 05/01/2025    CA 9.2 05/01/2025    OSMOCALC 289 05/01/2025    ALKPHO 62 05/01/2025    AST 27 05/01/2025    ALT 19 05/01/2025    BILT 0.6 05/01/2025    TP 9.0 (H) 05/01/2025    ALB 4.7 05/01/2025    GLOBULIN 4.3 (H) 05/01/2025     05/01/2025    K 3.9 05/01/2025     05/01/2025    CO2 27.0 05/01/2025     Lab Results   Component Value Date     (H) 05/01/2025     02/06/2025     12/12/2024     10/17/2024     10/08/2024     07/25/2024     12/27/2023     07/12/2023     05/08/2023     03/27/2023     02/16/2023     12/22/2022     09/05/2022     No results found for: \"PTT\", \"PT\", \"INR\"    Lab Results   Component Value Date    RETICABSOL 85.6 05/01/2025    RETICABSOL 61.7 02/06/2025    RETICABSOL 70.7 01/09/2025    RETICABSOL 61.1 12/12/2024    RETICABSOL 85.9 10/17/2024    RETICABSOL 90.3 10/08/2024    RETICABSOL 70.8 10/07/2024    RETICABSOL 59.5 08/22/2024    RETICABSOL 55.2 07/25/2024    RETICABSOL 88.8 05/30/2024     Lab Results   Component Value Date    CORINNE 279.5 (H) 02/16/2023    CORINNE 395.5 (H) 09/05/2022     Lab Results   Component Value Date    SAT 28 02/16/2023    SAT 36 09/05/2022     Lab Results   Component Value Date    B12 1,792 (H) 07/12/2023    B12 182 (L) 05/08/2023     Lab Results   Component Value Date    MMA 95 05/08/2023     Component      Latest  Ref Rng & Units 9/5/2022   HEMOGLOBIN A      95.5 - 100.0 % <1.0 (L)   HEMOGLOBIN A2      1.5 - 3.5 % 1.3 (L)   HEMOGLOBIN F (FETAL)      0.0 - 2.0 % 45.0 (H)   HEMOGLOBIN S      % 53.7   HGB Electophoresis Interp       Overall, 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 Although HgB F is often elevated in patients with sickle cell disease, such elevations do not commonly exceed 15% of the total hemoglobin. Possible considerations for the degree of HgB F elevation as seen in this patient may include therapy effect (Hydroxyurea) vs. hereditary persistence of fetal hemoglobin (HPFH).      Impression & Plan:     *Hgb SS/Sickle cell disease  -+hx of acute chest in 2018 requiring RBC exchange.  She reports having less than 10 transfusions in her lifetime, most of the transfusions she received were during prior pregnancies.   -She has had an excellent response with hydroxyurea with marked increase in hemoglobin F.   -Continue hydroxyurea to 2000 mg daily  -Started L-glutamine 15 g BID 4/2022, in effort to reduce frequency of acute pain episodes  -restarted Crizanlizumab (adakveo) 3/16/23, tolerating well without complication.  Continue crizanlizumab 5 mg/kg q4 weeks- dose today.  The frequency of her hospitalizations have decreased since she started this, though it is unclear if her pain is actually better or if she is managing her pain better at home in attempt to avoid hospitalizations.    -Previously intolerant to Voxelotor   -Recommend Folvite 1 mg daily to be continued indefinitely  -Recommend annual ophthalmology visits  -Advised to stay up-to-date with recommended vaccinations.    -She has met with  Dr. Bryce Aviles at [facility] to discuss pursuing Casgevy gene therapy.  Since she has already had 3 hospitalizations for acute pain episodes within the last 7 months despite maximal therapy otherwise I encouraged her to pursue gene therapy if  able.    *macrocytic anemia  -d/t sickle cell anemia + hydroxyurea effect + B12 def  -Continue vitamin B-12 1000 mcg PO daily to be continued indefinitely  -continue folvite 1mg daily indefinitely  -Follow CBC    *Chronic pain management  -Continue MS Contin 60mg q8 hrs scheduled and oxycodone 30 mg IR PO q3hrs prn   -Duloxetine was not helpful    *Acute pain episode  -Will give IV fluid hydration and IV dilaudid 2mg x 1 then 1-2mg prn today as below while she is in the infusion unit today for acute pain flare.      *Management recommendations of acute pain episodes not controlled with home meds  -Hydration with IV fluids with 0.9 NS until determined to be euvolemic, then maintained with 0.5 NS as relative hypernatremia can increase RBC sickling.  -Recommend prompt management of painful symptoms with parenteral opioids- recommend starting IV Dilaudid 2mg prn up to 3 doses for additional pain relief.  If needed, a PCA can be initiated on admission.  For pruritus related to opiates consider PO Benadryl or cetirizine.  I favor avoiding IV Benadryl.   -For any acute pain episode evaluation for potential infection or development of acute chest should be considered.  -standing order remains in place for IV dilaudid 1-2mg up to 3 doses prn + 1 L of 0.9 NS IVF + PO benadryl 25-50mg prn for acute pain episodes that can be managed as an outpatient in the infusion center PRN in effort to avoid ED visits as able.     RTC in 12 weeks    Esau Mccormick MD  Hematology/Medical Oncology  [facility]    MDM- high risk related to sickle cell disease management- given IVF and IV opiates requiring intensive monitoring in infusion unit.  ongoing continuity of complex care related to sickle cell disease.

## 2025-05-20 RX ORDER — HYDROXYUREA 500 MG/1
2000 CAPSULE ORAL DAILY
Qty: 360 CAPSULE | Refills: 3 | Status: ON HOLD | OUTPATIENT
Start: 2025-05-20

## 2025-05-22 ENCOUNTER — HOSPITAL ENCOUNTER (INPATIENT)
Facility: HOSPITAL | Age: 40
LOS: 7 days | Discharge: HOME | End: 2025-05-29
Attending: EMERGENCY MEDICINE | Admitting: HOSPITALIST
Payer: COMMERCIAL

## 2025-05-22 ENCOUNTER — APPOINTMENT (OUTPATIENT)
Dept: CT IMAGING | Facility: HOSPITAL | Age: 40
End: 2025-05-22
Attending: HOSPITALIST
Payer: COMMERCIAL

## 2025-05-22 ENCOUNTER — APPOINTMENT (OUTPATIENT)
Dept: GENERAL RADIOLOGY | Facility: HOSPITAL | Age: 40
End: 2025-05-22
Payer: COMMERCIAL

## 2025-05-22 DIAGNOSIS — D57.00 SICKLE CELL PAIN CRISIS (HCC): Primary | ICD-10-CM

## 2025-05-22 DIAGNOSIS — R79.89 ELEVATED TROPONIN: ICD-10-CM

## 2025-05-22 LAB
ALBUMIN SERPL-MCNC: 4.4 G/DL (ref 3.2–4.8)
ALBUMIN/GLOB SERPL: 1 {RATIO} (ref 1–2)
ALP LIVER SERPL-CCNC: 53 U/L (ref 37–98)
ALT SERPL-CCNC: 17 U/L (ref 10–49)
ANION GAP SERPL CALC-SCNC: 4 MMOL/L (ref 0–18)
AST SERPL-CCNC: 28 U/L (ref ?–34)
BASOPHILS # BLD AUTO: 0.01 X10(3) UL (ref 0–0.2)
BASOPHILS NFR BLD AUTO: 0.2 %
BILIRUB SERPL-MCNC: 0.7 MG/DL (ref 0.3–1.2)
BUN BLD-MCNC: 6 MG/DL (ref 9–23)
CALCIUM BLD-MCNC: 9.3 MG/DL (ref 8.7–10.6)
CHLORIDE SERPL-SCNC: 105 MMOL/L (ref 98–112)
CHOLEST SERPL-MCNC: 125 MG/DL (ref ?–200)
CO2 SERPL-SCNC: 29 MMOL/L (ref 21–32)
CREAT BLD-MCNC: 0.58 MG/DL (ref 0.55–1.02)
EGFRCR SERPLBLD CKD-EPI 2021: 117 ML/MIN/1.73M2 (ref 60–?)
EOSINOPHIL # BLD AUTO: 0.03 X10(3) UL (ref 0–0.7)
EOSINOPHIL NFR BLD AUTO: 0.5 %
ERYTHROCYTE [DISTWIDTH] IN BLOOD BY AUTOMATED COUNT: 15.5 %
GLOBULIN PLAS-MCNC: 4.4 G/DL (ref 2–3.5)
GLUCOSE BLD-MCNC: 98 MG/DL (ref 70–99)
HCT VFR BLD AUTO: 25 % (ref 35–48)
HDLC SERPL-MCNC: 40 MG/DL (ref 40–59)
HGB BLD-MCNC: 9 G/DL (ref 12–16)
HGB RETIC QN AUTO: 31.6 PG (ref 28.2–36.6)
IMM GRANULOCYTES # BLD AUTO: 0.01 X10(3) UL (ref 0–1)
IMM GRANULOCYTES NFR BLD: 0.2 %
IMM RETICS NFR: 0.38 RATIO (ref 0.1–0.3)
LDLC SERPL CALC-MCNC: 74 MG/DL (ref ?–100)
LYMPHOCYTES # BLD AUTO: 1.82 X10(3) UL (ref 1–4)
LYMPHOCYTES NFR BLD AUTO: 32.6 %
MCH RBC QN AUTO: 39.5 PG (ref 26–34)
MCHC RBC AUTO-ENTMCNC: 36 G/DL (ref 31–37)
MCV RBC AUTO: 109.6 FL (ref 80–100)
MONOCYTES # BLD AUTO: 0.64 X10(3) UL (ref 0.1–1)
MONOCYTES NFR BLD AUTO: 11.5 %
NEUTROPHILS # BLD AUTO: 3.07 X10 (3) UL (ref 1.5–7.7)
NEUTROPHILS # BLD AUTO: 3.07 X10(3) UL (ref 1.5–7.7)
NEUTROPHILS NFR BLD AUTO: 55 %
NONHDLC SERPL-MCNC: 85 MG/DL (ref ?–130)
OSMOLALITY SERPL CALC.SUM OF ELEC: 284 MOSM/KG (ref 275–295)
PLATELET # BLD AUTO: 388 10(3)UL (ref 150–450)
POTASSIUM SERPL-SCNC: 3.9 MMOL/L (ref 3.5–5.1)
PROT SERPL-MCNC: 8.8 G/DL (ref 5.7–8.2)
RBC # BLD AUTO: 2.28 X10(6)UL (ref 3.8–5.3)
RETICS # AUTO: 78.1 X10(3) UL (ref 22.5–147.5)
RETICS/RBC NFR AUTO: 3.4 % (ref 0.5–2.5)
SODIUM SERPL-SCNC: 138 MMOL/L (ref 136–145)
TRIGL SERPL-MCNC: 46 MG/DL (ref 30–149)
TROPONIN I SERPL HS-MCNC: 48 NG/L (ref ?–34)
VLDLC SERPL CALC-MCNC: 7 MG/DL (ref 0–30)
WBC # BLD AUTO: 5.6 X10(3) UL (ref 4–11)

## 2025-05-22 PROCEDURE — 71045 X-RAY EXAM CHEST 1 VIEW: CPT

## 2025-05-22 PROCEDURE — 71275 CT ANGIOGRAPHY CHEST: CPT | Performed by: HOSPITALIST

## 2025-05-22 PROCEDURE — 99222 1ST HOSP IP/OBS MODERATE 55: CPT | Performed by: HOSPITALIST

## 2025-05-22 RX ORDER — DIPHENHYDRAMINE HCL 25 MG
25 CAPSULE ORAL ONCE
Status: COMPLETED | OUTPATIENT
Start: 2025-05-22 | End: 2025-05-22

## 2025-05-22 RX ORDER — SODIUM CHLORIDE 9 MG/ML
INJECTION, SOLUTION INTRAVENOUS CONTINUOUS
Status: DISCONTINUED | OUTPATIENT
Start: 2025-05-22 | End: 2025-05-24

## 2025-05-22 RX ORDER — TEMAZEPAM 7.5 MG/1
15 CAPSULE ORAL NIGHTLY PRN
Status: DISCONTINUED | OUTPATIENT
Start: 2025-05-22 | End: 2025-05-29

## 2025-05-22 RX ORDER — HYDROXYUREA 500 MG/1
2000 CAPSULE ORAL DAILY
Status: DISCONTINUED | OUTPATIENT
Start: 2025-05-22 | End: 2025-05-29

## 2025-05-22 RX ORDER — HYDROMORPHONE HYDROCHLORIDE 1 MG/ML
0.4 INJECTION, SOLUTION INTRAMUSCULAR; INTRAVENOUS; SUBCUTANEOUS EVERY 2 HOUR PRN
Status: DISCONTINUED | OUTPATIENT
Start: 2025-05-22 | End: 2025-05-22

## 2025-05-22 RX ORDER — DIPHENHYDRAMINE HCL 25 MG
CAPSULE ORAL EVERY 8 HOURS PRN
Status: DISCONTINUED | OUTPATIENT
Start: 2025-05-22 | End: 2025-05-29

## 2025-05-22 RX ORDER — HYDROMORPHONE HYDROCHLORIDE 1 MG/ML
0.2 INJECTION, SOLUTION INTRAMUSCULAR; INTRAVENOUS; SUBCUTANEOUS EVERY 2 HOUR PRN
Status: DISCONTINUED | OUTPATIENT
Start: 2025-05-22 | End: 2025-05-22

## 2025-05-22 RX ORDER — HYDROMORPHONE HYDROCHLORIDE 1 MG/ML
2 INJECTION, SOLUTION INTRAMUSCULAR; INTRAVENOUS; SUBCUTANEOUS EVERY 2 HOUR PRN
Refills: 0 | Status: DISCONTINUED | OUTPATIENT
Start: 2025-05-22 | End: 2025-05-24

## 2025-05-22 RX ORDER — METOPROLOL SUCCINATE 25 MG/1
25 TABLET, EXTENDED RELEASE ORAL
Status: DISCONTINUED | OUTPATIENT
Start: 2025-05-22 | End: 2025-05-29

## 2025-05-22 RX ORDER — MORPHINE SULFATE 15 MG/1
60 TABLET, FILM COATED, EXTENDED RELEASE ORAL EVERY 8 HOURS SCHEDULED
Status: DISCONTINUED | OUTPATIENT
Start: 2025-05-22 | End: 2025-05-29

## 2025-05-22 RX ORDER — ACETAMINOPHEN 500 MG
500 TABLET ORAL EVERY 4 HOURS PRN
Status: DISCONTINUED | OUTPATIENT
Start: 2025-05-22 | End: 2025-05-29

## 2025-05-22 RX ORDER — HYDROMORPHONE HYDROCHLORIDE 1 MG/ML
0.2 INJECTION, SOLUTION INTRAMUSCULAR; INTRAVENOUS; SUBCUTANEOUS EVERY 2 HOUR PRN
Refills: 0 | Status: DISCONTINUED | OUTPATIENT
Start: 2025-05-22 | End: 2025-05-24

## 2025-05-22 RX ORDER — HYDROMORPHONE HYDROCHLORIDE 1 MG/ML
0.4 INJECTION, SOLUTION INTRAMUSCULAR; INTRAVENOUS; SUBCUTANEOUS EVERY 2 HOUR PRN
Refills: 0 | Status: DISCONTINUED | OUTPATIENT
Start: 2025-05-22 | End: 2025-05-24

## 2025-05-22 RX ORDER — HYDROMORPHONE HYDROCHLORIDE 1 MG/ML
2 INJECTION, SOLUTION INTRAMUSCULAR; INTRAVENOUS; SUBCUTANEOUS EVERY 30 MIN PRN
Refills: 0 | Status: COMPLETED | OUTPATIENT
Start: 2025-05-22 | End: 2025-05-22

## 2025-05-22 RX ORDER — OXYCODONE HYDROCHLORIDE 10 MG/1
30 TABLET ORAL
Status: DISCONTINUED | OUTPATIENT
Start: 2025-05-22 | End: 2025-05-29

## 2025-05-22 RX ORDER — ENOXAPARIN SODIUM 100 MG/ML
40 INJECTION SUBCUTANEOUS DAILY
Status: DISCONTINUED | OUTPATIENT
Start: 2025-05-22 | End: 2025-05-29

## 2025-05-22 RX ORDER — FOLIC ACID 1 MG/1
1 TABLET ORAL DAILY
Status: DISCONTINUED | OUTPATIENT
Start: 2025-05-22 | End: 2025-05-29

## 2025-05-22 RX ORDER — ASPIRIN 81 MG/1
81 TABLET ORAL DAILY
Status: DISCONTINUED | OUTPATIENT
Start: 2025-05-22 | End: 2025-05-29

## 2025-05-22 RX ORDER — HYDROMORPHONE HYDROCHLORIDE 1 MG/ML
1 INJECTION, SOLUTION INTRAMUSCULAR; INTRAVENOUS; SUBCUTANEOUS EVERY 2 HOUR PRN
Status: DISCONTINUED | OUTPATIENT
Start: 2025-05-22 | End: 2025-05-22

## 2025-05-22 RX ORDER — HYDROMORPHONE HYDROCHLORIDE 1 MG/ML
2 INJECTION, SOLUTION INTRAMUSCULAR; INTRAVENOUS; SUBCUTANEOUS ONCE
Refills: 0 | Status: COMPLETED | OUTPATIENT
Start: 2025-05-22 | End: 2025-05-22

## 2025-05-22 NOTE — PAYOR COMM NOTE
--------------  ADMISSION REVIEW     Payor: [employer]  Subscriber #:  [id number]  Authorization Number: [id number]    Admit date: 25  Admit time:  6:31 AM         Patient Seen in: [facility] Emergency Department    History  Stated Complaint: sickle cell pt with chest pain         40 yo fm with h/o sickle cell anemia with c/o chest pain intermittent since yesterday morning. Pain crisis x 1 week. SOB and center chest pain. Pain is worse with deep breath. Knee joints, ribs, low back pain. No fever/chills.     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)    Sickle cell crisis (HCC)    Frequent crises    Sickle cell crisis (HCC)    Sickle cell pain crisis (HCC)     Past Surgical History:   Procedure Laterality Date            2018    Cholecystectomy  2024    LAPAROSCOPIC CHOLECYSTECTOMY WITH CHOLANGIOGRAM     Physical Exam    ED Triage Vitals [25 0349]   /77   Pulse 88   Resp 20   Temp 99.4 °F (37.4 °C)   Temp src Oral   SpO2 98 %   O2 Device None (Room air)     Current Vitals:   Vital Signs  BP: 113/77  Pulse: 88  Resp: 20  Temp: 99.4 °F (37.4 °C)  Temp src: Oral  MAP (mmHg): 89    Physical Exam  Vitals and nursing note reviewed.   Constitutional:       Appearance: She is well-developed.   HENT:      Head: Normocephalic and atraumatic.   Eyes:      Pupils: Pupils are equal, round, and  reactive to light.   Cardiovascular:      Rate and Rhythm: Normal rate and regular rhythm.   Pulmonary:      Effort: Pulmonary effort is normal.      Breath sounds: Normal breath sounds.   Abdominal:      General: Bowel sounds are normal.      Palpations: Abdomen is soft.   Musculoskeletal:         General: No deformity.      Cervical back: Normal range of motion and neck supple.   Skin:     General: Skin is warm and dry.      Capillary Refill: Capillary refill takes less than 2 seconds.   Neurological:      Mental Status: She is alert and oriented to person, place, and time.     Labs Reviewed   COMP METABOLIC PANEL (14) - Abnormal; Notable for the following components:       Result Value    BUN 6 (*)     Total Protein 8.8 (*)     Globulin  4.4 (*)     All other components within normal limits   CBC WITH DIFFERENTIAL WITH PLATELET - Abnormal; Notable for the following components:    RBC 2.28 (*)     HGB 9.0 (*)     HCT 25.0 (*)     .6 (*)     MCH 39.5 (*)     All other components within normal limits   TROPONIN I HIGH SENSITIVITY - Abnormal; Notable for the following components:    Troponin I (High Sensitivity) 48 (*)     All other components within normal limits   LIPID PANEL - Normal   RETICULOCYTE COUNT     EKG    Rate, intervals and axes as noted on EKG Report.  Rate: 90  Rhythm: Sinus Rhythm  Reading: no gross st elevations or depressions.     Time: 05/22 0442  Comment: Pain after 2mg IV dilaudid is still 10/10.          Chest single view  Similar interstitial pattern with a left retrocardiac opacity, potentially scarring or atelectasis.  Trace pleural effusions.  No pneumothorax.    Stable mild cardiac silhouette enlargement  Per Dr. Graham/s notes.       *Management recommendations of acute pain episodes not controlled with home meds  -Hydration with IV fluids with 0.9 NS until determined to be euvolemic, then maintained with 0.5 NS as relative hypernatremia can increase RBC sickling.  -Recommend  prompt management of painful symptoms with parenteral opioids- recommend starting IV Dilaudid 2mg prn up to 3 doses for additional pain relief.  If needed, a PCA can be initiated on admission.  For pruritus related to opiates consider PO Benadryl or cetirizine.  I favor avoiding IV Benadryl.   -For any acute pain episode evaluation for potential infection or development of acute chest should be considered.  -standing order remains in place for IV dilaudid 1-2mg up to 3 doses prn + 1 L of 0.9 NS IVF + PO benadryl 25-50mg prn for acute pain episodes that can be managed as an outpatient in the infusion center PRN in effort to avoid ED visits as able.      39-year-old female past medical history of sickle cell anemia presents ED with complaints of acute pain crisis which.  Vital stable arrival patient appears nontoxic examination lung sounds clear heart sounds normal EKG sinus necrosis EKG significant ischemia basic labs obtained hemoglobin baseline at 9 troponin elevated but chronically elevated per chart review.  Chest x-ray clear no mediastinal widening or infiltrates from independent evaluation of the imaging.  Patient was given Dilaudid 2 mg IV along with oral Benadryl 25 mg p.o. and 0.9 NS x 1 L.  She was then given 2 more doses of Dilaudid 2 mg IV.  Discussed admission she is agreeable to admission at this time.  Patient is admitted in stable condition.    Disposition and Plan     Clinical Impression:  1. Sickle cell pain crisis (HCC)    2. Elevated troponin         History and Physical     Chief Complaint: chest discomfort, knees pain, lower rib cages, lower back   Oluwaseun Oluwadamilola Ogunyemi is a 39 year old female with History of sickle cell disease who follows up with Dr. Albarran and at [facility], patient is compliant with monthly IV infusions and with follow-ups.  She takes Oxy intermittently at 3 L as needed almost every day and morphine extended release twice daily.  She had chest pain  syndrome many years ago and she required IVC exchange.  She has about 2 3 admissions per year otherwise and she comes in complaining of chest discomfort 3-4 out of 10 worse with taking a deep breath and low back pain needs pain lower rib cage chest pain.    Physical Exam:    /77   Pulse 88   Temp 99.4 °F (37.4 °C) (Oral)   Resp 20   Ht 5' 6\" (1.676 m)   Wt 167 lb (75.8 kg)   LMP 04/15/2025 (Approximate)   SpO2 98%   BMI 26.95 kg/m²   General: No acute distress, Alert  Respiratory: No rhonchi, no wheezes  Cardiovascular: S1, S2. Regular rate and rhythm  Abdomen: Soft, Non-tender, non-distended, positive bowel sounds  Neuro: No new focal deficits  Extremities: No edema     Lab 05/22/25  0359   RBC 2.28*   HGB 9.0*   HCT 25.0*   .6*   MCH 39.5*   MCHC 36.0   RDW 15.5   NEPRELIM 3.07   WBC 5.6   .0      GLU 98   BUN 6*   CREATSERUM 0.58   EGFRCR 117   CA 9.3   ALB 4.4      K 3.9      CO2 29.0   ALKPHO 53   AST 28   ALT 17   BILT 0.7   TP 8.8*      TROPHS 48*       sodium chloride 0.9% infusion  Intravenous,   100 mL/hr,   Continuous       Assessment & Plan:  #Sickle cell pain crisis   -PTA Folic acid, Hydroxyurea  -IV Pain control and continue PTA regimen of MS contin and IR Oxycodone      #Chronically elevated troponin  -continue PTA ASA and Beta blocker      #Chronic Macrocytic anemia likely due to hydrea that was started in 2019  -monitor Hb            MEDICATIONS ADMINISTERED IN LAST 1 DAY:  aspirin DR tab 81 mg       Date Action Dose Route User    5/22/2025 0742 Given 81 mg Oral Mic Mccauley, RN          diphenhydrAMINE (Benadryl) cap/tab 25 mg       Date Action Dose Route User    5/22/2025 0428 Given 25 mg Oral Verenice Solorzano RN          folic acid (Folvite) tab 1 mg       Date Action Dose Route User    5/22/2025 0742 Given 1 mg Oral Mic Mccauley, RN          HYDROmorphone (Dilaudid) 1 MG/ML injection 2 mg       Date Action Dose Route User    5/22/2025 0658  Given 2 mg Intravenous Ysabel Cespedes RN    5/22/2025 0502 Given 2 mg Intravenous Verenice Solorzano RN    5/22/2025 0429 Given 2 mg Intravenous Verenice Solorzano RN          HYDROmorphone (Dilaudid) 1 MG/ML injection 1 mg       Date Action Dose Route User    5/22/2025 1251 Given 1 mg Intravenous Mic Mccauley RN    5/22/2025 1004 Given 1 mg Intravenous Mic Mccauley RN          hydroxyurea (Hydrea) cap 2,000 mg       Date Action Dose Route User    5/22/2025 0741 Given 2,000 mg Oral Mic Mccauley RN          metoprolol succinate ER (Toprol XL) 24 hr tab 25 mg       Date Action Dose Route User    5/22/2025 0742 Given 25 mg Oral Mic Mccauley RN          morphINE ER (MS Contin) tab 60 mg       Date Action Dose Route User    5/22/2025 0742 Given 60 mg Oral Mic Mccauley RN          oxyCODONE immediate release tab 30 mg       Date Action Dose Route User    5/22/2025 1123 Given 30 mg Oral Mic Mccauley RN          sodium chloride 0.9% infusion       Date Action Dose Route User    5/22/2025 0657 New Bag (none) Intravenous Ysabel Cespedes RN          sodium chloride 0.9 % IV bolus 1,000 mL       Date Action Dose Route User    5/22/2025 0427 New Bag 1,000 mL Intravenous Verenice Solorzano RN            Vitals (last day)       Date/Time Temp Pulse Resp BP SpO2 Weight O2 Device O2 Flow Rate (L/min) Who    05/22/25 1130 99 °F (37.2 °C) 96 19 107/85 93 % -- None (Room air) 0 L/min ST    05/22/25 0830 98.7 °F (37.1 °C) 101 19 97/65 95 % -- None (Room air) -- IB    05/22/25 0642 98.9 °F (37.2 °C) 90 19 107/81 99 % 179 lb (81.2 kg) None (Room air) -- SB    05/22/25 0500 -- 84 19 111/81 100 % -- None (Room air) -- JM    05/22/25 0430 -- -- -- -- -- -- None (Room air) -- JULIANNE    05/22/25 0349 99.4 °F (37.4 °C) 88 20 113/77 98 % 167 lb (75.8 kg) None (Room air) -- MM

## 2025-05-22 NOTE — H&P
[facility]     Oluwaseun Oluwadamilola Ogunyemi Patient Status:  Emergency    8/3/1985 MRN [id number]   Location [facility] EMERGENCY DEPARTMENT Attending Leighann Sheth, DO   Hosp Day # 0 PCP Clive St MD     Chief Complaint: chest discomfort, knees pain, lower rib cages, lower back    Subjective:    History of Present Illness:     Oluwaseun Oluwadamilola Ogunyemi is a 39 year old female with History of sickle cell disease who follows up with Dr. Albarran and at [facility], patient is compliant with monthly IV infusions and with follow-ups.  She takes Oxy intermittently at 3 L as needed almost every day and morphine extended release twice daily.  She had chest pain syndrome many years ago and she required IVC exchange.  She has about 2 3 admissions per year otherwise and she comes in complaining of chest discomfort 3-4 out of 10 worse with taking a deep breath and low back pain needs pain lower rib cage chest pain.    History/Other:    Past Medical History:  Past Medical History[1]  Past Surgical History:   Past Surgical History[2]   Family History:   Family History[3]  Social History:    reports that she has never smoked. She has never used smokeless tobacco. She reports that she does not drink alcohol and does not use drugs.     Allergies: Allergies[4]    Medications:  Medications Ordered Prior to Encounter[5]    Review of Systems:   A comprehensive review of systems was completed.    Pertinent positives and negatives noted in the HPI.    Objective:   Physical Exam:    /77   Pulse 88   Temp 99.4 °F (37.4 °C) (Oral)   Resp 20   Ht 5' 6\" (1.676 m)   Wt 167 lb (75.8 kg)   LMP 04/15/2025 (Approximate)   SpO2 98%   BMI 26.95 kg/m²   General: No acute distress, Alert  Respiratory: No rhonchi, no wheezes  Cardiovascular: S1, S2. Regular rate and rhythm  Abdomen: Soft, Non-tender, non-distended, positive bowel sounds  Neuro: No new focal  deficits  Extremities: No edema      Results:    Labs:      Labs Last 24 Hours:    Recent Labs   Lab 05/22/25 0359   RBC 2.28*   HGB 9.0*   HCT 25.0*   .6*   MCH 39.5*   MCHC 36.0   RDW 15.5   NEPRELIM 3.07   WBC 5.6   .0       Recent Labs   Lab 05/22/25 0359   GLU 98   BUN 6*   CREATSERUM 0.58   EGFRCR 117   CA 9.3   ALB 4.4      K 3.9      CO2 29.0   ALKPHO 53   AST 28   ALT 17   BILT 0.7   TP 8.8*       Estimated Glomerular Filtration Rate: 117 mL/min/1.73m2 (result from lab).    No results found for: \"PT\", \"INR\"    Recent Labs   Lab 05/22/25 0359   TROPHS 48*       No results for input(s): \"TROP\", \"PBNP\" in the last 168 hours.    No results for input(s): \"PCT\" in the last 168 hours.    Imaging: Imaging data reviewed in Epic.    Assessment & Plan:      #Sickle cell pain crisis   -PTA Folic acid, Hydroxyurea  -IV Pain control and continue PTA regimen of MS contin and IR Oxycodone      #Chronically elevated troponin  -continue PTA ASA and Beta blocker      #Chronic Macrocytic anemia likely due to hydrea that was started in 2019  -monitor Hb        Plan of care discussed with patient and emergency room physician    Slime Keene MD    Supplementary Documentation:     The 21st Century Cures Act makes medical notes like these available to patients in the interest of transparency. Please be advised this is a medical document. Medical documents are intended to carry relevant information, facts as evident, and the clinical opinion of the practitioner. The medical note is intended as peer to peer communication and may appear blunt or direct. It is written in medical language and may contain abbreviations or verbiage that are unfamiliar.                                       [1]   Past Medical History:   Abnormal antibody titer    Anti-C, Anti-E, Anti-K, Warm Auto Antibody    Acute chest syndrome due to sickle cell crisis (HCC)    Acute chest syndrome due to sickle-cell disease (HCC)    RBC  exchange for acute chest    Blood disorder    sickle cell    Chronic, continuous use of opioids    Constipation due to opioid therapy    High blood pressure    History of blood transfusion    History of transfusion    multiple blood transfusions, most of which were during pregnancies    Hypokalemia    Nausea    Sickle cell anemia (HCC)    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)    Sickle cell crisis (HCC)    Frequent crises    Sickle cell crisis (HCC)    Sickle cell pain crisis (HCC)   [2]   Past Surgical History:  Procedure Laterality Date            2018    Cholecystectomy  2024    LAPAROSCOPIC CHOLECYSTECTOMY WITH CHOLANGIOGRAM   [3]   Family History  Problem Relation Age of Onset    Hypertension Mother         unknown    Cataracts Mother     No Known Problems Father         was told cardiac arrest    No Known Problems Brother     Sickle Cell Maternal Aunt         passed from kidney failure    DVT/VTE Other     Breast Cancer Neg     Ovarian Cancer Neg     Colon Cancer Neg    [4]   Allergies  Allergen Reactions    Piperacillin Sod-Tazobactam So ITCHING     Generalized  Tolerated cephalosporins in the past   [5]   Current Facility-Administered Medications on File Prior to Encounter   Medication Dose Route Frequency Provider Last Rate Last Admin    [COMPLETED] sodium chloride 0.9 % IV bolus 1,000 mL  1,000 mL Intravenous Once Esau Mccormick MD   Stopped at 25 1210    [COMPLETED] crizanlizumab-tmca (Adakveo) 390 mg in sodium chloride 0.9% 100 mL infusion  5 mg/kg Intravenous Once Esau Mccormick MD   Stopped at 25 1230    [COMPLETED] sodium chloride 0.9 % IV bolus 1,000 mL  1,000 mL Intravenous Once Esau Mccormick MD   Stopped at 25 1110    [COMPLETED] crizanlizumab-tmca (Adakveo) 380 mg in sodium chloride 0.9% 100 mL infusion  5 mg/kg Intravenous Once Esau Mccormick MD   Stopped at 25 1052    [COMPLETED] diphenhydrAMINE  (Benadryl) cap/tab 25-50 mg  25-50 mg Oral Once Esau Mccormick MD   25 mg at 04/03/25 1001    [COMPLETED] sodium chloride 0.9 % IV bolus 1,000 mL  1,000 mL Intravenous Once Esau Mccromick MD   Stopped at 03/06/25 1150    [COMPLETED] crizanlizumab-tmca (Adakveo) 380 mg in sodium chloride 0.9% 100 mL infusion  5 mg/kg Intravenous Once Esau Mccormick MD   Stopped at 03/06/25 1112    [COMPLETED] sodium chloride 0.9 % IV bolus 1,000 mL  1,000 mL Intravenous Once Esau Mccormick MD   Stopped at 02/21/25 1250     Current Outpatient Medications on File Prior to Encounter   Medication Sig Dispense Refill    hydroxyurea 500 MG Oral Cap Take 4 capsules (2,000 mg total) by mouth daily. 360 capsule 3    morphINE ER 60 MG Oral Tab CR Take 1 tablet (60 mg total) by mouth every 8 (eight) hours. 90 tablet 0    OxyCODONE HCl IR 30 MG Oral Tab Take 1 tablet (30 mg total) by mouth every 3 (three) hours as needed for Pain. 180 tablet 0    ASPIRIN LOW DOSE 81 MG Oral Tab EC TAKE 1 TABLET BY MOUTH EVERY DAY 90 tablet 3    folic acid 1 MG Oral Tab Take 1 tablet (1 mg total) by mouth daily. 90 tablet 3    metoprolol succinate ER 25 MG Oral Tablet 24 Hr Take 1 tablet (25 mg total) by mouth in the morning.      Cyanocobalamin (VITAMIN B-12 OR) Take 1 tablet by mouth in the morning.      Glutamine, Sickle Cell, (ENDARI) 5 g Oral Powd Pack Mix 3 packets (15 grams) with 8 ounces (240 mL) of cold or room temperature liquid or 4 to 6 ounces of food and take by mouth twice daily as directed. (Patient not taking: Reported on 5/22/2025) 180 each 11    loperamide 2 MG Oral Cap Take 1 capsule (2 mg total) by mouth every 3 (three) hours as needed for Diarrhea. 30 capsule 0    diphenhydrAMINE 25 MG Oral Cap Take 1-2 capsules (25-50 mg total) by mouth every 8 (eight) hours as needed for Itching.      ondansetron (ZOFRAN) 8 MG tablet Take 1 tablet (8 mg total) by mouth every 8 (eight) hours as needed for Nausea. 9 tablet 13    Naloxone HCl 4  MG/0.1ML Nasal Liquid 4 mg by Nasal route as needed. If patient remains unresponsive, repeat dose in other nostril 2-5 minutes after first dose. 1 kit 0

## 2025-05-22 NOTE — PLAN OF CARE
Assumed pt care this morning at 0730.  Pt is A&Ox4, following commands.   Pt on room air, VSS.  ST/NSR on tele.   Pt c/o pain. Dilaudid given for pain.  Pt up ad alejandra.  Pt on regular diet. Tolerating diet.   L AC NS at 100 mL/hr.  Bed in lowest position, call light in reach.     Problem: PAIN - ADULT  Goal: Verbalizes/displays adequate comfort level or patient's stated pain goal  Description: INTERVENTIONS:  - Encourage pt to monitor pain and request assistance  - Assess pain using appropriate pain scale  - Administer analgesics based on type and severity of pain and evaluate response  - Implement non-pharmacological measures as appropriate and evaluate response  - Consider cultural and social influences on pain and pain management  - Manage/alleviate anxiety  - Utilize distraction and/or relaxation techniques  - Monitor for opioid side effects  - Notify MD/LIP if interventions unsuccessful or patient reports new pain  - Anticipate increased pain with activity and pre-medicate as appropriate  Outcome: Progressing

## 2025-05-22 NOTE — ED INITIAL ASSESSMENT (HPI)
Sickle cell pain with chest pain that started intermittently on Wednesday, worse pain overnight. Taking po morphine and oxycodone with no relief, last dose at 2200

## 2025-05-22 NOTE — ED PROVIDER NOTES
Patient Seen in: [facility] Emergency Department        History  Chief Complaint   Patient presents with    Sickle Cell    Chest Pain Angina     Stated Complaint: sickle cell pt with chest pain    Subjective:   HPI            40 yo fm with h/o sickle cell anemia with c/o chest pain intermittent since yesterday morning. Pain crisis x 1 week. SOB and center chest pain. Pain is worse with deep breath. Knee joints, ribs, low back pain. No fever/chills.       Objective:     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)    Sickle cell crisis (HCC)    Frequent crises    Sickle cell crisis (HCC)    Sickle cell pain crisis (HCC)              Past Surgical History:   Procedure Laterality Date            2018    Cholecystectomy  2024    LAPAROSCOPIC CHOLECYSTECTOMY WITH CHOLANGIOGRAM                Social History     Socioeconomic History    Marital status:    Tobacco Use    Smoking status: Never    Smokeless tobacco: Never   Vaping Use    Vaping status: Never Used   Substance and Sexual Activity    Alcohol use: Never     Comment: No history of excessive use    Drug use: Never    Sexual activity: Yes     Partners: Male     Birth control/protection: Condom   Other Topics Concern    Caffeine Concern Yes     Comment: 5-10 a month    Exercise Yes     Comment: 1-2 times a week    Seat Belt Yes    Self-Exams Yes      Comment: self breast exam with showering   Social History Narrative    , has 2 children ages 4 and 5 as of 9/2022.  Not employed.     Social Drivers of Health     Food Insecurity: No Food Insecurity (1/9/2025)    Food Insecurity     Food Insecurity: Never true   Transportation Needs: No Transportation Needs (1/9/2025)    Transportation Needs     Lack of Transportation: No   Housing Stability: Low Risk  (1/9/2025)    Housing Stability     Housing Instability: No                                Physical Exam    ED Triage Vitals [05/22/25 0349]   /77   Pulse 88   Resp 20   Temp 99.4 °F (37.4 °C)   Temp src Oral   SpO2 98 %   O2 Device None (Room air)       Current Vitals:   Vital Signs  BP: 113/77  Pulse: 88  Resp: 20  Temp: 99.4 °F (37.4 °C)  Temp src: Oral  MAP (mmHg): 89    Oxygen Therapy  SpO2: 98 %  O2 Device: None (Room air)            Physical Exam  Vitals and nursing note reviewed.   Constitutional:       Appearance: She is well-developed.   HENT:      Head: Normocephalic and atraumatic.   Eyes:      Pupils: Pupils are equal, round, and reactive to light.   Cardiovascular:      Rate and Rhythm: Normal rate and regular rhythm.   Pulmonary:      Effort: Pulmonary effort is normal.      Breath sounds: Normal breath sounds.   Abdominal:      General: Bowel sounds are normal.      Palpations: Abdomen is soft.   Musculoskeletal:         General: No deformity.      Cervical back: Normal range of motion and neck supple.   Skin:     General: Skin is warm and dry.      Capillary Refill: Capillary refill takes less than 2 seconds.   Neurological:      Mental Status: She is alert and oriented to person, place, and time.               ED Course  Labs Reviewed   COMP METABOLIC PANEL (14) - Abnormal; Notable for the following components:       Result Value    BUN 6 (*)     Total Protein 8.8 (*)     Globulin  4.4 (*)     All other components within normal limits   CBC WITH DIFFERENTIAL WITH PLATELET - Abnormal;  Notable for the following components:    RBC 2.28 (*)     HGB 9.0 (*)     HCT 25.0 (*)     .6 (*)     MCH 39.5 (*)     All other components within normal limits   TROPONIN I HIGH SENSITIVITY - Abnormal; Notable for the following components:    Troponin I (High Sensitivity) 48 (*)     All other components within normal limits   LIPID PANEL - Normal   RETICULOCYTE COUNT   RAINBOW DRAW LAVENDER   RAINBOW DRAW LIGHT GREEN   RAINBOW DRAW BLUE     EKG    Rate, intervals and axes as noted on EKG Report.  Rate: 90  Rhythm: Sinus Rhythm  Reading: no gross st elevations or depressions.            ED Course as of 05/22/25 0524  ------------------------------------------------------------  Time: 05/22 0442  Comment: Pain after 2mg IV dilaudid is still 10/10.            Chest single view    Comparison: 1/9/2025      IMPRESSION:    Similar interstitial pattern with a left retrocardiac opacity, potentially scarring or atelectasis.  Trace pleural effusions.  No pneumothorax.    Stable mild cardiac silhouette enlargement  Per Dr. Graham/s notes.       *Management recommendations of acute pain episodes not controlled with home meds  -Hydration with IV fluids with 0.9 NS until determined to be euvolemic, then maintained with 0.5 NS as relative hypernatremia can increase RBC sickling.  -Recommend prompt management of painful symptoms with parenteral opioids- recommend starting IV Dilaudid 2mg prn up to 3 doses for additional pain relief.  If needed, a PCA can be initiated on admission.  For pruritus related to opiates consider PO Benadryl or cetirizine.  I favor avoiding IV Benadryl.   -For any acute pain episode evaluation for potential infection or development of acute chest should be considered.  -standing order remains in place for IV dilaudid 1-2mg up to 3 doses prn + 1 L of 0.9 NS IVF + PO benadryl 25-50mg prn for acute pain episodes that can be managed as an outpatient in the infusion center PRN in effort to avoid ED  visits as able.             [facility]         Admission disposition: 5/22/2025  5:24 AM       39-year-old female past medical history of sickle cell anemia presents ED with complaints of acute pain crisis which.  Vital stable arrival patient appears nontoxic examination lung sounds clear heart sounds normal EKG sinus necrosis EKG significant ischemia basic labs obtained hemoglobin baseline at 9 troponin elevated but chronically elevated per chart review.  Chest x-ray clear no mediastinal widening or infiltrates from independent evaluation of the imaging.  Patient was given Dilaudid 2 mg IV along with oral Benadryl 25 mg p.o. and 0.9 NS x 1 L.  She was then given 2 more doses of Dilaudid 2 mg IV.  Discussed admission she is agreeable to admission at this time.  Patient is admitted in stable condition.    Medical Decision Making      Disposition and Plan     Clinical Impression:  1. Sickle cell pain crisis (HCC)    2. Elevated troponin         Disposition:  Admit  5/22/2025  5:24 am    Follow-up:  No follow-up provider specified.        Medications Prescribed:  Current Discharge Medication List                Supplementary Documentation:         Hospital Problems       Present on Admission  Date Reviewed: 5/1/2025          ICD-10-CM Noted POA    * (Principal) Sickle cell pain crisis (HCC) D57.00 9/20/2022 Unknown

## 2025-05-22 NOTE — ED QUICK NOTES
Orders for admission, patient is aware of plan and ready to go upstairs. Any questions, please call ED RN Bello at extension [phone].     Patient Covid vaccination status: Fully vaccinated     COVID Test Ordered in ED: None    COVID Suspicion at Admission: N/A    Running Infusions: Medication Infusions[1] None    Mental Status/LOC at time of transport: A&Ox4    Other pertinent information:   CIWA score: N/A   NIH score:  N/A             [1]

## 2025-05-22 NOTE — PROGRESS NOTES
NURSING ADMISSION NOTE      Patient admitted via Cart  Oriented to room.  Safety precautions initiated.  Bed in low position.  Call light in reach.    Admission navigator completed. Patient is alert and oriented x4 , on ra, vss. Medicated for reports of chest and lower abdomen pain. 0.9 normal saline infusing at 100 ml/hr via LAC PIV.  Patient and oncoming nurse updated on plan of care.

## 2025-05-23 LAB
ANION GAP SERPL CALC-SCNC: 9 MMOL/L (ref 0–18)
ATRIAL RATE: 90 BPM
BUN BLD-MCNC: 5 MG/DL (ref 9–23)
CALCIUM BLD-MCNC: 8.9 MG/DL (ref 8.7–10.6)
CHLORIDE SERPL-SCNC: 106 MMOL/L (ref 98–112)
CO2 SERPL-SCNC: 25 MMOL/L (ref 21–32)
CREAT BLD-MCNC: 0.64 MG/DL (ref 0.55–1.02)
EGFRCR SERPLBLD CKD-EPI 2021: 115 ML/MIN/1.73M2 (ref 60–?)
ERYTHROCYTE [DISTWIDTH] IN BLOOD BY AUTOMATED COUNT: 15.3 %
GLUCOSE BLD-MCNC: 124 MG/DL (ref 70–99)
HCT VFR BLD AUTO: 24.2 % (ref 35–48)
HGB BLD-MCNC: 8.6 G/DL (ref 12–16)
HGB S BLD QL SOLY: POSITIVE
MCH RBC QN AUTO: 38.9 PG (ref 26–34)
MCHC RBC AUTO-ENTMCNC: 35.5 G/DL (ref 31–37)
MCV RBC AUTO: 109.5 FL (ref 80–100)
OSMOLALITY SERPL CALC.SUM OF ELEC: 289 MOSM/KG (ref 275–295)
P AXIS: 21 DEGREES
P-R INTERVAL: 198 MS
PLATELET # BLD AUTO: 373 10(3)UL (ref 150–450)
POTASSIUM SERPL-SCNC: 3.6 MMOL/L (ref 3.5–5.1)
POTASSIUM SERPL-SCNC: 4.4 MMOL/L (ref 3.5–5.1)
Q-T INTERVAL: 358 MS
QRS DURATION: 86 MS
QTC CALCULATION (BEZET): 437 MS
R AXIS: 5 DEGREES
RBC # BLD AUTO: 2.21 X10(6)UL (ref 3.8–5.3)
SODIUM SERPL-SCNC: 140 MMOL/L (ref 136–145)
T AXIS: -46 DEGREES
TROPONIN I SERPL HS-MCNC: 48 NG/L (ref ?–34)
TROPONIN I SERPL HS-MCNC: 49 NG/L (ref ?–34)
TROPONIN I SERPL HS-MCNC: 50 NG/L (ref ?–34)
VENTRICULAR RATE: 90 BPM
WBC # BLD AUTO: 6.5 X10(3) UL (ref 4–11)

## 2025-05-23 PROCEDURE — 99233 SBSQ HOSP IP/OBS HIGH 50: CPT | Performed by: HOSPITALIST

## 2025-05-23 RX ORDER — IPRATROPIUM BROMIDE AND ALBUTEROL SULFATE 2.5; .5 MG/3ML; MG/3ML
3 SOLUTION RESPIRATORY (INHALATION) EVERY 6 HOURS PRN
Status: DISCONTINUED | OUTPATIENT
Start: 2025-05-23 | End: 2025-05-29

## 2025-05-23 RX ORDER — HYDROMORPHONE HYDROCHLORIDE 1 MG/ML
2 INJECTION, SOLUTION INTRAMUSCULAR; INTRAVENOUS; SUBCUTANEOUS ONCE
Refills: 0 | Status: COMPLETED | OUTPATIENT
Start: 2025-05-23 | End: 2025-05-23

## 2025-05-23 RX ORDER — POTASSIUM CHLORIDE 1500 MG/1
40 TABLET, EXTENDED RELEASE ORAL EVERY 4 HOURS
Status: COMPLETED | OUTPATIENT
Start: 2025-05-23 | End: 2025-05-23

## 2025-05-23 NOTE — PROGRESS NOTES
[facility]   part of [facility]     Hospitalist Progress Note     Oluwaseun Oluwadamilola Ogunyemi Patient Status:  Inpatient    8/3/1985 MRN [id number]   Location [facility] 3NE-A Attending Jeremi Denny, DO   Hosp Day # 1 PCP Clive St MD     Chief Complaint: I'm having a sickle crisis    Subjective:     Patient has some intermittent right sided chest pain but overall feels better.     Objective:    Review of Systems:   A comprehensive review of systems was completed; pertinent positive and negatives stated in subjective.    Vital signs:  Temp:  [98 °F (36.7 °C)-100.2 °F (37.9 °C)] 98.3 °F (36.8 °C)  Pulse:  [] 87  Resp:  [17-19] 17  BP: ()/(64-92) 92/64  SpO2:  [93 %-97 %] 95 %    Physical Exam:    General: No acute distress  Respiratory: No wheezes, no rhonchi  Cardiovascular: S1, S2, regular rate and rhythm  Abdomen: Soft, Non-tender, non-distended, positive bowel sounds  Neuro: No new focal deficits.   Extremities: No edema      Diagnostic Data:    Labs:  Recent Labs   Lab 25  0359 25  0423   WBC 5.6 6.5   HGB 9.0* 8.6*   .6* 109.5*   .0 373.0       Recent Labs   Lab 25  0359 25  0423   GLU 98 124*   BUN 6* 5*   CREATSERUM 0.58 0.64   CA 9.3 8.9   ALB 4.4  --     140   K 3.9 3.6    106   CO2 29.0 25.0   ALKPHO 53  --    AST 28  --    ALT 17  --    BILT 0.7  --    TP 8.8*  --        Estimated Glomerular Filtration Rate: 115 mL/min/1.73m2 (result from lab).    Recent Labs   Lab 25  0156 25  0423 25  0630   TROPHS 50* 49* 48*       No results for input(s): \"PTP\", \"INR\" in the last 168 hours.               Microbiology    No results found for this visit on 25.      Imaging: Reviewed in Epic.    Medications: Scheduled Medications[1]    Assessment & Plan:      #Sickle cell pain crisis   -PTA Folic acid, Hydroxyurea  -cont IVF  -IV Pain control and continue PTA regimen of MS contin and IR Oxycodone       #Chronically elevated troponin  -continue PTA ASA and Beta blocker      #Chronic Macrocytic anemia likely due to hydrea   -monitor Hb    #Abnormal CTA chest  #Suspicion for PNA  -pt denies cough, fevers, or chest pain, less likely pna, more likely atelectasis, cont IS  -given sickle cell, pt on ceftiraxone, will plan to tx possible PNA      Jeremi Denny,     Supplementary Documentation:     Quality:  DVT Mechanical Prophylaxis:   SCDs, Early ambuation  DVT Pharmacologic Prophylaxis   Medication    enoxaparin (Lovenox) 40 MG/0.4ML SUBQ injection 40 mg         DVT Pharmacologic prophylaxis: Aspirin 81 mg      Code Status: Not on file  [place]: No urinary catheter in place  [place] Duration (in days):   Central line:    ABHAY:     Discharge is dependent on: improvement in pain  At this point Ms. Andrade is expected to be discharge to: home    The 21st Century Cures Act makes medical notes like these available to patients in the interest of transparency. Please be advised this is a medical document. Medical documents are intended to carry relevant information, facts as evident, and the clinical opinion of the practitioner. The medical note is intended as peer to peer communication and may appear blunt or direct. It is written in medical language and may contain abbreviations or verbiage that are unfamiliar.                         [1]    cefTRIAXone  1 g Intravenous Q24H    potassium chloride  40 mEq Oral Q4H    aspirin  81 mg Oral Daily    folic acid  1 mg Oral Daily    hydroxyurea  2,000 mg Oral Daily    metoprolol succinate ER  25 mg Oral Daily Beta Blocker    morphINE ER  60 mg Oral Q8H PRAVIN    enoxaparin  40 mg Subcutaneous Daily

## 2025-05-23 NOTE — PAYOR COMM NOTE
--------------  5/23 CONTINUED STAY REVIEW    Payor: [facility] OUT OF STATE PPO  Subscriber #:  [id number]  Authorization Number: [id number]    NURSING:  Assumed care at 1930. A&OX4. RA. Pt c/o SOB intermittently. PRN Duo nebs given per MAR by RT. NSR on tele. Regular diet. Cardiac electrolyte protocol. PIV infusing NS @ 125 mL/hr. Lovenox for VTE. Pt c/o pain, scheduled Morphine, PRN Dilaudid, and PRN Oxycodone given per MAR. Per pt, no improvement and severe pain worsening. MD notified. CTA Chest performed. Hgb Solubility lab drawn. One time dose of 2mg IV Dilaudid given per MD orders. IV Rocephin given per orders. Safety precautions in place, call light within reach. Will continue with plan of care.     0140 Per pt, pain remains 9/10 with no improvement. Pt continues to have CP with SOB at times. MD notified. Troponin lab drawn, EKG performed and one time dose of 2mg IV Dilaudid given per MD orders.      HOSPITALIST:    Patient has some intermittent right sided chest pain but overall feels better.       Vital signs:  Temp:  [98 °F (36.7 °C)-100.2 °F (37.9 °C)] 98.3 °F (36.8 °C)  Pulse:  [] 87  Resp:  [17-19] 17  BP: ()/(64-92) 92/64  SpO2:  [93 %-97 %] 95 %     Physical Exam:    General: No acute distress  Respiratory: No wheezes, no rhonchi  Cardiovascular: S1, S2, regular rate and rhythm  Abdomen: Soft, Non-tender, non-distended, positive bowel sounds  Neuro: No new focal deficits.   Extremities: No edema     Lab 05/22/25  0359 05/23/25  0423   WBC 5.6 6.5   HGB 9.0* 8.6*   .6* 109.5*   .0 373.0      GLU 98 124*   BUN 6* 5*   CREATSERUM 0.58 0.64   CA 9.3 8.9   ALB 4.4  --     140   K 3.9 3.6    106   CO2 29.0 25.0   ALKPHO 53  --    AST 28  --    ALT 17  --    BILT 0.7  --    TP 8.8*  --       Lab 05/23/25  0156 05/23/25  0423 05/23/25  0630   TROPHS 50* 49* 48*         Medications: [Scheduled Medications]    [Scheduled Medications]   cefTRIAXone  1 g Intravenous Q24H     potassium chloride  40 mEq Oral Q4H    aspirin  81 mg Oral Daily    folic acid  1 mg Oral Daily    hydroxyurea  2,000 mg Oral Daily    metoprolol succinate ER  25 mg Oral Daily Beta Blocker    morphINE ER  60 mg Oral Q8H PRAVIN    enoxaparin  40 mg Subcutaneous Daily         sodium chloride 0.9% infusion  Intravenous,   125 mL/hr,   Continuous               Assessment & Plan:  #Sickle cell pain crisis   -PTA Folic acid, Hydroxyurea  -cont IVF  -IV Pain control and continue PTA regimen of MS contin and IR Oxycodone      #Chronically elevated troponin  -continue PTA ASA and Beta blocker      #Chronic Macrocytic anemia likely due to hydrea   -monitor Hb    #Abnormal CTA chest  #Suspicion for PNA  -pt denies cough, fevers, or chest pain, less likely pna, more likely atelectasis, cont IS  -given sickle cell, pt on ceftiraxone, will plan to tx possible PNA           CTA CHEST:     1.  Airspace consolidation in the right lower lobe posteriorly and right middle lobe medially may represent atelectasis or pneumonia.       2. Small right pleural effusion could be parapneumonic effusion.      3. No evidence of pulmonary embolus.     MEDICATIONS ADMINISTERED IN LAST 1 DAY:  acetaminophen (Tylenol Extra Strength) tab 500 mg       Date Action Dose Route User    5/23/2025 0433 Given 500 mg Oral Erma Carrero RN    5/22/2025 1634 Given 500 mg Oral Mic Mccauley RN          aspirin DR tab 81 mg       Date Action Dose Route User    5/23/2025 0811 Given 81 mg Oral Nunu Priest RN          cefTRIAXone (Rocephin) 1 g in sodium chloride 0.9% 100 mL IVPB-ADDV       Date Action Dose Route User    5/23/2025 0116 New Bag 1 g Intravenous Erma Carrero RN          folic acid (Folvite) tab 1 mg       Date Action Dose Route User    5/23/2025 0811 Given 1 mg Oral Nunu Priest RN          HYDROmorphone (Dilaudid) 1 MG/ML injection 1 mg       Date Action Dose Route User    5/22/2025 1634 Given 1 mg Intravenous Mic Mccauley, RYLAND     5/22/2025 1251 Given 1 mg Intravenous Mic Mccauley, RYLAND          HYDROmorphone (Dilaudid) 1 MG/ML injection 2 mg       Date Action Dose Route User    5/23/2025 1229 Given 2 mg Intravenous Zayda, Nunu, RN    5/23/2025 0815 Given 2 mg Intravenous Zayda, Nunu, RN    5/23/2025 0434 Given 2 mg Intravenous Carrero, Erma, RN    5/23/2025 0113 Given 2 mg Intravenous Carrero, Erma, RN    5/22/2025 2202 Given 2 mg Intravenous Carrero, Erma, RN    5/22/2025 1933 Given 2 mg Intravenous CarreroErma, RN          HYDROmorphone (Dilaudid) 1 MG/ML injection 2 mg       Date Action Dose Route User    5/22/2025 2233 Given 2 mg Intravenous CarreroErma, RN          HYDROmorphone (Dilaudid) 1 MG/ML injection 2 mg       Date Action Dose Route User    5/23/2025 0152 Given 2 mg Intravenous Erma Carrero RN          hydroxyurea (Hydrea) cap 2,000 mg       Date Action Dose Route User    5/23/2025 0811 Given 2,000 mg Oral ZaydaNunu RN     ipratropium-albuterol (Duoneb) 0.5-2.5 (3) MG/3ML inhalation solution 3 mL       Date Action Dose Route User    5/23/2025 0128 Given 3 mL Nebulization UnnerstallVannesa          metoprolol succinate ER (Toprol XL) 24 hr tab 25 mg       Date Action Dose Route User    5/23/2025 0614 Given 25 mg Oral Erma Carrero RN          morphINE ER (MS Contin) tab 60 mg       Date Action Dose Route User    5/23/2025 0614 Given 60 mg Oral CarreroErma, RN    5/22/2025 2331 Given 60 mg Oral CarreroErma, RN    5/22/2025 1431 Given 60 mg Oral Mic Mccauley, RN          oxyCODONE immediate release tab 30 mg       Date Action Dose Route User    5/22/2025 2101 Given 30 mg Oral CarreroErma boyce RN          potassium chloride (Klor-Con M20) tab 40 mEq       Date Action Dose Route User    5/23/2025 1229 Given 40 mEq Oral Zayda, Nunu, RN    5/23/2025 0811 Given 40 mEq Oral Zayda, Nunu, RN          sodium chloride 0.9% infusion       Date Action Dose Route User    5/22/2025 8113 Rate/Dose  Change (none) Intravenous Erma Carrero, RN            Vitals (last day)       Date/Time Temp Pulse Resp BP SpO2 Weight O2 Device O2 Flow Rate (L/min) [facility]    05/23/25 1145 97.5 °F (36.4 °C) 84 16 107/73 97 % -- None (Room air) 0 L/min ER    05/23/25 0800 98.3 °F (36.8 °C) 87 17 92/64 95 % -- None (Room air) 0 L/min ER    05/23/25 0613 98 °F (36.7 °C) 106 -- 102/71 95 % -- -- -- VC    05/23/25 0400 100.2 °F (37.9 °C) 100 -- 103/66 94 % -- None (Room air) -- AP    05/23/25 0128 -- -- -- -- 96 % -- None (Room air) -- CU    05/23/25 0000 99.9 °F (37.7 °C) 107 -- 113/83 96 % -- None (Room air) -- AP    05/22/25 2200 -- 109 -- 107/76 96 % -- -- -- VC    05/22/25 2100 -- 100 -- 105/77 93 % -- -- -- VC    05/22/25 2000 99.5 °F (37.5 °C) 96 -- 106/70 95 % -- None (Room air) -- AP    05/22/25 1630 99.8 °F (37.7 °C) 105 19 119/92 97 % -- None (Room air) -- IB

## 2025-05-23 NOTE — PLAN OF CARE
Assumed care at 1930. A&OX4. RA. Pt c/o SOB intermittently. PRN Duo nebs given per MAR by RT. NSR on tele. Regular diet. Cardiac electrolyte protocol. PIV infusing NS @ 125 mL/hr. Lovenox for VTE. Pt c/o pain, scheduled Morphine, PRN Dilaudid, and PRN Oxycodone given per MAR. Per pt, no improvement and severe pain worsening. MD notified. CTA Chest performed. Hgb Solubility lab drawn. One time dose of 2mg IV Dilaudid given per MD orders. IV Rocephin given per orders. Safety precautions in place, call light within reach. Will continue with plan of care.    0140 Per pt, pain remains 9/10 with no improvement. Pt continues to have CP with SOB at times. MD notified. Troponin lab drawn, EKG performed and one time dose of 2mg IV Dilaudid given per MD orders.    Problem: PAIN - ADULT  Goal: Verbalizes/displays adequate comfort level or patient's stated pain goal  Description: INTERVENTIONS:  - Encourage pt to monitor pain and request assistance  - Assess pain using appropriate pain scale  - Administer analgesics based on type and severity of pain and evaluate response  - Implement non-pharmacological measures as appropriate and evaluate response  - Consider cultural and social influences on pain and pain management  - Manage/alleviate anxiety  - Utilize distraction and/or relaxation techniques  - Monitor for opioid side effects  - Notify MD/LIP if interventions unsuccessful or patient reports new pain  - Anticipate increased pain with activity and pre-medicate as appropriate  Outcome: Progressing

## 2025-05-23 NOTE — PLAN OF CARE
Patient alert and oriented x4.  On Ra.  NSR on tele.  6/10 pain.  PRN dilaudid.  IVF.  IV rocephin q 24.  K replaced per protocol.  Resting comfortably in bed.

## 2025-05-24 LAB
ALBUMIN SERPL-MCNC: 4.3 G/DL (ref 3.2–4.8)
ALP LIVER SERPL-CCNC: 51 U/L (ref 37–98)
ALT SERPL-CCNC: 27 U/L (ref 10–49)
ANION GAP SERPL CALC-SCNC: 9 MMOL/L (ref 0–18)
AST SERPL-CCNC: 46 U/L (ref ?–34)
ATRIAL RATE: 109 BPM
BILIRUB DIRECT SERPL-MCNC: 0.2 MG/DL (ref ?–0.3)
BILIRUB SERPL-MCNC: 0.5 MG/DL (ref 0.3–1.2)
BUN BLD-MCNC: 6 MG/DL (ref 9–23)
CALCIUM BLD-MCNC: 9.1 MG/DL (ref 8.7–10.6)
CHLORIDE SERPL-SCNC: 106 MMOL/L (ref 98–112)
CO2 SERPL-SCNC: 25 MMOL/L (ref 21–32)
CREAT BLD-MCNC: 0.57 MG/DL (ref 0.55–1.02)
EGFRCR SERPLBLD CKD-EPI 2021: 118 ML/MIN/1.73M2 (ref 60–?)
ERYTHROCYTE [DISTWIDTH] IN BLOOD BY AUTOMATED COUNT: 15.2 %
GLUCOSE BLD-MCNC: 105 MG/DL (ref 70–99)
HCT VFR BLD AUTO: 22.6 % (ref 35–48)
HGB BLD-MCNC: 8.1 G/DL (ref 12–16)
HGB RETIC QN AUTO: 29.5 PG (ref 28.2–36.6)
IMM RETICS NFR: 0.29 RATIO (ref 0.1–0.3)
MCH RBC QN AUTO: 38.9 PG (ref 26–34)
MCHC RBC AUTO-ENTMCNC: 35.8 G/DL (ref 31–37)
MCV RBC AUTO: 108.7 FL (ref 80–100)
OSMOLALITY SERPL CALC.SUM OF ELEC: 288 MOSM/KG (ref 275–295)
P AXIS: 41 DEGREES
P-R INTERVAL: 192 MS
PLATELET # BLD AUTO: 359 10(3)UL (ref 150–450)
POTASSIUM SERPL-SCNC: 3.8 MMOL/L (ref 3.5–5.1)
PROT SERPL-MCNC: 8.6 G/DL (ref 5.7–8.2)
Q-T INTERVAL: 328 MS
QRS DURATION: 84 MS
QTC CALCULATION (BEZET): 441 MS
R AXIS: 14 DEGREES
RBC # BLD AUTO: 2.08 X10(6)UL (ref 3.8–5.3)
RETICS # AUTO: 69.4 X10(3) UL (ref 22.5–147.5)
RETICS/RBC NFR AUTO: 3.3 % (ref 0.5–2.5)
RH BLOOD TYPE: POSITIVE
SODIUM SERPL-SCNC: 140 MMOL/L (ref 136–145)
T AXIS: -79 DEGREES
TROPONIN I SERPL HS-MCNC: 44 NG/L (ref ?–34)
VENTRICULAR RATE: 109 BPM
WBC # BLD AUTO: 4.2 X10(3) UL (ref 4–11)

## 2025-05-24 PROCEDURE — 99233 SBSQ HOSP IP/OBS HIGH 50: CPT | Performed by: STUDENT IN AN ORGANIZED HEALTH CARE EDUCATION/TRAINING PROGRAM

## 2025-05-24 PROCEDURE — 30233N1 TRANSFUSION OF NONAUTOLOGOUS RED BLOOD CELLS INTO PERIPHERAL VEIN, PERCUTANEOUS APPROACH: ICD-10-PCS | Performed by: INTERNAL MEDICINE

## 2025-05-24 RX ORDER — ONDANSETRON 2 MG/ML
4 INJECTION INTRAMUSCULAR; INTRAVENOUS EVERY 6 HOURS PRN
Status: DISCONTINUED | OUTPATIENT
Start: 2025-05-24 | End: 2025-05-29

## 2025-05-24 RX ORDER — HYDROMORPHONE HYDROCHLORIDE 1 MG/ML
0.54 INJECTION, SOLUTION INTRAMUSCULAR; INTRAVENOUS; SUBCUTANEOUS EVERY 4 HOURS PRN
Refills: 0 | Status: DISCONTINUED | OUTPATIENT
Start: 2025-05-24 | End: 2025-05-24

## 2025-05-24 RX ORDER — KETOROLAC TROMETHAMINE 30 MG/ML
30 INJECTION, SOLUTION INTRAMUSCULAR; INTRAVENOUS EVERY 6 HOURS
Status: DISPENSED | OUTPATIENT
Start: 2025-05-24 | End: 2025-05-26

## 2025-05-24 RX ORDER — SODIUM CHLORIDE 450 MG/100ML
INJECTION, SOLUTION INTRAVENOUS CONTINUOUS
Status: DISCONTINUED | OUTPATIENT
Start: 2025-05-24 | End: 2025-05-29

## 2025-05-24 RX ORDER — HYDROMORPHONE HYDROCHLORIDE 1 MG/ML
0.4 INJECTION, SOLUTION INTRAMUSCULAR; INTRAVENOUS; SUBCUTANEOUS EVERY 2 HOUR PRN
Refills: 0 | Status: DISCONTINUED | OUTPATIENT
Start: 2025-05-24 | End: 2025-05-24

## 2025-05-24 RX ORDER — HYDROMORPHONE HYDROCHLORIDE 1 MG/ML
0.2 INJECTION, SOLUTION INTRAMUSCULAR; INTRAVENOUS; SUBCUTANEOUS EVERY 2 HOUR PRN
Refills: 0 | Status: DISCONTINUED | OUTPATIENT
Start: 2025-05-24 | End: 2025-05-24

## 2025-05-24 RX ORDER — HYDROMORPHONE HYDROCHLORIDE 1 MG/ML
1 INJECTION, SOLUTION INTRAMUSCULAR; INTRAVENOUS; SUBCUTANEOUS EVERY 2 HOUR PRN
Refills: 0 | Status: DISCONTINUED | OUTPATIENT
Start: 2025-05-24 | End: 2025-05-25

## 2025-05-24 RX ORDER — HYDROMORPHONE HYDROCHLORIDE 1 MG/ML
0.5 INJECTION, SOLUTION INTRAMUSCULAR; INTRAVENOUS; SUBCUTANEOUS EVERY 2 HOUR PRN
Refills: 0 | Status: DISCONTINUED | OUTPATIENT
Start: 2025-05-24 | End: 2025-05-25

## 2025-05-24 RX ORDER — POTASSIUM CHLORIDE 1500 MG/1
40 TABLET, EXTENDED RELEASE ORAL ONCE
Status: COMPLETED | OUTPATIENT
Start: 2025-05-24 | End: 2025-05-24

## 2025-05-24 RX ORDER — HYDROMORPHONE HYDROCHLORIDE 1 MG/ML
3 INJECTION, SOLUTION INTRAMUSCULAR; INTRAVENOUS; SUBCUTANEOUS EVERY 4 HOURS PRN
Refills: 0 | Status: DISCONTINUED | OUTPATIENT
Start: 2025-05-24 | End: 2025-05-25

## 2025-05-24 RX ORDER — METOCLOPRAMIDE HYDROCHLORIDE 5 MG/ML
5 INJECTION INTRAMUSCULAR; INTRAVENOUS EVERY 8 HOURS PRN
Status: DISCONTINUED | OUTPATIENT
Start: 2025-05-24 | End: 2025-05-29

## 2025-05-24 RX ORDER — HYDROMORPHONE HYDROCHLORIDE 1 MG/ML
3 INJECTION, SOLUTION INTRAMUSCULAR; INTRAVENOUS; SUBCUTANEOUS EVERY 2 HOUR PRN
Refills: 0 | Status: DISCONTINUED | OUTPATIENT
Start: 2025-05-24 | End: 2025-05-24

## 2025-05-24 RX ORDER — SODIUM CHLORIDE 9 MG/ML
INJECTION, SOLUTION INTRAVENOUS ONCE
Status: DISCONTINUED | OUTPATIENT
Start: 2025-05-24 | End: 2025-05-29

## 2025-05-24 RX ORDER — HYDROMORPHONE HYDROCHLORIDE 1 MG/ML
2 INJECTION, SOLUTION INTRAMUSCULAR; INTRAVENOUS; SUBCUTANEOUS EVERY 2 HOUR PRN
Refills: 0 | Status: DISCONTINUED | OUTPATIENT
Start: 2025-05-24 | End: 2025-05-25

## 2025-05-24 NOTE — PLAN OF CARE
Assumed pt care this morning at 0730.  Pt is A&Ox4, following commands. Lethargic.  Pt on room air, VSS. Bps running on low end, MD aware. Hold Dilaudid for SBP < 90 mmHg.  NSR  Pt reports sever pain behind knees, lower back, R side chest. Frequent pain meds given per MAR  Pt one person assist.  Pt on regular diet. Tolerating diet, low appetite.  R AC 0.45 at 100 mL/hr.  IS bedside, pt is encouraged to use and is educated on proper usage.  Nebs available PRN, none needed so far.  Bed in lowest position, call light in reach.        Problem: PAIN - ADULT  Goal: Verbalizes/displays adequate comfort level or patient's stated pain goal  Description: INTERVENTIONS:  - Encourage pt to monitor pain and request assistance  - Assess pain using appropriate pain scale  - Administer analgesics based on type and severity of pain and evaluate response  - Implement non-pharmacological measures as appropriate and evaluate response  - Consider cultural and social influences on pain and pain management  - Manage/alleviate anxiety  - Utilize distraction and/or relaxation techniques  - Monitor for opioid side effects  - Notify MD/LIP if interventions unsuccessful or patient reports new pain  - Anticipate increased pain with activity and pre-medicate as appropriate  Outcome: Progressing     Problem: CARDIOVASCULAR - ADULT  Goal: Maintains optimal cardiac output and hemodynamic stability  Description: INTERVENTIONS:  - Monitor vital signs, rhythm, and trends  - Monitor for bleeding, hypotension and signs of decreased cardiac output  - Evaluate effectiveness of vasoactive medications to optimize hemodynamic stability  - Monitor arterial and/or venous puncture sites for bleeding and/or hematoma  - Assess quality of pulses, skin color and temperature  - Assess for signs of decreased coronary artery perfusion - ex. Angina  - Evaluate fluid balance, assess for edema, trend weights  Outcome: Progressing  Goal: Absence of cardiac arrhythmias or  at baseline  Description: INTERVENTIONS:  - Continuous cardiac monitoring, monitor vital signs, obtain 12 lead EKG if indicated  - Evaluate effectiveness of antiarrhythmic and heart rate control medications as ordered  - Initiate emergency measures for life threatening arrhythmias  - Monitor electrolytes and administer replacement therapy as ordered  Outcome: Progressing

## 2025-05-24 NOTE — PLAN OF CARE
End of shift note:  Pt is A&Ox4, c/o significant amount of pain(right chest, back and knees) overnight-PRN pain meds utilized without adequate response. Md notified w/ order to increase dose of dilaudid- pt stated that the new dose was \"better\" and got some sleep afterwards. On tele, SR. Room air-02 sat wnl. I.S at bedside- educated on and encouraged use. Eating and drinking w// no issues. Bm overnight. Receiving IVF and IV abx . Safety precautions in place. Pt updated on POC, all questions answered.   Problem: PAIN - ADULT  Goal: Verbalizes/displays adequate comfort level or patient's stated pain goal  Description: INTERVENTIONS:  - Encourage pt to monitor pain and request assistance  - Assess pain using appropriate pain scale  - Administer analgesics based on type and severity of pain and evaluate response  - Implement non-pharmacological measures as appropriate and evaluate response  - Consider cultural and social influences on pain and pain management  - Manage/alleviate anxiety  - Utilize distraction and/or relaxation techniques  - Monitor for opioid side effects  - Notify MD/LIP if interventions unsuccessful or patient reports new pain  - Anticipate increased pain with activity and pre-medicate as appropriate  Outcome: Progressing

## 2025-05-24 NOTE — PROGRESS NOTES
[facility]   part of [facility]     Hospitalist Progress Note     Oluwaseun Oluwadamilola Ogunyemi Patient Status:  Inpatient    8/3/1985 MRN [id number]   Location [facility] 3NE-A Attending Jeremi Denny, DO   Hosp Day # 2 PCP [address] MD     Chief Complaint: I'm having a sickle crisis    Subjective:     Patient pain stays at 8/10- does not want PCA pump. Notes breathing has improved slightly.     Objective:    Review of Systems:   A comprehensive review of systems was completed; pertinent positive and negatives stated in subjective.    Vital signs:  Temp:  [97.5 °F (36.4 °C)-98.3 °F (36.8 °C)] 98 °F (36.7 °C)  Pulse:  [81-86] 86  Resp:  [12-19] 12  BP: ()/(55-77) 97/55  SpO2:  [94 %-98 %] 96 %    Physical Exam:    General: No acute distress  Respiratory: No wheezes, no rhonchi  Cardiovascular: S1, S2, regular rate and rhythm  Abdomen: Soft, Non-tender, non-distended, positive bowel sounds  Neuro: No new focal deficits.   Extremities: No edema      Diagnostic Data:    Labs:  Recent Labs   Lab 25  0359 25  0423 25  0722   WBC 5.6 6.5 4.2   HGB 9.0* 8.6* 8.1*   .6* 109.5* 108.7*   .0 373.0 359.0       Recent Labs   Lab 25  0359 25  0423 25  1629 25  0722   GLU 98 124*  --  105*   BUN 6* 5*  --  6*   CREATSERUM 0.58 0.64  --  0.57   CA 9.3 8.9  --  9.1   ALB 4.4  --   --   --     140  --  140   K 3.9 3.6 4.4 3.8    106  --  106   CO2 29.0 25.0  --  25.0   ALKPHO 53  --   --   --    AST 28  --   --   --    ALT 17  --   --   --    BILT 0.7  --   --   --    TP 8.8*  --   --   --        Estimated Glomerular Filtration Rate: 118 mL/min/1.73m2 (result from lab).    Recent Labs   Lab 25  0156 25  0423 25  0630   TROPHS 50* 49* 48*       No results for input(s): \"PTP\", \"INR\" in the last 168 hours.               Microbiology    No results found for this visit on 25.      Imaging: Reviewed in  Epic.    Medications: Scheduled Medications[1]    Assessment & Plan:      #Sickle cell pain crisis and anemia   H/o acute chest syndrome   -PTA Folic acid, Hydroxyurea  -cont IVF- changed to 0.45 NS per Dr Mccormick OP note   -IV Pain control and continue PTA regimen of MS contin and IR Oxycodone   - PRAVIN toradol added      #Chronically elevated troponin  -continue PTA ASA and Beta blocker      #Chronic Macrocytic anemia likely due to hydrea   -monitor Hb    #Abnormal CTA chest- suspicious for PNA  Continue IV CTX    Hanna Agosto MD    Supplementary Documentation:     Quality:  DVT Mechanical Prophylaxis:   SCDs, Early ambuation  DVT Pharmacologic Prophylaxis   Medication    enoxaparin (Lovenox) 40 MG/0.4ML SUBQ injection 40 mg         DVT Pharmacologic prophylaxis: Aspirin 81 mg      Code Status: Not on file  [place]: No urinary catheter in place  [place] Duration (in days):   Central line:    ABHAY:     Discharge is dependent on: improvement in pain  At this point Ms. Andrade is expected to be discharge to: home    The 21st Century Cures Act makes medical notes like these available to patients in the interest of transparency. Please be advised this is a medical document. Medical documents are intended to carry relevant information, facts as evident, and the clinical opinion of the practitioner. The medical note is intended as peer to peer communication and may appear blunt or direct. It is written in medical language and may contain abbreviations or verbiage that are unfamiliar.                         [1]    ketorolac  30 mg Intravenous Q6H    cefTRIAXone  1 g Intravenous Q24H    aspirin  81 mg Oral Daily    folic acid  1 mg Oral Daily    hydroxyurea  2,000 mg Oral Daily    metoprolol succinate ER  25 mg Oral Daily Beta Blocker    morphINE ER  60 mg Oral Q8H PRAVIN    enoxaparin  40 mg Subcutaneous Daily

## 2025-05-25 PROBLEM — J18.9 PNEUMONIA DUE TO INFECTIOUS ORGANISM: Status: ACTIVE | Noted: 2025-05-25

## 2025-05-25 LAB
ALBUMIN SERPL-MCNC: 3.9 G/DL (ref 3.2–4.8)
ALBUMIN/GLOB SERPL: 1 {RATIO} (ref 1–2)
ALP LIVER SERPL-CCNC: 53 U/L (ref 37–98)
ALT SERPL-CCNC: 20 U/L (ref 10–49)
ANION GAP SERPL CALC-SCNC: 9 MMOL/L (ref 0–18)
ANTIBODY SCREEN: POSITIVE
AST SERPL-CCNC: 20 U/L (ref ?–34)
BASOPHILS # BLD AUTO: 0.01 X10(3) UL (ref 0–0.2)
BASOPHILS NFR BLD AUTO: 0.2 %
BILIRUB SERPL-MCNC: 0.3 MG/DL (ref 0.3–1.2)
BUN BLD-MCNC: 8 MG/DL (ref 9–23)
CALCIUM BLD-MCNC: 8.8 MG/DL (ref 8.7–10.6)
CHLORIDE SERPL-SCNC: 105 MMOL/L (ref 98–112)
CO2 SERPL-SCNC: 25 MMOL/L (ref 21–32)
CREAT BLD-MCNC: 0.61 MG/DL (ref 0.55–1.02)
EGFRCR SERPLBLD CKD-EPI 2021: 116 ML/MIN/1.73M2 (ref 60–?)
EOSINOPHIL # BLD AUTO: 0.07 X10(3) UL (ref 0–0.7)
EOSINOPHIL NFR BLD AUTO: 1.7 %
ERYTHROCYTE [DISTWIDTH] IN BLOOD BY AUTOMATED COUNT: 14.7 %
GLOBULIN PLAS-MCNC: 4 G/DL (ref 2–3.5)
GLUCOSE BLD-MCNC: 104 MG/DL (ref 70–99)
HAPTOGLOB SERPL-MCNC: 93 MG/DL (ref 30–200)
HCT VFR BLD AUTO: 22 % (ref 35–48)
HGB BLD-MCNC: 10.2 G/DL (ref 12–16)
HGB BLD-MCNC: 7.8 G/DL (ref 12–16)
HGB RETIC QN AUTO: 32 PG (ref 28.2–36.6)
IMM GRANULOCYTES # BLD AUTO: 0.02 X10(3) UL (ref 0–1)
IMM GRANULOCYTES NFR BLD: 0.5 %
IMM RETICS NFR: 0.33 RATIO (ref 0.1–0.3)
LDH SERPL L TO P-CCNC: 142 U/L (ref 120–246)
LYMPHOCYTES # BLD AUTO: 1.46 X10(3) UL (ref 1–4)
LYMPHOCYTES NFR BLD AUTO: 34.5 %
MCH RBC QN AUTO: 38.6 PG (ref 26–34)
MCHC RBC AUTO-ENTMCNC: 35.5 G/DL (ref 31–37)
MCV RBC AUTO: 108.9 FL (ref 80–100)
MONOCYTES # BLD AUTO: 0.48 X10(3) UL (ref 0.1–1)
MONOCYTES NFR BLD AUTO: 11.3 %
NEUTROPHILS # BLD AUTO: 2.19 X10 (3) UL (ref 1.5–7.7)
NEUTROPHILS # BLD AUTO: 2.19 X10(3) UL (ref 1.5–7.7)
NEUTROPHILS NFR BLD AUTO: 51.8 %
OSMOLALITY SERPL CALC.SUM OF ELEC: 287 MOSM/KG (ref 275–295)
PLATELET # BLD AUTO: 380 10(3)UL (ref 150–450)
POTASSIUM SERPL-SCNC: 3.7 MMOL/L (ref 3.5–5.1)
POTASSIUM SERPL-SCNC: 3.7 MMOL/L (ref 3.5–5.1)
PROT SERPL-MCNC: 7.9 G/DL (ref 5.7–8.2)
RBC # BLD AUTO: 2.02 X10(6)UL (ref 3.8–5.3)
RETICS # AUTO: 69.7 X10(3) UL (ref 22.5–147.5)
RETICS/RBC NFR AUTO: 3.5 % (ref 0.5–2.5)
RGTSCRN: 5
SODIUM SERPL-SCNC: 139 MMOL/L (ref 136–145)
TREATSERUM: 1
WBC # BLD AUTO: 4.2 X10(3) UL (ref 4–11)

## 2025-05-25 PROCEDURE — 99223 1ST HOSP IP/OBS HIGH 75: CPT | Performed by: INTERNAL MEDICINE

## 2025-05-25 PROCEDURE — 99232 SBSQ HOSP IP/OBS MODERATE 35: CPT | Performed by: STUDENT IN AN ORGANIZED HEALTH CARE EDUCATION/TRAINING PROGRAM

## 2025-05-25 RX ORDER — POTASSIUM CHLORIDE 1500 MG/1
40 TABLET, EXTENDED RELEASE ORAL ONCE
Status: COMPLETED | OUTPATIENT
Start: 2025-05-25 | End: 2025-05-25

## 2025-05-25 RX ORDER — SODIUM CHLORIDE 9 MG/ML
INJECTION, SOLUTION INTRAVENOUS CONTINUOUS
Status: DISCONTINUED | OUTPATIENT
Start: 2025-05-25 | End: 2025-05-28

## 2025-05-25 RX ORDER — MELATONIN
1000 DAILY
Status: DISCONTINUED | OUTPATIENT
Start: 2025-05-25 | End: 2025-05-29

## 2025-05-25 RX ORDER — NALOXONE HYDROCHLORIDE 0.4 MG/ML
0.08 INJECTION, SOLUTION INTRAMUSCULAR; INTRAVENOUS; SUBCUTANEOUS
Status: DISCONTINUED | OUTPATIENT
Start: 2025-05-25 | End: 2025-05-28

## 2025-05-25 NOTE — CONSULTS
Hematology Oncology Consultation note    Patient Name: Oluwaseun Oluwadamilola Ogunyemi  Medical Record Number: [id number]   YOB: 1985    PCP: Clive St MD     Reason for Consultation:  Oluwaseun Oluwadamilola Ogunyemi was seen today for the diagnosis of hgb SS/sickle cell disease    Hematologic History:  *Sickle cell disease, hgb SS  -early 2000's moved from [facility] to   -2016/2017- 1st pregnancy c/b worsening pain crises  -2018- 2nd pregnancy c/b worsening pain crises, including episode of acute chest requiring RBC exchange.  -early 2019- started hydroxyurea, titrated up to 2000mg daily  -7/2020- started Voxelotor, but dc'd shortly after starting d/t poor tolerability with diarrhea, fatigue, elevated LFTs, was not very helpful either.   -5/28/21- 3/2022- received crizanlizumab (Adakveo) however c/b infusion reaction 3/2022 and was only slightly helpful therefore not continued  -10/2021- moved from [facility] (followed with Dr. Walton of [facility]) to [place].   -4/2022- started L-glutamine (Endari) 15g PO BID, cont'd hydrea  -8/2022- moved from [place] (prev followed by Dr. Esau Blount) to [place], [place]  -3/27/23- resumed crizanlizumab d/t more freq acute pain episodes   -5/8/23- reduced hydrea to 1500mg daily (d/t hgb 7.4, abs retic 47K)   -7/2/23- reduced hydrea to 1000mg daily (d/t hgb 7.0, abs retic 59K)   -12/27/23- increased Hydrea to 1500mg daily  -4/4/24- increased Hydrea to 2000mg daily    ================================  HPI: Ms. Andrade is a pleasant 40 y/o F with Hb SS/sickle cell disease being treated by my colleague Dr. Esau Mccormick - who presented to ED with chest and bilateral leg pain, found to be in acute sickle cell crisis.     Hematology was consulted given history of acute chest syndrome.    She last saw Dr. Mccormick 5/1/25 and received crizanlizumab at that time.    This is her 4th hospitalization in the last 7 months for acute sickle cell crisis.      On this admission, CTA Chest suspicious for PNA and IV ceftriaxone was started- with which her respiration has significantly improved. For her sickle cell crisis, she she is being started on Dilaudid PCA pump . Her pain (bilateral knees) has not improved with IV pain meds thus far.    VSS O2 98% on room air. Today at bedside she explains that she is awaiting approval for gene therapy. She is compliant with daily hydrea and folic acid.     Hospitalizations and ED visits for acute vaso-occlusive pain episodes since moving from [place] to [place] in 2022:  -22- hospitalization  -22- ED  -22- hospitalization  10/8/22- hospitalization  -10/29/22- ED  -22- ED  -22- ED   -23- hospitalization  -23- hospitalization  -23- hospitalization  -10/7/24- hospitalization  -12/3/24- hospitalization  -25- hospitalization  -25- hospitalization    Past Medical History:  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 90 tablet 0    OxyCODONE HCl IR 30 MG Oral Tab Take 1 tablet (30 mg total) by mouth every 3 (three) hours as needed for Pain. 180 tablet 0    loperamide 2 MG Oral Cap Take 1 capsule (2 mg total) by mouth every 3 (three) hours as needed for Diarrhea. 30 capsule 0    diphenhydrAMINE 25 MG Oral Cap Take 1-2 capsules (25-50 mg total) by mouth every 8 (eight) hours as needed for Itching.      ASPIRIN LOW DOSE 81 MG Oral Tab EC TAKE 1 TABLET BY MOUTH EVERY DAY 90 tablet 3    ondansetron (ZOFRAN) 8 MG tablet Take 1 tablet (8 mg total) by mouth every 8 (eight) hours as needed for Nausea. 9 tablet 13    hydroxyurea 500 MG Oral Cap Take 4 capsules (2,000 mg total) by mouth daily. 360 capsule 3    folic acid 1 MG Oral Tab Take 1 tablet (1 mg total) by mouth daily. 90 tablet 3    Naloxone HCl 4 MG/0.1ML Nasal Liquid 4 mg by Nasal route as needed. If patient remains unresponsive, repeat dose in other nostril 2-5 minutes after first dose. 1 kit 0    metoprolol succinate ER 25 MG Oral Tablet 24 Hr Take 1 tablet (25 mg total) by mouth in the morning.      Cyanocobalamin (VITAMIN B-12 OR) Take 1 tablet by mouth in the morning.       Allergies:   Allergies   Allergen Reactions    Piperacillin Sod-Tazobactam So ITCHING     Generalized  Tolerated cephalosporins in the past       Psychosocial History:  Social History     Social History Narrative    , has 2 children ages 4 and 5 as of 9/2022.  Not employed.     Social History     Socioeconomic History    Marital status:    Tobacco Use    Smoking status: Never    Smokeless tobacco: Never   Vaping Use    Vaping status: Never Used   Substance and Sexual Activity    Alcohol use: Never     Comment: No history of excessive use    Drug use: Yes     Types: Hydromorphone, Oxycodone     Comment: Prescribed    Sexual activity: Yes     Partners: Male     Birth control/protection: Condom   Other Topics Concern    Caffeine Concern Yes     Comment: 5-10 a month    Exercise Yes      Comment: 1-2 times a week    Seat Belt Yes    Self-Exams Yes     Comment: self breast exam with showering   Social History Narrative    , has 2 children ages 4 and 5 as of 9/2022.  Not employed.     Social Drivers of Health     Food Insecurity: No Food Insecurity (5/22/2025)    NCSS - Food Insecurity     Worried About Running Out of Food in the Last Year: No     Ran Out of Food in the Last Year: No   Transportation Needs: No Transportation Needs (5/22/2025)    NCSS - Transportation     Lack of Transportation: No   Housing Stability: Not At Risk (5/22/2025)    NCSS - Housing/Utilities     Has Housing: Yes     Worried About Losing Housing: No     Unable to Get Utilities: No       Family Medical History:  Family History   Problem Relation Age of Onset    Hypertension Mother         unknown    Cataracts Mother     No Known Problems Father         was told cardiac arrest    No Known Problems Brother     Sickle Cell Maternal Aunt         passed from kidney failure    DVT/VTE Other     Breast Cancer Neg     Ovarian Cancer Neg     Colon Cancer Neg      Review of Systems:  A 10-point ROS was done with pertinent positives and negative per the HPI    Vital Signs:  Height: --  Weight: --  BSA (Calculated - sq m): --  Pulse: 69 (05/25 1238)  BP: 84/64 (05/25 1238)  Temp: 97.9 °F (36.6 °C) (05/25 0800)  Do Not Use - Resp Rate: --  SpO2: 99 % (05/25 1238)    Wt Readings from Last 6 Encounters:   05/22/25 81.2 kg (179 lb)   05/01/25 77.3 kg (170 lb 6.4 oz)   04/03/25 76.4 kg (168 lb 6.4 oz)   03/06/25 76.7 kg (169 lb)   02/06/25 74.8 kg (165 lb)   01/09/25 72.6 kg (160 lb 0.9 oz)     Physical Examination:  General: Patient is alert and oriented, not in acute distress  Psych: Mood and affect are appropriate  Eyes: EOMI  ENT: Oropharynx is clear  CV: Regular rate and rhythm, no murmurs, no LE edema  Respiratory: Lungs clear to auscultation bilaterally  GI/Abd: Soft, non-tender with normoactive bowel sounds, no  hepatosplenomegaly  Neurological: Grossly intact   Lymphatics: No palpable lymphadenopathy  Skin: no rashes or petechiae    Laboratory:  Lab Results   Component Value Date    WBC 4.2 05/25/2025    WBC 4.2 05/24/2025    WBC 6.5 05/23/2025    HGB 7.8 (L) 05/25/2025    HGB 8.1 (L) 05/24/2025    HGB 8.6 (L) 05/23/2025    HCT 22.0 (L) 05/25/2025    .9 (H) 05/25/2025    MCH 38.6 (H) 05/25/2025    MCHC 35.5 05/25/2025    RDW 14.7 05/25/2025    .0 05/25/2025    .0 05/24/2025    .0 05/23/2025     Lab Results   Component Value Date     (H) 05/25/2025    BUN 8 (L) 05/25/2025    CREATSERUM 0.61 05/25/2025    CREATSERUM 0.57 05/24/2025    CREATSERUM 0.64 05/23/2025    ANIONGAP 9 05/25/2025    CA 8.8 05/25/2025    OSMOCALC 287 05/25/2025    ALKPHO 53 05/25/2025    AST 20 05/25/2025    ALT 20 05/25/2025    BILT 0.3 05/25/2025    TP 7.9 05/25/2025    ALB 3.9 05/25/2025    GLOBULIN 4.0 (H) 05/25/2025     05/25/2025    K 3.7 05/25/2025    K 3.7 05/25/2025     05/25/2025    CO2 25.0 05/25/2025     Lab Results   Component Value Date     05/25/2025     (H) 05/01/2025     02/06/2025     12/12/2024     10/17/2024     10/08/2024     07/25/2024     12/27/2023     07/12/2023     05/08/2023     03/27/2023     02/16/2023     12/22/2022     09/05/2022     No results found for: \"PTT\", \"PT\", \"INR\"    Lab Results   Component Value Date    RETICABSOL 69.7 05/25/2025    RETICABSOL 69.4 05/24/2025    RETICABSOL 78.1 05/22/2025    RETICABSOL 85.6 05/01/2025    RETICABSOL 61.7 02/06/2025    RETICABSOL 70.7 01/09/2025    RETICABSOL 61.1 12/12/2024    RETICABSOL 85.9 10/17/2024    RETICABSOL 90.3 10/08/2024    RETICABSOL 70.8 10/07/2024     Lab Results   Component Value Date    CORINNE 279.5 (H) 02/16/2023    CORINNE 395.5 (H) 09/05/2022     Lab Results   Component Value Date    SAT 28 02/16/2023    SAT 36 09/05/2022      Lab Results   Component Value Date    B12 1,792 (H) 07/12/2023    B12 182 (L) 05/08/2023     Lab Results   Component Value Date    MMA 95 05/08/2023     Component      Latest Ref Rng & Units 9/5/2022   HEMOGLOBIN A      95.5 - 100.0 % <1.0 (L)   HEMOGLOBIN A2      1.5 - 3.5 % 1.3 (L)   HEMOGLOBIN F (FETAL)      0.0 - 2.0 % 45.0 (H)   HEMOGLOBIN S      % 53.7   HGB Electophoresis Interp       Overall, 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 Although HgB F is often elevated in patients with sickle cell disease, such elevations do not commonly exceed 15% of the total hemoglobin. Possible considerations for the degree of HgB F elevation as seen in this patient may include therapy effect (Hydroxyurea) vs. hereditary persistence of fetal hemoglobin (HPFH).      Impression & Plan:  Ms. Andrade is a pleasant 38 y/o F with Hb SS/sickle cell disease being treated by my colleague Dr. Esau Mccormick - who presented to ED with chest and bilateral leg pain, found to be in acute sickle cell crisis.     *Hgb SS/Sickle cell disease   Acute Sickle Cell Crisis  -+hx of acute chest in 2018 requiring RBC exchange.  She reports having less than 10 transfusions in her lifetime, most of the transfusions she received were during prior pregnancies.   -Previously intolerant to Voxelotor   -She has had an excellent response with hydroxyurea with marked increase in hemoglobin F.   -Started L-glutamine 15 g BID 4/2022, in effort to reduce frequency of acute pain episodes  -restarted Crizanlizumab (adakveo) 3/16/23, tolerating well without complication.  Last given 5/1/25 5/25/25: Hb 7.8 g/dL. LDH/haptoglobin/reticulocyte count normal. No evidence of hemolysis at this time. O2 VSS stable on room air. CTA Chest with PNA -for which she is on acute antibiotics. Low suspicion for acute chest syndrome at this time. No indication for exchange transfusion.    Recommendations:  - Follow  up Hb electrophoresis (in process). 1 u PRBC simple transfusion ordered (give most compatible blood)  - daily CBC with diff  -Continue hydroxyurea to 2000 mg daily  -Continue Folvite 1 mg daily indefinitely  -pain control per primary team; Dilaudid PCA started 5/25  -incentive spirometry  -continuous IVF  -awaiting gene therapy outpatient    *macrocytic anemia  -d/t sickle cell anemia + hydroxyurea effect + B12 def  -Continue vitamin B-12 1000 mcg PO daily to be continued indefinitely  -continue folvite 1mg daily indefinitely  -Follow CBC    *PNA  -on ceftriaxone 1 g daily. Management per primary team      Thank you for including us in the care of this patient. We will follow along with you. Dr. Mccormick will return to round on Tues 5/27.      Sara Alston D.O.  [facility]

## 2025-05-25 NOTE — PLAN OF CARE
Received pt in her bed with pain coming all over her body. Alert and coherent. SBA to the bathroom. RA, vss. Alternating oxy, dilaudid and morphine for her pain d/t her BP is on the lower side. 1/2 NSS at 100gtts on her left AC. Type and screening was done RH positive blood was ordered at 10PM awaiting for the central blood supply outside source to be deliver. For BT d/t low rbc. HGB was 8.1. Heme to see in the morning at bedside. Abts given scheduled per mar. Sleeping upright position. Bed in lowest position, bed alarm on. Safety and prec maintained. Will cont poc.    Problem: PAIN - ADULT  Goal: Verbalizes/displays adequate comfort level or patient's stated pain goal  Description: INTERVENTIONS:  - Encourage pt to monitor pain and request assistance  - Assess pain using appropriate pain scale  - Administer analgesics based on type and severity of pain and evaluate response  - Implement non-pharmacological measures as appropriate and evaluate response  - Consider cultural and social influences on pain and pain management  - Manage/alleviate anxiety  - Utilize distraction and/or relaxation techniques  - Monitor for opioid side effects  - Notify MD/LIP if interventions unsuccessful or patient reports new pain  - Anticipate increased pain with activity and pre-medicate as appropriate  Outcome: Progressing

## 2025-05-25 NOTE — PLAN OF CARE
Assumed care at 0730  A/O x 4  RA  NSR on tele  VSS  Complains of severe pain related to sickle cell crisis. Pain mostly localized to bilateral knees and flank/back. PCA / continuous dilaudid gtt initiated per orders. See MAR for full details.  Up SBA. Fall risk with high dose dilaudid. Bed alarm on, pt instructed to call with needs.  Regular diet  1/2 NS infusing at 125 ml/hr  Continent x 2. No BM, declines miralax for now.  L AC PIV c/d/I  All needs met. Pt updated. Will continue with plan of care.    1230: BP 84/64. Dr. Triny chong. Order for 500 ml bolus received and given per MAR.    1330: BP 91/77 s/p 500 ml NS bolus. Ok to initiate PCA per Dr. Agosto. New PIV placed to L forearm. PCA with continuous dilaudid infusion infusing via L forearm 22G PIV at continuous dose of 0.4 mg/hr and PCA dose of 0.2 mg q10 mins with lockout of 1.8 mg / hr. Medication administration verified with RYLAND Mendoza.    1343: 1 unit pRBCs transfusion initiated. See flowsheets for full details.     Problem: PAIN - ADULT  Goal: Verbalizes/displays adequate comfort level or patient's stated pain goal  Description: INTERVENTIONS:  - Encourage pt to monitor pain and request assistance  - Assess pain using appropriate pain scale  - Administer analgesics based on type and severity of pain and evaluate response  - Implement non-pharmacological measures as appropriate and evaluate response  - Consider cultural and social influences on pain and pain management  - Manage/alleviate anxiety  - Utilize distraction and/or relaxation techniques  - Monitor for opioid side effects  - Notify MD/LIP if interventions unsuccessful or patient reports new pain  - Anticipate increased pain with activity and pre-medicate as appropriate  Outcome: Progressing     Problem: CARDIOVASCULAR - ADULT  Goal: Maintains optimal cardiac output and hemodynamic stability  Description: INTERVENTIONS:  - Monitor vital signs, rhythm, and trends  - Monitor for bleeding,  hypotension and signs of decreased cardiac output  - Evaluate effectiveness of vasoactive medications to optimize hemodynamic stability  - Monitor arterial and/or venous puncture sites for bleeding and/or hematoma  - Assess quality of pulses, skin color and temperature  - Assess for signs of decreased coronary artery perfusion - ex. Angina  - Evaluate fluid balance, assess for edema, trend weights  Outcome: Progressing  Goal: Absence of cardiac arrhythmias or at baseline  Description: INTERVENTIONS:  - Continuous cardiac monitoring, monitor vital signs, obtain 12 lead EKG if indicated  - Evaluate effectiveness of antiarrhythmic and heart rate control medications as ordered  - Initiate emergency measures for life threatening arrhythmias  - Monitor electrolytes and administer replacement therapy as ordered  Outcome: Progressing

## 2025-05-25 NOTE — PROGRESS NOTES
[facility]   part of [facility]     Hospitalist Progress Note     Oluwaseun Oluwadamilola Ogunyemi Patient Status:  Inpatient    8/3/1985 MRN [id number]   Location [facility] 3NE-A Attending Jeremi Denny, DO   Hosp Day # 3 PCP Clive St MD     Chief Complaint: I'm having a sickle crisis    Subjective:     Patient ongoing pain -8/10    Objective:    Review of Systems:   A comprehensive review of systems was completed; pertinent positive and negatives stated in subjective.    Vital signs:  Temp:  [97.6 °F (36.4 °C)-98.2 °F (36.8 °C)] 97.9 °F (36.6 °C)  Pulse:  [67-76] 72  Resp:  [10] 10  BP: ()/(51-78) 106/70  SpO2:  [97 %-98 %] 98 %    Physical Exam:    General: No acute distress  Respiratory: No wheezes, no rhonchi  Cardiovascular: S1, S2, regular rate and rhythm  Abdomen: Soft, Non-tender, non-distended, positive bowel sounds  Neuro: No new focal deficits.   Extremities: No edema      Diagnostic Data:    Labs:  Recent Labs   Lab 25  0359 25  0423 25  0722 25  0653   WBC 5.6 6.5 4.2 4.2   HGB 9.0* 8.6* 8.1* 7.8*   .6* 109.5* 108.7* 108.9*   .0 373.0 359.0 380.0       Recent Labs   Lab 25  0359 25  0423 25  1629 25  0722 25  1450 25  0653   GLU 98 124*  --  105*  --  104*   BUN 6* 5*  --  6*  --  8*   CREATSERUM 0.58 0.64  --  0.57  --  0.61   CA 9.3 8.9  --  9.1  --  8.8   ALB 4.4  --   --   --  4.3 3.9    140  --  140  --  139   K 3.9 3.6 4.4 3.8  --  3.7  3.7    106  --  106  --  105   CO2 29.0 25.0  --  25.0  --  25.0   ALKPHO 53  --   --   --  51 53   AST 28  --   --   --  46* 20   ALT 17  --   --   --  27 20   BILT 0.7  --   --   --  0.5 0.3   TP 8.8*  --   --   --  8.6* 7.9       Estimated Glomerular Filtration Rate: 116 mL/min/1.73m2 (result from lab).    Recent Labs   Lab 25  0423 25  0630 25  1450   TROPHS 49* 48* 44*       No results for input(s): \"PTP\", \"INR\" in the last  168 hours.               Microbiology    No results found for this visit on 05/22/25.      Imaging: Reviewed in Epic.    Medications: Scheduled Medications[1]    Assessment & Plan:      #Sickle cell pain crisis and anemia   H/o acute chest syndrome   -PTA Folic acid, Hydroxyurea- PCA -pump on 5/25   -IV Pain control and continue PTA regimen of MS contin and IR Oxycodone   - PRAVIN toradol added      #Chronically elevated troponin  -continue PTA ASA and Beta blocker      #Chronic Macrocytic anemia likely due to hydrea   -monitor Hb    #Abnormal CTA chest- suspicious for PNA  Continue IV CTX    Hanna Agosto MD    Supplementary Documentation:     Quality:  DVT Mechanical Prophylaxis:   SCDs, Early ambuation  DVT Pharmacologic Prophylaxis   Medication    enoxaparin (Lovenox) 40 MG/0.4ML SUBQ injection 40 mg         DVT Pharmacologic prophylaxis: Aspirin 81 mg      Code Status: Not on file  [place]: No urinary catheter in place  [place] Duration (in days):   Central line:    ABHAY:     Discharge is dependent on: improvement in pain  At this point Ms. Andrade is expected to be discharge to: home    The 21st Century Cures Act makes medical notes like these available to patients in the interest of transparency. Please be advised this is a medical document. Medical documents are intended to carry relevant information, facts as evident, and the clinical opinion of the practitioner. The medical note is intended as peer to peer communication and may appear blunt or direct. It is written in medical language and may contain abbreviations or verbiage that are unfamiliar.                         [1]    ketorolac  30 mg Intravenous Q6H    sodium chloride   Intravenous Once    cefTRIAXone  1 g Intravenous Q24H    aspirin  81 mg Oral Daily    folic acid  1 mg Oral Daily    hydroxyurea  2,000 mg Oral Daily    metoprolol succinate ER  25 mg Oral Daily Beta Blocker    morphINE ER  60 mg Oral Q8H PRAVIN    enoxaparin  40 mg Subcutaneous Daily

## 2025-05-26 LAB
ATRIAL RATE: 74 BPM
BASOPHILS # BLD AUTO: 0.01 X10(3) UL (ref 0–0.2)
BASOPHILS NFR BLD AUTO: 0.2 %
BLOOD TYPE BARCODE: 5100
EOSINOPHIL # BLD AUTO: 0.09 X10(3) UL (ref 0–0.7)
EOSINOPHIL NFR BLD AUTO: 2.1 %
ERYTHROCYTE [DISTWIDTH] IN BLOOD BY AUTOMATED COUNT: 21.2 %
HCT VFR BLD AUTO: 25.5 % (ref 35–48)
HGB BLD-MCNC: 9.3 G/DL (ref 12–16)
IMM GRANULOCYTES # BLD AUTO: 0.01 X10(3) UL (ref 0–1)
IMM GRANULOCYTES NFR BLD: 0.2 %
LYMPHOCYTES # BLD AUTO: 1.9 X10(3) UL (ref 1–4)
LYMPHOCYTES NFR BLD AUTO: 45.1 %
MCH RBC QN AUTO: 36.3 PG (ref 26–34)
MCHC RBC AUTO-ENTMCNC: 36.5 G/DL (ref 31–37)
MCV RBC AUTO: 99.6 FL (ref 80–100)
MONOCYTES # BLD AUTO: 0.37 X10(3) UL (ref 0.1–1)
MONOCYTES NFR BLD AUTO: 8.8 %
NEUTROPHILS # BLD AUTO: 1.83 X10 (3) UL (ref 1.5–7.7)
NEUTROPHILS # BLD AUTO: 1.83 X10(3) UL (ref 1.5–7.7)
NEUTROPHILS NFR BLD AUTO: 43.6 %
P AXIS: 5 DEGREES
P-R INTERVAL: 188 MS
PLATELET # BLD AUTO: 370 10(3)UL (ref 150–450)
POTASSIUM SERPL-SCNC: 3.9 MMOL/L (ref 3.5–5.1)
Q-T INTERVAL: 420 MS
QRS DURATION: 88 MS
QTC CALCULATION (BEZET): 466 MS
R AXIS: 46 DEGREES
RBC # BLD AUTO: 2.56 X10(6)UL (ref 3.8–5.3)
T AXIS: 66 DEGREES
UNIT VOLUME: 350 ML
VENTRICULAR RATE: 74 BPM
WBC # BLD AUTO: 4.2 X10(3) UL (ref 4–11)

## 2025-05-26 PROCEDURE — 99233 SBSQ HOSP IP/OBS HIGH 50: CPT | Performed by: INTERNAL MEDICINE

## 2025-05-26 PROCEDURE — 99232 SBSQ HOSP IP/OBS MODERATE 35: CPT | Performed by: STUDENT IN AN ORGANIZED HEALTH CARE EDUCATION/TRAINING PROGRAM

## 2025-05-26 NOTE — PROGRESS NOTES
[facility]   part of [facility]     Hospitalist Progress Note     Oluwaseun Oluwadamilola Ogunyemi Patient Status:  Inpatient    8/3/1985 MRN [id number]   Location [facility] 3NE-A Attending Jeremi Denny, DO   Hosp Day # 4 PCP Cl[address] MD     Chief Complaint: I'm having a sickle crisis    Subjective:     Patient reports pain better controlled 5/10    Objective:    Review of Systems:   A comprehensive review of systems was completed; pertinent positive and negatives stated in subjective.    Vital signs:  Temp:  [97.1 °F (36.2 °C)-98.5 °F (36.9 °C)] 98 °F (36.7 °C)  Pulse:  [63-76] 63  Resp:  [16-18] 16  BP: ()/(64-93) 129/93  SpO2:  [97 %-100 %] 98 %    Physical Exam:    General: No acute distress  Respiratory: No wheezes, no rhonchi  Cardiovascular: S1, S2, regular rate and rhythm  Abdomen: Soft, Non-tender, non-distended, positive bowel sounds  Neuro: No new focal deficits.   Extremities: No edema      Diagnostic Data:    Labs:  Recent Labs   Lab 25  0359 25  0423 25  0722 25  0653 25  1847 25  0638   WBC 5.6 6.5 4.2 4.2  --  4.2   HGB 9.0* 8.6* 8.1* 7.8* 10.2* 9.3*   .6* 109.5* 108.7* 108.9*  --  99.6   .0 373.0 359.0 380.0  --  370.0       Recent Labs   Lab 25  0359 25  0423 25  1629 25  0722 25  1450 25  0653 25  0638   GLU 98 124*  --  105*  --  104*  --    BUN 6* 5*  --  6*  --  8*  --    CREATSERUM 0.58 0.64  --  0.57  --  0.61  --    CA 9.3 8.9  --  9.1  --  8.8  --    ALB 4.4  --   --   --  4.3 3.9  --     140  --  140  --  139  --    K 3.9 3.6   < > 3.8  --  3.7  3.7 3.9    106  --  106  --  105  --    CO2 29.0 25.0  --  25.0  --  25.0  --    ALKPHO 53  --   --   --  51 53  --    AST 28  --   --   --  46* 20  --    ALT 17  --   --   --  27 20  --    BILT 0.7  --   --   --  0.5 0.3  --    TP 8.8*  --   --   --  8.6* 7.9  --     < > = values in this interval not displayed.        Estimated Glomerular Filtration Rate: 116 mL/min/1.73m2 (result from lab).    Recent Labs   Lab 05/23/25  0423 05/23/25  0630 05/24/25  1450   TROPHS 49* 48* 44*       No results for input(s): \"PTP\", \"INR\" in the last 168 hours.               Microbiology    No results found for this visit on 05/22/25.      Imaging: Reviewed in Epic.    Medications: Scheduled Medications[1]    Assessment & Plan:      #Sickle cell pain crisis and anemia   H/o acute chest syndrome   -PTA Folic acid, Hydroxyurea- PCA pump on 5/25 - plan to decrease on 5/27 and DC on 5/28 discussed w patient   -IV Pain control and continue PTA regimen of MS contin and IR Oxycodone   - PRAVIN toradol added      #Chronically elevated troponin  -continue PTA ASA and Beta blocker      #Chronic Macrocytic anemia likely due to hydrea   -monitor Hb    #Abnormal CTA chest- suspicious for PNA  Continue IV CTX x 7 days     Hanna Agosto MD    Supplementary Documentation:     Quality:  DVT Mechanical Prophylaxis:   SCDs, Early ambuation  DVT Pharmacologic Prophylaxis   Medication    enoxaparin (Lovenox) 40 MG/0.4ML SUBQ injection 40 mg         DVT Pharmacologic prophylaxis: Aspirin 81 mg      Code Status: Not on file  Gilmore: No urinary catheter in place  Gilmore Duration (in days):   Central line:    ABHAY:     Discharge is dependent on: improvement in pain  At this point Ms. Andrade is expected to be discharge to: home    The 21st Century Cures Act makes medical notes like these available to patients in the interest of transparency. Please be advised this is a medical document. Medical documents are intended to carry relevant information, facts as evident, and the clinical opinion of the practitioner. The medical note is intended as peer to peer communication and may appear blunt or direct. It is written in medical language and may contain abbreviations or verbiage that are unfamiliar.                         [1]    cyanocobalamin  1,000 mcg Oral Daily     cefTRIAXone  2 g Intravenous Q24H    sodium chloride   Intravenous Once    aspirin  81 mg Oral Daily    folic acid  1 mg Oral Daily    hydroxyurea  2,000 mg Oral Daily    metoprolol succinate ER  25 mg Oral Daily Beta Blocker    morphINE ER  60 mg Oral Q8H PRAVIN    enoxaparin  40 mg Subcutaneous Daily

## 2025-05-26 NOTE — PLAN OF CARE
[place] at [id number]  A/Ox4. RA. NSR on tele  PCA/Continuous Dilaudid drip infusing per MAR   1/2 NS infusing at 125ml/hr  Up SBA  Zofran given with relief   Safety precautions in place. All needs met at this time

## 2025-05-26 NOTE — PROGRESS NOTES
Hematology Oncology Progress Note    Patient Name: Oluwaseun Oluwadamilola Ogunyemi  Medical Record Number: [id number]   YOB: 1985    PCP: Clive St MD     Reason for Consultation:  Oluwaseun Oluwadamilola Ogunyemi was seen today for the diagnosis of hgb SS/sickle cell disease    Interval History  Patient seen and examined at bedside this morning.  Pain improved after starting Dilaudid PCA pump, but now returning (mostly in LLE and left knee).  Received 1 u PRBC 5/25. Hb improved from 7.8 to 9.3 g/dL.    Hematologic History:  *Sickle cell disease, hgb SS  -early 2000's moved from [facility] to   -2016/2017- 1st pregnancy c/b worsening pain crises  -2018- 2nd pregnancy c/b worsening pain crises, including episode of acute chest requiring RBC exchange.  -early 2019- started hydroxyurea, titrated up to 2000mg daily  -7/2020- started Voxelotor, but dc'd shortly after starting d/t poor tolerability with diarrhea, fatigue, elevated LFTs, was not very helpful either.   -5/28/21- 3/2022- received crizanlizumab (Adakveo) however c/b infusion reaction 3/2022 and was only slightly helpful therefore not continued  -10/2021- moved from [facility] (followed with Dr. Walton of [facility]) to [place].   -4/2022- started L-glutamine (Endari) 15g PO BID, cont'd hydrea  -8/2022- moved from [place] (prev followed by Dr. Esau Blount) to [place], [place]  -3/27/23- resumed crizanlizumab d/t more freq acute pain episodes   -5/8/23- reduced hydrea to 1500mg daily (d/t hgb 7.4, abs retic 47K)   -7/2/23- reduced hydrea to 1000mg daily (d/t hgb 7.0, abs retic 59K)   -12/27/23- increased Hydrea to 1500mg daily  -4/4/24- increased Hydrea to 2000mg daily    ================================  HPI: Ms. Ogunyemi is a pleasant 38 y/o F with Hb SS/sickle cell disease being treated by my colleague Dr. Esau Mccormick - who presented to ED with chest and bilateral leg pain, found to be in acute sickle cell crisis.      Hematology was consulted given history of acute chest syndrome.    She last saw Dr. Mccormick 5/1/25 and received crizanlizumab at that time.    This is her 4th hospitalization in the last 7 months for acute sickle cell crisis.     On this admission, CTA Chest suspicious for PNA and IV ceftriaxone was started- with which her respiration has significantly improved. For her sickle cell crisis, she she is being started on Dilaudid PCA pump 5/25. Her pain (bilateral knees) has not improved with IV pain meds thus far.    VSS O2 98% on room air. Today at bedside she explains that she is awaiting approval for gene therapy. She is compliant with daily hydrea and folic acid.     Hospitalizations and ED visits for acute vaso-occlusive pain episodes since moving from [place] to [place] in 8/2022:  -9/5/22- hospitalization  -9/19/22- ED  -9/20/22- hospitalization  10/8/22- hospitalization  -10/29/22- ED  -11/6/22- ED  -11/21/22- ED   -1/7/23- hospitalization  -4/22/23- hospitalization  -7/1/23- hospitalization  -10/7/24- hospitalization  -12/3/24- hospitalization  -1/9/25- hospitalization  -5/25/25- hospitalization    Past Medical History:  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 90 tablet 0    OxyCODONE HCl IR 30 MG Oral Tab Take 1 tablet (30 mg total) by mouth every 3 (three) hours as needed for Pain. 180 tablet 0    loperamide 2 MG Oral Cap Take 1 capsule (2 mg total) by mouth every 3 (three) hours as needed for Diarrhea. 30 capsule 0    diphenhydrAMINE 25 MG Oral Cap Take 1-2 capsules (25-50 mg total) by mouth every 8 (eight) hours as needed for Itching.      ASPIRIN LOW DOSE 81 MG Oral Tab EC TAKE 1 TABLET BY MOUTH EVERY DAY 90 tablet 3    ondansetron (ZOFRAN) 8 MG tablet Take 1 tablet (8 mg total) by mouth every 8 (eight) hours as needed for Nausea. 9 tablet 13    hydroxyurea 500 MG Oral Cap Take 4 capsules (2,000 mg total) by mouth daily. 360 capsule 3    folic acid 1 MG Oral Tab Take 1 tablet (1 mg total) by mouth daily. 90 tablet 3    Naloxone HCl 4 MG/0.1ML Nasal Liquid 4 mg by Nasal route as needed. If patient remains unresponsive, repeat dose in other nostril 2-5 minutes after first dose. 1 kit 0    metoprolol succinate ER 25 MG Oral Tablet 24 Hr Take 1 tablet (25 mg total) by mouth in the morning.      Cyanocobalamin (VITAMIN B-12 OR) Take 1 tablet by mouth in the morning.       Allergies:   Allergies   Allergen Reactions    Piperacillin Sod-Tazobactam So ITCHING     Generalized  Tolerated cephalosporins in the past       Psychosocial History:  Social History     Social History Narrative    , has 2 children ages 4 and 5 as of 2022.  Not employed.     Social History     Socioeconomic History    Marital status:    Tobacco Use    Smoking status: Never    Smokeless tobacco: Never   Vaping Use    Vaping status: Never Used   Substance and Sexual Activity    Alcohol use: Never     Comment: No history of excessive use    Drug use: Yes      Types: Hydromorphone, Oxycodone     Comment: Prescribed    Sexual activity: Yes     Partners: Male     Birth control/protection: Condom   Other Topics Concern    Caffeine Concern Yes     Comment: 5-10 a month    Exercise Yes     Comment: 1-2 times a week    Seat Belt Yes    Self-Exams Yes     Comment: self breast exam with showering   Social History Narrative    , has 2 children ages 4 and 5 as of 9/2022.  Not employed.     Social Drivers of Health     Food Insecurity: No Food Insecurity (5/22/2025)    NCSS - Food Insecurity     Worried About Running Out of Food in the Last Year: No     Ran Out of Food in the Last Year: No   Transportation Needs: No Transportation Needs (5/22/2025)    NCSS - Transportation     Lack of Transportation: No   Housing Stability: Not At Risk (5/22/2025)    NCSS - Housing/Utilities     Has Housing: Yes     Worried About Losing Housing: No     Unable to Get Utilities: No       Family Medical History:  Family History   Problem Relation Age of Onset    Hypertension Mother         unknown    Cataracts Mother     No Known Problems Father         was told cardiac arrest    No Known Problems Brother     Sickle Cell Maternal Aunt         passed from kidney failure    DVT/VTE Other     Breast Cancer Neg     Ovarian Cancer Neg     Colon Cancer Neg      Review of Systems:  A 10-point ROS was done with pertinent positives and negative per the HPI    Vital Signs:  Height: --  Weight: --  BSA (Calculated - sq m): --  Pulse: 71 (05/26 1200)  BP: 108/77 (05/26 1200)  Temp: 98 °F (36.7 °C) (05/26 1200)  Do Not Use - Resp Rate: --  SpO2: 97 % (05/26 1200)    Wt Readings from Last 6 Encounters:   05/22/25 81.2 kg (179 lb)   05/01/25 77.3 kg (170 lb 6.4 oz)   04/03/25 76.4 kg (168 lb 6.4 oz)   03/06/25 76.7 kg (169 lb)   02/06/25 74.8 kg (165 lb)   01/09/25 72.6 kg (160 lb 0.9 oz)     Physical Examination:  General: Patient is alert and oriented, in mild distress  Psych: Mood and affect are  appropriate  Eyes: EOMI  ENT: Oropharynx is clear  CV: Regular rate and rhythm, no murmurs, no LE edema  Respiratory: Lungs clear to auscultation bilaterally  GI/Abd: Soft, non-tender with normoactive bowel sounds, no hepatosplenomegaly  Neurological: Grossly intact   Lymphatics: No palpable lymphadenopathy  Skin: no rashes or petechiae    Laboratory:  Lab Results   Component Value Date    WBC 4.2 05/26/2025    WBC 4.2 05/25/2025    WBC 4.2 05/24/2025    HGB 9.3 (L) 05/26/2025    HGB 10.2 (L) 05/25/2025    HGB 7.8 (L) 05/25/2025    HCT 25.5 (L) 05/26/2025    MCV 99.6 05/26/2025    MCH 36.3 (H) 05/26/2025    MCHC 36.5 05/26/2025    RDW 21.2 05/26/2025    .0 05/26/2025    .0 05/25/2025    .0 05/24/2025     Lab Results   Component Value Date     (H) 05/25/2025    BUN 8 (L) 05/25/2025    CREATSERUM 0.61 05/25/2025    CREATSERUM 0.57 05/24/2025    CREATSERUM 0.64 05/23/2025    ANIONGAP 9 05/25/2025    CA 8.8 05/25/2025    OSMOCALC 287 05/25/2025    ALKPHO 53 05/25/2025    AST 20 05/25/2025    ALT 20 05/25/2025    BILT 0.3 05/25/2025    TP 7.9 05/25/2025    ALB 3.9 05/25/2025    GLOBULIN 4.0 (H) 05/25/2025     05/25/2025    K 3.9 05/26/2025     05/25/2025    CO2 25.0 05/25/2025     Lab Results   Component Value Date     05/25/2025     (H) 05/01/2025     02/06/2025     12/12/2024     10/17/2024     10/08/2024     07/25/2024     12/27/2023     07/12/2023     05/08/2023     03/27/2023     02/16/2023     12/22/2022     09/05/2022     No results found for: \"PTT\", \"PT\", \"INR\"    Lab Results   Component Value Date    RETICABSOL 69.7 05/25/2025    RETICABSOL 69.4 05/24/2025    RETICABSOL 78.1 05/22/2025    RETICABSOL 85.6 05/01/2025    RETICABSOL 61.7 02/06/2025    RETICABSOL 70.7 01/09/2025    RETICABSOL 61.1 12/12/2024    RETICABSOL 85.9 10/17/2024    RETICABSOL 90.3 10/08/2024    RETICABSOL  70.8 10/07/2024     Lab Results   Component Value Date    CORINNE 279.5 (H) 02/16/2023    CORINNE 395.5 (H) 09/05/2022     Lab Results   Component Value Date    SAT 28 02/16/2023    SAT 36 09/05/2022     Lab Results   Component Value Date    B12 1,792 (H) 07/12/2023    B12 182 (L) 05/08/2023     Lab Results   Component Value Date    MMA 95 05/08/2023     Component      Latest Ref Rng & Units 9/5/2022   HEMOGLOBIN A      95.5 - 100.0 % <1.0 (L)   HEMOGLOBIN A2      1.5 - 3.5 % 1.3 (L)   HEMOGLOBIN F (FETAL)      0.0 - 2.0 % 45.0 (H)   HEMOGLOBIN S      % 53.7   HGB Electophoresis Interp       Overall, 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 Although HgB F is often elevated in patients with sickle cell disease, such elevations do not commonly exceed 15% of the total hemoglobin. Possible considerations for the degree of HgB F elevation as seen in this patient may include therapy effect (Hydroxyurea) vs. hereditary persistence of fetal hemoglobin (HPFH).      Impression & Plan:  Ms. Andrade is a pleasant 38 y/o F with Hb SS/sickle cell disease being treated by my colleague Dr. Esau Mccormick - who presented to ED with chest and bilateral leg pain, found to be in acute sickle cell crisis.     *Hgb SS/Sickle cell disease   Acute Sickle Cell Crisis  -+hx of acute chest in 2018 requiring RBC exchange.  She reports having less than 10 transfusions in her lifetime, most of the transfusions she received were during prior pregnancies.   -Previously intolerant to Voxelotor   -She has had an excellent response with hydroxyurea with marked increase in hemoglobin F.   -Started L-glutamine 15 g BID 4/2022, in effort to reduce frequency of acute pain episodes  -restarted Crizanlizumab (adakveo) 3/16/23, tolerating well without complication.  Last given 5/1/25 5/25/25: Hb 7.8 g/dL. LDH/haptoglobin/reticulocyte count normal. No evidence of hemolysis at this time. O2 VSS stable on  room air. CTA Chest with PNA -for which she is on acute antibiotics. Low suspicion for acute chest syndrome at this time. No indication for exchange transfusion.    5/25/25: 1 u PRBC given. Hb 7.8 --> 9.3 g/dL.     Recommendations:  - Follow up Hb electrophoresis (in process).  - daily CBC with diff. No further PRBC transfusion indicated at this time  -pain control per primary team; Dilaudid PCA started 5/25  -Continue hydroxyurea to 2000 mg daily  -Continue Folvite 1 mg daily indefinitely  -incentive spirometry  -continuous IVF  -awaiting gene therapy outpatient    *macrocytic anemia  -d/t sickle cell anemia + hydroxyurea effect + B12 def  -Continue vitamin B-12 1000 mcg PO daily to be continued indefinitely  -continue folvite 1mg daily indefinitely  -Follow CBC    *PNA  -on ceftriaxone 1 g daily. Feels respiratory symptoms have improved. Management per primary team      Thank you for including us in the care of this patient.  Dr. Mccormick will return to round tomorrow.      Sara Alston D.O.  [facility]

## 2025-05-26 NOTE — PLAN OF CARE
Received pt in her bed. Pain scale of 5/10. Pca pump with dilaudid is now running continuous. .45 Nss running to her LAC at 125. PCA dose of 1.8mg lockout. Cont dose of 0.4mg and 0.2mg [id number]. HGB - 10.2. VSS, ra. SBA to the bathroom. All safety prec maintained. Heme to see this morning. Will cont poc    Problem: PAIN - ADULT  Goal: Verbalizes/displays adequate comfort level or patient's stated pain goal  Description: INTERVENTIONS:  - Encourage pt to monitor pain and request assistance  - Assess pain using appropriate pain scale  - Administer analgesics based on type and severity of pain and evaluate response  - Implement non-pharmacological measures as appropriate and evaluate response  - Consider cultural and social influences on pain and pain management  - Manage/alleviate anxiety  - Utilize distraction and/or relaxation techniques  - Monitor for opioid side effects  - Notify MD/LIP if interventions unsuccessful or patient reports new pain  - Anticipate increased pain with activity and pre-medicate as appropriate  Outcome: Progressing     Problem: CARDIOVASCULAR - ADULT  Goal: Maintains optimal cardiac output and hemodynamic stability  Description: INTERVENTIONS:  - Monitor vital signs, rhythm, and trends  - Monitor for bleeding, hypotension and signs of decreased cardiac output  - Evaluate effectiveness of vasoactive medications to optimize hemodynamic stability  - Monitor arterial and/or venous puncture sites for bleeding and/or hematoma  - Assess quality of pulses, skin color and temperature  - Assess for signs of decreased coronary artery perfusion - ex. Angina  - Evaluate fluid balance, assess for edema, trend weights  Outcome: Progressing  Goal: Absence of cardiac arrhythmias or at baseline  Description: INTERVENTIONS:  - Continuous cardiac monitoring, monitor vital signs, obtain 12 lead EKG if indicated  - Evaluate effectiveness of antiarrhythmic and heart rate control medications as ordered  -  Initiate emergency measures for life threatening arrhythmias  - Monitor electrolytes and administer replacement therapy as ordered  Outcome: Progressing

## 2025-05-27 LAB
BASOPHILS # BLD AUTO: 0.01 X10(3) UL (ref 0–0.2)
BASOPHILS NFR BLD AUTO: 0.2 %
EOSINOPHIL # BLD AUTO: 0.07 X10(3) UL (ref 0–0.7)
EOSINOPHIL NFR BLD AUTO: 1.7 %
ERYTHROCYTE [DISTWIDTH] IN BLOOD BY AUTOMATED COUNT: 20.7 %
HCT VFR BLD AUTO: 26.4 % (ref 35–48)
HGB BLD-MCNC: 9.5 G/DL (ref 12–16)
IMM GRANULOCYTES # BLD AUTO: 0.02 X10(3) UL (ref 0–1)
IMM GRANULOCYTES NFR BLD: 0.5 %
LYMPHOCYTES # BLD AUTO: 1.47 X10(3) UL (ref 1–4)
LYMPHOCYTES NFR BLD AUTO: 36 %
MCH RBC QN AUTO: 36 PG (ref 26–34)
MCHC RBC AUTO-ENTMCNC: 36 G/DL (ref 31–37)
MCV RBC AUTO: 100 FL (ref 80–100)
MONOCYTES # BLD AUTO: 0.48 X10(3) UL (ref 0.1–1)
MONOCYTES NFR BLD AUTO: 11.8 %
NEUTROPHILS # BLD AUTO: 2.03 X10 (3) UL (ref 1.5–7.7)
NEUTROPHILS # BLD AUTO: 2.03 X10(3) UL (ref 1.5–7.7)
NEUTROPHILS NFR BLD AUTO: 49.8 %
PLATELET # BLD AUTO: 389 10(3)UL (ref 150–450)
RBC # BLD AUTO: 2.64 X10(6)UL (ref 3.8–5.3)
WBC # BLD AUTO: 4.1 X10(3) UL (ref 4–11)

## 2025-05-27 PROCEDURE — 99232 SBSQ HOSP IP/OBS MODERATE 35: CPT | Performed by: STUDENT IN AN ORGANIZED HEALTH CARE EDUCATION/TRAINING PROGRAM

## 2025-05-27 PROCEDURE — 99232 SBSQ HOSP IP/OBS MODERATE 35: CPT | Performed by: INTERNAL MEDICINE

## 2025-05-27 NOTE — PLAN OF CARE
[place]umed care at [id number]  A/Ox4. RA. NSR on tele  PCA Dilaudid infusing per MAR   1/2 NS infusing at 125ml/hr  Up SBA  Oral Morphine q8   IV Rocephin q24  Safety precautions in place. All needs met at this time

## 2025-05-27 NOTE — PLAN OF CARE
End of shift note:   Pt A&Ox4, reports pain is controlled on PCA. On tele, SR, room air-02 sat wnl. I.S at bedside- encouraged use. Eating and drinking w/ no issues. Up sba to bathroom. Still receiving IV ABX and IV fluids. Safety precautions in place. Updated pt on poc, pt verbalized understanding.     Problem: PAIN - ADULT  Goal: Verbalizes/displays adequate comfort level or patient's stated pain goal  Description: INTERVENTIONS:  - Encourage pt to monitor pain and request assistance  - Assess pain using appropriate pain scale  - Administer analgesics based on type and severity of pain and evaluate response  - Implement non-pharmacological measures as appropriate and evaluate response  - Consider cultural and social influences on pain and pain management  - Manage/alleviate anxiety  - Utilize distraction and/or relaxation techniques  - Monitor for opioid side effects  - Notify MD/LIP if interventions unsuccessful or patient reports new pain  - Anticipate increased pain with activity and pre-medicate as appropriate  Outcome: Progressing

## 2025-05-27 NOTE — PROGRESS NOTES
Hematology/Oncology Progress Note    Patient Name: Oluwaseun Oluwadamilola Ogunyemi  Medical Record Number: [id number]    YOB: 1985     Reason for Consultation:  Oluwaseun Oluwadamilola Ogunyemi was seen today for the diagnosis of sickle cell acute pain episode    Interval events: States that she requires multiple demand doses before she gets relief but overall pain is still better controlled.  She states that prior to her current pain crisis that started about a week ago the MS Contin 60 mg 3 times daily was adequate at controlling chronic pain.  Pain is involving her legs and lower back.  No chest pain at this time.  No increased dyspnea.    Inpatient Meds:   cyanocobalamin  1,000 mcg Oral Daily    cefTRIAXone  2 g Intravenous Q24H    sodium chloride   Intravenous Once    aspirin  81 mg Oral Daily    folic acid  1 mg Oral Daily    hydroxyurea  2,000 mg Oral Daily    metoprolol succinate ER  25 mg Oral Daily Beta Blocker    morphINE ER  60 mg Oral Q8H PRAVIN    enoxaparin  40 mg Subcutaneous Daily      HYDROmorphone in sodium chloride 0.9% 0 mL/hr at 05/27/25 0730    sodium chloride 10 mL/hr at 05/25/25 1355    sodium chloride 125 mL/hr at 05/27/25 0559     PRN Meds:    naloxone    HYDROmorphone    ondansetron    metoclopramide    ipratropium-albuterol    diphenhydrAMINE    OxyCODONE HCl IR    acetaminophen    temazepam  Allergies:   Allergies   Allergen Reactions    Piperacillin Sod-Tazobactam So ITCHING     Generalized  Tolerated cephalosporins in the past       Vital Signs:  Height: --  Weight: --  BSA (Calculated - sq m): --  Pulse: 66 (05/27 0545)  BP: 117/88 (05/27 0545)  Temp: 98 °F (36.7 °C) (05/27 0408)  Do Not Use - Resp Rate: --  SpO2: 99 % (05/27 0545)  Wt Readings from Last 6 Encounters:   05/22/25 81.2 kg (179 lb)   05/01/25 77.3 kg (170 lb 6.4 oz)   04/03/25 76.4 kg (168 lb 6.4 oz)   03/06/25 76.7 kg (169 lb)   02/06/25 74.8 kg (165 lb)   01/09/25 72.6 kg (160 lb 0.9 oz)     Physical  Examination:  General: Patient is alert and oriented  CV: Regular rate and rhythm, no murmurs, no LE edema  Respiratory: Lungs clear to auscultation bilaterally  GI/Abd: Soft, non-tender   Skin: no rashes or petechiae    Laboratory:  Recent Labs   Lab 05/25/25  0653 05/25/25  1847 05/26/25  0638 05/27/25  0630   WBC 4.2  --  4.2 4.1   HGB 7.8* 10.2* 9.3* 9.5*   HCT 22.0*  --  25.5* 26.4*   .0  --  370.0 389.0   .9*  --  99.6 100.0   RDW 14.7  --  21.2 20.7   NEPRELIM 2.19  --  1.83 2.03     Recent Labs   Lab 05/22/25  0359 05/23/25  0423 05/23/25  1629 05/24/25  0722 05/24/25  1450 05/25/25  0653 05/26/25  0638    140  --  140  --  139  --    K 3.9 3.6   < > 3.8  --  3.7  3.7 3.9    106  --  106  --  105  --    CO2 29.0 25.0  --  25.0  --  25.0  --    BUN 6* 5*  --  6*  --  8*  --    CREATSERUM 0.58 0.64  --  0.57  --  0.61  --    GLU 98 124*  --  105*  --  104*  --    CA 9.3 8.9  --  9.1  --  8.8  --    TP 8.8*  --   --   --  8.6* 7.9  --    ALB 4.4  --   --   --  4.3 3.9  --    ALKPHO 53  --   --   --  51 53  --    AST 28  --   --   --  46* 20  --    ALT 17  --   --   --  27 20  --    BILT 0.7  --   --   --  0.5 0.3  --     < > = values in this interval not displayed.     No results for input(s): \"PT\", \"INR\", \"PTT\" in the last 168 hours.    Impression & Plan:     *Hgb SS/Sickle cell disease with acute pain crisis  -Pain is better controlled overall. Home MS contin dose was adequate prior to pain crisis.  Will stop continuous dosing on PCA and increase demand dosing.   Pain is adequately controlled tomorrow will order PO morphine 30-45mg IR q3hrs prn to start transitioning to orals as able.  -continue home MS Contin 60 mg q8hrs.   -Continue hydroxyurea to 2000 mg daily.  Hold endari and crizanlizumab while hospitalized.   -continue Folvite 1 mg daily.    -supp O2  -IVF   -DVT prophy    *PNA  -no longer with chest pain.  Imaging suggestive of PNA, improving on ceftriaxone, recommend  continuing this.   -not classic acute chest, received 1 unit PRBCs, no need for exchange transfusion at this time.     Esau Mccormick MD  Hematology/Medical Oncology  [facility]

## 2025-05-27 NOTE — PAYOR COMM NOTE
--------------  5/24- 27 CONTINUED STAY REVIEW    Payor: [facility] OUT OF STATE PPO  Subscriber #:  [id number]  Authorization Number: [id number]    5/24:     HOSPITALIST:    Patient pain stays at 8/10- does not want PCA pump. Notes breathing has improved slightly.       Vital signs:  Temp:  [97.5 °F (36.4 °C)-98.3 °F (36.8 °C)] 98 °F (36.7 °C)  Pulse:  [81-86] 86  Resp:  [12-19] 12  BP: ()/(55-77) 97/55  SpO2:  [94 %-98 %] 96 %     Physical Exam:    General: No acute distress  Respiratory: No wheezes, no rhonchi  Cardiovascular: S1, S2, regular rate and rhythm  Abdomen: Soft, Non-tender, non-distended, positive bowel sounds  Neuro: No new focal deficits.   Extremities: No edema     Lab 05/22/25  0359 05/23/25  0423 05/24/25  0722   WBC 5.6 6.5 4.2   HGB 9.0* 8.6* 8.1*   .6* 109.5* 108.7*   .0 373.0 359.0      Lab 05/22/25  0359 05/23/25  0423 05/23/25  1629 05/24/25  0722   GLU 98 124*  --  105*   BUN 6* 5*  --  6*   CREATSERUM 0.58 0.64  --  0.57   CA 9.3 8.9  --  9.1   ALB 4.4  --   --   --     140  --  140   K 3.9 3.6 4.4 3.8    106  --  106   CO2 29.0 25.0  --  25.0   ALKPHO 53  --   --   --    AST 28  --   --   --    ALT 17  --   --   --    BILT 0.7  --   --   --    TP 8.8*  --   --   --        Medications: [Scheduled Medications]    [Scheduled Medications]   ketorolac  30 mg Intravenous Q6H    cefTRIAXone  1 g Intravenous Q24H    aspirin  81 mg Oral Daily    folic acid  1 mg Oral Daily    hydroxyurea  2,000 mg Oral Daily    metoprolol succinate ER  25 mg Oral Daily Beta Blocker    morphINE ER  60 mg Oral Q8H PRAVIN    enoxaparin  40 mg Subcutaneous Daily        Assessment & Plan:  #Sickle cell pain crisis and anemia   H/o acute chest syndrome   -PTA Folic acid, Hydroxyurea  -cont IVF- changed to 0.45 NS per Dr Mccormick OP note   -IV Pain control and continue PTA regimen of MS contin and IR Oxycodone   - [facility] toradol added      #Chronically elevated troponin  -continue PTA ASA and Beta  blocker      #Chronic Macrocytic anemia likely due to hydrea   -monitor Hb    #Abnormal CTA chest- suspicious for PNA  Continue IV CTX        NURSING:  Pt is A&Ox4, following commands. Lethargic.  Pt on room air, VSS. Bps running on low end, MD aware. Hold Dilaudid for SBP < 90 mmHg.  NSR  Pt reports sever pain behind knees, lower back, R side chest. Frequent pain meds given per MAR  low appetite.  R AC 0.45 at 100 mL/hr.    5/25:    HOSPITALIST:  Patient ongoing pain 7-8/10      Vital signs:  Temp:  [97.6 °F (36.4 °C)-98.2 °F (36.8 °C)] 97.9 °F (36.6 °C)  Pulse:  [67-76] 72  Resp:  [10] 10  BP: ()/(51-78) 106/70  SpO2:  [97 %-98 %] 98 %     Physical Exam:    General: No acute distress  Respiratory: No wheezes, no rhonchi  Cardiovascular: S1, S2, regular rate and rhythm  Abdomen: Soft, Non-tender, non-distended, positive bowel sounds  Neuro: No new focal deficits.   Extremities: No edema     Lab 05/22/25  0359 05/23/25  0423 05/24/25  0722 05/25/25  0653   WBC 5.6 6.5 4.2 4.2   HGB 9.0* 8.6* 8.1* 7.8*   .6* 109.5* 108.7* 108.9*   .0 373.0 359.0 380.0      Lab 05/22/25  0359 05/23/25  0423 05/23/25  1629 05/24/25  0722 05/24/25  1450 05/25/25  0653   GLU 98 124*  --  105*  --  104*   BUN 6* 5*  --  6*  --  8*   CREATSERUM 0.58 0.64  --  0.57  --  0.61   CA 9.3 8.9  --  9.1  --  8.8   ALB 4.4  --   --   --  4.3 3.9    140  --  140  --  139   K 3.9 3.6 4.4 3.8  --  3.7  3.7    106  --  106  --  105   CO2 29.0 25.0  --  25.0  --  25.0   ALKPHO 53  --   --   --  51 53   AST 28  --   --   --  46* 20   ALT 17  --   --   --  27 20   BILT 0.7  --   --   --  0.5 0.3   TP 8.8*  --   --   --  8.6* 7.9        Medications: [Scheduled Medications]    [Scheduled Medications]   ketorolac  30 mg Intravenous Q6H    sodium chloride   Intravenous Once    cefTRIAXone  1 g Intravenous Q24H    aspirin  81 mg Oral Daily    folic acid  1 mg Oral Daily    hydroxyurea  2,000 mg Oral Daily    metoprolol  succinate ER  25 mg Oral Daily Beta Blocker    morphINE ER  60 mg Oral Q8H PRAVIN    enoxaparin  40 mg Subcutaneous Daily        Assessment & Plan:  #Sickle cell pain crisis and anemia   H/o acute chest syndrome   -PTA Folic acid, Hydroxyurea- PCA -pump on 5/25   -IV Pain control and continue PTA regimen of MS contin and IR Oxycodone   - PRAVIN toradol added      #Chronically elevated troponin  -continue PTA ASA and Beta blocker      #Chronic Macrocytic anemia likely due to hydrea   -monitor Hb    #Abnormal CTA chest- suspicious for PNA  Continue IV CTX     NURSING:    Complains of severe pain related to sickle cell crisis. Pain mostly localized to bilateral knees and flank/back. PCA / continuous dilaudid gtt initiated per orders. See MAR for full details.    1/2 NS infusing at 125 ml/hr     1230: BP 84/64. Dr. Triny chong. Order for 500 ml bolus received and given per MAR.     1330: BP 91/77 s/p 500 ml NS bolus. Ok to initiate PCA per Dr. Agosto. New PIV placed to L forearm. PCA with continuous dilaudid infusion infusing via L forearm 22G PIV at continuous dose of 0.4 mg/hr and PCA dose of 0.2 mg q10 mins with lockout of 1.8 mg / hr. Medication administration verified with RYLAND Mendoza.     1343: 1 unit pRBCs transfusion initiated.       HEME/ONC:    HPI: Ms. Andrade is a pleasant 40 y/o F with Hb SS/sickle cell disease being treated by my colleague Dr. Esau Mccormick - who presented to ED with chest and bilateral leg pain, found to be in acute sickle cell crisis.      Hematology was consulted given history of acute chest syndrome.     She last saw Dr. Mccormick 5/1/25 and received crizanlizumab at that time.     This is her 4th hospitalization in the last 7 months for acute sickle cell crisis.      On this admission, CTA Chest suspicious for PNA and IV ceftriaxone was started- with which her respiration has significantly improved. For her sickle cell crisis, she she is being started on Dilaudid PCA pump 5/25. Her pain  (bilateral knees) has not improved with IV pain meds thus far.    VSS O2 98% on room air. Today at bedside she explains that she is awaiting approval for gene therapy. She is compliant with daily hydrea and folic acid.       Impression & Plan:  Ms. Andrade is a pleasant 38 y/o F with Hb SS/sickle cell disease being treated by my colleague Dr. Esau Mccormick - who presented to ED with chest and bilateral leg pain, found to be in acute sickle cell crisis.      *Hgb SS/Sickle cell disease   Acute Sickle Cell Crisis  -+hx of acute chest in 2018 requiring RBC exchange.  She reports having less than 10 transfusions in her lifetime, most of the transfusions she received were during prior pregnancies.   -Previously intolerant to Voxelotor   -She has had an excellent response with hydroxyurea with marked increase in hemoglobin F.   -Started L-glutamine 15 g BID 4/2022, in effort to reduce frequency of acute pain episodes  -restarted Crizanlizumab (adakveo) 3/16/23, tolerating well without complication.  Last given 5/1/25 5/25/25: Hb 7.8 g/dL. LDH/haptoglobin/reticulocyte count normal. No evidence of hemolysis at this time. O2 VSS stable on room air. CTA Chest with PNA -for which she is on acute antibiotics. Low suspicion for acute chest syndrome at this time. No indication for exchange transfusion.     Recommendations:  - Follow up Hb electrophoresis (in process). 1 u PRBC simple transfusion ordered (give most compatible blood)  - daily CBC with diff  -Continue hydroxyurea to 2000 mg daily  -Continue Folvite 1 mg daily indefinitely  -pain control per primary team; Dilaudid PCA started 5/25  -incentive spirometry  -continuous IVF  -awaiting gene therapy outpatient     *macrocytic anemia  -d/t sickle cell anemia + hydroxyurea effect + B12 def  -Continue vitamin B-12 1000 mcg PO daily to be continued indefinitely  -continue folvite 1mg daily indefinitely  -Follow CBC     *PNA  -on ceftriaxone 1 g daily. Management per  primary team       5/26:    NURSING:    PCA/Continuous Dilaudid drip infusing    1/2 NS infusing at 125ml/hr  Zofran given with relief    HOSPITALIST:    Patient reports pain better controlled 5/10      Vital signs:  Temp:  [97.1 °F (36.2 °C)-98.5 °F (36.9 °C)] 98 °F (36.7 °C)  Pulse:  [63-76] 63  Resp:  [16-18] 16  BP: ()/(64-93) 129/93  SpO2:  [97 %-100 %] 98 %     Physical Exam:    General: No acute distress  Respiratory: No wheezes, no rhonchi  Cardiovascular: S1, S2, regular rate and rhythm  Abdomen: Soft, Non-tender, non-distended, positive bowel sounds  Neuro: No new focal deficits.   Extremities: No edema     Lab 05/22/25  0359 05/23/25  0423 05/24/25  0722 05/25/25  0653 05/25/25  1847 05/26/25  0638   WBC 5.6 6.5 4.2 4.2  --  4.2   HGB 9.0* 8.6* 8.1* 7.8* 10.2* 9.3*   .6* 109.5* 108.7* 108.9*  --  99.6   .0 373.0 359.0 380.0  --  370.0      Lab 05/22/25  0359 05/23/25  0423 05/23/25  1629 05/24/25  0722 05/24/25  1450 05/25/25  0653 05/26/25  0638   GLU 98 124*  --  105*  --  104*  --    BUN 6* 5*  --  6*  --  8*  --    CREATSERUM 0.58 0.64  --  0.57  --  0.61  --    CA 9.3 8.9  --  9.1  --  8.8  --    ALB 4.4  --   --   --  4.3 3.9  --     140  --  140  --  139  --    K 3.9 3.6   < > 3.8  --  3.7  3.7 3.9    106  --  106  --  105  --    CO2 29.0 25.0  --  25.0  --  25.0  --    ALKPHO 53  --   --   --  51 53  --    AST 28  --   --   --  46* 20  --    ALT 17  --   --   --  27 20  --    BILT 0.7  --   --   --  0.5 0.3  --    TP 8.8*  --   --   --  8.6* 7.9  --       Medications: [Scheduled Medications]    [Scheduled Medications]   cyanocobalamin  1,000 mcg Oral Daily    cefTRIAXone  2 g Intravenous Q24H    sodium chloride   Intravenous Once    aspirin  81 mg Oral Daily    folic acid  1 mg Oral Daily    hydroxyurea  2,000 mg Oral Daily    metoprolol succinate ER  25 mg Oral Daily Beta Blocker    morphINE ER  60 mg Oral Q8H PRAVIN    enoxaparin  40 mg Subcutaneous Daily      1/2  NS infusing at 125ml/hr     Assessment & Plan:  #Sickle cell pain crisis and anemia   H/o acute chest syndrome   -PTA Folic acid, Hydroxyurea- PCA pump on 5/25 - plan to decrease on 5/27 and DC on 5/28 discussed w patient   -IV Pain control and continue PTA regimen of MS contin and IR Oxycodone   - PRAVIN toradol added      #Chronically elevated troponin  -continue PTA ASA and Beta blocker      #Chronic Macrocytic anemia likely due to hydrea   -monitor Hb    #Abnormal CTA chest- suspicious for PNA  Continue IV CTX x 7 days      HEME/ONC:    Pain improved after starting Dilaudid PCA pump, but now returning (mostly in LLE and left knee).  Received 1 u PRBC 5/25. Hb improved from 7.8 to 9.3 g/dL.     Recommendations:  - Follow up Hb electrophoresis (in process).  - daily CBC with diff. No further PRBC transfusion indicated at this time  -pain control per primary team; Dilaudid PCA started 5/25  -Continue hydroxyurea to 2000 mg daily  -Continue Folvite 1 mg daily indefinitely  -incentive spirometry  -continuous IVF  -awaiting gene therapy outpatient     *macrocytic anemia  -d/t sickle cell anemia + hydroxyurea effect + B12 def  -Continue vitamin B-12 1000 mcg PO daily to be continued indefinitely  -continue folvite 1mg daily indefinitely  -Follow CBC     *PNA  -on ceftriaxone 1 g daily. Feels respiratory symptoms have improved. Management per primary team      5/27:    HEME/ONC:    Interval events: States that she requires multiple demand doses before she gets relief but overall pain is still better controlled.  She states that prior to her current pain crisis that started about a week ago the MS Contin 60 mg 3 times daily was adequate at controlling chronic pain.  Pain is involving her legs and lower back.  No chest pain at this time.  No increased dyspnea.     Inpatient Meds:  Scheduled Medications    cyanocobalamin  1,000 mcg Oral Daily    cefTRIAXone  2 g Intravenous Q24H    sodium chloride   Intravenous Once     aspirin  81 mg Oral Daily    folic acid  1 mg Oral Daily    hydroxyurea  2,000 mg Oral Daily    metoprolol succinate ER  25 mg Oral Daily Beta Blocker    morphINE ER  60 mg Oral Q8H PRAVIN    enoxaparin  40 mg Subcutaneous Daily         Medication Infusions    HYDROmorphone in sodium chloride 0.9% 0 mL/hr at 05/27/25 0730    sodium chloride 10 mL/hr at 05/25/25 1355    sodium chloride 125 mL/hr at 05/27/25 0559        Vital signs:  Temp:  [98 °F (36.7 °C)-98.5 °F (36.9 °C)] 98.5 °F (36.9 °C)  Pulse:  [55-72] 55  Resp:  [16-18] 18  BP: (103-125)/(64-98) 125/98  SpO2:  [96 %-99 %] 98 %    Physical Examination:  General: Patient is alert and oriented  CV: Regular rate and rhythm, no murmurs, no LE edema  Respiratory: Lungs clear to auscultation bilaterally  GI/Abd: Soft, non-tender   Skin: no rashes or petechiae     Lab 05/25/25  0653 05/25/25  1847 05/26/25  0638 05/27/25  0630   WBC 4.2  --  4.2 4.1   HGB 7.8* 10.2* 9.3* 9.5*   HCT 22.0*  --  25.5* 26.4*   .0  --  370.0 389.0   .9*  --  99.6 100.0   RDW 14.7  --  21.2 20.7   NEPRELIM 2.19  --  1.83 2.03        Impression & Plan:      *Hgb SS/Sickle cell disease with acute pain crisis  -Pain is better controlled overall. Home MS contin dose was adequate prior to pain crisis.  Will stop continuous dosing on PCA and increase demand dosing.   Pain is adequately controlled tomorrow will order PO morphine 30-45mg IR q3hrs prn to start transitioning to orals as able.  -continue home MS Contin 60 mg q8hrs.   -Continue hydroxyurea to 2000 mg daily.  Hold endari and crizanlizumab while hospitalized.   -continue Folvite 1 mg daily.    -supp O2  -IVF   -DVT prophy     *PNA  -no longer with chest pain.  Imaging suggestive of PNA, improving on ceftriaxone, recommend continuing this.   -not classic acute chest, received 1 unit PRBCs, no need for exchange transfusion at this time.       HOSPITALIST:    Assessment & Plan:  #Sickle cell pain crisis and anemia   H/o acute  chest syndrome   -PTA Folic acid, Hydroxyurea- PCA pump on 5/25 - plan to decrease on 5/27 and DC on 5/28 discussed w patient -   -IV Pain control and continue PTA regimen of MS contin and IR Oxycodone   - PRAVIN toradol      #Chronically elevated troponin  -continue PTA ASA and Beta blocker      #Chronic Macrocytic anemia likely due to hydrea   -monitor Hb    #Abnormal CTA chest- suspicious for PNA  Continue IV CTX x 7 days      MEDICATIONS ADMINISTERED IN LAST 1 DAY:  aspirin DR tab 81 mg       Date Action Dose Route User    5/27/2025 0742 Given 81 mg Oral Nisha Nicolas RN          cefTRIAXone (Rocephin) 2 g in sodium chloride 0.9% 100 mL IVPB-ADDV       Date Action Dose Route User    5/26/2025 2349 New Bag 2 g Intravenous Consuelo Delacruz RN          folic acid (Folvite) tab 1 mg       Date Action Dose Route User    5/27/2025 0742 Given 1 mg Oral Nisha Nicolas RN          HYDROmorphone in sodium chloride 0.9% (Dilaudid) 20 mg/100mL PCA premix       Date Action Dose Route User    5/26/2025 1709 New Bag 20 mg Intravenous Nisha Nicolas RN          HYDROmorphone in sodium chloride 0.9% (Dilaudid) 20 mg/100mL PCA premix       Date Action Dose Route User    5/27/2025 0730 New Bag (none) Intravenous Nisha Nicolas RN          HYDROmorphone 0.4 mg BOLUS FROM PCA       Date Action Dose Route User    5/27/2025 0844 Bolus from Bag 0.4 mg Intravenous Nisha Nicolas RN          hydroxyurea (Hydrea) cap 2,000 mg       Date Action Dose Route User    5/27/2025 0742 Given 2,000 mg Oral Nisha Nicolas RN          morphINE ER (MS Contin) tab 60 mg       Date Action Dose Route User    5/27/2025 1348 Given 60 mg Oral Nisha Nicolas RN    5/27/2025 0546 Given 60 mg Oral Consuelo Delacruz RN    5/26/2025 2204 Given 60 mg Oral Consuelo Delacruz RN          sodium chloride 0.45% infusion       Date Action Dose Route User    5/27/2025 1348 New Bag (none) Intravenous Nisha Nicolas RN    5/27/2025 0559 New Bag (none) Intravenous Consuelo Delacruz,  RN    5/27/2025 0033 Restarted (none) Intravenous Consuelo Delacruz, RYLAND    5/26/2025 2219 New Bag (none) Intravenous Consuelo Delacruz, RYLAND          cyanocobalamin (Vitamin B12) tab 1,000 mcg       Date Action Dose Route User    5/27/2025 0742 Given 1,000 mcg Oral Nisha Nicolas RN            Vitals (last day)       Date/Time Temp Pulse Resp BP SpO2 Weight O2 Device O2 Flow Rate (L/min) [facility]    05/27/25 1145 98.2 °F (36.8 °C) 57 18 115/66 97 % -- None (Room air) 0 L/min IB    05/27/25 1130 -- 63 -- -- 95 % -- -- -- IB    05/27/25 0800 -- -- -- -- -- -- None (Room air) -- IB    05/27/25 0800 98.5 °F (36.9 °C) 55 18 125/98 98 % -- -- -- ST    05/27/25 0545 -- 66 -- 117/88 99 % -- -- --     05/27/25 0408 98 °F (36.7 °C) 64 18 110/66 96 % -- None (Room air) -- RF    05/27/25 0000 98.2 °F (36.8 °C) 66 16 103/64 98 % -- None (Room air) -- MF    05/26/25 1903 98.3 °F (36.8 °C) 72 16 123/84 99 % -- None (Room air) -- RF    05/26/25 1530 98 °F (36.7 °C) 70 16 104/81 98 % -- None (Room air) -- IB    05/26/25 1200 98 °F (36.7 °C) 71 16 108/77 97 % -- None (Room air) -- IB    05/26/25 0830 98 °F (36.7 °C) 63 16 129/93 98 % -- None (Room air) 0 L/min IB    05/26/25 0400 98 °F (36.7 °C) 64 -- 126/89 98 % -- None (Room air) -- AP    05/26/25 0000 98.1 °F (36.7 °C) 68 -- 114/69 98 % -- None (Room air) -- AP         Blood Transfusion Record       Product Unit Status Volume Start End            Transfuse RBC       25  [id number]  [id number] Completed 05/25/25 1721 441.67 mL 05/25/25 1343 05/25/25 1721

## 2025-05-28 LAB
BASOPHILS # BLD AUTO: 0.01 X10(3) UL (ref 0–0.2)
BASOPHILS NFR BLD AUTO: 0.2 %
EOSINOPHIL # BLD AUTO: 0.04 X10(3) UL (ref 0–0.7)
EOSINOPHIL NFR BLD AUTO: 0.9 %
ERYTHROCYTE [DISTWIDTH] IN BLOOD BY AUTOMATED COUNT: 20.4 %
HCT VFR BLD AUTO: 28.4 % (ref 35–48)
HGB A2 MFR BLD: 1.5 % (ref 1.5–3.5)
HGB BLD-MCNC: 10 G/DL (ref 12–16)
HGB F MFR BLD: 42 % (ref 0–2)
HGB PNL BLD ELPH: 0 % (ref 95.5–100)
HGB S MFR BLD: 56.5 %
IMM GRANULOCYTES # BLD AUTO: 0.01 X10(3) UL (ref 0–1)
IMM GRANULOCYTES NFR BLD: 0.2 %
LYMPHOCYTES # BLD AUTO: 1.33 X10(3) UL (ref 1–4)
LYMPHOCYTES NFR BLD AUTO: 31.4 %
MCH RBC QN AUTO: 35.7 PG (ref 26–34)
MCHC RBC AUTO-ENTMCNC: 35.2 G/DL (ref 31–37)
MCV RBC AUTO: 101.4 FL (ref 80–100)
MONOCYTES # BLD AUTO: 0.49 X10(3) UL (ref 0.1–1)
MONOCYTES NFR BLD AUTO: 11.6 %
NEUTROPHILS # BLD AUTO: 2.35 X10 (3) UL (ref 1.5–7.7)
NEUTROPHILS # BLD AUTO: 2.35 X10(3) UL (ref 1.5–7.7)
NEUTROPHILS NFR BLD AUTO: 55.7 %
PLATELET # BLD AUTO: 392 10(3)UL (ref 150–450)
RBC # BLD AUTO: 2.8 X10(6)UL (ref 3.8–5.3)
WBC # BLD AUTO: 4.2 X10(3) UL (ref 4–11)

## 2025-05-28 PROCEDURE — 99232 SBSQ HOSP IP/OBS MODERATE 35: CPT | Performed by: HOSPITALIST

## 2025-05-28 PROCEDURE — 99232 SBSQ HOSP IP/OBS MODERATE 35: CPT | Performed by: INTERNAL MEDICINE

## 2025-05-28 RX ORDER — OXYCODONE HYDROCHLORIDE 15 MG/1
45 TABLET ORAL
Refills: 0 | Status: DISCONTINUED | OUTPATIENT
Start: 2025-05-28 | End: 2025-05-29

## 2025-05-28 RX ORDER — OXYCODONE HYDROCHLORIDE 10 MG/1
30 TABLET ORAL
Refills: 0 | Status: DISCONTINUED | OUTPATIENT
Start: 2025-05-28 | End: 2025-05-29

## 2025-05-28 RX ORDER — HYDROMORPHONE HYDROCHLORIDE 1 MG/ML
0.5 INJECTION, SOLUTION INTRAMUSCULAR; INTRAVENOUS; SUBCUTANEOUS EVERY 2 HOUR PRN
Refills: 0 | Status: DISCONTINUED | OUTPATIENT
Start: 2025-05-28 | End: 2025-05-29

## 2025-05-28 RX ORDER — HYDROMORPHONE HYDROCHLORIDE 1 MG/ML
1 INJECTION, SOLUTION INTRAMUSCULAR; INTRAVENOUS; SUBCUTANEOUS EVERY 2 HOUR PRN
Refills: 0 | Status: DISCONTINUED | OUTPATIENT
Start: 2025-05-28 | End: 2025-05-29

## 2025-05-28 NOTE — PLAN OF CARE
Assumed pt care this morning at 0730.   Pt is A&Ox4.   On room air, lungs sounds clear diminished. Encouraged use of IS. VSS.  Tele: NSR.  Pt states that she was not able to sleep last night.   Denies having pain at the moment.   Dilaudid PCA discontinued.   L AC PIV infusing 0.45ml at 125 ml/hr.  L forearm PIV saline lock.   SBA. Continent X2.   Regular diet.   Bed in lowest position, call light within reach.     Problem: PAIN - ADULT  Goal: Verbalizes/displays adequate comfort level or patient's stated pain goal  Description: INTERVENTIONS:  - Encourage pt to monitor pain and request assistance  - Assess pain using appropriate pain scale  - Administer analgesics based on type and severity of pain and evaluate response  - Implement non-pharmacological measures as appropriate and evaluate response  - Consider cultural and social influences on pain and pain management  - Manage/alleviate anxiety  - Utilize distraction and/or relaxation techniques  - Monitor for opioid side effects  - Notify MD/LIP if interventions unsuccessful or patient reports new pain  - Anticipate increased pain with activity and pre-medicate as appropriate  Outcome: Progressing     Problem: CARDIOVASCULAR - ADULT  Goal: Maintains optimal cardiac output and hemodynamic stability  Description: INTERVENTIONS:  - Monitor vital signs, rhythm, and trends  - Monitor for bleeding, hypotension and signs of decreased cardiac output  - Evaluate effectiveness of vasoactive medications to optimize hemodynamic stability  - Monitor arterial and/or venous puncture sites for bleeding and/or hematoma  - Assess quality of pulses, skin color and temperature  - Assess for signs of decreased coronary artery perfusion - ex. Angina  - Evaluate fluid balance, assess for edema, trend weights  Outcome: Progressing  Goal: Absence of cardiac arrhythmias or at baseline  Description: INTERVENTIONS:  - Continuous cardiac monitoring, monitor vital signs, obtain 12 lead EKG if  indicated  - Evaluate effectiveness of antiarrhythmic and heart rate control medications as ordered  - Initiate emergency measures for life threatening arrhythmias  - Monitor electrolytes and administer replacement therapy as ordered  Outcome: Progressing

## 2025-05-28 NOTE — PAYOR COMM NOTE
--------------  CONTINUED STAY REVIEW    Payor: [employer] OUT [employer] STATE PPO  Subscriber #:  [id number]  Authorization Number: [id number]    5/28:     Hematology/ Oncology Progress Note:   Reason for Consultation:  Oluwaseun Oluwadamilola Ogunyemi was seen today for the diagnosis of sickle cell acute pain episode     Interval events: Pain is improving.  No fevers, chest pain, or increased dyspnea.         sodium chloride 125 mL/hr at 05/27/25 2202       Recent Labs   Lab 05/26/25  0638 05/27/25  0630 05/28/25  0702   WBC 4.2 4.1 4.2   HGB 9.3* 9.5* 10.0*   HCT 25.5* 26.4* 28.4*   .0 389.0 392.0   MCV 99.6 100.0 101.4*   RDW 21.2 20.7 20.4   NEPRELIM 1.83 2.03 2.35                Recent Labs   Lab 05/22/25  0359 05/23/25  0423 05/23/25  1629 05/24/25  0722 05/24/25  1450 05/25/25  0653 05/26/25  0638    140  --  140  --  139  --    K 3.9 3.6   < > 3.8  --  3.7  3.7 3.9    106  --  106  --  105  --    CO2 29.0 25.0  --  25.0  --  25.0  --    BUN 6* 5*  --  6*  --  8*  --    CREATSERUM 0.58 0.64  --  0.57  --  0.61  --    GLU 98 124*  --  105*  --  104*  --    CA 9.3 8.9  --  9.1  --  8.8  --    TP 8.8*  --   --   --  8.6* 7.9  --    ALB 4.4  --   --   --  4.3 3.9  --    ALKPHO 53  --   --   --  51 53  --    AST 28  --   --   --  46* 20  --    ALT 17  --   --   --  27 20  --    BILT 0.7  --   --   --  0.5 0.3  --     < > = values in this interval not displayed.      No results for input(s): \"PT\", \"INR\", \"PTT\" in the last 168 hours.     Impression & Plan:      *Hgb SS/Sickle cell disease with acute pain crisis  -Pain is better controlled overall. Home MS contin dose was adequate prior to pain crisis.    - Will stop PCA.  Start PO oxycodone 30-45mg IR q3hrs prn with IV Dilaudid as backup.  -continue home MS Contin 60 mg q8hrs.   -Continue hydroxyurea to 2000 mg daily.  Hold endari and crizanlizumab while hospitalized.   -continue Folvite 1 mg daily.    -supp O2  -IVF   -DVT prophy     *PNA  -no longer with  chest pain.  Imaging suggestive of PNA, improving on ceftriaxone, recommend continuing this.   -not classic acute chest, received 1 unit PRBCs, no need for exchange transfusion at this time.           Hospitalist Progress Note:   Assessment & Plan:  #Sickle cell pain crisis and anemia   H/o acute chest syndrome   -PTA Folic acid, Hydroxyurea- s/p PCA; prn IV dilaudid   -Continue PTA regimen of MS contin and IR Oxycodone   - PRAVIN toradol      #Chronically elevated troponin  -continue PTA ASA and Beta blocker      #Chronic Macrocytic anemia likely due to hydrea   -monitor Hb    #Abnormal CTA chest- suspicious for PNA  Continue IV CTX x 7 days         Medications 05/26/25 05/27/25 05/28/25   aspirin DR tab 81 mg  Dose: 81 mg  Freq: Daily Route: OR  Start: 05/22/25 0645   Admin Instructions:   Do not crush    0732 KA-Given        0742 KA-Given        0800 JM-Given          cefTRIAXone (Rocephin) 1 g in sodium chloride 0.9% 100 mL IVPB-ADDV  Dose: 1 g  Freq: Every 24 hours Route: IV  Last Dose: 1 g (05/25/25 0006)  Start: 05/23/25 0100 End: 05/25/25 1845   Admin Instructions:   Ceftriaxone must NOT be administered simultaneously with calcium containing IV solutions. Includes Y-site as well.  In patients other than neonates ceftriaxone and calcium containing products may administered sequentially, provided the line is flushed in between administrations.   Order specific questions:            cefTRIAXone (Rocephin) 2 g in sodium chloride 0.9% 100 mL IVPB-ADDV  Dose: 2 g  Freq: Every 24 hours Route: IV  Last Dose: 2 g (05/27/25 2340)  Start: 05/26/25 0000   Admin Instructions:   Ceftriaxone must NOT be administered simultaneously with calcium containing IV solutions. Includes Y-site as well.  In patients other than neonates ceftriaxone and calcium containing products may administered sequentially, provided the line is flushed in between administrations.   Order specific questions:       0149 EN-New Bag   2349 MF-New Bag        0032 MF-Stopped   2340 TN-New Bag          cyanocobalamin (Vitamin B12) tab 1,000 mcg  Dose: 1,000 mcg  Freq: Daily Route: OR  Start: 05/25/25 1300    0732 KA-Given        0742 KA-Given        0800 JM-Given          diphenhydrAMINE (Benadryl) cap/tab 25 mg  Dose: 25 mg  Freq: Once Route: OR  Start: 05/22/25 0413 End: 05/22/25 0428         enoxaparin (Lovenox) 40 MG/0.4ML SUBQ injection 40 mg  Dose: 40 mg  Freq: Daily Route: SC  Start: 05/22/25 0930   Admin Instructions:   Only administer Enoxaparin subcutaneously into the abdomen unless directed otherwise by a physician. Alternate injection sites between the left and right anterolateral and left and right posterolateral abdominal wall.    (0930 KA)-Not Given        (0930 KA)-Not Given        (0800 JM)-Not Given          folic acid (Folvite) tab 1 mg  Dose: 1 mg  Freq: Daily Route: OR  Start: 05/22/25 0645    0732 KA-Given        0742 KA-Given        0800 JM-Given          HYDROmorphone (Dilaudid) 1 MG/ML injection 2 mg  Dose: 2 mg  Freq: Once Route: IV  Start: 05/23/25 0145 End: 05/23/25 0152   Admin Instructions:   Use PRN reason as a guide and follow range order policy. If oral pain meds are ordered and patient can tolerate oral intake, start with PRN oral pain medications first.         HYDROmorphone (Dilaudid) 1 MG/ML injection 2 mg  Dose: 2 mg  Freq: Once Route: IV  Start: 05/22/25 2230 End: 05/22/25 2233   Admin Instructions:   Use PRN reason as a guide and follow range order policy. If oral pain meds are ordered and patient can tolerate oral intake, start with PRN oral pain medications first.         hydroxyurea (Hydrea) cap 2,000 mg  Dose: 2,000 mg  Freq: Daily Route: OR  Start: 05/22/25 0645   Admin Instructions:   CAUTION: CHEMOTHERAPY. Do not crush.    0808 KA-Given        0742 KA-Given        0800 JM-Given          ketorolac (Toradol) 30 MG/ML injection 30 mg  Dose: 30 mg  Freq: Every 6 hours Route: IV  Start: 05/24/25 0900 End: 05/26/25 0632    0149  EN-Given   0632-D/C'd         metoprolol succinate ER (Toprol XL) 24 hr tab 25 mg  Dose: 25 mg  Freq: Daily Beta Blocker Route: OR  Start: 05/22/25 0700   Admin Instructions:   Do not crush    (0600 EN)-Not Given [C]        (0600 MF)-Not Given        (0549 TN)-Not Given          morphINE ER (MS Contin) tab 60 mg  Dose: 60 mg  Freq: Every 8 hours scheduled Route: OR  Start: 05/22/25 0645   Admin Instructions:   Do not crush    0616 EN-Given   1305 KA-Given   2204 MF-Given      0546 MF-Given   1348 KA-Given   2157 TN-Given      0548 TN-Given   1400   2200          Continuous Meds Sorted by Name  for Ogunyemi, Lola Oluwadamilola as of 5/26/25 through 5/28/25    1 Day 3 Days 7 Days 10 Days < Today >   Legend:       Medications 05/26/25 05/27/25 05/28/25   HYDROmorphone in sodium chloride 0.9% (Dilaudid) 20 mg/100mL PCA premix  Freq: Continuous Route: IV  Start: 05/27/25 0730 End: 05/28/25 0755   Admin Instructions:   (Conc = 0.2 mg/mL) Administer via CADD Pump.   Order specific questions:        0730 KA/MF-New Bag   1920 KA/TN-Handoff       0732 TN/JM-Handoff   0755-D/C'd       HYDROmorphone in sodium chloride 0.9% (Dilaudid) 20 mg/100mL PCA premix  Freq: Continuous Route: IV  Last Dose: 20 mg (05/26/25 1709)  Start: 05/25/25 1030 End: 05/27/25 0720   Admin Instructions:   (Conc = 0.2 mg/mL) Administer via CADD Pump.   Order specific questions:       0721 EN/KA-Handoff   1709 KA/AD-New Bag   1921 KA/MF-Handoff      0714 MF/KA-Handoff   0720-D/C'd        sodium chloride 0.45% infusion  Rate: 125 mL/hr  Freq: Continuous Route: IV  Start: 05/24/25 0845    0151 EN-New Bag   1200 KA-New Bag   2210 MF-Stopped     2219 MF-New Bag   2340 MF-Paused [C]       0033 MF-Restarted   0557 MF-Stopped   0559 MF-New Bag     1348 KA-New Bag   2202 TN-New Bag       0808 JM-New Bag          sodium chloride 0.9% infusion  Rate: 10 mL/hr  Freq: Continuous Route: IV  Start: 05/25/25 1030 End: 05/28/25 0755      0755-D/C'd        sodium  chloride 0.9% infusion  Rate: 125 mL/hr  Freq: Continuous Route: IV  Last Dose: Stopped (05/24/25 0900)  Start: 05/22/25 0645 End: 05/24/25 0831           PRN Meds Sorted by Name        Or   HYDROmorphone (Dilaudid) 1 MG/ML injection 1 mg  Dose: 1 mg  Freq: Every 2 hour PRN Route: IV  PRN Reason: moderate pain  Start: 05/28/25 0755   Admin Instructions:   Use PRN reason as a guide and follow range order policy. If oral pain meds are ordered and patient can tolerate oral intake, start with PRN oral pain medications first.      1449 JM-Given          HYDROmorphone 0.4 mg BOLUS FROM PCA  Dose: 0.4 mg  Freq: Every 30 min PRN Route: IV  PRN Comment: severe pain  Start: 05/25/25 1026 End: 05/28/25 0755   Admin Instructions:   Clinician dose maximum of 3 doses every 4 hours    1312 KA/PH-Bolus from Bag        0844 KA/LG-Bolus from Bag        0755-D/C'd        ondansetron (Zofran) 4 MG/2ML injection 4 mg  Dose: 4 mg  Freq: Every 6 hours PRN Route: IV  PRN Reason: Nausea  Start: 05/24/25 1437    0732 KA-Given        2157 TN-Given           temazepam (Restoril) cap 15 mg  Dose: 15 mg  Freq: Nightly PRN Route: OR  PRN Reason: Sleep  Start: 05/22/25 0635   Admin Instructions:   CAUTION: REPRODUCTIVE RISK     2206 TN-Given               Vitals (last day)       Date/Time Temp Pulse Resp BP SpO2 Weight O2 Device O2 Flow Rate (L/min) Who    05/28/25 1538 98.2 °F (36.8 °C) 60 18 119/75 98 % -- None (Room air) 0 L/min CK    05/28/25 1130 98.2 °F (36.8 °C) 71 17 124/83 -- -- None (Room air) 0 L/min ST    05/28/25 0751 98.1 °F (36.7 °C) 58 17 128/95 97 % -- None (Room air) -- JM    05/28/25 0516 98.5 °F (36.9 °C) 71 16 113/81 97 % -- None (Room air) -- SB    05/27/25 2318 98 °F (36.7 °C) 66 16 142/90 100 % -- None (Room air) -- SB    05/27/25 1900 97.7 °F (36.5 °C) 67 18 98/68 99 % -- None (Room air) -- SB    05/27/25 1615 98.5 °F (36.9 °C) 78 18 118/91 95 % -- None (Room air) 0 L/min IB    05/27/25 1145 98.2 °F (36.8 °C) 57 18 115/66  97 % -- None (Room air) 0 L/min IB    05/27/25 1130 -- 63 -- -- 95 % -- -- -- IB    05/27/25 0800 -- -- -- -- -- -- None (Room air) -- IB    05/27/25 0800 98.5 °F (36.9 °C) 55 18 125/98 98 % -- -- -- ST    05/27/25 0545 -- 66 -- 117/88 99 % -- -- -- MF    05/27/25 0408 98 °F (36.7 °C) 64 18 110/66 96 % -- None (Room air) -- RF    05/27/25 0000 98.2 °F (36.8 °C) 66 16 103/64 98 % -- None (Room air) --              Blood Transfusion Record       Product Unit Status Volume Start End            Transfuse RBC       25  [id number]  [id number] Completed 05/25/25 1721 441.67 mL 05/25/25 1343 05/25/25 1721

## 2025-05-28 NOTE — PLAN OF CARE
Assumed care at 1930, pt is alert and oriented x4, ra, continuous of PCA  Dilauded infusing seen Mar., scheduled morphine was given,  PIV w/0.45 NS infusing @125 ml/hrs, cardiac replacement, NSR on tele, reg diet, SBA.  Safety precaution is in place. Plan of care is ongoing.     Problem: PAIN - ADULT  Goal: Verbalizes/displays adequate comfort level or patient's stated pain goal  Description: INTERVENTIONS:  - Encourage pt to monitor pain and request assistance  - Assess pain using appropriate pain scale  - Administer analgesics based on type and severity of pain and evaluate response  - Implement non-pharmacological measures as appropriate and evaluate response  - Consider cultural and social influences on pain and pain management  - Manage/alleviate anxiety  - Utilize distraction and/or relaxation techniques  - Monitor for opioid side effects  - Notify MD/LIP if interventions unsuccessful or patient reports new pain  - Anticipate increased pain with activity and pre-medicate as appropriate  Outcome: Progressing     Problem: CARDIOVASCULAR - ADULT  Goal: Maintains optimal cardiac output and hemodynamic stability  Description: INTERVENTIONS:  - Monitor vital signs, rhythm, and trends  - Monitor for bleeding, hypotension and signs of decreased cardiac output  - Evaluate effectiveness of vasoactive medications to optimize hemodynamic stability  - Monitor arterial and/or venous puncture sites for bleeding and/or hematoma  - Assess quality of pulses, skin color and temperature  - Assess for signs of decreased coronary artery perfusion - ex. Angina  - Evaluate fluid balance, assess for edema, trend weights  Outcome: Progressing  Goal: Absence of cardiac arrhythmias or at baseline  Description: INTERVENTIONS:  - Continuous cardiac monitoring, monitor vital signs, obtain 12 lead EKG if indicated  - Evaluate effectiveness of antiarrhythmic and heart rate control medications as ordered  - Initiate emergency measures for life  threatening arrhythmias  - Monitor electrolytes and administer replacement therapy as ordered  Outcome: Progressing

## 2025-05-28 NOTE — PROGRESS NOTES
Hematology/Oncology Progress Note    Patient Name: Oluwaseun Oluwadamilola Ogunyemi  Medical Record Number: [id number]    YOB: 1985     Reason for Consultation:  Oluwaseun Oluwadamilola Ogunyemi was seen today for the diagnosis of sickle cell acute pain episode    Interval events: Pain is improving.  No fevers, chest pain, or increased dyspnea.    Inpatient Meds:   cyanocobalamin  1,000 mcg Oral Daily    cefTRIAXone  2 g Intravenous Q24H    sodium chloride   Intravenous Once    aspirin  81 mg Oral Daily    folic acid  1 mg Oral Daily    hydroxyurea  2,000 mg Oral Daily    metoprolol succinate ER  25 mg Oral Daily Beta Blocker    morphINE ER  60 mg Oral Q8H PRAVIN    enoxaparin  40 mg Subcutaneous Daily      sodium chloride 125 mL/hr at 05/27/25 2202     PRN Meds:    oxyCODONE **OR** oxyCODONE    HYDROmorphone **OR** HYDROmorphone    ondansetron    metoclopramide    ipratropium-albuterol    diphenhydrAMINE    OxyCODONE HCl IR    acetaminophen    temazepam  Allergies:   Allergies   Allergen Reactions    Piperacillin Sod-Tazobactam So ITCHING     Generalized  Tolerated cephalosporins in the past       Vital Signs:  Height: --  Weight: --  BSA (Calculated - sq m): --  Pulse: 58 (05/28 0751)  BP: 128/95 (05/28 0751)  Temp: 98.1 °F (36.7 °C) (05/28 0751)  Do Not Use - Resp Rate: --  SpO2: 97 % (05/28 0751)  Wt Readings from Last 6 Encounters:   05/22/25 81.2 kg (179 lb)   05/01/25 77.3 kg (170 lb 6.4 oz)   04/03/25 76.4 kg (168 lb 6.4 oz)   03/06/25 76.7 kg (169 lb)   02/06/25 74.8 kg (165 lb)   01/09/25 72.6 kg (160 lb 0.9 oz)     Physical Examination:  General: Patient is alert and oriented  CV: Regular rate and rhythm, no murmurs, no LE edema  Respiratory: Lungs clear to auscultation bilaterally  GI/Abd: Soft, non-tender   Skin: no rashes or petechiae    Laboratory:  Recent Labs   Lab 05/26/25  0638 05/27/25  0630 05/28/25  0702   WBC 4.2 4.1 4.2   HGB 9.3* 9.5* 10.0*   HCT 25.5* 26.4* 28.4*   .0 389.0  392.0   MCV 99.6 100.0 101.4*   RDW 21.2 20.7 20.4   NEPRELIM 1.83 2.03 2.35     Recent Labs   Lab 05/22/25  0359 05/23/25  0423 05/23/25  1629 05/24/25  0722 05/24/25  1450 05/25/25  0653 05/26/25  0638    140  --  140  --  139  --    K 3.9 3.6   < > 3.8  --  3.7  3.7 3.9    106  --  106  --  105  --    CO2 29.0 25.0  --  25.0  --  25.0  --    BUN 6* 5*  --  6*  --  8*  --    CREATSERUM 0.58 0.64  --  0.57  --  0.61  --    GLU 98 124*  --  105*  --  104*  --    CA 9.3 8.9  --  9.1  --  8.8  --    TP 8.8*  --   --   --  8.6* 7.9  --    ALB 4.4  --   --   --  4.3 3.9  --    ALKPHO 53  --   --   --  51 53  --    AST 28  --   --   --  46* 20  --    ALT 17  --   --   --  27 20  --    BILT 0.7  --   --   --  0.5 0.3  --     < > = values in this interval not displayed.     No results for input(s): \"PT\", \"INR\", \"PTT\" in the last 168 hours.    Impression & Plan:     *Hgb SS/Sickle cell disease with acute pain crisis  -Pain is better controlled overall. Home MS contin dose was adequate prior to pain crisis.    - Will stop PCA.  Start PO oxycodone 30-45mg IR q3hrs prn with IV Dilaudid as backup.  -continue home MS Contin 60 mg q8hrs.   -Continue hydroxyurea to 2000 mg daily.  Hold endari and crizanlizumab while hospitalized.   -continue Folvite 1 mg daily.    -supp O2  -IVF   -DVT prophy    *PNA  -no longer with chest pain.  Imaging suggestive of PNA, improving on ceftriaxone, recommend continuing this.   -not classic acute chest, received 1 unit PRBCs, no need for exchange transfusion at this time.     If oral opiates are providing adequate pain control can plan to discharge home tomorrow    Esau Mccormick MD  Hematology/Medical Oncology  [facility]

## 2025-05-28 NOTE — PROGRESS NOTES
[facility]   part of [facility]     Hospitalist Progress Note     Oluwaseun Oluwadamilola Ogunyemi Patient Status:  Inpatient    8/3/1985 MRN [id number]   Location [facility] 3NE-A Attending Jeremi Denny, DO   Hosp Day # 6 PCP [address] MD     Chief Complaint: I'm having a sickle crisis    Subjective:     Patient says pain is improving.  Aware of plan for possible discharge tomorrow.    Objective:    Review of Systems:   A comprehensive review of systems was completed; pertinent positive and negatives stated in subjective.    Vital signs:  Temp:  [97.7 °F (36.5 °C)-98.5 °F (36.9 °C)] 98.2 °F (36.8 °C)  Pulse:  [58-78] 71  Resp:  [16-18] 17  BP: ()/(68-95) 124/83  SpO2:  [95 %-100 %] 97 %    Physical Exam:    General: No acute distress  Respiratory: No wheezes, no rhonchi  Cardiovascular: S1, S2, regular rate and rhythm  Abdomen: Soft, Non-tender, non-distended, positive bowel sounds  Neuro: No new focal deficits.   Extremities: No edema      Diagnostic Data:    Labs:  Recent Labs   Lab 25  0722 25  0653 25  1847 25  0638 25  0630 25  0702   WBC 4.2 4.2  --  4.2 4.1 4.2   HGB 8.1* 7.8* 10.2* 9.3* 9.5* 10.0*   .7* 108.9*  --  99.6 100.0 101.4*   .0 380.0  --  370.0 389.0 392.0       Recent Labs   Lab 25  0359 25  0423 25  1629 25  0722 25  1450 25  0653 25  0638   GLU 98 124*  --  105*  --  104*  --    BUN 6* 5*  --  6*  --  8*  --    CREATSERUM 0.58 0.64  --  0.57  --  0.61  --    CA 9.3 8.9  --  9.1  --  8.8  --    ALB 4.4  --   --   --  4.3 3.9  --     140  --  140  --  139  --    K 3.9 3.6   < > 3.8  --  3.7  3.7 3.9    106  --  106  --  105  --    CO2 29.0 25.0  --  25.0  --  25.0  --    ALKPHO 53  --   --   --  51 53  --    AST 28  --   --   --  46* 20  --    ALT 17  --   --   --  27 20  --    BILT 0.7  --   --   --  0.5 0.3  --    TP 8.8*  --   --   --  8.6* 7.9  --     < > =  values in this interval not displayed.       Estimated Glomerular Filtration Rate: 116 mL/min/1.73m2 (result from lab).    Recent Labs   Lab 05/23/25  0423 05/23/25  0630 05/24/25  1450   TROPHS 49* 48* 44*       No results for input(s): \"PTP\", \"INR\" in the last 168 hours.               Microbiology    No results found for this visit on 05/22/25.      Imaging: Reviewed in Epic.    Medications: Scheduled Medications[1]    Assessment & Plan:      #Sickle cell pain crisis and anemia   H/o acute chest syndrome   -PTA Folic acid, Hydroxyurea- s/p PCA; prn IV dilaudid   -Continue PTA regimen of MS contin and IR Oxycodone   - PRAVIN toradol      #Chronically elevated troponin  -continue PTA ASA and Beta blocker      #Chronic Macrocytic anemia likely due to hydrea   -monitor Hb    #Abnormal CTA chest- suspicious for PNA  Continue IV CTX x 7 days     Jeremi Denny, DO    Supplementary Documentation:     Quality:  DVT Mechanical Prophylaxis:   SCDs, Early ambuation  DVT Pharmacologic Prophylaxis   Medication    enoxaparin (Lovenox) 40 MG/0.4ML SUBQ injection 40 mg         DVT Pharmacologic prophylaxis: Aspirin 81 mg      Code Status: Not on file  [place]: No urinary catheter in place  [place] Duration (in days):   Central line:    ABHAY:     Discharge is dependent on: improvement in pain  At this point Ms. Andrade is expected to be discharge to: home    The 21st Century Cures Act makes medical notes like these available to patients in the interest of transparency. Please be advised this is a medical document. Medical documents are intended to carry relevant information, facts as evident, and the clinical opinion of the practitioner. The medical note is intended as peer to peer communication and may appear blunt or direct. It is written in medical language and may contain abbreviations or verbiage that are unfamiliar.                         [1]    cyanocobalamin  1,000 mcg Oral Daily    cefTRIAXone  2 g Intravenous Q24H    sodium  chloride   Intravenous Once    aspirin  81 mg Oral Daily    folic acid  1 mg Oral Daily    hydroxyurea  2,000 mg Oral Daily    metoprolol succinate ER  25 mg Oral Daily Beta Blocker    morphINE ER  60 mg Oral Q8H PRAVIN    enoxaparin  40 mg Subcutaneous Daily

## 2025-05-29 ENCOUNTER — TELEPHONE (OUTPATIENT)
Age: 40
End: 2025-05-29

## 2025-05-29 ENCOUNTER — APPOINTMENT (OUTPATIENT)
Age: 40
End: 2025-05-29
Attending: INTERNAL MEDICINE
Payer: COMMERCIAL

## 2025-05-29 VITALS
OXYGEN SATURATION: 97 % | DIASTOLIC BLOOD PRESSURE: 79 MMHG | HEIGHT: 66 IN | HEART RATE: 63 BPM | BODY MASS INDEX: 28.77 KG/M2 | WEIGHT: 179 LBS | TEMPERATURE: 98 F | RESPIRATION RATE: 18 BRPM | SYSTOLIC BLOOD PRESSURE: 119 MMHG

## 2025-05-29 PROCEDURE — 99239 HOSP IP/OBS DSCHRG MGMT >30: CPT | Performed by: HOSPITALIST

## 2025-05-29 PROCEDURE — 99232 SBSQ HOSP IP/OBS MODERATE 35: CPT | Performed by: INTERNAL MEDICINE

## 2025-05-29 RX ORDER — HYDROMORPHONE HYDROCHLORIDE 1 MG/ML
2 INJECTION, SOLUTION INTRAMUSCULAR; INTRAVENOUS; SUBCUTANEOUS ONCE
Refills: 0 | Status: COMPLETED | OUTPATIENT
Start: 2025-05-29 | End: 2025-05-29

## 2025-05-29 NOTE — DIETARY NOTE
[facility]   part of [facility]   CLINICAL NUTRITION    Oluwaseun Oluwadamilola Ogunyemi     Admitting diagnosis:  Elevated troponin [R79.89]  Sickle cell pain crisis (HCC) [D57.00]    Ht: 167.6 cm (5' 6\")  Wt: 81.2 kg (179 lb).   Body mass index is 28.89 kg/m².  IBW: 59.1 kg    Wt Readings from Last 6 Encounters:   05/22/25 81.2 kg (179 lb)   05/01/25 77.3 kg (170 lb 6.4 oz)   04/03/25 76.4 kg (168 lb 6.4 oz)   03/06/25 76.7 kg (169 lb)   02/06/25 74.8 kg (165 lb)   01/09/25 72.6 kg (160 lb 0.9 oz)        Labs/Meds reviewed    Diet:       Procedures    Regular/General diet Is Patient on Accuchecks? No     Percent Meals Eaten (last 3 days)       Date/Time Percent Meals Eaten (%)    05/26/25 1903 100 %            Pt chart reviewed d/t LOS.  Patient reports good appetite at this time.  Nursing notes reports Percent Meals Eaten (%): 100 % intake for last meal.  Tolerating po diet without diarrhea, emesis, or constipation.   No significant weight changes noted.     PMH includes Sickle Cell, HTN.  Pt p/w Sickle Cell pain crisis.  Pt screened for LOS.  RN reports pt eating well, no n/v/d reported. Last BM 5/26. No Wt loss per EMR review.  RN reports possible discharge this date.    Patient is at low nutrition risk at this time.    Please consult if patient status changes or nutrition issues arise.    Qing Corona RD, LDN, [facility]  Clinical Dietitian  Phone [phone]

## 2025-05-29 NOTE — PROGRESS NOTES
Hematology/Oncology Progress Note    Patient Name: Oluwaseun Oluwadamilola Ogunyemi  Medical Record Number: [id number]    YOB: 1985     Reason for Consultation:  Oluwaseun Oluwadamilola Ogunyemi was seen today for the diagnosis of sickle cell acute pain episode    Interval events: Overall pain is improving though at the time of my visit her leg pains are increased.  No fevers, chest pain, or increased dyspnea.    Inpatient Meds:   melatonin  5 mg Oral Nightly    cyanocobalamin  1,000 mcg Oral Daily    cefTRIAXone  2 g Intravenous Q24H    sodium chloride   Intravenous Once    aspirin  81 mg Oral Daily    folic acid  1 mg Oral Daily    hydroxyurea  2,000 mg Oral Daily    metoprolol succinate ER  25 mg Oral Daily Beta Blocker    morphINE ER  60 mg Oral Q8H PRAVIN    enoxaparin  40 mg Subcutaneous Daily      sodium chloride 125 mL/hr at 05/29/25 0847     PRN Meds:    oxyCODONE **OR** oxyCODONE    HYDROmorphone **OR** HYDROmorphone    ondansetron    metoclopramide    ipratropium-albuterol    diphenhydrAMINE    OxyCODONE HCl IR    acetaminophen    temazepam  Allergies:   Allergies   Allergen Reactions    Piperacillin Sod-Tazobactam So ITCHING     Generalized  Tolerated cephalosporins in the past       Vital Signs:  Height: --  Weight: --  BSA (Calculated - sq m): --  Pulse: 53 (05/29 0805)  BP: 110/82 (05/29 0805)  Temp: 97.8 °F (36.6 °C) (05/29 0805)  Do Not Use - Resp Rate: --  SpO2: 96 % (05/29 0805)  Wt Readings from Last 6 Encounters:   05/22/25 81.2 kg (179 lb)   05/01/25 77.3 kg (170 lb 6.4 oz)   04/03/25 76.4 kg (168 lb 6.4 oz)   03/06/25 76.7 kg (169 lb)   02/06/25 74.8 kg (165 lb)   01/09/25 72.6 kg (160 lb 0.9 oz)     Physical Examination:  General: Patient is alert and oriented  CV: Regular rate and rhythm, no murmurs, no LE edema  Respiratory: Lungs clear to auscultation bilaterally  GI/Abd: Soft, non-tender   Skin: no rashes or petechiae    Laboratory:  Recent Labs   Lab 05/26/25  0673  05/27/25  0630 05/28/25  0702   WBC 4.2 4.1 4.2   HGB 9.3* 9.5* 10.0*   HCT 25.5* 26.4* 28.4*   .0 389.0 392.0   MCV 99.6 100.0 101.4*   RDW 21.2 20.7 20.4   NEPRELIM 1.83 2.03 2.35     Recent Labs   Lab 05/23/25  0423 05/23/25  1629 05/24/25  0722 05/24/25  1450 05/25/25  0653 05/26/25  0638     --  140  --  139  --    K 3.6   < > 3.8  --  3.7  3.7 3.9     --  106  --  105  --    CO2 25.0  --  25.0  --  25.0  --    BUN 5*  --  6*  --  8*  --    CREATSERUM 0.64  --  0.57  --  0.61  --    *  --  105*  --  104*  --    CA 8.9  --  9.1  --  8.8  --    TP  --   --   --  8.6* 7.9  --    ALB  --   --   --  4.3 3.9  --    ALKPHO  --   --   --  51 53  --    AST  --   --   --  46* 20  --    ALT  --   --   --  27 20  --    BILT  --   --   --  0.5 0.3  --     < > = values in this interval not displayed.     No results for input(s): \"PT\", \"INR\", \"PTT\" in the last 168 hours.    Impression & Plan:     *Hgb SS/Sickle cell disease with acute pain crisis  -Pain is better controlled overall. Home MS contin dose was adequate prior to pain crisis.    - Current level of pain is increased, will give a one-time dose of IV Dilaudid 2 mg.  Encouraged her to take PO oxycodone 30-45mg IR q3hrs prn.  Will keep order for IV Dilaudid as backup.  -continue home MS Contin 60 mg q8hrs.   -Continue hydroxyurea to 2000 mg daily.  Hold endari and crizanlizumab while hospitalized.  She was due for crizanlizumab today, will reschedule for after she is discharged.  -continue Folvite 1 mg daily.    -supp O2  -IVF   -DVT prophy    *PNA  -no longer with chest pain.  Imaging suggestive of PNA, improving on ceftriaxone, recommend continuing this.   -not classic acute chest, received 1 unit PRBCs, no need for exchange transfusion at this time.     If oral opiates are providing adequate pain control throughout the day, can plan to discharge home later this afternoon    Esau Mccormick MD  Hematology/Medical Oncology  [facility]

## 2025-05-29 NOTE — PROGRESS NOTES
[facility]   part of [facility]     Hospitalist Progress Note     Oluwaseun Oluwadamilola Ogunyemi Patient Status:  Inpatient    8/3/1985 MRN [id number]   Location [facility]NE[facility]A Attending Jeremi Denny, DO   Hosp Day # 7 PCP Clive St MD     Chief Complaint: I'm having a sickle crisis    Subjective:     Patient doing better.  Still having intermittent use of IV Dilaudid.  Hopeful for discharge later today.    Objective:    Review of Systems:   A comprehensive review of systems was completed; pertinent positive and negatives stated in subjective.    Vital signs:  Temp:  [97.8 °F (36.6 °C)-98.3 °F (36.8 °C)] 97.8 °F (36.6 °C)  Pulse:  [53-77] 53  Resp:  [16-20] 18  BP: (106-123)/(74-85) 110/82  SpO2:  [96 %-98 %] 96 %    Physical Exam:    General: No acute distress  Respiratory: No wheezes, no rhonchi  Cardiovascular: S1, S2, regular rate and rhythm  Abdomen: Soft, Non-tender, non-distended, positive bowel sounds  Neuro: No new focal deficits.   Extremities: No edema      Diagnostic Data:    Labs:  Recent Labs   Lab 25  0722 25  0653 25  1847 25  0638 25  0630 25  0702   WBC 4.2 4.2  --  4.2 4.1 4.2   HGB 8.1* 7.8* 10.2* 9.3* 9.5* 10.0*   .7* 108.9*  --  99.6 100.0 101.4*   .0 380.0  --  370.0 389.0 392.0       Recent Labs   Lab 25  0423 25  1629 25  0722 25  1450 25  0653 25  0638   *  --  105*  --  104*  --    BUN 5*  --  6*  --  8*  --    CREATSERUM 0.64  --  0.57  --  0.61  --    CA 8.9  --  9.1  --  8.8  --    ALB  --   --   --  4.3 3.9  --      --  140  --  139  --    K 3.6   < > 3.8  --  3.7  3.7 3.9     --  106  --  105  --    CO2 25.0  --  25.0  --  25.0  --    ALKPHO  --   --   --  51 53  --    AST  --   --   --  46* 20  --    ALT  --   --   --  27 20  --    BILT  --   --   --  0.5 0.3  --    TP  --   --   --  8.6* 7.9  --     < > = values in this interval not displayed.        Estimated Glomerular Filtration Rate: 116 mL/min/1.73m2 (result from lab).    Recent Labs   Lab 05/23/25  0423 05/23/25  0630 05/24/25  1450   TROPHS 49* 48* 44*       No results for input(s): \"PTP\", \"INR\" in the last 168 hours.               Microbiology    No results found for this visit on 05/22/25.      Imaging: Reviewed in Epic.    Medications: Scheduled Medications[1]    Assessment & Plan:      #Disposition  - Possible discharge later today if pain remains controlled, patient states she has adequate prescriptions at home and declined any new prescriptions    #Sickle cell pain crisis and anemia   H/o acute chest syndrome   -PTA Folic acid, Hydroxyurea- s/p PCA; prn IV dilaudid   -Continue PTA regimen of MS contin and IR Oxycodone   - PRAVIN toradol      #Chronically elevated troponin  -continue PTA ASA and Beta blocker      #Chronic Macrocytic anemia likely due to hydrea   -monitor Hb    #Abnormal CTA chest- suspicious for PNA  S/p IV CTX x 7 days     Jeremi Denny, DO    Supplementary Documentation:     Quality:  DVT Mechanical Prophylaxis:   SCDs, Early ambuation  DVT Pharmacologic Prophylaxis   Medication    enoxaparin (Lovenox) 40 MG/0.4ML SUBQ injection 40 mg         DVT Pharmacologic prophylaxis: Aspirin 81 mg      Code Status: Not on file  [place]: No urinary catheter in place  [place] Duration (in days):   Central line:    ABHAY: 5/29/2025    Discharge is dependent on: improvement in pain  At this point Ms. Andrade is expected to be discharge to: home    The 21st Century Cures Act makes medical notes like these available to patients in the interest of transparency. Please be advised this is a medical document. Medical documents are intended to carry relevant information, facts as evident, and the clinical opinion of the practitioner. The medical note is intended as peer to peer communication and may appear blunt or direct. It is written in medical language and may contain abbreviations or verbiage that  are unfamiliar.                         [1]    melatonin  5 mg Oral Nightly    cyanocobalamin  1,000 mcg Oral Daily    cefTRIAXone  2 g Intravenous Q24H    sodium chloride   Intravenous Once    aspirin  81 mg Oral Daily    folic acid  1 mg Oral Daily    hydroxyurea  2,000 mg Oral Daily    metoprolol succinate ER  25 mg Oral Daily Beta Blocker    morphINE ER  60 mg Oral Q8H PRAVIN    enoxaparin  40 mg Subcutaneous Daily

## 2025-05-29 NOTE — PROGRESS NOTES
NURSING DISCHARGE NOTE    Discharged Home via Ambulatory.  Accompanied by Support staff.  Belongings Taken by patient/family.  Peripheral IV's discontinued. AVS reviewed and sent home with the patient. All questions answered at this time.

## 2025-05-29 NOTE — PLAN OF CARE
Assumed care at 0730.  A&O 4.  Room air.  Fluids- 0.45 at 125 mL/hr and scheduled Rocephin Q 24.  Regular diet.  Tele- NSR/SB. Tele called to say the pt has been flipping in and out of 1st degree AVB, pt has a hx. MD notified. No new orders at this time.   VTE- Lovenox, pt refused this AM. Education provided.   PRN pain meds utilized and x1 2 mg dose of Dilaudid given per MD order. See MAR for more details.   Independent after set up to the bathroom.    Pt oriented to the room, safety prec initiated, bed is in the lowest position and call light within reach. Pt updated on the plan of care and awaiting potential discharge that is dependant on pain control. All needs met at this time.     Problem: PAIN - ADULT  Goal: Verbalizes/displays adequate comfort level or patient's stated pain goal  Description: INTERVENTIONS:  - Encourage pt to monitor pain and request assistance  - Assess pain using appropriate pain scale  - Administer analgesics based on type and severity of pain and evaluate response  - Implement non-pharmacological measures as appropriate and evaluate response  - Consider cultural and social influences on pain and pain management  - Manage/alleviate anxiety  - Utilize distraction and/or relaxation techniques  - Monitor for opioid side effects  - Notify MD/LIP if interventions unsuccessful or patient reports new pain  - Anticipate increased pain with activity and pre-medicate as appropriate  Outcome: Progressing     Problem: CARDIOVASCULAR - ADULT  Goal: Maintains optimal cardiac output and hemodynamic stability  Description: INTERVENTIONS:  - Monitor vital signs, rhythm, and trends  - Monitor for bleeding, hypotension and signs of decreased cardiac output  - Evaluate effectiveness of vasoactive medications to optimize hemodynamic stability  - Monitor arterial and/or venous puncture sites for bleeding and/or hematoma  - Assess quality of pulses, skin color and temperature  - Assess for signs of decreased  coronary artery perfusion - ex. Angina  - Evaluate fluid balance, assess for edema, trend weights  Outcome: Progressing  Goal: Absence of cardiac arrhythmias or at baseline  Description: INTERVENTIONS:  - Continuous cardiac monitoring, monitor vital signs, obtain 12 lead EKG if indicated  - Evaluate effectiveness of antiarrhythmic and heart rate control medications as ordered  - Initiate emergency measures for life threatening arrhythmias  - Monitor electrolytes and administer replacement therapy as ordered  Outcome: Progressing

## 2025-05-29 NOTE — PAYOR COMM NOTE
--------------  5/29 CONTINUED STAY REVIEW    Payor: [employer] OUT [employer] STATE PPO  Subscriber #:  [id number]  Authorization Number: [id number]    HEME/ONC:     Overall pain is improving though at the time of my visit her leg pains are increased.  No fevers, chest pain, or increased dyspnea.     Inpatient Meds:  Scheduled Medications    melatonin  5 mg Oral Nightly    cyanocobalamin  1,000 mcg Oral Daily    cefTRIAXone  2 g Intravenous Q24H    sodium chloride   Intravenous Once    aspirin  81 mg Oral Daily    folic acid  1 mg Oral Daily    hydroxyurea  2,000 mg Oral Daily    metoprolol succinate ER  25 mg Oral Daily Beta Blocker    morphINE ER  60 mg Oral Q8H PRAVIN    enoxaparin  40 mg Subcutaneous Daily         Medication Infusions    sodium chloride 125 mL/hr at 05/29/25 0847         PRN Meds:    PRN Medications     oxyCODONE **OR** oxyCODONE    HYDROmorphone **OR** HYDROmorphone    ondansetron    metoclopramide    ipratropium-albuterol    diphenhydrAMINE    OxyCODONE HCl IR    acetaminophen    temazepam     Allergies:   Allergies         Allergies   Allergen Reactions    Piperacillin Sod-Tazobactam So ITCHING       Generalized  Tolerated cephalosporins in the past            Vital Signs:  Height: --  Weight: --  BSA (Calculated - sq m): --  Pulse: 53 (05/29 0805)  BP: 110/82 (05/29 0805)  Temp: 97.8 °F (36.6 °C) (05/29 0805)  Do Not Use - Resp Rate: --  SpO2: 96 % (05/29 0805)      Wt Readings from Last 6 Encounters:   05/22/25 81.2 kg (179 lb)   05/01/25 77.3 kg (170 lb 6.4 oz)   04/03/25 76.4 kg (168 lb 6.4 oz)   03/06/25 76.7 kg (169 lb)   02/06/25 74.8 kg (165 lb)   01/09/25 72.6 kg (160 lb 0.9 oz)      Physical Examination:  General: Patient is alert and oriented  CV: Regular rate and rhythm, no murmurs, no LE edema  Respiratory: Lungs clear to auscultation bilaterally  GI/Abd: Soft, non-tender   Skin: no rashes or petechiae     Laboratory:        Recent Labs   Lab 05/26/25  0638 05/27/25  0630 05/28/25  0702   WBC  4.2 4.1 4.2   HGB 9.3* 9.5* 10.0*   HCT 25.5* 26.4* 28.4*   .0 389.0 392.0   MCV 99.6 100.0 101.4*   RDW 21.2 20.7 20.4   NEPRELIM 1.83 2.03 2.35               Recent Labs   Lab 05/23/25  0423 05/23/25  1629 05/24/25  0722 05/24/25  1450 05/25/25  0653 05/26/25  0638     --  140  --  139  --    K 3.6   < > 3.8  --  3.7  3.7 3.9     --  106  --  105  --    CO2 25.0  --  25.0  --  25.0  --    BUN 5*  --  6*  --  8*  --    CREATSERUM 0.64  --  0.57  --  0.61  --    *  --  105*  --  104*  --    CA 8.9  --  9.1  --  8.8  --    TP  --   --   --  8.6* 7.9  --    ALB  --   --   --  4.3 3.9  --    ALKPHO  --   --   --  51 53  --    AST  --   --   --  46* 20  --    ALT  --   --   --  27 20  --    BILT  --   --   --  0.5 0.3  --     < > = values in this interval not displayed.      No results for input(s): \"PT\", \"INR\", \"PTT\" in the last 168 hours.     Impression & Plan:      *Hgb SS/Sickle cell disease with acute pain crisis  -Pain is better controlled overall. Home MS contin dose was adequate prior to pain crisis.    - Current level of pain is increased, will give a one-time dose of IV Dilaudid 2 mg.  Encouraged her to take PO oxycodone 30-45mg IR q3hrs prn.  Will keep order for IV Dilaudid as backup.  -continue home MS Contin 60 mg q8hrs.   -Continue hydroxyurea to 2000 mg daily.  Hold endari and crizanlizumab while hospitalized.  She was due for crizanlizumab today, will reschedule for after she is discharged.  -continue Folvite 1 mg daily.    -supp O2  -IVF   -DVT prophy     *PNA  -no longer with chest pain.  Imaging suggestive of PNA, improving on ceftriaxone, recommend continuing this.   -not classic acute chest, received 1 unit PRBCs, no need for exchange transfusion at this time.          HOSPITALIST:    Patient doing better.  Still having intermittent use of IV Dilaudid.       Assessment & Plan:  #Disposition  - Possible discharge later today if pain remains controlled, patient states she  has adequate prescriptions at home and declined any new prescriptions     #Sickle cell pain crisis and anemia   H/o acute chest syndrome   -PTA Folic acid, Hydroxyurea- s/p PCA; prn IV dilaudid   -Continue PTA regimen of MS contin and IR Oxycodone   - PRAVIN toradol      #Chronically elevated troponin  -continue PTA ASA and Beta blocker      #Chronic Macrocytic anemia likely due to hydrea   -monitor Hb    #Abnormal CTA chest- suspicious for PNA  S/p IV CTX x 7 days       MEDICATIONS ADMINISTERED IN LAST 1 DAY:  aspirin DR tab 81 mg       Date Action Dose Route User    5/29/2025 0843 Given 81 mg Oral Palma Jara RN          cefTRIAXone (Rocephin) 2 g in sodium chloride 0.9% 100 mL IVPB-ADDV       Date Action Dose Route User    5/28/2025 2330 New Bag 2 g Intravenous Ana Montesinos RN          folic acid (Folvite) tab 1 mg       Date Action Dose Route User    5/29/2025 0843 Given 1 mg Oral Palma Jara RN          HYDROmorphone (Dilaudid) 1 MG/ML injection 1 mg       Date Action Dose Route User    5/29/2025 0653 Given 1 mg Intravenous Ana Montesinos RN    5/29/2025 0424 Given 1 mg Intravenous Ana Montesinos RN    5/29/2025 0142 Given 1 mg Intravenous Ana Montesinos, RN    5/28/2025 2340 Given 1 mg Intravenous Ana Montesinos, RN    5/28/2025 2023 Given 1 mg Intravenous Ana Montesinos RN          HYDROmorphone (Dilaudid) 1 MG/ML injection 2 mg       Date Action Dose Route User    5/29/2025 0839 Given 2 mg Intravenous Palma Jara RN          hydroxyurea (Hydrea) cap 2,000 mg       Date Action Dose Route User    5/29/2025 0843 Given 2,000 mg Oral Palma Jara RN          melatonin cap/tab 5 mg       Date Action Dose Route User    5/28/2025 2146 Given 5 mg Oral Ana Montesinos RN          morphINE ER (MS Contin) tab 60 mg       Date Action Dose Route User    5/29/2025 1409 Given 60 mg Oral Palma Jara RN    5/29/2025 0604 Given 60 mg Oral Ana Montesinos RN    5/28/2025 2146 Given 60 mg Oral Yamel,  RYLAND Perez    5/28/2025 1716 Given 60 mg Oral TurlingAlondra mcgill RN          oxyCODONE immediate release tab 30 mg       Date Action Dose Route User    5/29/2025 0036 Given 30 mg Oral Ana Montesinos RN          oxyCODONE immediate release tab 30 mg       Date Action Dose Route User    5/29/2025 1239 Given 30 mg Oral Rosalie Sampsonle    5/29/2025 0501 Given 30 mg Oral Ana Montesinos RN          oxyCODONE immediate release tab 45 mg       Date Action Dose Route User    5/28/2025 1603 Given 45 mg Oral Connie Joseph RN          sodium chloride 0.45% infusion       Date Action Dose Route User    5/29/2025 0847 New Bag (none) Intravenous Palma Jara RN    5/29/2025 0039 New Bag (none) Intravenous Ana Montesinos RN          cyanocobalamin (Vitamin B12) tab 1,000 mcg       Date Action Dose Route User    5/29/2025 0848 Given 1,000 mcg Oral Palma Jara RN            Vitals (last day)       Date/Time Temp Pulse Resp BP SpO2 Weight O2 Device O2 Flow Rate (L/min) Who    05/29/25 1148 97.8 °F (36.6 °C) 65 18 103/68 97 % -- None (Room air) 0 L/min JW    05/29/25 0805 97.8 °F (36.6 °C) 53 18 110/82 96 % -- None (Room air) 0 L/min EJ    05/29/25 0415 97.9 °F (36.6 °C) 63 17 123/84 98 % -- None (Room air) -- SB    05/28/25 2335 97.8 °F (36.6 °C) 77 20 106/74 97 % -- None (Room air) -- OB

## 2025-05-29 NOTE — PLAN OF CARE
Assumed care at 1930. A/O x4. RA. VSS. NSR on tele. Cardiac electrolyte protocol. Lovenox for VTE, pt refusing. Regular diet. Continent x2. Complaints of generalized pain, given dilaudid per MAR, and scheduled morphine per MAR. Pt complains of not being able to sleep, Melatonin given per MAR.  Up ad alejandra. Left AC PIV infusing 0.45 NS at 125 mL/hr, left forearm PIV sl. All safety precautions are in place.           Problem: CARDIOVASCULAR - ADULT  Goal: Maintains optimal cardiac output and hemodynamic stability  Description: INTERVENTIONS:  - Monitor vital signs, rhythm, and trends  - Monitor for bleeding, hypotension and signs of decreased cardiac output  - Evaluate effectiveness of vasoactive medications to optimize hemodynamic stability  - Monitor arterial and/or venous puncture sites for bleeding and/or hematoma  - Assess quality of pulses, skin color and temperature  - Assess for signs of decreased coronary artery perfusion - ex. Angina  - Evaluate fluid balance, assess for edema, trend weights  Outcome: Progressing  Goal: Absence of cardiac arrhythmias or at baseline  Description: INTERVENTIONS:  - Continuous cardiac monitoring, monitor vital signs, obtain 12 lead EKG if indicated  - Evaluate effectiveness of antiarrhythmic and heart rate control medications as ordered  - Initiate emergency measures for life threatening arrhythmias  - Monitor electrolytes and administer replacement therapy as ordered  Outcome: Progressing

## 2025-05-30 ENCOUNTER — OFFICE VISIT (OUTPATIENT)
Age: 40
End: 2025-05-30
Attending: INTERNAL MEDICINE
Payer: COMMERCIAL

## 2025-05-30 ENCOUNTER — PATIENT OUTREACH (OUTPATIENT)
Dept: CASE MANAGEMENT | Age: 40
End: 2025-05-30

## 2025-05-30 VITALS
BODY MASS INDEX: 26.52 KG/M2 | DIASTOLIC BLOOD PRESSURE: 67 MMHG | HEIGHT: 66.38 IN | WEIGHT: 167 LBS | RESPIRATION RATE: 18 BRPM | SYSTOLIC BLOOD PRESSURE: 104 MMHG | TEMPERATURE: 97 F | OXYGEN SATURATION: 97 % | HEART RATE: 83 BPM

## 2025-05-30 DIAGNOSIS — D57.00 SICKLE CELL PAIN CRISIS (HCC): Primary | ICD-10-CM

## 2025-05-30 DIAGNOSIS — D57.419 SICKLE CELL ANEMIA WITH COEXISTENT ALPHA-THALASSEMIA WITH CRISIS (HCC): ICD-10-CM

## 2025-05-30 DIAGNOSIS — D57.00 SICKLE CELL PAIN CRISIS (HCC): ICD-10-CM

## 2025-05-30 DIAGNOSIS — D57.00 SICKLE CELL DISEASE WITH CRISIS (HCC): Primary | ICD-10-CM

## 2025-05-30 RX ORDER — OXYCODONE HYDROCHLORIDE 30 MG/1
30 TABLET ORAL
Qty: 180 TABLET | Refills: 0 | Status: SHIPPED | OUTPATIENT
Start: 2025-05-30

## 2025-05-30 RX ORDER — DIPHENHYDRAMINE HCL 25 MG
CAPSULE ORAL ONCE
Start: 2025-06-27 | End: 2025-06-27

## 2025-05-30 RX ORDER — MORPHINE SULFATE 60 MG/1
60 TABLET, FILM COATED, EXTENDED RELEASE ORAL EVERY 8 HOURS SCHEDULED
Qty: 90 TABLET | Refills: 0 | Status: SHIPPED | OUTPATIENT
Start: 2025-05-30

## 2025-05-30 RX ORDER — HYDROMORPHONE HYDROCHLORIDE 1 MG/ML
1-2 INJECTION, SOLUTION INTRAMUSCULAR; INTRAVENOUS; SUBCUTANEOUS AS NEEDED
Status: DISCONTINUED | OUTPATIENT
Start: 2025-05-30 | End: 2025-05-30

## 2025-05-30 RX ORDER — DIPHENHYDRAMINE HCL 25 MG
CAPSULE ORAL ONCE
Status: COMPLETED | OUTPATIENT
Start: 2025-05-30 | End: 2025-05-30

## 2025-05-30 RX ORDER — OXYCODONE HYDROCHLORIDE 15 MG/1
15 TABLET ORAL
Qty: 180 TABLET | Refills: 0 | Status: SHIPPED | OUTPATIENT
Start: 2025-05-30

## 2025-05-30 RX ORDER — DIPHENHYDRAMINE HCL 25 MG
CAPSULE ORAL ONCE
Status: CANCELLED
Start: 2025-05-30 | End: 2025-05-30

## 2025-05-30 RX ORDER — HYDROMORPHONE HYDROCHLORIDE 1 MG/ML
1-2 INJECTION, SOLUTION INTRAMUSCULAR; INTRAVENOUS; SUBCUTANEOUS AS NEEDED
Start: 2025-06-27

## 2025-05-30 RX ORDER — ONDANSETRON 2 MG/ML
8 INJECTION INTRAMUSCULAR; INTRAVENOUS AS NEEDED
Start: 2025-06-27

## 2025-05-30 RX ADMIN — DIPHENHYDRAMINE HCL 25 MG: 25 MG CAPSULE ORAL at 13:41:00

## 2025-05-30 RX ADMIN — HYDROMORPHONE HYDROCHLORIDE 2 MG: 1 INJECTION, SOLUTION INTRAMUSCULAR; INTRAVENOUS; SUBCUTANEOUS at 13:47:00

## 2025-05-30 NOTE — PAYOR COMM NOTE
--------------  DISCHARGE REVIEW    Payor: [employer] STATE PPO  Subscriber #:  [id number]  Authorization Number: [id number]    Admit date: 5/22/25  Admit time:   6:31 AM  Discharge Date: 5/29/2025  5:57 PM     Admitting Physician: Slime Keene MD  Attending Physician:  No att. providers found  Primary Care Physician: Clive St MD       Discharge Summary Notes    No notes of this type exist for this encounter.         REVIEWER COMMENTS    HOME

## 2025-05-30 NOTE — PROGRESS NOTES
Education Record    Learner:  Patient    Disease / Diagnosis: Sickle Cell    Barriers / Limitations:  None   Comments:    Method:  Brief focused   Comments:    General Topics:  Plan of care reviewed   Comments:    Outcome:  Shows understanding   Comments:    Pt received Adakveo along w/ benadryl, fluids and dilaudid today.  Pt discharged in stable condition.  Pt may come next week for ivf/pain meds-pt to call and cancel if not needed.

## 2025-06-03 ENCOUNTER — OFFICE VISIT (OUTPATIENT)
Dept: FAMILY MEDICINE CLINIC | Facility: CLINIC | Age: 40
End: 2025-06-03
Payer: COMMERCIAL

## 2025-06-03 VITALS
DIASTOLIC BLOOD PRESSURE: 70 MMHG | RESPIRATION RATE: 16 BRPM | HEART RATE: 80 BPM | OXYGEN SATURATION: 98 % | WEIGHT: 174.38 LBS | HEIGHT: 66.38 IN | BODY MASS INDEX: 27.7 KG/M2 | SYSTOLIC BLOOD PRESSURE: 114 MMHG

## 2025-06-03 DIAGNOSIS — J18.9 COMMUNITY ACQUIRED PNEUMONIA OF RIGHT LUNG, UNSPECIFIED PART OF LUNG: ICD-10-CM

## 2025-06-03 DIAGNOSIS — D57.00 SICKLE CELL CRISIS (HCC): Primary | ICD-10-CM

## 2025-06-03 PROCEDURE — 3008F BODY MASS INDEX DOCD: CPT | Performed by: FAMILY MEDICINE

## 2025-06-03 PROCEDURE — 3074F SYST BP LT 130 MM HG: CPT | Performed by: FAMILY MEDICINE

## 2025-06-03 PROCEDURE — 3078F DIAST BP <80 MM HG: CPT | Performed by: FAMILY MEDICINE

## 2025-06-03 PROCEDURE — 99496 TRANSJ CARE MGMT HIGH F2F 7D: CPT | Performed by: FAMILY MEDICINE

## 2025-06-03 NOTE — PROGRESS NOTES
Chief Complaint   Patient presents with    TCM (Transition Of Care Management)     Patient here for TCM      Subjective:   Oluwaseun Oluwadamilola Ogunyemi is a 39 year old female who presents for hospital follow up.   She was discharged from [facility] to Home or Self Care  Admission Date: 5/22/25   Discharge Date: 5/29/25  Hospital Discharge Diagnosis: sickle cell crisis    Interactive contact within 2 business days post discharge first initiated on Date: 5/30/2025    I accessed [facility] and/or IM[facility]V Everywhere and personally reviewed the following for the recent hospitalization: provider notes, consults, summaries, labs and other test results and the pertinent findings are documented below.     HPI: Pt is a 38yo female with PMH sickle cell disease who presents for hospital follow up.  Was admitted on 22 May, discharged on 29 May.  She required IV pain medications and was monitored with regular blood work.  She did have a CT of the chest which showed some consolidation in the right lower lobe and right middle lobe which may represent pneumonia.  Due to this, she was also on IV antibiotics throughout the admission.    Day after discharge, was seen at the clinic and given Adakveo and Benadryl, fluid and Dilaudid.  She has an arrangement with the hematology team for IV fluids and pain medicines if she feels a crisis,.    Taking hydrea at 2,000mg daily.    Also seeing [facility] to consider gene therapy.      She is off work this week for recovery.  Is aware that work takes a toll on her, and is worried about a relapse and repeat hospitalization if she returns too soon.        History/Other:   Current Medications:  Medication Reconciliation:  I am aware of an inpatient discharge within the last 30 days.  The discharge medication list has been reconciled with the patient's current medication list and reviewed by me. See medication list for additions of new medication, and changes to current doses of medications and  discontinued medications.  Active Meds, Sig Only[1]    Review of Systems:  GENERAL: weight stable, energy stable, no sweating  SKIN: denies any unusual skin lesions  EYES: denies blurred vision or double vision  HEENT: denies nasal congestion, sinus pain or ST  LUNGS: denies shortness of breath with exertion  CARDIOVASCULAR: denies chest pain on exertion or palpitations  GI: denies abdominal pain, denies heartburn, denies diarrhea  MUSCULOSKELETAL: denies pain, normal range of motion of extremities  NEURO: denies headaches, denies dizziness, denies weakness  PSYCHE: denies depression or anxiety  HEMATOLOGIC: denies hx of anemia, or bruising, denies bleeding  ENDOCRINE: denies thyroid history  ALL/ASTHMA: denies hx of allergy or asthma    Objective:   Patient's last menstrual period was 04/15/2025 (approximate).  Estimated body mass index is 27.82 kg/m² as calculated from the following:    Height as of this encounter: 5' 6.38\" (1.686 m).    Weight as of this encounter: 174 lb 6 oz (79.1 kg).   /70   Pulse 80   Resp 16   Ht 5' 6.38\" (1.686 m)   Wt 174 lb 6 oz (79.1 kg)   LMP 04/15/2025 (Approximate)   SpO2 98%   BMI 27.82 kg/m²    GENERAL: well developed, well nourished, in no apparent distress  SKIN: no rashes, no suspicious lesions  HEENT: atraumatic, normocephalic  EYES: PERRLA, EOMI, conjunctiva are clear  NECK: supple, no adenopathy, no bruits  CHEST: no chest tenderness  LUNGS: clear to auscultation  CARDIO: RRR without murmur  GI: good BS's, no masses, HSM or tenderness  MUSCULOSKELETAL: back is not tender, FROM of the extremities  EXTREMITIES: no cyanosis, clubbing or edema  NEURO: Oriented times three, cranial nerves are intact, motor and sensory are grossly intact     Hemoglobin   Latest Ref Rng 12.0 - 16.0 g/dL   5/22/2025 9.0 (L)    5/23/2025 8.6 (L)    5/24/2025 8.1 (L)    5/25/2025 10.2 (L)     7.8 (L)    5/26/2025 9.3 (L)    5/27/2025 9.5 (L)    5/28/2025 10.0 (L)       Legend:  (L) Low  CT  chest  CONCLUSION:     1.  Airspace consolidation in the right lower lobe posteriorly and right middle lobe medially may represent atelectasis or pneumonia.     2. Small right pleural effusion could be parapneumonic effusion.   3. No evidence of pulmonary embolus.     Assessment & Plan:     Oluwaseun Oluwadamilola Ogunyemi was seen in the office today:  had concerns including TCM (Transition Of Care Management) (Patient here for TCM ).    1. Sickle cell crisis (HCC)  Flareup in weeklong hospitalization  Pain controlled with IV medications, IV fluids  Able to be discharged, feeling well since that time.  Did see the hematology team and got some IV fluids and pain control after discharge  She will continue to be alert for recurrences.  Likely flareup due to the pneumonia that was found during the admission, though she had no symptoms of this.  Hoping she now qualifies for the gene therapy given the number of admissions she has had    2. Community acquired pneumonia of right lung, unspecified part of lung  Noted on imaging  Status post IV antibiotics per week in the hospital  Lungs are better.  No coughing, clear on exam.          Clive St M.D.   EMG 3  06/03/25        No follow-ups on file.          [1]   Outpatient Medications Marked as Taking for the 6/3/25 encounter (Office Visit) with Clive St MD   Medication Sig    OxyCODONE HCl IR 30 MG Oral Tab Take 1 tablet (30 mg total) by mouth every 3 (three) hours as needed for Pain.    oxyCODONE 15 MG Oral Tab Take 1 tablet (15 mg total) by mouth every 3 (three) hours as needed for Pain. Combine with 30mg tablets to take up to 45mg every 3 hours as needed    hydroxyurea 500 MG Oral Cap Take 4 capsules (2,000 mg total) by mouth daily.    loperamide 2 MG Oral Cap Take 1 capsule (2 mg total) by mouth every 3 (three) hours as needed for Diarrhea.    diphenhydrAMINE 25 MG Oral Cap Take 1-2 capsules (25-50 mg total) by mouth every 8 (eight)  hours as needed for Itching.    ASPIRIN LOW DOSE 81 MG Oral Tab EC TAKE 1 TABLET BY MOUTH EVERY DAY    ondansetron (ZOFRAN) 8 MG tablet Take 1 tablet (8 mg total) by mouth every 8 (eight) hours as needed for Nausea.    folic acid 1 MG Oral Tab Take 1 tablet (1 mg total) by mouth daily.    Naloxone HCl 4 MG/0.1ML Nasal Liquid 4 mg by Nasal route as needed. If patient remains unresponsive, repeat dose in other nostril 2-5 minutes after first dose.    metoprolol succinate ER 25 MG Oral Tablet 24 Hr Take 1 tablet (25 mg total) by mouth in the morning.    Cyanocobalamin (VITAMIN B-12 OR) Take 1 tablet by mouth in the morning.

## 2025-06-03 NOTE — PAYOR COMM NOTE
WE ARE STILL AWAITING YOUR DETERMINATION ON THIS INPT ADMISSION.    PLEASE FAX INPT DAYS AUTHORIZED ASAP [phone]    THANK YOU!     --------------  DISCHARGE REVIEW    Payor: [employer] OUT OF STATE [facility]  Subscriber #:  [id number]  Authorization Number: [id number]    Admit date: 5/22/25  Admit time:   6:31 AM  Discharge Date: 5/29/2025  5:57 PM     Admitting Physician: Slime Keene MD  Attending Physician:  No att. providers found  Primary Care Physician: Clive St MD       Discharge Summary Notes    No notes of this type exist for this encounter.         REVIEWER COMMENTS  HOME

## 2025-06-03 NOTE — PAYOR COMM NOTE
--------------  DISCHARGE REVIEW    Payor: [employer] OUT OF STATE PPO  Subscriber #:  [id number]  Authorization Number: [id number]    Admit date: 5/22/25  Admit time:   6:31 AM  Discharge Date: 5/29/2025  5:57 PM     Admitting Physician: Slime Keene MD  Attending Physician:  No att. providers found  Primary Care Physician: Clive St MD       Discharge Summary Notes    No notes of this type exist for this encounter.     REQUESTING APPROVAL FOR ALL DAYS UP TO ( but not including)  5/29    PLEASE FAX  DAYS AUTHORIZED [phone]    THANK YOU!

## 2025-06-05 ENCOUNTER — APPOINTMENT (OUTPATIENT)
Age: 40
End: 2025-06-05
Attending: INTERNAL MEDICINE
Payer: COMMERCIAL

## 2025-06-05 NOTE — DISCHARGE SUMMARY
[place] [facility]  DISCHARGE SUMMARY     Oluwaseun Oluwadamilola Ogunyemi Patient Status:  Inpatient    8/3/1985 MRN [id number]   Location [facility] 3NE-A Attending No att. providers found   Hosp Day # 7 PCP Clive St MD     Date of Admission: 2025  Date of Discharge:  2025     Discharge Disposition: Home or Self Care    Discharge Diagnosis:  #Disposition  - Possible discharge later today if pain remains controlled, patient states she has adequate prescriptions at home and declined any new prescriptions     #Sickle cell pain crisis and anemia   H/o acute chest syndrome   -PTA Folic acid, Hydroxyurea- s/p PCA; prn IV dilaudid   -Continue PTA regimen of MS contin and IR Oxycodone   - PRAVIN toradol      #Chronically elevated troponin  -continue PTA ASA and Beta blocker      #Chronic Macrocytic anemia likely due to hydrea   -monitor Hb    #Abnormal CTA chest- suspicious for PNA  S/p IV CTX x 7 days     History of Present Illness: Oluwaseun Oluwadamilola Ogunyemi is a 39 year old female with History of sickle cell disease who follows up with Dr. Albarran and at [facility], patient is compliant with monthly IV infusions and with follow-ups.  She takes Oxy intermittently at 3 L as needed almost every day and morphine extended release twice daily.  She had chest pain syndrome many years ago and she required IVC exchange.  She has about 2 3 admissions per year otherwise and she comes in complaining of chest discomfort 3-4 out of 10 worse with taking a deep breath and low back pain needs pain lower rib cage chest pain.     Brief Synopsis: Patient admitted to hospital for sickle cell pain crisis and seen hematology consultation.  Patient proved with IV fluids and IV pain medicine.  Patient was diagnosed with pneumonia and completed 7 days of IV antibiotics during hospitalization.  At this point patient medically cleared for discharge.    Lace+ Score: 55  59-90 High Risk  29-58 Medium Risk  0-28    Low Risk       TCM Follow-Up Recommendation:  LACE 29-58: Moderate Risk of readmission after discharge from the hospital.      Procedures during hospitalization:       Incidental or significant findings and recommendations (brief descriptions):      Lab/Test results pending at Discharge:       Consultants:  Heme/onc    Discharge Medication List:     Discharge Medications        CONTINUE taking these medications        Instructions Prescription details   Aspirin Low Dose 81 MG Tbec  Generic drug: aspirin      TAKE 1 TABLET BY MOUTH EVERY DAY   Quantity: 90 tablet  Refills: 3     diphenhydrAMINE 25 MG Caps  Commonly known as: Benadryl      Take 1-2 capsules (25-50 mg total) by mouth every 8 (eight) hours as needed for Itching.   Refills: 0     folic acid 1 MG Tabs  Commonly known as: Folvite      Take 1 tablet (1 mg total) by mouth daily.   Quantity: 90 tablet  Refills: 3     hydroxyurea 500 MG Caps  Commonly known as: Hydrea      Take 4 capsules (2,000 mg total) by mouth daily.   Quantity: 360 capsule  Refills: 3     loperamide 2 MG Caps  Commonly known as: Imodium      Take 1 capsule (2 mg total) by mouth every 3 (three) hours as needed for Diarrhea.   Quantity: 30 capsule  Refills: 0     metoprolol succinate ER 25 MG Tb24  Commonly known as: Toprol XL      Take 1 tablet (25 mg total) by mouth in the morning.   Refills: 0     Naloxone HCl 4 MG/0.1ML Liqd      4 mg by Nasal route as needed. If patient remains unresponsive, repeat dose in other nostril 2-5 minutes after first dose.   Quantity: 1 kit  Refills: 0     ondansetron 8 MG tablet  Commonly known as: Zofran      Take 1 tablet (8 mg total) by mouth every 8 (eight) hours as needed for Nausea.   Quantity: 9 tablet  Refills: 13     VITAMIN B-12 OR      Take 1 tablet by mouth in the morning.   Refills: 0            ASK your doctor about these medications        Instructions Prescription details   Endari 5 g Pack  Generic drug: Glutamine (Sickle Cell)      Mix 3  packets (15 grams) with 8 ounces (240 mL) of cold or room temperature liquid or 4 to 6 ounces of food and take by mouth twice daily as directed.   Quantity: 180 each  Refills: 11              [place] reviewed:     Follow-up appointment:   Clive St MD  [address]  [facility] [address]  [phone]    Follow up in 1 week(s)      Appointments for Next 30 Days 2025 - 2025        Date Arrival Time Visit Type Length Department Provider     2025 10:30 AM  INFUSION [1980] 60 min [facility] Infusion [facility] NP [place] RN2    Patient Instructions:         Location Instructions:     Your appointment is on the [facility] in the Cancer [facility]. The address is [address] [place] [address]. Please register at the [facility]  on the second floor.  Masks are optional for all patients and visitors, unless otherwise indicated. No care partners/visitors under 18 years of age are allowed in the infusion room.                      Vital signs:       Physical Exam:    General: No acute distress   Lungs: clear to auscultation  Cardiovascular: S1, S2  Abdomen: Soft      -----------------------------------------------------------------------------------------------  PATIENT DISCHARGE INSTRUCTIONS: See electronic chart    Jeremi Denny DO    Total time spent on discharge plannin minutes     The  [employer] Cures Act makes medical notes like these available to patients in the interest of transparency. Please be advised this is a medical document. Medical documents are intended to carry relevant information, facts as evident, and the clinical opinion of the practitioner. The medical note is intended as peer to peer communication and may appear blunt or direct. It is written in medical language and may contain abbreviations or verbiage that are unfamiliar.

## 2025-06-13 ENCOUNTER — TELEPHONE (OUTPATIENT)
Dept: FAMILY MEDICINE CLINIC | Facility: CLINIC | Age: 40
End: 2025-06-13

## 2025-06-13 NOTE — TELEPHONE ENCOUNTER
Spoke with patient, states her sickle cell anemia is preventing her from working and would like a extension of her leave for another week. Until Monday 6/23/25. Patient would like this letter/Doctors note sent to her MyChart at least by Saturday.

## 2025-06-13 NOTE — TELEPHONE ENCOUNTER
Per note on 6/3:  She is off work this week for recovery. Is aware that work takes a toll on her, and is worried about a relapse and repeat hospitalization if she returns too soon.     Admitted for SCC/chest pain on 5/22    Left  a VM to see how she's doing. Ok for another week off work?

## 2025-06-20 ENCOUNTER — TELEPHONE (OUTPATIENT)
Dept: FAMILY MEDICINE CLINIC | Facility: CLINIC | Age: 40
End: 2025-06-20

## 2025-06-20 NOTE — TELEPHONE ENCOUNTER
Patient called back to provide details, ADVISED     UPLOAD TO [employer]    Type of Leave: disability   Reason for Leave:Sickle cell crisis   Start date of leave: 5/22/25  End date of leave: 6/22/25 rtw 6/23/25  How many flare ups per month/length?:  How many appts per month/length?:   Was Fee and Turnaround info Given?: y

## 2025-06-20 NOTE — TELEPHONE ENCOUNTER
Short term disability forms rec'd via email. Sent [email or web address] message for  Release of Information. Logged for processing. Called patient to obtain details. Left voicemail to call back [phone].

## 2025-06-23 ENCOUNTER — TELEPHONE (OUTPATIENT)
Age: 40
End: 2025-06-23

## 2025-06-23 NOTE — TELEPHONE ENCOUNTER
Dr. St,    Please sign off on form if you agree to: Disability from 5/22/25 to 6/22/25, Return to work 6/23/25 due to Sickle cell crisis    -Signature page will be the first page scanned  -From your Inbasket, Highlight the patient and click Chart   -Double click the 6/20/25 Forms Completion telephone encounter  -Scroll down to the Media section   -Click the blue Hyperlink: Disability, Dr. St, 6/23/25  -Click Acknowledge located in the top right ribbon/menu   -Drag the mouse into the blank space of the document and a + sign will appear. Left click to   electronically sign the document.  -Once signed, simply exit out of the screen and you signature will be saved.     Thank you,  Carlene GARCIA

## 2025-06-24 NOTE — TELEPHONE ENCOUNTER
Forms completed and efaxed to [employer] [phone]. Valid Release of Information on file 11/26/24. [employer] message sent, fax confirmed.

## 2025-06-26 ENCOUNTER — OFFICE VISIT (OUTPATIENT)
Age: 40
End: 2025-06-26
Attending: INTERNAL MEDICINE
Payer: COMMERCIAL

## 2025-06-26 VITALS
TEMPERATURE: 98 F | WEIGHT: 177 LBS | HEIGHT: 66.38 IN | HEART RATE: 93 BPM | RESPIRATION RATE: 16 BRPM | SYSTOLIC BLOOD PRESSURE: 115 MMHG | BODY MASS INDEX: 28.11 KG/M2 | OXYGEN SATURATION: 96 % | DIASTOLIC BLOOD PRESSURE: 79 MMHG

## 2025-06-26 DIAGNOSIS — D57.00 SICKLE CELL PAIN CRISIS (HCC): ICD-10-CM

## 2025-06-26 DIAGNOSIS — D57.419 SICKLE CELL ANEMIA WITH COEXISTENT ALPHA-THALASSEMIA WITH CRISIS (HCC): ICD-10-CM

## 2025-06-26 DIAGNOSIS — D57.00 SICKLE CELL DISEASE WITH CRISIS (HCC): Primary | ICD-10-CM

## 2025-06-26 RX ORDER — HYDROMORPHONE HYDROCHLORIDE 1 MG/ML
1-2 INJECTION, SOLUTION INTRAMUSCULAR; INTRAVENOUS; SUBCUTANEOUS AS NEEDED
Start: 2025-07-24

## 2025-06-26 RX ORDER — HYDROMORPHONE HYDROCHLORIDE 1 MG/ML
1-2 INJECTION, SOLUTION INTRAMUSCULAR; INTRAVENOUS; SUBCUTANEOUS AS NEEDED
Status: COMPLETED | OUTPATIENT
Start: 2025-06-26 | End: 2025-06-26

## 2025-06-26 RX ORDER — ONDANSETRON 2 MG/ML
8 INJECTION INTRAMUSCULAR; INTRAVENOUS AS NEEDED
Start: 2025-07-24

## 2025-06-26 RX ORDER — DIPHENHYDRAMINE HCL 25 MG
CAPSULE ORAL ONCE
Start: 2025-07-24 | End: 2025-07-24

## 2025-06-26 RX ORDER — OXYCODONE HYDROCHLORIDE 15 MG/1
15 TABLET ORAL
Qty: 180 TABLET | Refills: 0 | Status: SHIPPED | OUTPATIENT
Start: 2025-06-26

## 2025-06-26 RX ORDER — MORPHINE SULFATE 60 MG/1
60 TABLET, FILM COATED, EXTENDED RELEASE ORAL EVERY 8 HOURS SCHEDULED
Qty: 90 TABLET | Refills: 0 | Status: SHIPPED | OUTPATIENT
Start: 2025-06-26

## 2025-06-26 RX ORDER — OXYCODONE HYDROCHLORIDE 30 MG/1
30 TABLET ORAL
Qty: 180 TABLET | Refills: 0 | Status: SHIPPED | OUTPATIENT
Start: 2025-06-26

## 2025-06-26 RX ADMIN — HYDROMORPHONE HYDROCHLORIDE 2 MG: 1 INJECTION, SOLUTION INTRAMUSCULAR; INTRAVENOUS; SUBCUTANEOUS at 10:25:00

## 2025-06-26 RX ADMIN — HYDROMORPHONE HYDROCHLORIDE 2 MG: 1 INJECTION, SOLUTION INTRAMUSCULAR; INTRAVENOUS; SUBCUTANEOUS at 11:52:00

## 2025-06-26 RX ADMIN — HYDROMORPHONE HYDROCHLORIDE 2 MG: 1 INJECTION, SOLUTION INTRAMUSCULAR; INTRAVENOUS; SUBCUTANEOUS at 11:02:00

## 2025-06-26 NOTE — PROGRESS NOTES
Education Record    Learner:  Patient    Disease / Diagnosis:    Barriers / Limitations:  None   Comments:    Method:  Brief focused   Comments:    General Topics:  Side effects and symptom management   Comments:    Outcome:  Shows understanding   Comments:    Pt here for IVF 1L and Adakveo, pain meds

## 2025-07-10 ENCOUNTER — OFFICE VISIT (OUTPATIENT)
Age: 40
End: 2025-07-10
Attending: INTERNAL MEDICINE
Payer: COMMERCIAL

## 2025-07-10 ENCOUNTER — TELEPHONE (OUTPATIENT)
Age: 40
End: 2025-07-10

## 2025-07-10 ENCOUNTER — OFFICE VISIT (OUTPATIENT)
Facility: LOCATION | Age: 40
End: 2025-07-10
Attending: INTERNAL MEDICINE
Payer: COMMERCIAL

## 2025-07-10 VITALS
TEMPERATURE: 99 F | BODY MASS INDEX: 28.93 KG/M2 | DIASTOLIC BLOOD PRESSURE: 79 MMHG | HEIGHT: 66 IN | RESPIRATION RATE: 20 BRPM | WEIGHT: 180 LBS | HEART RATE: 68 BPM | SYSTOLIC BLOOD PRESSURE: 116 MMHG | OXYGEN SATURATION: 100 %

## 2025-07-10 DIAGNOSIS — D57.00 SICKLE CELL PAIN CRISIS (HCC): Primary | ICD-10-CM

## 2025-07-10 DIAGNOSIS — D53.9 MACROCYTIC ANEMIA: ICD-10-CM

## 2025-07-10 DIAGNOSIS — D57.419 SICKLE CELL ANEMIA WITH COEXISTENT ALPHA-THALASSEMIA WITH CRISIS (HCC): ICD-10-CM

## 2025-07-10 DIAGNOSIS — D57.00 SICKLE CELL DISEASE WITH CRISIS (HCC): Primary | ICD-10-CM

## 2025-07-10 DIAGNOSIS — D57.00 SICKLE CELL PAIN CRISIS (HCC): ICD-10-CM

## 2025-07-10 LAB
ALBUMIN SERPL-MCNC: 4.2 G/DL (ref 3.2–4.8)
ALBUMIN/GLOB SERPL: 1 (ref 1–2)
ALP LIVER SERPL-CCNC: 43 U/L (ref 37–98)
ALT SERPL-CCNC: 20 U/L (ref 10–49)
ANION GAP SERPL CALC-SCNC: 2 MMOL/L (ref 0–18)
AST SERPL-CCNC: 33 U/L (ref ?–34)
BASOPHILS # BLD AUTO: 0.01 X10(3) UL (ref 0–0.2)
BASOPHILS NFR BLD AUTO: 0.3 %
BILIRUB SERPL-MCNC: 0.8 MG/DL (ref 0.3–1.2)
BUN BLD-MCNC: <5 MG/DL (ref 9–23)
CALCIUM BLD-MCNC: 8.7 MG/DL (ref 8.7–10.6)
CHLORIDE SERPL-SCNC: 105 MMOL/L (ref 98–112)
CO2 SERPL-SCNC: 33 MMOL/L (ref 21–32)
CREAT BLD-MCNC: 0.64 MG/DL (ref 0.55–1.02)
EGFRCR SERPLBLD CKD-EPI 2021: 115 ML/MIN/1.73M2 (ref 60–?)
EOSINOPHIL # BLD AUTO: 0.02 X10(3) UL (ref 0–0.7)
EOSINOPHIL NFR BLD AUTO: 0.5 %
ERYTHROCYTE [DISTWIDTH] IN BLOOD BY AUTOMATED COUNT: 18 %
GLOBULIN PLAS-MCNC: 4.1 G/DL (ref 2–3.5)
GLUCOSE BLD-MCNC: 97 MG/DL (ref 70–99)
HCT VFR BLD AUTO: 23.4 % (ref 35–48)
HGB BLD-MCNC: 8.6 G/DL (ref 12–16)
HGB RETIC QN AUTO: 38.6 PG (ref 28.2–36.6)
IMM GRANULOCYTES # BLD AUTO: 0.01 X10(3) UL (ref 0–1)
IMM GRANULOCYTES NFR BLD: 0.3 %
IMM RETICS NFR: 0.35 RATIO (ref 0.1–0.3)
LYMPHOCYTES # BLD AUTO: 1.3 X10(3) UL (ref 1–4)
LYMPHOCYTES NFR BLD AUTO: 34 %
MCH RBC QN AUTO: 39.1 PG (ref 26–34)
MCHC RBC AUTO-ENTMCNC: 36.8 G/DL (ref 31–37)
MCV RBC AUTO: 106.4 FL (ref 80–100)
MONOCYTES # BLD AUTO: 0.41 X10(3) UL (ref 0.1–1)
MONOCYTES NFR BLD AUTO: 10.7 %
NEUTROPHILS # BLD AUTO: 2.07 X10 (3) UL (ref 1.5–7.7)
NEUTROPHILS # BLD AUTO: 2.07 X10(3) UL (ref 1.5–7.7)
NEUTROPHILS NFR BLD AUTO: 54.2 %
PLATELET # BLD AUTO: 320 10(3)UL (ref 150–450)
POTASSIUM SERPL-SCNC: 4 MMOL/L (ref 3.5–5.1)
PROT SERPL-MCNC: 8.3 G/DL (ref 5.7–8.2)
RBC # BLD AUTO: 2.2 X10(6)UL (ref 3.8–5.3)
RETICS # AUTO: 99.7 X10(3) UL (ref 22.5–147.5)
RETICS/RBC NFR AUTO: 4.5 % (ref 0.5–2.5)
SODIUM SERPL-SCNC: 140 MMOL/L (ref 136–145)
WBC # BLD AUTO: 3.8 X10(3) UL (ref 4–11)

## 2025-07-10 RX ORDER — HYDROMORPHONE HYDROCHLORIDE 1 MG/ML
1-2 INJECTION, SOLUTION INTRAMUSCULAR; INTRAVENOUS; SUBCUTANEOUS AS NEEDED
Status: CANCELLED
Start: 2025-07-24

## 2025-07-10 RX ORDER — HYDROMORPHONE HYDROCHLORIDE 1 MG/ML
2 INJECTION, SOLUTION INTRAMUSCULAR; INTRAVENOUS; SUBCUTANEOUS ONCE
Status: COMPLETED | OUTPATIENT
Start: 2025-07-10 | End: 2025-07-10

## 2025-07-10 RX ORDER — ONDANSETRON 2 MG/ML
8 INJECTION INTRAMUSCULAR; INTRAVENOUS AS NEEDED
Status: CANCELLED
Start: 2025-07-24

## 2025-07-10 RX ORDER — HYDROMORPHONE HYDROCHLORIDE 1 MG/ML
2 INJECTION, SOLUTION INTRAMUSCULAR; INTRAVENOUS; SUBCUTANEOUS EVERY 30 MIN PRN
Status: COMPLETED | OUTPATIENT
Start: 2025-07-10 | End: 2025-07-10

## 2025-07-10 RX ORDER — DIPHENHYDRAMINE HCL 25 MG
CAPSULE ORAL ONCE
Status: CANCELLED
Start: 2025-07-24 | End: 2025-07-24

## 2025-07-10 RX ORDER — HYDROMORPHONE HYDROCHLORIDE 1 MG/ML
2 INJECTION, SOLUTION INTRAMUSCULAR; INTRAVENOUS; SUBCUTANEOUS ONCE
Refills: 0 | Status: CANCELLED
Start: 2025-07-24 | End: 2025-07-24

## 2025-07-10 RX ADMIN — HYDROMORPHONE HYDROCHLORIDE 2 MG: 1 INJECTION, SOLUTION INTRAMUSCULAR; INTRAVENOUS; SUBCUTANEOUS at 15:57:00

## 2025-07-10 RX ADMIN — HYDROMORPHONE HYDROCHLORIDE 2 MG: 1 INJECTION, SOLUTION INTRAMUSCULAR; INTRAVENOUS; SUBCUTANEOUS at 15:17:00

## 2025-07-10 RX ADMIN — HYDROMORPHONE HYDROCHLORIDE 2 MG: 1 INJECTION, SOLUTION INTRAMUSCULAR; INTRAVENOUS; SUBCUTANEOUS at 16:29:00

## 2025-07-10 NOTE — PROGRESS NOTES
Cancer Center Progress Note    Patient Name: Oluwaseun Oluwadamilola Ogunyemi   YOB: 1985   Medical Record Number: [id number]   CSN: [id number]   Date of visit: 7/10/25  Provider: JUAN Weathers  Referring Physician: Esau Mccormick MD       NOTE TO THE PATIENT:  The 21st Century Cures Act makes medical notes like these available to patients in the interest of transparency. Please be advised this is a medical document. Medical documents are intended to carry relevant information, facts as evident, and the clinical opinion of the practitioner. The medical note is intended as peer to peer communication and may appear blunt or direct. It is written in medical language and may contain abbreviations or verbiage that are unfamiliar.       Chief Complaint:  Follow up          Hematologic History:  *Sickle cell disease, hgb SS  -early 2000's moved from [facility] to   -2016/2017- 1st pregnancy c/b worsening pain crises  -2018- 2nd pregnancy c/b worsening pain crises, including episode of acute chest requiring RBC exchange.  -early 2019- started hydroxyurea, titrated up to 2000mg daily  -7/2020- started Voxelotor, but dc'd shortly after starting d/t poor tolerability with diarrhea, fatigue, elevated LFTs, was not very helpful either.   -5/28/21- 3/2022- received crizanlizumab (Adakveo) however c/b infusion reaction 3/2022 and was only slightly helpful therefore not continued  -10/2021- moved from [facility] (followed with Dr. Walton of [facility]) to [place].   -4/2022- started L-glutamine (Endari) 15g PO BID, cont'd hydrea  -8/2022- moved from [place] (prev followed by Dr. Esau Blount) to [place], [place]  -3//27/23- resumed crizanlizumab d/t more freq acute pain episodes   -5/8/23- reduced hydrea to 1500mg daily (d/t hgb 7.4, abs retic 47K)   -7/2/23- reduced hydrea to 1000mg daily (d/t hgb 7.0, abs retic 59K)   -12/27/23- increased Hydrea to 1500 mg daily   -4/4/24- inceased Hydrea to 2000mg  daily    Hospitalizations and ED visits for acute vaso-occlusive pain episodes since moving from [place] to [place] in 8/2022:  -9/5/22- hospitalization  -9/19/22- ED  -9/20/22- hospitalization  10/8/22- hospitalization  -10/29/22- ED  -11/6/22- ED  -11/21/22- ED   -1/7/23- hospitalization  -4/22/23- hospitalization  -7/1/23- hospitalization    Interval Events:   39 year old  patient of Dr. Abarca  who presents for acute care due to pain crisis.  No fever, URI, diarrhea, N/V, or other signs of infection.  She went camping last week.  She reports feeling very winded with stair climbing at the camp site.  She slept on a hard surface.  Today she reports lower back pain, bilat femur pain and shoulder pain.  She noted recent chest pain but states this has subsided.  Denies fever or cough.  No edema.  Takes all her oral pain meds at home but she denies that she is getting effective relief prompting this visit.     Allergies:  Allergies   Allergen Reactions    Piperacillin Sod-Tazobactam So ITCHING     Generalized  Tolerated cephalosporins in the past       Social History     Socioeconomic History    Marital status:    Tobacco Use    Smoking status: Never    Smokeless tobacco: Never   Vaping Use    Vaping status: Never Used   Substance and Sexual Activity    Alcohol use: Never     Comment: No history of excessive use    Drug use: Yes     Types: Hydromorphone, Oxycodone     Comment: Prescribed    Sexual activity: Yes     Partners: Male     Birth control/protection: Condom   Other Topics Concern    Caffeine Concern Yes     Comment: 5-10 a month    Exercise Yes     Comment: 1-2 times a week    Seat Belt Yes    Self-Exams Yes     Comment: self breast exam with showering        Past Medical History:    Abnormal antibody titer    Anti-C, Anti-E, Anti-K, Warm Auto Antibody    Acute chest syndrome due to sickle cell crisis (HCC)    Acute chest syndrome due to sickle-cell disease (HCC)    RBC exchange for acute  chest    Blood disorder    sickle cell    Chronic, continuous use of opioids    Constipation due to opioid therapy    High blood pressure    History of blood transfusion    History of cholecystectomy    History of transfusion    multiple blood transfusions, most of which were during pregnancies    Hypokalemia    Nausea    Sickle cell anemia (HCC)    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)    Sickle cell crisis (HCC)    Frequent crises    Sickle cell crisis (HCC)    Sickle cell pain crisis (HCC)        Past Surgical History:   Procedure Laterality Date            2018    Cholecystectomy  2024    LAPAROSCOPIC CHOLECYSTECTOMY WITH CHOLANGIOGRAM          Vital Signs:  Height: 167.6 cm (5' 6\") (07/10 1433)  Weight: 81.6 kg (180 lb) (07/10 1433)  BSA (Calculated - sq m): 1.91 sq meters (07/10 1433)  Pulse: 68 (07/10 1433)  BP: 116/79 (07/10 1433)  Temp: 98.8 °F (37.1 °C) (07/10 1433)  Do Not Use - Resp Rate: --  SpO2: 100 % (07/10 1433)        Medications:    Current Outpatient Medications:     morphINE ER 60 MG Oral Tab CR, Take 1 tablet (60 mg total) by mouth every 8 (eight) hours., Disp: 90 tablet, Rfl: 0    oxyCODONE 15 MG Oral Tab, Take 1 tablet (15 mg total) by mouth every 3 (three) hours as needed for Pain. Combine with 30mg tablets to take up to 45mg every 3 hours as needed, Disp: 180 tablet, Rfl: 0    OxyCODONE HCl IR 30 MG Oral Tab, Take 1 tablet (30 mg total) by mouth every 3 (three) hours as needed for Pain., Disp: 180 tablet, Rfl: 0    hydroxyurea 500 MG Oral Cap, Take 4 capsules (2,000 mg total) by mouth daily., Disp: 360 capsule, Rfl: 3    Glutamine, Sickle Cell, (ENDARI) 5 g Oral Powd Pack, Mix 3 packets (15 grams) with 8 ounces (240 mL) of cold or room temperature liquid or 4 to 6 ounces of food and take by mouth twice daily as directed. (Patient not taking: Reported on 2025), Disp: 180 each, Rfl: 11    loperamide 2 MG Oral Cap, Take 1  capsule (2 mg total) by mouth every 3 (three) hours as needed for Diarrhea., Disp: 30 capsule, Rfl: 0    diphenhydrAMINE 25 MG Oral Cap, Take 1-2 capsules (25-50 mg total) by mouth every 8 (eight) hours as needed for Itching., Disp: , Rfl:     ASPIRIN LOW DOSE 81 MG Oral Tab EC, TAKE 1 TABLET BY MOUTH EVERY DAY, Disp: 90 tablet, Rfl: 3    ondansetron (ZOFRAN) 8 MG tablet, Take 1 tablet (8 mg total) by mouth every 8 (eight) hours as needed for Nausea., Disp: 9 tablet, Rfl: 13    folic acid 1 MG Oral Tab, Take 1 tablet (1 mg total) by mouth daily., Disp: 90 tablet, Rfl: 3    Naloxone HCl 4 MG/0.1ML Nasal Liquid, 4 mg by Nasal route as needed. If patient remains unresponsive, repeat dose in other nostril 2-5 minutes after first dose., Disp: 1 kit, Rfl: 0    metoprolol succinate ER 25 MG Oral Tablet 24 Hr, Take 1 tablet (25 mg total) by mouth in the morning., Disp: , Rfl:     Cyanocobalamin (VITAMIN B-12 OR), Take 1 tablet by mouth in the morning., Disp: , Rfl:     Review of Systems:   As in HPI    Physical Examination:  General: Awake, alert, oriented x3, no acute distress.    HEENT:  Anicteric, conjunctivae and sclerae clear, no sinus tenderness, no oropharyngeal lesion/thrush, mucous membranes are dry  Neck:  Supple, no tenderness, no masses or adenopathy  Lungs:  Clear to auscultation bilaterally  CV:  Regular rate and rhythm  Abdomen:  Non-distended, normoactive bowel sounds, soft,nontender, no hepatosplenomegaly.  Extremities:  No edema, no tenderness  Neuro:  CN 2-12 intact    ECO    No results found for this or any previous visit (from the past 24 hours).        Impression/Plan:  *Acute pain episode  -We will give IV fluid hydration and IV hyromorphone as below  acute pain flare.  If her pain cannot be adequately controlled with this then she will be encouragedto go to the ED to be admitted.  *Management recommendations of acute pain episodes not controlled with home meds  -Hydration with IV fluids with 0.9  NS until determined to be euvolemic  -Recommend prompt management of painful symptoms with parenteral opioids- recommend starting IV hydromorphone 2 mg prn up to 3 doses for additional pain relief.  For pruritus related to opiates consider PO Benadryl or cetirizine.  No IV Benadryl.   -For any acute pain episode evaluation for potential infection or development of acute chest should be considered.    Hgb SS/Sickle cell disease  -Continue hydroxyurea to 2000 mg daily, L-glutamine 15 g BID 4/2022, Folvite 1 mg daily  -On Crizanlizumab (adakveo) 5 mg/kg q4 weeks-dose today.    -Consulted with Dr. Bryce Aviles at [facility].  She's told she's a good candidate Gene Therapy/ASCT and she is expressing interest in pursuing this.  Further discussion with Dr. Mccormick next month.    Anemia:  -Stable.  -Continue vitamin B-12 1000 mcg PO daily to be continued indefinitely  -continue folvite 1mg daily indefinitely  -Follow CBC     *Chronic pain management  -Oxycodone 30-45 mg IR PO q3hrs prn-   -MS Contin 60mg q8 hrs.           JUAN Weathers  [facility]

## 2025-07-10 NOTE — TELEPHONE ENCOUNTER
Pt calling with symptoms of sickle cell pain and wants to know if she can come in today for Hydration. Pain in lower back, legs and joints.       Please contact patient

## 2025-07-24 ENCOUNTER — OFFICE VISIT (OUTPATIENT)
Facility: LOCATION | Age: 40
End: 2025-07-24
Attending: INTERNAL MEDICINE
Payer: COMMERCIAL

## 2025-07-24 VITALS
HEIGHT: 66.38 IN | DIASTOLIC BLOOD PRESSURE: 81 MMHG | RESPIRATION RATE: 16 BRPM | SYSTOLIC BLOOD PRESSURE: 120 MMHG | OXYGEN SATURATION: 96 % | BODY MASS INDEX: 28.21 KG/M2 | WEIGHT: 177.63 LBS | TEMPERATURE: 98 F | HEART RATE: 102 BPM

## 2025-07-24 DIAGNOSIS — D53.9 MACROCYTIC ANEMIA: ICD-10-CM

## 2025-07-24 DIAGNOSIS — D57.00 SICKLE CELL PAIN CRISIS (HCC): ICD-10-CM

## 2025-07-24 DIAGNOSIS — D57.419 SICKLE CELL ANEMIA WITH COEXISTENT ALPHA-THALASSEMIA WITH CRISIS (HCC): ICD-10-CM

## 2025-07-24 DIAGNOSIS — D57.00 SICKLE CELL DISEASE WITH CRISIS (HCC): Primary | ICD-10-CM

## 2025-07-24 DIAGNOSIS — D57.00 SICKLE CELL PAIN CRISIS (HCC): Primary | ICD-10-CM

## 2025-07-24 PROBLEM — R07.9 CHEST PAIN OF UNCERTAIN ETIOLOGY: Status: RESOLVED | Noted: 2023-07-02 | Resolved: 2025-07-24

## 2025-07-24 PROBLEM — J18.9 MULTIFOCAL PNEUMONIA: Status: RESOLVED | Noted: 2024-10-07 | Resolved: 2025-07-24

## 2025-07-24 PROBLEM — R79.89 ELEVATED TROPONIN: Status: RESOLVED | Noted: 2023-04-22 | Resolved: 2025-07-24

## 2025-07-24 PROBLEM — J18.9 PNEUMONIA DUE TO INFECTIOUS ORGANISM: Status: RESOLVED | Noted: 2025-05-25 | Resolved: 2025-07-24

## 2025-07-24 LAB
ALBUMIN SERPL-MCNC: 4.2 G/DL (ref 3.2–4.8)
ALBUMIN/GLOB SERPL: 1.1 {RATIO} (ref 1–2)
ALP LIVER SERPL-CCNC: 59 U/L (ref 37–98)
ALT SERPL-CCNC: 15 U/L (ref 10–49)
ANION GAP SERPL CALC-SCNC: 6 MMOL/L (ref 0–18)
AST SERPL-CCNC: 21 U/L (ref ?–34)
BASOPHILS # BLD AUTO: 0.01 X10(3) UL (ref 0–0.2)
BASOPHILS NFR BLD AUTO: 0.2 %
BILIRUB SERPL-MCNC: 0.7 MG/DL (ref 0.3–1.2)
BUN BLD-MCNC: <5 MG/DL (ref 9–23)
CALCIUM BLD-MCNC: 8.8 MG/DL (ref 8.7–10.6)
CHLORIDE SERPL-SCNC: 104 MMOL/L (ref 98–112)
CO2 SERPL-SCNC: 30 MMOL/L (ref 21–32)
CREAT BLD-MCNC: 0.63 MG/DL (ref 0.55–1.02)
EGFRCR SERPLBLD CKD-EPI 2021: 115 ML/MIN/1.73M2 (ref 60–?)
EOSINOPHIL # BLD AUTO: 0.05 X10(3) UL (ref 0–0.7)
EOSINOPHIL NFR BLD AUTO: 1.1 %
ERYTHROCYTE [DISTWIDTH] IN BLOOD BY AUTOMATED COUNT: 16.5 %
GLOBULIN PLAS-MCNC: 4 G/DL (ref 2–3.5)
GLUCOSE BLD-MCNC: 110 MG/DL (ref 70–99)
HCT VFR BLD AUTO: 23.8 % (ref 35–48)
HGB BLD-MCNC: 8.6 G/DL (ref 12–16)
HGB RETIC QN AUTO: 39 PG (ref 28.2–36.6)
IMM GRANULOCYTES # BLD AUTO: 0.01 X10(3) UL (ref 0–1)
IMM GRANULOCYTES NFR BLD: 0.2 %
IMM RETICS NFR: 0.31 RATIO (ref 0.1–0.3)
LDH SERPL L TO P-CCNC: 202 U/L (ref 120–246)
LYMPHOCYTES # BLD AUTO: 1.77 X10(3) UL (ref 1–4)
LYMPHOCYTES NFR BLD AUTO: 40.5 %
MCH RBC QN AUTO: 39.8 PG (ref 26–34)
MCHC RBC AUTO-ENTMCNC: 36.1 G/DL (ref 31–37)
MCV RBC AUTO: 110.2 FL (ref 80–100)
MONOCYTES # BLD AUTO: 0.51 X10(3) UL (ref 0.1–1)
MONOCYTES NFR BLD AUTO: 11.7 %
NEUTROPHILS # BLD AUTO: 2.02 X10 (3) UL (ref 1.5–7.7)
NEUTROPHILS # BLD AUTO: 2.02 X10(3) UL (ref 1.5–7.7)
NEUTROPHILS NFR BLD AUTO: 46.3 %
PLATELET # BLD AUTO: 358 10(3)UL (ref 150–450)
POTASSIUM SERPL-SCNC: 3.4 MMOL/L (ref 3.5–5.1)
PROT SERPL-MCNC: 8.2 G/DL (ref 5.7–8.2)
RBC # BLD AUTO: 2.16 X10(6)UL (ref 3.8–5.3)
RETICS # AUTO: 100.7 X10(3) UL (ref 22.5–147.5)
RETICS/RBC NFR AUTO: 4.7 % (ref 0.5–2.5)
SODIUM SERPL-SCNC: 140 MMOL/L (ref 136–145)
WBC # BLD AUTO: 4.4 X10(3) UL (ref 4–11)

## 2025-07-24 RX ORDER — DIPHENHYDRAMINE HCL 25 MG
CAPSULE ORAL ONCE
Status: COMPLETED | OUTPATIENT
Start: 2025-07-24 | End: 2025-07-24

## 2025-07-24 RX ORDER — HYDROMORPHONE HYDROCHLORIDE 1 MG/ML
1-3 INJECTION, SOLUTION INTRAMUSCULAR; INTRAVENOUS; SUBCUTANEOUS AS NEEDED
Status: DISCONTINUED | OUTPATIENT
Start: 2025-07-24 | End: 2025-07-24

## 2025-07-24 RX ORDER — ONDANSETRON 2 MG/ML
8 INJECTION INTRAMUSCULAR; INTRAVENOUS AS NEEDED
Start: 2025-08-21

## 2025-07-24 RX ORDER — MORPHINE SULFATE 60 MG/1
60 TABLET, FILM COATED, EXTENDED RELEASE ORAL EVERY 8 HOURS SCHEDULED
Qty: 90 TABLET | Refills: 0 | Status: SHIPPED | OUTPATIENT
Start: 2025-07-24

## 2025-07-24 RX ORDER — DIPHENHYDRAMINE HCL 25 MG
CAPSULE ORAL ONCE
Start: 2025-08-21 | End: 2025-08-21

## 2025-07-24 RX ORDER — HYDROMORPHONE HYDROCHLORIDE 1 MG/ML
1-3 INJECTION, SOLUTION INTRAMUSCULAR; INTRAVENOUS; SUBCUTANEOUS AS NEEDED
Start: 2025-08-21

## 2025-07-24 RX ORDER — OXYCODONE HYDROCHLORIDE 30 MG/1
30 TABLET ORAL
Qty: 180 TABLET | Refills: 0 | Status: SHIPPED | OUTPATIENT
Start: 2025-07-24

## 2025-07-24 RX ORDER — HYDROMORPHONE HYDROCHLORIDE 1 MG/ML
1-3 INJECTION, SOLUTION INTRAMUSCULAR; INTRAVENOUS; SUBCUTANEOUS AS NEEDED
Status: CANCELLED
Start: 2025-07-24

## 2025-07-24 RX ORDER — OXYCODONE HYDROCHLORIDE 15 MG/1
15 TABLET ORAL
Qty: 180 TABLET | Refills: 0 | Status: SHIPPED | OUTPATIENT
Start: 2025-07-24

## 2025-07-24 RX ADMIN — DIPHENHYDRAMINE HCL 25 MG: 25 MG CAPSULE ORAL at 11:22:00

## 2025-07-24 RX ADMIN — HYDROMORPHONE HYDROCHLORIDE 2 MG: 1 INJECTION, SOLUTION INTRAMUSCULAR; INTRAVENOUS; SUBCUTANEOUS at 12:14:00

## 2025-07-24 RX ADMIN — HYDROMORPHONE HYDROCHLORIDE 3 MG: 1 INJECTION, SOLUTION INTRAMUSCULAR; INTRAVENOUS; SUBCUTANEOUS at 11:18:00

## 2025-07-24 NOTE — PROGRESS NOTES
Hematology Clinic Follow Up Visit    Patient Name: Oluwaseun Oluwadamilola Ogunyemi  Medical Record Number: [id number]   YOB: 1985    PCP: Clive St MD     Reason for Consultation:  Oluwaseun Oluwadamilola Ogunyemi was seen today for the diagnosis of hgb SS/sickle cell disease    Hematologic History:  *Sickle cell disease, hgb SS  -early 2000's moved from [facility] to   -2016/2017- 1st pregnancy c/b worsening pain crises  -2018- 2nd pregnancy c/b worsening pain crises, including episode of acute chest requiring RBC exchange.  -early 2019- started hydroxyurea, titrated up to 2000mg daily  -7/2020- started Voxelotor, but dc'd shortly after starting d/t poor tolerability with diarrhea, fatigue, elevated LFTs, was not very helpful either.   -5/28/21- 3/2022- received crizanlizumab (Adakveo) however c/b infusion reaction 3/2022 and was only slightly helpful therefore not continued  -10/2021- moved from [facility] (followed with Dr. Walton of [facility]) to [place].   -4/2022- started L-glutamine (Endari) 15g PO BID, cont'd hydrea  -8/2022- moved from [place] (prev followed by Dr. Esau Blount) to [place], [place]  -3/27/23- resumed crizanlizumab d/t more freq acute pain episodes   -5/8/23- reduced hydrea to 1500mg daily (d/t hgb 7.4, abs retic 47K)   -7/2/23- reduced hydrea to 1000mg daily (d/t hgb 7.0, abs retic 59K)   -12/27/23- increased Hydrea to 1500mg daily  -4/4/24- increased Hydrea to 2000mg daily    ================================  Interval events: Presents for follow-up and for next crizanlizumab infusion.      She was last hospitalized for pneumonia and acute sickle cell pain episode at the end of May.  She had another visit with Dr. Aviles at [facility] in pursuit of gene therapy with [facility].  She states that they are working to try to secure her a slot for October/November treatment.  She is waiting to hear from their clinic about scheduling all of the pre- gene  therapy workup.    She continues to have chronic pains and frequent episodes of acute pain flares.  She reports over the last week her pain has been worse and seem to flare even more so overnight.  Is having acute sickle cell pain involving her lower back, legs, and chest at this time.  This is typical for her pain episodes.  No increased dyspnea or fevers.     She does report having some dyspnea with exertion which she relates to deconditioning over the last several weeks.    She continues to take MS Contin 60 mg q8hrs and oxycodone IR 45 mg q3 hrs prn.      She has been taking Hydrea at 2000 mg daily.  She is also taking Endari 15 g twice daily and folic acid 1 mg daily.     Hospitalizations and ED visits for acute vaso-occlusive pain episodes since moving from [place] to [place] in 8/2022:  -9/5/22- hospitalization  -9/19/22- ED  -9/20/22- hospitalization  10/8/22- hospitalization  -10/29/22- ED  -11/6/22- ED  -11/21/22- ED   -1/7/23- hospitalization  -4/22/23- hospitalization  -7/1/23- hospitalization  -10/7/24- hospitalization  -12/3/24- hospitalization  -1/9/25- hospitalization  -5/22/25- hospitalization    Past Medical History:  Past Medical History:    Abnormal antibody titer    Anti-C, Anti-E, Anti-K, Warm Auto Antibody    Acute chest syndrome due to sickle cell crisis (HCC)    Acute chest syndrome due to sickle-cell disease (HCC)    RBC exchange for acute chest    Blood disorder    sickle cell    Chronic, continuous use of opioids    Constipation due to opioid therapy    High blood pressure    History of blood transfusion    History of cholecystectomy    History of transfusion    multiple blood transfusions, most of which were during pregnancies    Hypokalemia    Nausea    Sickle cell anemia (HCC)    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 90 tablet 0    oxyCODONE 15 MG Oral Tab Take 1 tablet (15 mg total) by mouth every 3 (three) hours as needed for Pain. Combine with 30mg tablets to take up to 45mg every 3 hours as needed 180 tablet 0    OxyCODONE HCl IR 30 MG Oral Tab Take 1 tablet (30 mg total) by mouth every 3 (three) hours as needed for Pain. 180 tablet 0    hydroxyurea 500 MG Oral Cap Take 4 capsules (2,000 mg total) by mouth daily. 360 capsule 3    loperamide 2 MG Oral Cap Take 1 capsule (2 mg total) by mouth every 3 (three) hours as needed for Diarrhea. 30 capsule 0    diphenhydrAMINE 25 MG Oral Cap Take 1-2 capsules (25-50 mg total) by mouth every 8 (eight) hours as needed for Itching.      ASPIRIN LOW DOSE 81 MG Oral Tab EC TAKE 1 TABLET BY MOUTH EVERY DAY 90 tablet 3    ondansetron (ZOFRAN) 8 MG tablet Take 1 tablet (8 mg total) by mouth every 8 (eight) hours as needed for Nausea. 9 tablet 13    folic acid 1 MG Oral Tab Take 1 tablet (1 mg total) by mouth daily. 90 tablet 3    Naloxone HCl 4 MG/0.1ML Nasal Liquid 4 mg by Nasal route as needed. If patient remains unresponsive, repeat dose in other nostril 2-5 minutes after first dose. 1 kit 0    metoprolol succinate ER 25 MG Oral Tablet 24 Hr Take 1 tablet (25 mg total) by mouth in the morning.      Cyanocobalamin (VITAMIN B-12 OR) Take 1 tablet by mouth in the morning.       Allergies:   Allergies   Allergen Reactions    Piperacillin Sod-Tazobactam So ITCHING     Generalized  Tolerated cephalosporins in the past       Psychosocial History:  Social History     Social History Narrative    , has 2 children ages 4 and 5 as of 2022.  Not employed.     Social History     Socioeconomic History    Marital status:     Tobacco Use    Smoking status: Never    Smokeless tobacco: Never   Vaping Use    Vaping status: Never Used   Substance and Sexual Activity    Alcohol use: Never     Comment: No history of excessive use    Drug use: Yes     Types: Hydromorphone, Oxycodone     Comment: Prescribed    Sexual activity: Yes     Partners: Male     Birth control/protection: Condom   Other Topics Concern    Caffeine Concern Yes     Comment: 5-10 a month    Exercise Yes     Comment: 1-2 times a week    Seat Belt Yes    Self-Exams Yes     Comment: self breast exam with showering   Social History Narrative    , has 2 children ages 4 and 5 as of 9/2022.  Not employed.     Social Drivers of Health     Food Insecurity: No Food Insecurity (5/22/2025)    NCSS - Food Insecurity     Worried About Running Out of Food in the Last Year: No     Ran Out of Food in the Last Year: No   Transportation Needs: No Transportation Needs (5/22/2025)    NCSS - Transportation     Lack of Transportation: No   Housing Stability: Not At Risk (5/22/2025)    NCSS - Housing/Utilities     Has Housing: Yes     Worried About Losing Housing: No     Unable to Get Utilities: No       Family Medical History:  Family History   Problem Relation Age of Onset    Hypertension Mother         unknown    Cataracts Mother     No Known Problems Father         was told cardiac arrest    No Known Problems Brother     Sickle Cell Maternal Aunt         passed from kidney failure    DVT/VTE Other     Breast Cancer Neg     Ovarian Cancer Neg     Colon Cancer Neg      Review of Systems:  A 10-point ROS was done with pertinent positives and negative per the HPI    Vital Signs:  Height: 168.6 cm (5' 6.38\") (07/24 1029)  Weight: 80.6 kg (177 lb 9.6 oz) (07/24 1029)  BSA (Calculated - sq m): 1.91 sq meters (07/24 1029)  Pulse: 102 (07/24 1029)  BP: 120/81 (07/24 1029)  Temp: 98.1 °F (36.7 °C) (07/24 1029)  Do Not Use - Resp Rate: --  SpO2: 96 % (07/24 1029)    Wt Readings from Last 6  Encounters:   07/24/25 80.6 kg (177 lb 9.6 oz)   07/10/25 81.6 kg (180 lb)   06/26/25 80.3 kg (177 lb)   06/03/25 79.1 kg (174 lb 6 oz)   05/30/25 75.8 kg (167 lb)   05/22/25 81.2 kg (179 lb)     Physical Examination:  General: Patient is alert and oriented, not in acute distress  Psych: Mood and affect are appropriate  Eyes: EOMI  ENT: Oropharynx is clear  CV: Regular rate and rhythm, no murmurs, no LE edema  Respiratory: Lungs clear to auscultation bilaterally  GI/Abd: Soft, non-tender with normoactive bowel sounds, no hepatosplenomegaly  Neurological: Grossly intact   Lymphatics: No palpable lymphadenopathy  Skin: no rashes or petechiae    Laboratory:  Lab Results   Component Value Date    WBC 3.8 (L) 07/10/2025    WBC 4.2 05/28/2025    WBC 4.1 05/27/2025    HGB 8.6 (L) 07/10/2025    HGB 10.0 (L) 05/28/2025    HGB 9.5 (L) 05/27/2025    HCT 23.4 (L) 07/10/2025    .4 (H) 07/10/2025    MCH 39.1 (H) 07/10/2025    MCHC 36.8 07/10/2025    RDW 18.0 07/10/2025    .0 07/10/2025    .0 05/28/2025    .0 05/27/2025     Lab Results   Component Value Date    GLU 97 07/10/2025    BUN <5 (L) 07/10/2025    CREATSERUM 0.64 07/10/2025    CREATSERUM 0.61 05/25/2025    CREATSERUM 0.57 05/24/2025    ANIONGAP 2 07/10/2025    CA 8.7 07/10/2025    OSMOCALC 287 05/25/2025    ALKPHO 43 07/10/2025    AST 33 07/10/2025    ALT 20 07/10/2025    BILT 0.8 07/10/2025    TP 8.3 (H) 07/10/2025    ALB 4.2 07/10/2025    GLOBULIN 4.1 (H) 07/10/2025     07/10/2025    K 4.0 07/10/2025     07/10/2025    CO2 33.0 (H) 07/10/2025     Lab Results   Component Value Date     07/24/2025     05/25/2025     (H) 05/01/2025     02/06/2025     12/12/2024     10/17/2024     10/08/2024     07/25/2024     12/27/2023     07/12/2023     05/08/2023     03/27/2023     02/16/2023     12/22/2022     09/05/2022     No results found  for: \"PTT\", \"PT\", \"INR\"    Lab Results   Component Value Date    RETICABSOL 100.7 07/24/2025    RETICABSOL 99.7 07/10/2025    RETICABSOL 69.7 05/25/2025    RETICABSOL 69.4 05/24/2025    RETICABSOL 78.1 05/22/2025    RETICABSOL 85.6 05/01/2025    RETICABSOL 61.7 02/06/2025    RETICABSOL 70.7 01/09/2025    RETICABSOL 61.1 12/12/2024    RETICABSOL 85.9 10/17/2024     Lab Results   Component Value Date    CORINNE 279.5 (H) 02/16/2023    CORINNE 395.5 (H) 09/05/2022     Lab Results   Component Value Date    SAT 28 02/16/2023    SAT 36 09/05/2022     Lab Results   Component Value Date    B12 1,792 (H) 07/12/2023    B12 182 (L) 05/08/2023     Lab Results   Component Value Date    MMA 95 05/08/2023     Component      Latest Ref Rng & Units 9/5/2022   HEMOGLOBIN A      95.5 - 100.0 % <1.0 (L)   HEMOGLOBIN A2      1.5 - 3.5 % 1.3 (L)   HEMOGLOBIN F (FETAL)      0.0 - 2.0 % 45.0 (H)   HEMOGLOBIN S      % 53.7   HGB Electophoresis Interp       Overall, 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 Although HgB F is often elevated in patients with sickle cell disease, such elevations do not commonly exceed 15% of the total hemoglobin. Possible considerations for the degree of HgB F elevation as seen in this patient may include therapy effect (Hydroxyurea) vs. hereditary persistence of fetal hemoglobin (HPFH).      Impression & Plan:     *Hgb SS/Sickle cell disease  -+hx of acute chest in 2018 requiring RBC exchange.  She reports having less than 10 transfusions in her lifetime, most of the transfusions she received were during prior pregnancies.   -She has had an excellent response with hydroxyurea with marked increase in hemoglobin F.   -Continue hydroxyurea to 2000 mg daily  -Started L-glutamine 15 g BID 4/2022, in effort to reduce frequency of acute pain episodes  -restarted Crizanlizumab (adakveo) 3/16/23, tolerating well without complication.  Continue crizanlizumab 5 mg/kg q4  weeks- dose today.  The frequency of her hospitalizations decreased to some extent since she started this, though this does not seem to be sustained.  It is unclear if her pain is actually better or if she is managing her pain better at home in attempt to avoid hospitalizations.    -She still having frequent severe pain crises despite maximal medical management including 4 hospitalizations within the last 12 months.  -Previously intolerant to Voxelotor   -Recommend Folvite 1 mg daily to be continued indefinitely  -Recommend annual ophthalmology visits  -Advised to stay up-to-date with recommended vaccinations.    -She has met with  Dr. Bryce Aviles at [facility] with plans to pursue Cas[facility] gene therapy- hopefully she will secure a slot for this in the fall.      *macrocytic anemia  -d/t sickle cell anemia + hydroxyurea effect + B12 def  -Continue vitamin B-12 1000 mcg PO daily to be continued indefinitely  -continue folvite 1mg daily indefinitely  -Follow CBC    *Chronic pain management  -Continue MS Contin 60mg q8 hrs scheduled and oxycodone 45 mg IR PO q3hrs prn   -Duloxetine was not helpful    *Acute pain episode  -Will give IV fluid hydration and IV dilaudid 3mg x 1 then 1-3mg prn today as below while she is in the infusion unit today for acute pain flare.      *Management recommendations of acute pain episodes not controlled with home meds  -Hydration with IV fluids with 0.9 NS until determined to be euvolemic, then maintained with 0.5 NS as relative hypernatremia can increase RBC sickling.  -Recommend prompt management of painful symptoms with parenteral opioids- recommend starting IV Dilaudid 3mg prn up to 3 doses for additional pain relief.  If needed, a PCA can be initiated on admission.  For pruritus related to opiates consider PO Benadryl or cetirizine.  I favor avoiding IV Benadryl.   -For any acute pain episode evaluation for potential infection or development of acute chest should be  considered.  -standing order remains in place for IV dilaudid 1-3mg up to 3 doses prn + 1 L of 0.9 NS IVF + PO benadryl 25-50mg prn for acute pain episodes that can be managed as an outpatient in the infusion center PRN in effort to avoid ED visits as able.     RTC in 8 weeks    Esau Mccormick MD  Hematology/Medical Oncology  [facility]    MDM- high risk related to sickle cell disease management- given IVF and IV opiates requiring intensive monitoring in infusion unit.  ongoing continuity of complex care related to sickle cell disease.

## 2025-07-24 NOTE — PROGRESS NOTES
Pt here for adakveo/IVF/dilaudid . Pt denies any issues or concerns.      Ordering Provider: Dr. Mccormick  Order Exp: ongoing     Pt tolerated infusion without difficulty or complaint. Reviewed next apt date/time: yes      Education Record  Learner:  Patient  Disease / Diagnosis: sickle cell anemia  Barriers / Limitations:  None  Method:  Brief focused and Discussion  General Topics:  Medication, Precautions, and Plan of care reviewed  Outcome:  Shows understanding     Per Dr. Mccormick, patient to receive dilaudid IVP 3mg (may have an additional 2 mg 30 minutes later if needed), adakveo, IVF, and benadryl. Patient's pain improving with dilaudid. Unable to finish IVF d/t childcare. Understands to call with any concerns. Discharged in stable condition.

## 2025-07-24 NOTE — PROGRESS NOTES
Education Record    Learner:  Patient    Disease / Diagnosis: Sickle cell anemia    Barriers / Limitations:  None   Comments:    Method:  Brief focused and Reinforcement   Comments:    General Topics:  Diet, Medication, Pain, Side effects and symptom management, and Plan of care reviewed   Comments:    Outcome:  Shows understanding   Comments:    Patient here for follow up and Adakveo infusion today. She reports having sickle cell crisis pain on and off all night last night. Especially focusing on her lower back, thighs/legs, and chest. She does get SOB w/ activity. Denies recent infections, dizziness, or HA's. She's taking Hydrea 2000mg daily. Requesting refills on Morphine and Oxy.

## 2025-08-12 ENCOUNTER — OFFICE VISIT (OUTPATIENT)
Facility: LOCATION | Age: 40
End: 2025-08-12
Attending: INTERNAL MEDICINE

## 2025-08-12 ENCOUNTER — TELEPHONE (OUTPATIENT)
Facility: LOCATION | Age: 40
End: 2025-08-12

## 2025-08-12 VITALS
SYSTOLIC BLOOD PRESSURE: 123 MMHG | BODY MASS INDEX: 28 KG/M2 | HEIGHT: 66.38 IN | OXYGEN SATURATION: 100 % | HEART RATE: 79 BPM | DIASTOLIC BLOOD PRESSURE: 83 MMHG | RESPIRATION RATE: 16 BRPM | TEMPERATURE: 98 F

## 2025-08-12 DIAGNOSIS — D57.00 SICKLE CELL DISEASE WITH CRISIS (HCC): Primary | ICD-10-CM

## 2025-08-12 DIAGNOSIS — D57.419 SICKLE CELL ANEMIA WITH COEXISTENT ALPHA-THALASSEMIA WITH CRISIS (HCC): ICD-10-CM

## 2025-08-12 DIAGNOSIS — D57.00 SICKLE CELL PAIN CRISIS (HCC): ICD-10-CM

## 2025-08-12 DIAGNOSIS — R11.0 NAUSEA: ICD-10-CM

## 2025-08-12 RX ORDER — ONDANSETRON 2 MG/ML
8 INJECTION INTRAMUSCULAR; INTRAVENOUS AS NEEDED
Start: 2025-08-19

## 2025-08-12 RX ORDER — HYDROMORPHONE HYDROCHLORIDE 1 MG/ML
1-3 INJECTION, SOLUTION INTRAMUSCULAR; INTRAVENOUS; SUBCUTANEOUS AS NEEDED
Status: DISCONTINUED | OUTPATIENT
Start: 2025-08-12 | End: 2025-08-12

## 2025-08-12 RX ORDER — DIPHENHYDRAMINE HCL 25 MG
CAPSULE ORAL ONCE
Status: COMPLETED | OUTPATIENT
Start: 2025-08-12 | End: 2025-08-12

## 2025-08-12 RX ORDER — DIPHENHYDRAMINE HCL 25 MG
CAPSULE ORAL ONCE
Start: 2025-08-19 | End: 2025-08-19

## 2025-08-12 RX ORDER — ONDANSETRON 2 MG/ML
8 INJECTION INTRAMUSCULAR; INTRAVENOUS AS NEEDED
Status: DISCONTINUED | OUTPATIENT
Start: 2025-08-12 | End: 2025-08-12

## 2025-08-12 RX ORDER — HYDROMORPHONE HYDROCHLORIDE 1 MG/ML
1-3 INJECTION, SOLUTION INTRAMUSCULAR; INTRAVENOUS; SUBCUTANEOUS AS NEEDED
Start: 2025-08-19

## 2025-08-12 RX ADMIN — ONDANSETRON 8 MG: 2 INJECTION INTRAMUSCULAR; INTRAVENOUS at 14:07:00

## 2025-08-12 RX ADMIN — HYDROMORPHONE HYDROCHLORIDE 2 MG: 1 INJECTION, SOLUTION INTRAMUSCULAR; INTRAVENOUS; SUBCUTANEOUS at 14:53:00

## 2025-08-12 RX ADMIN — HYDROMORPHONE HYDROCHLORIDE 3 MG: 1 INJECTION, SOLUTION INTRAMUSCULAR; INTRAVENOUS; SUBCUTANEOUS at 14:07:00

## 2025-08-12 RX ADMIN — DIPHENHYDRAMINE HCL 25 MG: 25 MG CAPSULE ORAL at 14:07:00

## 2025-08-21 ENCOUNTER — OFFICE VISIT (OUTPATIENT)
Facility: LOCATION | Age: 40
End: 2025-08-21
Attending: INTERNAL MEDICINE

## 2025-08-21 VITALS
OXYGEN SATURATION: 97 % | HEIGHT: 66.38 IN | TEMPERATURE: 98 F | SYSTOLIC BLOOD PRESSURE: 118 MMHG | BODY MASS INDEX: 28.17 KG/M2 | DIASTOLIC BLOOD PRESSURE: 79 MMHG | HEART RATE: 78 BPM | RESPIRATION RATE: 18 BRPM | WEIGHT: 177.38 LBS

## 2025-08-21 DIAGNOSIS — D57.00 SICKLE CELL PAIN CRISIS (HCC): ICD-10-CM

## 2025-08-21 DIAGNOSIS — D57.00 SICKLE CELL DISEASE WITH CRISIS (HCC): Primary | ICD-10-CM

## 2025-08-21 DIAGNOSIS — D57.419 SICKLE CELL ANEMIA WITH COEXISTENT ALPHA-THALASSEMIA WITH CRISIS (HCC): ICD-10-CM

## 2025-08-21 RX ORDER — OXYCODONE HYDROCHLORIDE 30 MG/1
30 TABLET ORAL
Qty: 180 TABLET | Refills: 0 | Status: SHIPPED | OUTPATIENT
Start: 2025-08-21

## 2025-08-21 RX ORDER — HYDROMORPHONE HYDROCHLORIDE 1 MG/ML
1-3 INJECTION, SOLUTION INTRAMUSCULAR; INTRAVENOUS; SUBCUTANEOUS AS NEEDED
Status: DISCONTINUED | OUTPATIENT
Start: 2025-08-21 | End: 2025-08-21

## 2025-08-21 RX ORDER — HYDROMORPHONE HYDROCHLORIDE 1 MG/ML
1-3 INJECTION, SOLUTION INTRAMUSCULAR; INTRAVENOUS; SUBCUTANEOUS AS NEEDED
Start: 2025-09-18

## 2025-08-21 RX ORDER — OXYCODONE HYDROCHLORIDE 15 MG/1
15 TABLET ORAL
Qty: 180 TABLET | Refills: 0 | Status: SHIPPED | OUTPATIENT
Start: 2025-08-21

## 2025-08-21 RX ORDER — DIPHENHYDRAMINE HCL 25 MG
CAPSULE ORAL ONCE
Status: COMPLETED | OUTPATIENT
Start: 2025-08-21 | End: 2025-08-21

## 2025-08-21 RX ORDER — DIPHENHYDRAMINE HCL 25 MG
CAPSULE ORAL ONCE
Start: 2025-09-18 | End: 2025-09-18

## 2025-08-21 RX ORDER — ONDANSETRON 2 MG/ML
8 INJECTION INTRAMUSCULAR; INTRAVENOUS AS NEEDED
Start: 2025-09-18

## 2025-08-21 RX ADMIN — HYDROMORPHONE HYDROCHLORIDE 3 MG: 1 INJECTION, SOLUTION INTRAMUSCULAR; INTRAVENOUS; SUBCUTANEOUS at 11:46:00

## 2025-08-21 RX ADMIN — HYDROMORPHONE HYDROCHLORIDE 3 MG: 1 INJECTION, SOLUTION INTRAMUSCULAR; INTRAVENOUS; SUBCUTANEOUS at 12:41:00

## 2025-08-21 RX ADMIN — DIPHENHYDRAMINE HCL 25 MG: 25 MG CAPSULE ORAL at 10:40:00

## 2025-08-21 RX ADMIN — HYDROMORPHONE HYDROCHLORIDE 3 MG: 1 INJECTION, SOLUTION INTRAMUSCULAR; INTRAVENOUS; SUBCUTANEOUS at 10:57:00

## 2025-08-26 ENCOUNTER — APPOINTMENT (OUTPATIENT)
Dept: GENERAL RADIOLOGY | Facility: HOSPITAL | Age: 40
End: 2025-08-26

## 2025-08-26 ENCOUNTER — HOSPITAL ENCOUNTER (EMERGENCY)
Facility: HOSPITAL | Age: 40
Discharge: HOME | End: 2025-08-26
Attending: EMERGENCY MEDICINE

## 2025-08-26 VITALS
RESPIRATION RATE: 17 BRPM | HEIGHT: 67 IN | BODY MASS INDEX: 26.68 KG/M2 | OXYGEN SATURATION: 100 % | WEIGHT: 170 LBS | SYSTOLIC BLOOD PRESSURE: 119 MMHG | DIASTOLIC BLOOD PRESSURE: 80 MMHG | TEMPERATURE: 98 F | HEART RATE: 86 BPM

## 2025-08-26 DIAGNOSIS — D57.00 SICKLE CELL PAIN CRISIS (HCC): Primary | ICD-10-CM

## 2025-08-26 DIAGNOSIS — U07.1 COVID-19: ICD-10-CM

## 2025-08-26 LAB
ALBUMIN SERPL-MCNC: 4.7 G/DL (ref 3.2–4.8)
ALBUMIN/GLOB SERPL: 1 (ref 1–2)
ALP LIVER SERPL-CCNC: 53 U/L (ref 37–98)
ALT SERPL-CCNC: 30 U/L (ref 10–49)
ANION GAP SERPL CALC-SCNC: 12 MMOL/L (ref 0–18)
AST SERPL-CCNC: 42 U/L (ref ?–34)
ATRIAL RATE: 82 BPM
BASOPHILS # BLD AUTO: 0 X10(3) UL (ref 0–0.2)
BASOPHILS NFR BLD AUTO: 0 %
BILIRUB SERPL-MCNC: 0.5 MG/DL (ref 0.3–1.2)
BUN BLD-MCNC: 7 MG/DL (ref 9–23)
CALCIUM BLD-MCNC: 10.3 MG/DL (ref 8.7–10.6)
CHLORIDE SERPL-SCNC: 103 MMOL/L (ref 98–112)
CHOLEST SERPL-MCNC: 134 MG/DL (ref ?–200)
CO2 SERPL-SCNC: 26 MMOL/L (ref 21–32)
CREAT BLD-MCNC: 0.62 MG/DL (ref 0.55–1.02)
EGFRCR SERPLBLD CKD-EPI 2021: 115 ML/MIN/1.73M2 (ref 60–?)
EOSINOPHIL # BLD AUTO: 0 X10(3) UL (ref 0–0.7)
EOSINOPHIL NFR BLD AUTO: 0 %
ERYTHROCYTE [DISTWIDTH] IN BLOOD BY AUTOMATED COUNT: 14.8 %
FLUAV + FLUBV RNA SPEC NAA+PROBE: NEGATIVE
FLUAV + FLUBV RNA SPEC NAA+PROBE: NEGATIVE
GLOBULIN PLAS-MCNC: 4.9 G/DL (ref 2–3.5)
GLUCOSE BLD-MCNC: 121 MG/DL (ref 70–99)
HCT VFR BLD AUTO: 28 % (ref 35–48)
HDLC SERPL-MCNC: 34 MG/DL (ref 40–59)
HGB BLD-MCNC: 10.2 G/DL (ref 12–16)
HGB RETIC QN AUTO: 35.9 PG (ref 28.2–36.6)
IMM GRANULOCYTES # BLD AUTO: 0.02 X10(3) UL (ref 0–1)
IMM GRANULOCYTES NFR BLD: 0.6 %
IMM RETICS NFR: 0.24 RATIO (ref 0.1–0.3)
LDLC SERPL CALC-MCNC: 82 MG/DL (ref ?–100)
LYMPHOCYTES # BLD AUTO: 0.83 X10(3) UL (ref 1–4)
LYMPHOCYTES NFR BLD AUTO: 23.5 %
MCH RBC QN AUTO: 40.3 PG (ref 26–34)
MCHC RBC AUTO-ENTMCNC: 36.4 G/DL (ref 31–37)
MCV RBC AUTO: 110.7 FL (ref 80–100)
MONOCYTES # BLD AUTO: 0.32 X10(3) UL (ref 0.1–1)
MONOCYTES NFR BLD AUTO: 9.1 %
NEUTROPHILS # BLD AUTO: 2.36 X10 (3) UL (ref 1.5–7.7)
NEUTROPHILS # BLD AUTO: 2.36 X10(3) UL (ref 1.5–7.7)
NEUTROPHILS NFR BLD AUTO: 66.8 %
NONHDLC SERPL-MCNC: 100 MG/DL (ref ?–130)
OSMOLALITY SERPL CALC.SUM OF ELEC: 291 MOSM/KG (ref 275–295)
P AXIS: 42 DEGREES
P-R INTERVAL: 174 MS
PLATELET # BLD AUTO: 267 10(3)UL (ref 150–450)
POTASSIUM SERPL-SCNC: 3.6 MMOL/L (ref 3.5–5.1)
PROT SERPL-MCNC: 9.6 G/DL (ref 5.7–8.2)
Q-T INTERVAL: 360 MS
QRS DURATION: 84 MS
QTC CALCULATION (BEZET): 420 MS
R AXIS: 17 DEGREES
RBC # BLD AUTO: 2.53 X10(6)UL (ref 3.8–5.3)
RETICS # AUTO: 55.7 X10(3) UL (ref 22.5–147.5)
RETICS/RBC NFR AUTO: 2.2 % (ref 0.5–2.5)
RSV RNA SPEC NAA+PROBE: NEGATIVE
SARS-COV-2 RNA RESP QL NAA+PROBE: DETECTED
SODIUM SERPL-SCNC: 141 MMOL/L (ref 136–145)
T AXIS: -84 DEGREES
TRIGL SERPL-MCNC: 92 MG/DL (ref 30–149)
TROPONIN I SERPL HS-MCNC: 53 NG/L (ref ?–34)
VENTRICULAR RATE: 82 BPM
VLDLC SERPL CALC-MCNC: 15 MG/DL (ref 0–30)
WBC # BLD AUTO: 3.5 X10(3) UL (ref 4–11)

## 2025-08-26 PROCEDURE — 85045 AUTOMATED RETICULOCYTE COUNT: CPT

## 2025-08-26 PROCEDURE — 99285 EMERGENCY DEPT VISIT HI MDM: CPT

## 2025-08-26 PROCEDURE — 80061 LIPID PANEL: CPT | Performed by: EMERGENCY MEDICINE

## 2025-08-26 PROCEDURE — 87637 SARSCOV2&INF A&B&RSV AMP PRB: CPT | Performed by: EMERGENCY MEDICINE

## 2025-08-26 PROCEDURE — 84484 ASSAY OF TROPONIN QUANT: CPT | Performed by: EMERGENCY MEDICINE

## 2025-08-26 PROCEDURE — 96374 THER/PROPH/DIAG INJ IV PUSH: CPT

## 2025-08-26 PROCEDURE — 71045 X-RAY EXAM CHEST 1 VIEW: CPT

## 2025-08-26 PROCEDURE — 96376 TX/PRO/DX INJ SAME DRUG ADON: CPT

## 2025-08-26 PROCEDURE — 85025 COMPLETE CBC W/AUTO DIFF WBC: CPT | Performed by: EMERGENCY MEDICINE

## 2025-08-26 PROCEDURE — 80053 COMPREHEN METABOLIC PANEL: CPT | Performed by: EMERGENCY MEDICINE

## 2025-08-26 PROCEDURE — 85025 COMPLETE CBC W/AUTO DIFF WBC: CPT

## 2025-08-26 PROCEDURE — 96361 HYDRATE IV INFUSION ADD-ON: CPT

## 2025-08-26 PROCEDURE — 93005 ELECTROCARDIOGRAM TRACING: CPT

## 2025-08-26 PROCEDURE — 85045 AUTOMATED RETICULOCYTE COUNT: CPT | Performed by: EMERGENCY MEDICINE

## 2025-08-26 PROCEDURE — 96375 TX/PRO/DX INJ NEW DRUG ADDON: CPT

## 2025-08-26 PROCEDURE — 80053 COMPREHEN METABOLIC PANEL: CPT

## 2025-08-26 RX ORDER — ONDANSETRON 4 MG/1
4 TABLET, ORALLY DISINTEGRATING ORAL EVERY 4 HOURS PRN
Qty: 10 TABLET | Refills: 0 | Status: SHIPPED | OUTPATIENT
Start: 2025-08-26 | End: 2025-09-02

## 2025-08-26 RX ORDER — HYDROMORPHONE HYDROCHLORIDE 1 MG/ML
1 INJECTION, SOLUTION INTRAMUSCULAR; INTRAVENOUS; SUBCUTANEOUS ONCE
Refills: 0 | Status: COMPLETED | OUTPATIENT
Start: 2025-08-26 | End: 2025-08-26

## 2025-08-26 RX ORDER — ONDANSETRON 2 MG/ML
4 INJECTION INTRAMUSCULAR; INTRAVENOUS ONCE
Status: COMPLETED | OUTPATIENT
Start: 2025-08-26 | End: 2025-08-26

## 2025-08-26 RX ORDER — KETOROLAC TROMETHAMINE 15 MG/ML
15 INJECTION, SOLUTION INTRAMUSCULAR; INTRAVENOUS ONCE
Status: COMPLETED | OUTPATIENT
Start: 2025-08-26 | End: 2025-08-26

## 2025-08-26 RX ORDER — HYDROMORPHONE HYDROCHLORIDE 1 MG/ML
0.5 INJECTION, SOLUTION INTRAMUSCULAR; INTRAVENOUS; SUBCUTANEOUS ONCE
Refills: 0 | Status: COMPLETED | OUTPATIENT
Start: 2025-08-26 | End: 2025-08-26

## (undated) DEVICE — C-ARM: Brand: UNBRANDED

## (undated) DEVICE — DALE ABDOMINAL BINDER, 12" WIDE, STRETCHES TO FIT 30"-45", 1 PER BOX.: Brand: DALE ABDOMINAL BINDER

## (undated) DEVICE — CLIP APPLIER WITH CLIP LOGIC TECHNOLOGY: Brand: ENDO CLIP III

## (undated) DEVICE — POUCH SPECIMEN WIRE 6X3 250ML

## (undated) DEVICE — MINI ENDOCUT SCISSOR TIP, DISPOSABLE: Brand: RENEW

## (undated) DEVICE — SHEET,DRAPE,40X58,STERILE: Brand: MEDLINE

## (undated) DEVICE — COVER,LIGHT,CAMERA,HARD,1/PK,STRL: Brand: MEDLINE

## (undated) DEVICE — TROCARS: Brand: KII® BALLOON BLUNT TIP SYSTEM

## (undated) DEVICE — CLIP APPLIER: Brand: ENDO CLIP II

## (undated) DEVICE — ANTIBACTERIAL VIOLET BRAIDED (POLYGLACTIN 910), SYNTHETIC ABSORBABLE SUTURE: Brand: COATED VICRYL

## (undated) DEVICE — TROCAR: Brand: KII FIOS FIRST ENTRY

## (undated) DEVICE — TRADITIONAL MARYLAND DISSECTOR TIP, DISPOSABLE: Brand: RENEW

## (undated) DEVICE — LAP CHOLE/APPY CDS-LF: Brand: MEDLINE INDUSTRIES, INC.

## (undated) DEVICE — SLEEVE COMPR MD KNEE LEN SGL USE KENDALL SCD

## (undated) DEVICE — 40580 - THE PINK PAD - ADVANCED TRENDELENBURG POSITIONING KIT: Brand: 40580 - THE PINK PAD - ADVANCED TRENDELENBURG POSITIONING KIT

## (undated) DEVICE — GUIDEWIRE .035X150 STR ZIPWIRE

## (undated) DEVICE — TROCAR: Brand: KII SHIELDED BLADED ACCESS SYSTEM

## (undated) DEVICE — NEPTUNE E-SEP SMOKE EVACUATION PENCIL, COATED, 70MM BLADE, PUSH BUTTON SWITCH: Brand: NEPTUNE E-SEP

## (undated) DEVICE — CATHETER URET 5FR L70CM FLX OPN TIP NONPORTED

## (undated) DEVICE — SUT MCRYL 4-0 18IN PS-2 ABSRB UD 19MM 3/8 CIR

## (undated) DEVICE — L-HOOK CAUTERY PROBE TIP, DISPOSABLE: Brand: RENEW

## (undated) DEVICE — SYRINGE MED 10ML LL TIP W/O SFTY DISP

## (undated) DEVICE — TROCAR: Brand: KII® SLEEVE

## (undated) DEVICE — GLOVE SUR 7.5 SENSICARE PI PIP CRM PWD F

## (undated) NOTE — LETTER
[address] [facility] FOR BLOOD OR BLOOD COMPONENTS      In the course of your treatment, it may become necessary to administer a transfusion of blood or blood components. This form provides basic information concerning this procedure and, if signed by you, authorizes its performance by qualified medical personnel. DESCRIPTION OF PROCEDURE:  Blood is introduced into one of your veins, commonly in the arm, using a sterilized disposable needle. The amount of blood transfused, and whether the transfusion will be of blood or blood components is a judgment the physician will make based on your particular needs. RISKS:  The transfusion is a common procedure of low risk. MINOR AND TEMPORARY REACTIONS ARE NOT UNCOMMON, including a slight bruise, swelling or local reaction in the area where the needle pierces your skin, or a non-serious reaction to the transfused material itself, including headache, fever or a mild skin reaction, such as rash. Serious reactions are possible, though very unlikely and include severe allergic reaction (shock)  and destruction (hemolysis) of transfused blood cells. Infectious diseases which are known to be transmitted by blood transfusion include CERTAIN TYPES OF VIRAL HEPATITIS, a viral infection of the liver, HUMAN IMMUNODEFICIENCY VIRUS (HIV-1,2) infection, a viral infection known to cause ACQUIRED IMMUNODEFICIENCY SYNDROME (AIDS) AS WELL AS CERTAIN OTHER BACTERIAL, VIRAL AND PARASITIC DISEASES. While a minimal risk of acquiring an infectious disease from transfused blood exists, in accordance with Federal and State law all due care has been taken in donor selection and testing to avoid transmission of disease. ALTERNATIVES:  If loss of blood poses serious threats in the course of your treatment, THERE IS NO EFFECTIVE ALTERNATIVE TO BLOOD TRANSFUSION.  However, if you have any further questions on this matter, your physician will fully explain the alternatives to you if it has not already been done. I,Elbert Andrade, have read/had read to me the above. I understand the matters bearing on the decision whether or not to authorize a transfusion of blood or blood components. I have no questions which have not been answered to my full satisfaction.  I hereby consent to such transfusion as  my physician may deem necessary or advisable in the course of my treatment.        _______________   __________________________________________________  Date     Signature of Patient, Parent or Legal Guardian      ([place] [facility])      __________________________________________  Witness to Signature (title or relationship to patient)    Patient Name: Wali Alex     : 8/3/1985                 Printed: October 10, 2022     Medical Record #: [id number]                    Page 1 of 1

## (undated) NOTE — LETTER
Date: 6/3/2025    Patient Name: Oluwaseun Oluwadamilola Ogunyemi          To Whom it may concern:    This letter has been written at the patient's request. The above patient was seen at [facility] for treatment of a medical condition.    Following her hospitalization and sickle cell crisis, please extend her leave from a return date of June 9th to a return date of June 16th.    Thank you for your attention to this matter.       Sincerely,        Clive St MD

## (undated) NOTE — Clinical Note
May 25, 2025    Patient: Oluwaseun Oluwadamilola Ogunyemi   Date of Visit: 5/22/2025       To Whom It May Concern:    Elbert Andrade was seen and treated in our emergency department on 5/22/2025. She {Return to school/sport/work:[id number]}.    If you have any questions or concerns, please don't hesitate to call.       Encounter Provider(s):    Leighann Sheth DO Olaru, Oana, MD Shah, Niket, DO Salame, Grace, MD

## (undated) NOTE — LETTER
URGENT    Patient Name: Ogunyemi,Oluwaseun Oluwadamilola    Surgery Date: 12/3/2024  Medical Record: [id number]  CSN: [id number]    Surgeon(s):  Mahin Denis DO  Consent Procedure: LAPAROSCOPIC CHOLECYSTECTOMY POSSIBLE OPEN WITH CHOLANGIOGRAM  Anesthesia Type: General    To Attending: Mahin Denis DO    To Other: Esau Mccormick MD    ANESTHESIOLOGIST BUCKY PEDROZA DO HAS REQUESTED THE FOLLOWING:  LAST OFFICE NOTE WITH HEMATOLOGY RECOMMENDATIONS FOR SURGERY 12/03/2024  _____________________________________________________  Please note the following attached testing results for your review:   Labs - SEE HEMOGRAM IN EPIC 11/26/2024

## (undated) NOTE — LETTER
OUTSIDE TESTING RESULT REQUEST     IMPORTANT: FOR YOUR IMMEDIATE ATTENTION  Please FAX all test results listed below to: [phone]     Testing already done on or about: 24     * * * * If testing is NOT complete, arrange with patient A.S.A.P. * * * *      Patient Name: Ogunyemi,Oluwaseun Oluwadamilola  Surgery Date: 12/3/2024  Medical Record: [id number]  CSN: [id number]  : 8/3/1985 - A: 39 y     Sex: female  Surgeon(s):  Mahin Denis DO  Procedure: LAPAROSCOPIC CHOLECYSTECTOMY POSSIBLE OPEN WITH CHOLANGIOGRAM  Anesthesia Type: General     Surgeon: Mahni Denis DO     The following Testing and Time Line are REQUIRED PER ANESTHESIA     EKG READ AND SIGNED WITHIN   90 days      Thank You,     Sent by:  July Bernard RN

## (undated) NOTE — Clinical Note
Spoke with pt today--TCM/HFU appt made for 5/02/2023, thank you.   Future Appointments 5/2/2023   11:00 AM   Mirza Arrieta MD       EMG 3               EMG [facility] 5/8/2023   10:45 AM   Fany Smith MD      [facility] 5/8/2023   11:00 AM   Vini Palafox [address] [facility]

## (undated) NOTE — LETTER
Patient Name: Ogunyemi,Oluwaseun Oluwadamilola  Surgery Date: 12/3/2024  Medical Record: [id number]  CSN: [id number]    Surgeon(s):  Mahin Denis DO  Consent Procedure: LAPAROSCOPIC CHOLECYSTECTOMY POSSIBLE OPEN WITH CHOLANGIOGRAM  Anesthesia Type: General    To Attending: Mahin Denis DO  To PCP:  Clive St MD  To Other: Dr. Esau Valdez MD    ANESTHESIOLOGIST *** HAS REQUESTED THE FOLLOWING:  {EDW OR FAX EVALUTION REQUEST (PAT):[id number]}  _____________________________________________________  Please note the following attached testing results for your review:   {EDW OR FAX TESTING REVIEW:[id number]}

## (undated) NOTE — LETTER
Patient Name: Ogunyemi,Oluwaseun Oluwadamilola  -Age / Sex: 8/3/1985-A: 39 y  female   Medical Records: [id number] CSN: [id number]    ABNORMAL VALUES  Surgeon(s):  Mahin Denis DO  Anesthesia Type: General  Date of Surgery: 12/3/2024  Procedure Description: LAPAROSCOPIC CHOLECYSTECTOMY POSSIBLE OPEN WITH CHOLANGIOGRAM  Primary Surgeon:  Mahin Denis DO  Phone Number: [phone]    PLEASE NOTE THE FOLLOWING ABNORMALITIES:   Hematology  - Hgb= 7.8, hematocrit= 21.9  ________________________________________________________  Anesthesia to review patient's chart

## (undated) NOTE — LETTER
OUTSIDE TESTING RESULT REQUEST     IMPORTANT: FOR YOUR IMMEDIATE ATTENTION  Please FAX all test results listed below to: [phone]     Testing already done on or about: 24     * * * * If testing is NOT complete, arrange with patient A.S.A.P. * * * *      Patient Name: Ogunyemi,Oluwaseun Oluwadamilola  Surgery Date: 12/3/2024  Medical Record: [id number]  CSN: [id number]  : 8/3/1985 - A: 39 y     Sex: female  Surgeon(s):  Mahin Denis DO  Procedure: LAPAROSCOPIC CHOLECYSTECTOMY POSSIBLE OPEN WITH CHOLANGIOGRAM  Anesthesia Type: General     Surgeon: Mahin Denis DO     The following Testing and Time Line are REQUIRED PER ANESTHESIA     EKG READ AND SIGNED WITHIN   90 days      Thank You,   Sent by:staff

## (undated) NOTE — LETTER
Date: 6/13/2025    Patient Name: Oluwaseun Oluwadamilola Ogunyemi          To Whom it may concern:    This letter has been written at the patient's request. The above patient was seen at [facility] for treatment of a medical condition.    Due to ongoing limitations from her condition, I am requesting her return to work be pushed back an additional week, from June 16th to June 23rd.        Sincerely,          Clive St M.D.   EMG 3  06/13/25

## (undated) NOTE — LETTER
Certificate of Child Health Examination     Student’s Name    Ogunyemi Oluwaseun Oluwadamilola  Last                     First                         Middle  Birth Date  (Mo/Day/Yr)    8/3/1985 Sex  Female   Race/Ethnicity  Black or   NON  OR  OR  ETHNICITY School/Grade Level/ID#   {Grade:1366}   [address] [address]  [address] Address                                 City                                Zip Code   Parent/Guardian                                                                   Telephone (home/work)   HEALTH HISTORY: MUST BE COMPLETED AND SIGNED BY PARENT/GUARDIAN AND VERIFIED BY HEALTH CARE PROVIDER     ALLERGIES (Food, drug, insect, other):   Piperacillin sod-tazobactam so  MEDICATION (List all prescribed or taken on a regular basis) has a current medication list which includes the following prescription(s): oxycodone hcl ir, oxycodone, hydroxyurea, loperamide, diphenhydramine, aspirin low dose, ondansetron, folic acid, naloxone hcl, metoprolol succinate er, cyanocobalamin, morphine er, and endari.     Diagnosis of asthma?  Child wakes during the night coughing? [] Yes    [] No  [] Yes    [] No  Loss of function of one of paired organs? (eye/ear/kidney/testicle) [] Yes    [] No    Birth defects? [] Yes    [] No  Hospitalizations?  When?  What for? [] Yes    [] No    Developmental delay? [] Yes    [] No       Blood disorders?  Hemophilia,  Sickle Cell, Other?  Explain [] Yes    [] No  Surgery? (List all.)  When?  What for? [] Yes    [] No    Diabetes? [] Yes    [] No  Serious injury or illness? [] Yes    [] No    Head injury/Concussion/Passed out? [] Yes    [] No  TB skin test positive (past/present)? [] Yes    [] No *If yes, refer to local health department   Seizures?  What are they like? [] Yes    [] No  TB disease (past or present)? [] Yes    [] No    Heart problem/Shortness of breath? [] Yes    [] No  Tobacco use (type,  frequency)? [] Yes    [] No    Heart murmur/High blood pressure? [] Yes    [] No  Alcohol/Drug use? [] Yes    [] No    Dizziness or chest pain with exercise? [] Yes    [] No  Family history of sudden death  before age 50? (Cause?) [] Yes    [] No    Eye/Vision problems? [] Yes [] No  Glasses [] Contacts[] Last exam by eye doctor________ Dental    [] Braces    [] Bridge    [] Plate  []  Other:    Other concerns? (crossed eye, drooping lids, squinting, difficulty reading) Additional Information:   Ear/Hearing problems? Yes[]No[]  Information may be shared with appropriate personnel for health and education purposes.  Patent/Guardian  Signature:                                                                 Date:   Bone/Joint problem/injury/scoliosis? Yes[]No[]     IMMUNIZATIONS: To be completed by health care provider. The mo/day/yr for every dose administered is required. If a specific vaccine is medically contraindicated, a separate written statement must be attached by the health care provider responsible for completing the health examination explaining the medical reason for the contraindication.   REQUIRED  VACCINE / DOSE DATE   Diphtheria, Tetanus and Pertussis (DTP or DTap)    Tdap 2/15/2018   Td    Pediatric DT    Inactivate Polio (IPV)    Oral Polio (OPV)    Haemophilus Influenza Type B (Hib)    Hepatitis B (HB)    Varicella (Chickenpox)    Combined Measles, Mumps and Rubella (MMR)    Measles (Rubeola)    Rubella (3-day measles)    Mumps    Pneumococcal 1/31/2023   Meningococcal Conjugate      RECOMMENDED, BUT NOT REQUIRED  VACCINE / DOSE DATE DATE DATE DATE   Hepatitis A       HPV       Influenza 10/19/2016 10/6/2020 1/6/2022 9/28/2022   Men B       Covid 12/21/2020 1/9/2021 11/2/2021 9/28/2022      Health care provider (MD, DO, APN, PA, school health professional, health official) verifying above immunization history must sign below.  If adding dates to the above immunization history section, put your  initials by date(s) and sign here.      Signature   ***                                                                                                                                                                            Title______________________________________ Date 6/3/2025       Elbert Andrade  Birth Date 8/3/1985 Sex Female School Grade Level/ID# {Grade:1366}       Certificates of Worship Exemption to Immunizations or Physician Medical Statements of Medical Contraindication  are reviewed and Maintained by the School Authority.   ALTERNATIVE PROOF OF IMMUNITY   1. Clinical diagnosis (measles, mumps, hepatitis B) is allowed when verified by physician and supported with lab confirmation.  Attach copy of lab result.  *MEASLES (Rubeola) (MO/DA/YR) ____________  **MUMPS (MO/DA/YR) ____________   HEPATITIS B (MO/DA/YR) ____________   VARICELLA (MO/DA/YR) ____________   2. History of varicella (chickenpox) disease is acceptable if verified by health care provider, school health professional or health official.    Person signing below verifies that the parent/guardian’s description of varicella disease history is indicative of past infection and is accepting such history as documentation of disease.     Date of Disease:   Signature:   Title:                          3. Laboratory Evidence of Immunity (check one) [] Measles     [] Mumps      [] Rubella      [] Hepatitis B      [] Varicella      Attach copy of lab result.   * All measles cases diagnosed on or after July 1, 2002, must be confirmed by laboratory evidence.  ** All mumps cases diagnosed on or after July 1, 2013, must be confirmed by laboratory evidence.  Physician Statements of Immunity MUST be submitted to ID for review.  Completion of Alternatives 1 or 3 MUST be accompanied by Labs & Physician Signature: __________________________________________________________________     PHYSICAL EXAMINATION REQUIREMENTS     Entire section below to be  completed by MD//MEMON/PA   /70   Pulse 80   Resp 16   Ht 5' 6.38\" (1.686 m)   Wt 174 lb 6 oz (79.1 kg)   SpO2 98%   BMI 27.82 kg/m²  Normalized BMI data available only for age 0 to 20 years.   DIABETES SCREENING: (NOT REQUIRED FOR DAY CARE)  BMI>85% age/sex {Yes/No No default:585}  And any two of the following: Family History {Yes/No No default:585}  Ethnic Minority {Yes/No No default:585} Signs of Insulin Resistance (hypertension, dyslipidemia, polycystic ovarian syndrome, acanthosis nigricans) {Yes/No No default:585} At Risk {Yes/No No default:585}      LEAD RISK QUESTIONNAIRE: Required for children aged 6 months through 6 years enrolled in licensed or public-school operated day care, , nursery school and/or . (Blood test required if resides in [place] or high-risk zip code.)  Questionnaire Administered?  {Yes/No:829}               Blood Test Indicated?  {NO:830}                Blood Test Date: _________________    Result: _____________________   TB SKIN OR BLOOD TEST: Recommended only for children in high-risk groups including children immunosuppressed due to HIV infection or other conditions, frequent travel to or born in high prevalence countries or those exposed to adults in high-risk categories. See CDC guidelines. http://www.cdc.gov/tb/publications/factsheets/testing/TB_testing.htm  {DMG_TB_SKIN_TEST:1380}   Skin test:   Date Read ___________________  Result {POS_NE::\"      \"}     mm ___________                                                      Blood Test:   Date Reported: ____________________ Result: {POS_NE::\"      \"}     Value ______________     LAB TESTS (Recommended) Date Results Screenings Date Results   Hemoglobin or Hematocrit   Developmental Screening  [] Completed  [] N/A   Urinalysis   Social and Emotional Screening  [] Completed  [] N/A   Sickle Cell (when indicated)   Other:       SYSTEM REVIEW Normal Comments/Follow-up/Needs SYSTEM REVIEW Normal  Comments/Follow-up/Needs   Skin {Yes/No:829}  Endocrine {Yes/No:829}    Ears {Yes/No:829}                                           Screening Result: Gastrointestinal {Yes/No:829}    Eyes {Yes/No:829}                                           Screening Result: Genito-Urinary {Yes/No:829}                                                      LMP: Patient's last menstrual period was 04/15/2025 (approximate).   Nose {Yes/No:829}  Neurological {Yes/No:829}    Throat {Yes/No:829}  Musculoskeletal {Yes/No:829}    Mouth/Dental {Yes/No:829}  Spinal Exam {Yes/No:829}    Cardiovascular/HTN {Yes/No:829}  Nutritional Status {Yes/No:829}    Respiratory {Yes/No:829}  Mental Health {Yes/No:829}    Currently Prescribed Asthma Medication:           Quick-relief  medication (e.g. Short Acting Beta Antagonist): {NO:830}          Controller medication (e.g. inhaled corticosteroid):   {NO:830} Other     NEEDS/MODIFICATIONS: required in the school setting: {DMG_NONE:1367}   DIETARY Needs/Restrictions: {DMG_NONE:1367}   SPECIAL INSTRUCTIONS/DEVICES e.g., safety glasses, glass eye, chest protector for arrhythmia, pacemaker, prosthetic device, dental bridge, false teeth, athletic support/cup)  {DMG_NONE:1367}   MENTAL HEALTH/OTHER Is there anything else the school should know about this student? {NO:830}  If you would like to discuss this student's health with school or school health personnel, check title: [] Nurse  [] Teacher  [] Counselor  [] Principal   EMERGENCY ACTION PLAN: needed while at school due to child's health condition (e.g., seizures, asthma, insect sting, food, peanut allergy, bleeding problem, diabetes, heart problem?  {NO:830}  If yes, please describe:   On the basis of the examination on this day, I approve this child's participation in                                        (If No or Modified please attach explanation.)  PHYSICAL EDUCATION   {DMG_Y/N/MODIFIED:1369}                    INTERSCHOLASTIC SPORTS  {BLANK,  Y/N/MODIFIED:1483::yes}     Print Name: Clive St MD                                                                                              Signature: ***                                                                            Date: 6/3/2025    Address: [address] [place] [address]  [address], [address]                                                                                                                                              Phone: [phone]

## (undated) NOTE — LETTER
24    Patient: Oluwaseun Oluwadamilola Ogunyemi  : 8/3/1985 Visit date: 2024    Dear  Clive St MD    Thank you for referring Oluwaseun Oluwadamilola Ogunyemi to my practice.  Please find my assessment and plan below.        I saw Skye in the office, she does have cholelithiasis.  I do not believe she is symptomatic from the gallstones however due to her history of sickle cell disease I would recommend cholecystectomy as this is one of the indications for cholecystectomy in an asymptomatic patient.  I have discussed this with Skye.  She is considering.  Thank you Clement  Sincerely,       Mahin Denis DO   CC: No Recipients

## (undated) NOTE — LETTER
[facility]  [address] [facility] [address]  [phone]    Blood Transfusion Consent    In the course of your treatment, it may become necessary to administer a transfusion of blood or blood components. This form provides basic information concerning this procedure and, if signed by you, authorizes its administration. By signing this form, you agree that all of your questions about the administration of blood or blood products have been answered by the ordering medical professional or designee.    Description of Procedure  Blood is introduced into one of your veins, commonly in the arm, using a sterilized disposable needle. The amount of blood transfused, and whether the transfusion will be of blood or blood components is a judgement the physician will make based on your particular needs.    Risks  The transfusion is a common procedure of low risk.  MINOR AND TEMPORARY REACTIONS ARE NOT UNCOMMON, including a slight bruise, swelling or local reaction in the area where the needle pierces your skin, or a nonserious reaction to the transfused material itself, including headache, fever or mild skin reaction, such as rash.  Serious reactions are possible, though very unlikely, and include severe allergic reaction (shock) and destruction (hemolysis) of transfused blood cells.  Infectious diseases which are known to be transmitted by blood transfusion include certain types of viral Hepatitis(liver infection from a virus), Human Immunodeficiency Virus (HIV-1,2) infection, a viral infection known to cause Acquired Immunodeficiency Syndrome (AIDS), as well as certain other bacterial, viral, and parasitic diseases. While a minimal risk of acquiring an infectious disease from transfused blood exists, in accordance with the Federal and State law, all due care has been taken in donor selection and testing to avoid transmission of disease.    Alternatives  If loss of blood poses serious threats during your  treatment, THERE IS NO EFFECTIVE ALTERNATIVE TO BLOOD TRANSFUSION. However, if you have any further questions on this matter, your provider will fully explain the alternatives to you if it has not already been done.    I, ______________________________, have read/had read to me the above. I understand the matters bearing on the decision whether or not to authorize a transfusion of blood or blood components. I have no questions which have not been answered to my full satisfaction. I hereby consent to such transfusion as my physician may deem necessary or advisable in the course of my treatment.    ______________________________________________                    ___________________________  (Signature of Patient or Responsible party in case of minor,                 (Printed Name of Patient or incompetent, or unconscious patient)              Responsible Party)    ___________________________               _____________________  (Relationship to Patient if not self)                                    (Date and Time)    __________________________                                                           ______________________              (Signature of Witness)               (Printed Name of Witness)     Language line ()    Telephone/Verbal/Video Consent    __________________________                     ____________________  (Signature of 2nd Witness           (Printed Name of 2nd  Telephone/Verbal/Video Consent)           Witness)    Patient Name: Oluwaseun Oluwadamilola Ogunyemi     : 8/3/1985                 Printed: May 25, 2025     Medical Record #: [id number]      Rev: 2023